# Patient Record
Sex: MALE | Race: WHITE | NOT HISPANIC OR LATINO | Employment: OTHER | URBAN - METROPOLITAN AREA
[De-identification: names, ages, dates, MRNs, and addresses within clinical notes are randomized per-mention and may not be internally consistent; named-entity substitution may affect disease eponyms.]

---

## 2018-11-26 LAB
CEA: NORMAL NG/ML
COMPLEXED PSA SERPL-MCNC: 5.2 NG/ML (ref 0–3)
FERRITIN SERPL-MCNC: 1158 NG/ML (ref 37–201)
HIV 1/2 EIA AB SCREEN: NEGATIVE

## 2018-11-27 LAB — HAPTOGLOBIN: 469 MG/DL (ref 34–200)

## 2018-11-28 LAB
ANA SER-ACNC: NEGATIVE
FLOW CYTOMETRY ANTIBODIES ANALYZED: NORMAL
MISCELLANEOUS LAB TEST ORDER: NORMAL

## 2018-12-11 ENCOUNTER — TELEPHONE (OUTPATIENT)
Dept: HEMATOLOGY/ONCOLOGY | Facility: CLINIC | Age: 58
End: 2018-12-11

## 2018-12-11 NOTE — TELEPHONE ENCOUNTER
----- Message from Dalila Hernandez sent at 12/11/2018  3:52 PM CST -----  Type: Needs Medical Advice    Who Called: Friend-Fallon Lima    Best Call Back Number: [phone]  Additional Information: Stating Dr. Moran office faxed over referral information to have the patient scheduled with Dr. Rich. Wanted to verify if received and to schedule if so

## 2018-12-11 NOTE — TELEPHONE ENCOUNTER
Spoke to Fallon Lima and informed her that we have not received the faxed information for a referral she's inquiring about. Mrs Lima stated staff at Dr Moran's office and was told that it had been send. Mrs Lima instructed to call the office again and have the information resend to .

## 2018-12-12 ENCOUNTER — TELEPHONE (OUTPATIENT)
Dept: HEMATOLOGY/ONCOLOGY | Facility: CLINIC | Age: 58
End: 2018-12-12

## 2018-12-22 ENCOUNTER — OUTSIDE PLACE OF SERVICE (OUTPATIENT)
Dept: HEMATOLOGY/ONCOLOGY | Facility: CLINIC | Age: 58
End: 2018-12-22
Payer: MEDICAID

## 2018-12-22 PROCEDURE — 99233 SBSQ HOSP IP/OBS HIGH 50: CPT | Mod: ,,, | Performed by: INTERNAL MEDICINE

## 2018-12-23 ENCOUNTER — OUTSIDE PLACE OF SERVICE (OUTPATIENT)
Dept: HEMATOLOGY/ONCOLOGY | Facility: CLINIC | Age: 58
End: 2018-12-23
Payer: MEDICAID

## 2018-12-23 PROCEDURE — 99233 SBSQ HOSP IP/OBS HIGH 50: CPT | Mod: ,,, | Performed by: INTERNAL MEDICINE

## 2018-12-23 PROCEDURE — 99233 PR SUBSEQUENT HOSPITAL CARE,LEVL III: ICD-10-PCS | Mod: ,,, | Performed by: INTERNAL MEDICINE

## 2018-12-24 ENCOUNTER — OUTSIDE PLACE OF SERVICE (OUTPATIENT)
Dept: HEMATOLOGY/ONCOLOGY | Facility: CLINIC | Age: 58
End: 2018-12-24
Payer: MEDICAID

## 2018-12-24 LAB
APTT PPP: 40.7 SEC (ref 26.2–34.7)
INR PPP: 1.6
LDH SERPL L TO P-CCNC: 278 IU/L (ref 100–194)
PROTHROMBIN TIME: 18.8 SEC (ref 11.7–14)
URATE SERPL-MCNC: 1.9 MG/DL (ref 2.6–7.8)

## 2018-12-24 PROCEDURE — 99233 PR SUBSEQUENT HOSPITAL CARE,LEVL III: ICD-10-PCS | Mod: ,,, | Performed by: INTERNAL MEDICINE

## 2018-12-24 PROCEDURE — 99233 SBSQ HOSP IP/OBS HIGH 50: CPT | Mod: ,,, | Performed by: INTERNAL MEDICINE

## 2018-12-26 ENCOUNTER — TELEPHONE (OUTPATIENT)
Dept: HEMATOLOGY/ONCOLOGY | Facility: CLINIC | Age: 58
End: 2018-12-26

## 2018-12-26 LAB
ALBUMIN SERPL-MCNC: 1.9 G/DL (ref 3.1–4.7)
ALP SERPL-CCNC: 212 IU/L (ref 40–104)
ALT (SGPT): 18 IU/L (ref 3–33)
APTT PPP: 41.6 SEC (ref 26.2–34.7)
AST SERPL-CCNC: 21 IU/L (ref 10–40)
BASOPHILS NFR BLD: 0 K/UL (ref 0–0.2)
BASOPHILS NFR BLD: 0.1 %
BILIRUB SERPL-MCNC: 1.5 MG/DL (ref 0.3–1)
BUN SERPL-MCNC: 18 MG/DL (ref 8–20)
CALCIUM SERPL-MCNC: 8.3 MG/DL (ref 7.7–10.4)
CHLORIDE: 102 MMOL/L (ref 98–110)
CO2 SERPL-SCNC: 25.3 MMOL/L (ref 22.8–31.6)
CREATININE: 0.7 MG/DL (ref 0.6–1.4)
EOSINOPHIL NFR BLD: 0.2 K/UL (ref 0–0.7)
EOSINOPHIL NFR BLD: 1.3 %
ERYTHROCYTE [DISTWIDTH] IN BLOOD BY AUTOMATED COUNT: 21.6 % (ref 11.7–14.9)
FIBRINOGEN ANTIGEN: 596 MG/DL (ref 162–449)
GLUCOSE: 114 MG/DL (ref 70–99)
GRAN #: 10.4 K/UL (ref 1.4–6.5)
GRAN%: 88.4 %
HCT VFR BLD AUTO: 28.3 % (ref 39–55)
HGB BLD-MCNC: 9 G/DL (ref 14–16)
IMMATURE GRANS (ABS): 0.3 K/UL (ref 0–1)
IMMATURE GRANULOCYTES: 2.7 %
INR PPP: 1.7
LDH SERPL L TO P-CCNC: 249 IU/L (ref 100–194)
LYMPH #: 0.7 K/UL (ref 1.2–3.4)
LYMPH%: 6.1 %
MCH RBC QN AUTO: 25.4 PG (ref 25–35)
MCHC RBC AUTO-ENTMCNC: 31.8 G/DL (ref 31–36)
MCV RBC AUTO: 79.9 FL (ref 80–100)
MONO #: 0.2 K/UL (ref 0.1–0.6)
MONO%: 1.4 %
NUCLEATED RBCS: 2 %
PLATELET # BLD AUTO: 107 K/UL (ref 140–440)
PMV BLD AUTO: 9.7 FL (ref 8.8–12.7)
POTASSIUM SERPL-SCNC: 3.1 MMOL/L (ref 3.5–5)
PROT SERPL-MCNC: 5.5 G/DL (ref 6–8.2)
PROTHROMBIN TIME: 19.7 SEC (ref 11.7–14)
RBC # BLD AUTO: 3.54 M/UL (ref 4.3–5.9)
SODIUM: 138 MMOL/L (ref 134–144)
URATE SERPL-MCNC: 2.1 MG/DL (ref 2.6–7.8)
WBC # BLD AUTO: 11.8 K/UL (ref 5–10)

## 2018-12-27 ENCOUNTER — OUTSIDE PLACE OF SERVICE (OUTPATIENT)
Dept: HEMATOLOGY/ONCOLOGY | Facility: CLINIC | Age: 58
End: 2018-12-27
Payer: MEDICAID

## 2018-12-27 LAB
ALBUMIN SERPL-MCNC: 2 G/DL (ref 3.1–4.7)
ALP SERPL-CCNC: 252 IU/L (ref 40–104)
ALT (SGPT): 18 IU/L (ref 3–33)
AST SERPL-CCNC: 19 IU/L (ref 10–40)
BASOPHILS NFR BLD: 0 K/UL (ref 0–0.2)
BASOPHILS NFR BLD: 0.2 %
BILIRUB SERPL-MCNC: 1.1 MG/DL (ref 0.3–1)
BUN SERPL-MCNC: 17 MG/DL (ref 8–20)
CALCIUM SERPL-MCNC: 8.7 MG/DL (ref 7.7–10.4)
CHLORIDE: 100 MMOL/L (ref 98–110)
CO2 SERPL-SCNC: 26.2 MMOL/L (ref 22.8–31.6)
CREATININE: 0.76 MG/DL (ref 0.6–1.4)
EOSINOPHIL NFR BLD: 0.1 K/UL (ref 0–0.7)
EOSINOPHIL NFR BLD: 1.1 %
ERYTHROCYTE [DISTWIDTH] IN BLOOD BY AUTOMATED COUNT: 22.3 % (ref 11.7–14.9)
GLUCOSE: 140 MG/DL (ref 70–99)
GRAN #: 10.7 K/UL (ref 1.4–6.5)
GRAN%: 89 %
HCT VFR BLD AUTO: 30.8 % (ref 39–55)
HGB BLD-MCNC: 9.6 G/DL (ref 14–16)
IMMATURE GRANS (ABS): 0.3 K/UL (ref 0–1)
IMMATURE GRANULOCYTES: 2.7 %
LYMPH #: 0.6 K/UL (ref 1.2–3.4)
LYMPH%: 5.2 %
MCH RBC QN AUTO: 24.9 PG (ref 25–35)
MCHC RBC AUTO-ENTMCNC: 31.2 G/DL (ref 31–36)
MCV RBC AUTO: 79.8 FL (ref 80–100)
MONO #: 0.2 K/UL (ref 0.1–0.6)
MONO%: 1.8 %
NUCLEATED RBCS: 3 %
PLATELET # BLD AUTO: 122 K/UL (ref 140–440)
PMV BLD AUTO: 9.4 FL (ref 8.8–12.7)
POTASSIUM SERPL-SCNC: 3.7 MMOL/L (ref 3.5–5)
PROT SERPL-MCNC: 5.9 G/DL (ref 6–8.2)
RBC # BLD AUTO: 3.86 M/UL (ref 4.3–5.9)
SODIUM: 136 MMOL/L (ref 134–144)
WBC # BLD AUTO: 12 K/UL (ref 5–10)

## 2018-12-27 PROCEDURE — 99233 PR SUBSEQUENT HOSPITAL CARE,LEVL III: ICD-10-PCS | Mod: ,,, | Performed by: INTERNAL MEDICINE

## 2018-12-27 PROCEDURE — 99233 SBSQ HOSP IP/OBS HIGH 50: CPT | Mod: ,,, | Performed by: INTERNAL MEDICINE

## 2018-12-28 ENCOUNTER — OUTSIDE PLACE OF SERVICE (OUTPATIENT)
Dept: HEMATOLOGY/ONCOLOGY | Facility: CLINIC | Age: 58
End: 2018-12-28
Payer: MEDICAID

## 2018-12-28 ENCOUNTER — TELEPHONE (OUTPATIENT)
Dept: HEMATOLOGY/ONCOLOGY | Facility: CLINIC | Age: 58
End: 2018-12-28

## 2018-12-28 PROCEDURE — 99232 SBSQ HOSP IP/OBS MODERATE 35: CPT | Mod: ,,, | Performed by: INTERNAL MEDICINE

## 2018-12-28 PROCEDURE — 99232 PR SUBSEQUENT HOSPITAL CARE,LEVL II: ICD-10-PCS | Mod: ,,, | Performed by: INTERNAL MEDICINE

## 2018-12-28 NOTE — TELEPHONE ENCOUNTER
----- Message from Patty Edmond sent at 12/28/2018 12:49 PM CST -----  Contact: [facility]  Dr. Patel is trying to get in touch with Dr. Rich regarding this patient.  Please call [phone].  He states this is urgent.

## 2018-12-31 ENCOUNTER — TELEPHONE (OUTPATIENT)
Dept: HEMATOLOGY/ONCOLOGY | Facility: CLINIC | Age: 58
End: 2018-12-31

## 2018-12-31 NOTE — TELEPHONE ENCOUNTER
Called and spoke to pt daughter eliseo. She stated that she lives out of town and is the only relative available to help out with her father.Eliseo stated that pt is currently admitted in [facility]. I informed eliseo that she is not on patients plan of care so I cannot speak to her about pt health or concerns. I informed pt that I will contact her Wednesday when the office opens to discuss what she needs to do to be put on pt plan of care. Eliseo stated that pt is confused so he cant verbally say its ok for her to speak about his care. Eliseo requested to speak with the Dr. I informed eliseo that I will speak with Dr. Rich Wednesday and give her a call back. Eliseo verbalized understanding.

## 2018-12-31 NOTE — TELEPHONE ENCOUNTER
----- Message from Quynh Hoyt sent at 12/31/2018 12:45 PM CST -----  Contact: daughter  Daughter - Dottie Coker - [phone] is calling/patient is in [facility] at this time under the care of Dr Rich/daughter lives about 10 hours away and is the only living relative for patient/daughter has been calling for at least four days trying to talk to Dr Rich but she has not received any return call/she is asking for Dr Rich to please call her concerning her father/also daughter is asking for a recommendation for assisted living/please call daughter as soon as possible

## 2019-01-04 ENCOUNTER — TELEPHONE (OUTPATIENT)
Dept: HEMATOLOGY/ONCOLOGY | Facility: CLINIC | Age: 59
End: 2019-01-04

## 2019-01-04 NOTE — TELEPHONE ENCOUNTER
Nicole from [employer] called to give a corrected Aptt result. Initially it was 31.2 but the correct result is a clotted sample.

## 2019-01-08 ENCOUNTER — OUTSIDE PLACE OF SERVICE (OUTPATIENT)
Dept: HEMATOLOGY/ONCOLOGY | Facility: CLINIC | Age: 59
End: 2019-01-08
Payer: MEDICAID

## 2019-01-08 ENCOUNTER — TELEPHONE (OUTPATIENT)
Dept: HEMATOLOGY/ONCOLOGY | Facility: CLINIC | Age: 59
End: 2019-01-08

## 2019-01-08 DIAGNOSIS — C81.18 NODULAR SCLEROSIS HODGKIN LYMPHOMA OF LYMPH NODES OF MULTIPLE REGIONS: ICD-10-CM

## 2019-01-08 PROCEDURE — 99233 SBSQ HOSP IP/OBS HIGH 50: CPT | Mod: ,,, | Performed by: INTERNAL MEDICINE

## 2019-01-08 PROCEDURE — 99233 PR SUBSEQUENT HOSPITAL CARE,LEVL III: ICD-10-PCS | Mod: ,,, | Performed by: INTERNAL MEDICINE

## 2019-01-08 RX ORDER — SODIUM CHLORIDE 0.9 % (FLUSH) 0.9 %
10 SYRINGE (ML) INJECTION
Status: CANCELLED | OUTPATIENT
Start: 2019-01-22

## 2019-01-08 RX ORDER — HEPARIN 100 UNIT/ML
500 SYRINGE INTRAVENOUS
Status: CANCELLED | OUTPATIENT
Start: 2019-01-22

## 2019-01-08 RX ORDER — SODIUM CHLORIDE 0.9 % (FLUSH) 0.9 %
10 SYRINGE (ML) INJECTION
Status: CANCELLED | OUTPATIENT
Start: 2019-01-08

## 2019-01-08 RX ORDER — HEPARIN 100 UNIT/ML
500 SYRINGE INTRAVENOUS
Status: CANCELLED | OUTPATIENT
Start: 2019-01-08

## 2019-01-08 RX ORDER — DOXORUBICIN HYDROCHLORIDE 2 MG/ML
25 INJECTION, SOLUTION INTRAVENOUS
Status: CANCELLED | OUTPATIENT
Start: 2019-01-08

## 2019-01-08 RX ORDER — DOXORUBICIN HYDROCHLORIDE 2 MG/ML
25 INJECTION, SOLUTION INTRAVENOUS
Status: CANCELLED | OUTPATIENT
Start: 2019-01-22

## 2019-01-08 NOTE — TELEPHONE ENCOUNTER
----- Message from Asiya Gibson sent at 1/8/2019  1:02 PM CST -----  Contact: olivia brown/ [facility] ph#[phone]  olivia brown/ [facility] ph#[phone]  Requesting clarification of chemo orders

## 2019-01-09 ENCOUNTER — TELEPHONE (OUTPATIENT)
Dept: HEMATOLOGY/ONCOLOGY | Facility: CLINIC | Age: 59
End: 2019-01-09

## 2019-01-09 NOTE — TELEPHONE ENCOUNTER
----- Message from Kalyn Matias LPN sent at 1/8/2019  4:22 PM CST -----  Contact: olivia brown/ [facility] ph#[phone]       ----- Message -----  From: Asiya Gibson  Sent: 1/8/2019   3:13 PM  To: Hilario REEVES Staff    [phone]   Requesting clarification of chemo orders, treatment plan   Fax#[phone]

## 2019-01-09 NOTE — TELEPHONE ENCOUNTER
----- Message from Abdias Romeo LPN sent at 1/8/2019  4:12 PM CST -----  Contact: olivia brown/ [facility] ph#[phone]       ----- Message -----  From: Asiya Gibson  Sent: 1/8/2019   3:13 PM  To: Hilario REEVES Staff    olivia brown/ [facility] ph#[phone]   Requesting clarification of chemo orders, treatment plan   Fax#[phone]

## 2019-01-10 ENCOUNTER — OUTSIDE PLACE OF SERVICE (OUTPATIENT)
Dept: HEMATOLOGY/ONCOLOGY | Facility: CLINIC | Age: 59
End: 2019-01-10
Payer: MEDICAID

## 2019-01-10 PROCEDURE — 99232 PR SUBSEQUENT HOSPITAL CARE,LEVL II: ICD-10-PCS | Mod: ,,, | Performed by: INTERNAL MEDICINE

## 2019-01-10 PROCEDURE — 99232 SBSQ HOSP IP/OBS MODERATE 35: CPT | Mod: ,,, | Performed by: INTERNAL MEDICINE

## 2019-01-14 DIAGNOSIS — E61.1 IRON DEFICIENCY: Primary | ICD-10-CM

## 2019-01-15 ENCOUNTER — OUTSIDE PLACE OF SERVICE (OUTPATIENT)
Dept: HEMATOLOGY/ONCOLOGY | Facility: CLINIC | Age: 59
End: 2019-01-15
Payer: MEDICAID

## 2019-01-15 PROCEDURE — 99231 PR SUBSEQUENT HOSPITAL CARE,LEVL I: ICD-10-PCS | Mod: ,,, | Performed by: INTERNAL MEDICINE

## 2019-01-15 PROCEDURE — 99231 SBSQ HOSP IP/OBS SF/LOW 25: CPT | Mod: ,,, | Performed by: INTERNAL MEDICINE

## 2019-01-16 ENCOUNTER — OUTSIDE PLACE OF SERVICE (OUTPATIENT)
Dept: HEMATOLOGY/ONCOLOGY | Facility: CLINIC | Age: 59
End: 2019-01-16
Payer: MEDICAID

## 2019-01-16 PROCEDURE — 99233 PR SUBSEQUENT HOSPITAL CARE,LEVL III: ICD-10-PCS | Mod: ,,, | Performed by: INTERNAL MEDICINE

## 2019-01-16 PROCEDURE — 99233 SBSQ HOSP IP/OBS HIGH 50: CPT | Mod: ,,, | Performed by: INTERNAL MEDICINE

## 2019-01-18 ENCOUNTER — OUTSIDE PLACE OF SERVICE (OUTPATIENT)
Dept: HEMATOLOGY/ONCOLOGY | Facility: CLINIC | Age: 59
End: 2019-01-18
Payer: MEDICAID

## 2019-01-18 PROCEDURE — 99233 SBSQ HOSP IP/OBS HIGH 50: CPT | Mod: ,,, | Performed by: INTERNAL MEDICINE

## 2019-01-18 PROCEDURE — 99233 PR SUBSEQUENT HOSPITAL CARE,LEVL III: ICD-10-PCS | Mod: ,,, | Performed by: INTERNAL MEDICINE

## 2019-01-20 LAB
ALBUMIN SERPL-MCNC: 1.7 G/DL (ref 3.1–4.7)
ALP SERPL-CCNC: 175 IU/L (ref 40–104)
ALT (SGPT): 10 IU/L (ref 3–33)
AST SERPL-CCNC: 32 IU/L (ref 10–40)
BILIRUB SERPL-MCNC: 1.3 MG/DL (ref 0.3–1)
BUN SERPL-MCNC: 15 MG/DL (ref 8–20)
CALCIUM SERPL-MCNC: 7.2 MG/DL (ref 7.7–10.4)
CHLORIDE: 103 MMOL/L (ref 98–110)
CO2 SERPL-SCNC: 27.9 MMOL/L (ref 22.8–31.6)
CREATININE: 0.49 MG/DL (ref 0.6–1.4)
GLUCOSE: 106 MG/DL (ref 70–99)
MAGNESIUM SERPL-MCNC: 1.6 MG/DL (ref 1.5–2.6)
PHOSPHATE FLD-MCNC: 3.3 MG/DL (ref 2.5–4.9)
POTASSIUM SERPL-SCNC: 3.4 MMOL/L (ref 3.5–5)
PROT SERPL-MCNC: 4.5 G/DL (ref 6–8.2)
SODIUM: 138 MMOL/L (ref 134–144)

## 2019-01-21 ENCOUNTER — OUTSIDE PLACE OF SERVICE (OUTPATIENT)
Dept: HEMATOLOGY/ONCOLOGY | Facility: CLINIC | Age: 59
End: 2019-01-21
Payer: MEDICAID

## 2019-01-21 LAB
ALBUMIN SERPL-MCNC: 1.7 G/DL (ref 3.1–4.7)
ALP SERPL-CCNC: 187 IU/L (ref 40–104)
ALT (SGPT): 10 IU/L (ref 3–33)
AST SERPL-CCNC: 15 IU/L (ref 10–40)
BILIRUB SERPL-MCNC: 1.1 MG/DL (ref 0.3–1)
BUN SERPL-MCNC: 14 MG/DL (ref 8–20)
CALCIUM SERPL-MCNC: 7.8 MG/DL (ref 7.7–10.4)
CHLORIDE: 105 MMOL/L (ref 98–110)
CO2 SERPL-SCNC: 28.9 MMOL/L (ref 22.8–31.6)
CREATININE: 0.51 MG/DL (ref 0.6–1.4)
GLUCOSE: 99 MG/DL (ref 70–99)
MAGNESIUM SERPL-MCNC: 1.9 MG/DL (ref 1.5–2.6)
PHOSPHATE FLD-MCNC: 3.1 MG/DL (ref 2.5–4.9)
POTASSIUM SERPL-SCNC: 3.4 MMOL/L (ref 3.5–5)
PROT SERPL-MCNC: 4.3 G/DL (ref 6–8.2)
SODIUM: 141 MMOL/L (ref 134–144)

## 2019-01-21 PROCEDURE — 99233 SBSQ HOSP IP/OBS HIGH 50: CPT | Mod: ,,, | Performed by: INTERNAL MEDICINE

## 2019-01-21 PROCEDURE — 99233 PR SUBSEQUENT HOSPITAL CARE,LEVL III: ICD-10-PCS | Mod: ,,, | Performed by: INTERNAL MEDICINE

## 2019-01-22 ENCOUNTER — OUTSIDE PLACE OF SERVICE (OUTPATIENT)
Dept: HEMATOLOGY/ONCOLOGY | Facility: CLINIC | Age: 59
End: 2019-01-22
Payer: MEDICAID

## 2019-01-22 LAB
ALBUMIN SERPL-MCNC: 1.9 G/DL (ref 3.1–4.7)
ALP SERPL-CCNC: 209 IU/L (ref 40–104)
ALT (SGPT): 13 IU/L (ref 3–33)
AST SERPL-CCNC: 21 IU/L (ref 10–40)
BASOPHILS NFR BLD: 0 K/UL (ref 0–0.2)
BASOPHILS NFR BLD: 0.3 %
BILIRUB SERPL-MCNC: 1.4 MG/DL (ref 0.3–1)
BUN SERPL-MCNC: 14 MG/DL (ref 8–20)
CALCIUM SERPL-MCNC: 8.1 MG/DL (ref 7.7–10.4)
CHLORIDE: 103 MMOL/L (ref 98–110)
CO2 SERPL-SCNC: 28.7 MMOL/L (ref 22.8–31.6)
CREATININE: 0.64 MG/DL (ref 0.6–1.4)
EOSINOPHIL NFR BLD: 0 K/UL (ref 0–0.7)
EOSINOPHIL NFR BLD: 0.1 %
ERYTHROCYTE [DISTWIDTH] IN BLOOD BY AUTOMATED COUNT: 20.6 % (ref 11.7–14.9)
GLUCOSE: 97 MG/DL (ref 70–99)
GRAN #: 7 K/UL (ref 1.4–6.5)
GRAN%: 57.9 %
HCT VFR BLD AUTO: 27.8 % (ref 39–55)
HGB BLD-MCNC: 9 G/DL (ref 14–16)
IMMATURE GRANS (ABS): 2.3 K/UL (ref 0–1)
IMMATURE GRANULOCYTES: 19.2 %
IMMATURE PLATELET FRACTION: 4 % (ref 0.5–7.5)
LYMPH #: 1.8 K/UL (ref 1.2–3.4)
LYMPH%: 15 %
MAGNESIUM SERPL-MCNC: 1.9 MG/DL (ref 1.5–2.6)
MCH RBC QN AUTO: 26.9 PG (ref 25–35)
MCHC RBC AUTO-ENTMCNC: 32.4 G/DL (ref 31–36)
MCV RBC AUTO: 83.2 FL (ref 80–100)
MONO #: 0.9 K/UL (ref 0.1–0.6)
MONO%: 7.5 %
NUCLEATED RBCS: 4 %
PHOSPHATE FLD-MCNC: 3.4 MG/DL (ref 2.5–4.9)
PLATELET # BLD AUTO: 88 K/UL (ref 140–440)
PMV BLD AUTO: 10.5 FL (ref 8.8–12.7)
POTASSIUM SERPL-SCNC: 3.8 MMOL/L (ref 3.5–5)
PROT SERPL-MCNC: 4.7 G/DL (ref 6–8.2)
RBC # BLD AUTO: 3.34 M/UL (ref 4.3–5.9)
SODIUM: 139 MMOL/L (ref 134–144)
WBC # BLD AUTO: 12 K/UL (ref 5–10)

## 2019-01-22 PROCEDURE — 99233 PR SUBSEQUENT HOSPITAL CARE,LEVL III: ICD-10-PCS | Mod: ,,, | Performed by: INTERNAL MEDICINE

## 2019-01-22 PROCEDURE — 99233 SBSQ HOSP IP/OBS HIGH 50: CPT | Mod: ,,, | Performed by: INTERNAL MEDICINE

## 2019-01-23 ENCOUNTER — OUTSIDE PLACE OF SERVICE (OUTPATIENT)
Dept: HEMATOLOGY/ONCOLOGY | Facility: CLINIC | Age: 59
End: 2019-01-23
Payer: MEDICAID

## 2019-01-23 PROCEDURE — 99233 PR SUBSEQUENT HOSPITAL CARE,LEVL III: ICD-10-PCS | Mod: ,,, | Performed by: INTERNAL MEDICINE

## 2019-01-23 PROCEDURE — 99233 SBSQ HOSP IP/OBS HIGH 50: CPT | Mod: ,,, | Performed by: INTERNAL MEDICINE

## 2019-01-24 ENCOUNTER — OUTSIDE PLACE OF SERVICE (OUTPATIENT)
Dept: HEMATOLOGY/ONCOLOGY | Facility: CLINIC | Age: 59
End: 2019-01-24

## 2019-01-24 LAB
BASOPHILS NFR BLD: 0.1 K/UL (ref 0–0.2)
BASOPHILS NFR BLD: 0.7 %
BUN SERPL-MCNC: 14 MG/DL (ref 8–20)
CALCIUM SERPL-MCNC: 7.8 MG/DL (ref 7.7–10.4)
CHLORIDE: 106 MMOL/L (ref 98–110)
CO2 SERPL-SCNC: 28.2 MMOL/L (ref 22.8–31.6)
CREATININE: 0.49 MG/DL (ref 0.6–1.4)
EOSINOPHIL NFR BLD: 0 %
EOSINOPHIL NFR BLD: 0 K/UL (ref 0–0.7)
ERYTHROCYTE [DISTWIDTH] IN BLOOD BY AUTOMATED COUNT: 20.4 % (ref 11.7–14.9)
GLUCOSE: 90 MG/DL (ref 70–99)
GRAN #: 9.5 K/UL (ref 1.4–6.5)
GRAN%: 56.9 %
HCT VFR BLD AUTO: 25.3 % (ref 39–55)
HGB BLD-MCNC: 8 G/DL (ref 14–16)
IMMATURE GRANS (ABS): 2.7 K/UL (ref 0–1)
IMMATURE GRANULOCYTES: 16.1 %
LYMPH #: 3.3 K/UL (ref 1.2–3.4)
LYMPH%: 20 %
MAGNESIUM SERPL-MCNC: 1.9 MG/DL (ref 1.5–2.6)
MCH RBC QN AUTO: 26.1 PG (ref 25–35)
MCHC RBC AUTO-ENTMCNC: 31.6 G/DL (ref 31–36)
MCV RBC AUTO: 82.7 FL (ref 80–100)
MONO #: 1 K/UL (ref 0.1–0.6)
MONO%: 6.3 %
NUCLEATED RBCS: 5 %
PHOSPHATE FLD-MCNC: 3.4 MG/DL (ref 2.5–4.9)
PLATELET # BLD AUTO: 70 K/UL (ref 140–440)
PMV BLD AUTO: 9.6 FL (ref 8.8–12.7)
POTASSIUM SERPL-SCNC: 3.3 MMOL/L (ref 3.5–5)
RBC # BLD AUTO: 3.06 M/UL (ref 4.3–5.9)
SODIUM: 141 MMOL/L (ref 134–144)
WBC # BLD AUTO: 16.6 K/UL (ref 5–10)

## 2019-01-25 ENCOUNTER — OUTSIDE PLACE OF SERVICE (OUTPATIENT)
Dept: HEMATOLOGY/ONCOLOGY | Facility: CLINIC | Age: 59
End: 2019-01-25
Payer: MEDICAID

## 2019-01-25 LAB
BASOPHILS NFR BLD: 0.1 K/UL (ref 0–0.2)
BASOPHILS NFR BLD: 0.5 %
BUN SERPL-MCNC: 12 MG/DL (ref 8–20)
CALCIUM SERPL-MCNC: 7.9 MG/DL (ref 7.7–10.4)
CHLORIDE: 107 MMOL/L (ref 98–110)
CO2 SERPL-SCNC: 27.5 MMOL/L (ref 22.8–31.6)
CREATININE: 0.48 MG/DL (ref 0.6–1.4)
EOSINOPHIL NFR BLD: 0 %
EOSINOPHIL NFR BLD: 0 K/UL (ref 0–0.7)
ERYTHROCYTE [DISTWIDTH] IN BLOOD BY AUTOMATED COUNT: 20.8 % (ref 11.7–14.9)
GLUCOSE: 91 MG/DL (ref 70–99)
GRAN #: 10.5 K/UL (ref 1.4–6.5)
GRAN%: 53.6 %
HCT VFR BLD AUTO: 26 % (ref 39–55)
HGB BLD-MCNC: 8.3 G/DL (ref 14–16)
IMMATURE GRANS (ABS): 2.6 K/UL (ref 0–1)
IMMATURE GRANULOCYTES: 13.5 %
LYMPH #: 5.1 K/UL (ref 1.2–3.4)
LYMPH%: 26 %
MAGNESIUM SERPL-MCNC: 2 MG/DL (ref 1.5–2.6)
MCH RBC QN AUTO: 26.8 PG (ref 25–35)
MCHC RBC AUTO-ENTMCNC: 31.9 G/DL (ref 31–36)
MCV RBC AUTO: 83.9 FL (ref 80–100)
MONO #: 1.3 K/UL (ref 0.1–0.6)
MONO%: 6.4 %
NUCLEATED RBCS: 8 %
PHOSPHATE FLD-MCNC: 3.6 MG/DL (ref 2.5–4.9)
PLATELET # BLD AUTO: 71 K/UL (ref 140–440)
PMV BLD AUTO: 9.7 FL (ref 8.8–12.7)
POTASSIUM SERPL-SCNC: 3.7 MMOL/L (ref 3.5–5)
RBC # BLD AUTO: 3.1 M/UL (ref 4.3–5.9)
SODIUM: 141 MMOL/L (ref 134–144)
WBC # BLD AUTO: 19.5 K/UL (ref 5–10)

## 2019-01-25 PROCEDURE — 99233 PR SUBSEQUENT HOSPITAL CARE,LEVL III: ICD-10-PCS | Mod: ,,, | Performed by: INTERNAL MEDICINE

## 2019-01-25 PROCEDURE — 99233 SBSQ HOSP IP/OBS HIGH 50: CPT | Mod: ,,, | Performed by: INTERNAL MEDICINE

## 2019-01-26 LAB
BASOPHILS NFR BLD: 0.2 K/UL (ref 0–0.2)
BASOPHILS NFR BLD: 0.6 %
BUN SERPL-MCNC: 18 MG/DL (ref 8–20)
CALCIUM SERPL-MCNC: 7.7 MG/DL (ref 7.7–10.4)
CHLORIDE: 106 MMOL/L (ref 98–110)
CO2 SERPL-SCNC: 27.5 MMOL/L (ref 22.8–31.6)
CREATININE: 0.62 MG/DL (ref 0.6–1.4)
EOSINOPHIL NFR BLD: 0 %
EOSINOPHIL NFR BLD: 0 K/UL (ref 0–0.7)
ERYTHROCYTE [DISTWIDTH] IN BLOOD BY AUTOMATED COUNT: 21.1 % (ref 11.7–14.9)
GLUCOSE: 170 MG/DL (ref 70–99)
GRAN #: 16.4 K/UL (ref 1.4–6.5)
GRAN%: 65.9 %
HCT VFR BLD AUTO: 23 % (ref 39–55)
HGB BLD-MCNC: 7.4 G/DL (ref 14–16)
IMMATURE GRANS (ABS): 2 K/UL (ref 0–1)
IMMATURE GRANULOCYTES: 7.9 %
LYMPH #: 4.9 K/UL (ref 1.2–3.4)
LYMPH%: 19.7 %
MAGNESIUM SERPL-MCNC: 2 MG/DL (ref 1.5–2.6)
MCH RBC QN AUTO: 27.2 PG (ref 25–35)
MCHC RBC AUTO-ENTMCNC: 32.2 G/DL (ref 31–36)
MCV RBC AUTO: 84.6 FL (ref 80–100)
MONO #: 1.5 K/UL (ref 0.1–0.6)
MONO%: 5.9 %
NUCLEATED RBCS: 3 %
PHOSPHATE FLD-MCNC: 4.4 MG/DL (ref 2.5–4.9)
PLATELET # BLD AUTO: 68 K/UL (ref 140–440)
PMV BLD AUTO: 11.2 FL (ref 8.8–12.7)
POTASSIUM SERPL-SCNC: 4 MMOL/L (ref 3.5–5)
RBC # BLD AUTO: 2.72 M/UL (ref 4.3–5.9)
SODIUM: 139 MMOL/L (ref 134–144)
WBC # BLD AUTO: 24.9 K/UL (ref 5–10)

## 2019-01-27 LAB
BASOPHILS NFR BLD: 0 K/UL (ref 0–0.2)
BASOPHILS NFR BLD: 0.2 %
BUN SERPL-MCNC: 17 MG/DL (ref 8–20)
CALCIUM SERPL-MCNC: 7.9 MG/DL (ref 7.7–10.4)
CHLORIDE: 108 MMOL/L (ref 98–110)
CO2 SERPL-SCNC: 28.2 MMOL/L (ref 22.8–31.6)
CREATININE: 0.63 MG/DL (ref 0.6–1.4)
EOSINOPHIL NFR BLD: 0 K/UL (ref 0–0.7)
EOSINOPHIL NFR BLD: 0.1 %
ERYTHROCYTE [DISTWIDTH] IN BLOOD BY AUTOMATED COUNT: 21 % (ref 11.7–14.9)
GLUCOSE: 87 MG/DL (ref 70–99)
GRAN #: 6.8 K/UL (ref 1.4–6.5)
GRAN%: 78.1 %
HCT VFR BLD AUTO: 21.4 % (ref 39–55)
HGB BLD-MCNC: 6.9 G/DL (ref 14–16)
IMMATURE GRANS (ABS): 0.3 K/UL (ref 0–1)
IMMATURE GRANULOCYTES: 3.2 %
LYMPH #: 1.2 K/UL (ref 1.2–3.4)
LYMPH%: 13.7 %
MAGNESIUM SERPL-MCNC: 2 MG/DL (ref 1.5–2.6)
MCH RBC QN AUTO: 26.8 PG (ref 25–35)
MCHC RBC AUTO-ENTMCNC: 32.2 G/DL (ref 31–36)
MCV RBC AUTO: 83.3 FL (ref 80–100)
MONO #: 0.4 K/UL (ref 0.1–0.6)
MONO%: 4.7 %
NUCLEATED RBCS: 1 %
PHOSPHATE FLD-MCNC: 3.7 MG/DL (ref 2.5–4.9)
PLATELET # BLD AUTO: 58 K/UL (ref 140–440)
PMV BLD AUTO: 10.2 FL (ref 8.8–12.7)
POTASSIUM SERPL-SCNC: 3.7 MMOL/L (ref 3.5–5)
RBC # BLD AUTO: 2.57 M/UL (ref 4.3–5.9)
SODIUM: 143 MMOL/L (ref 134–144)
WBC # BLD AUTO: 8.7 K/UL (ref 5–10)

## 2019-01-28 ENCOUNTER — OUTSIDE PLACE OF SERVICE (OUTPATIENT)
Dept: HEMATOLOGY/ONCOLOGY | Facility: CLINIC | Age: 59
End: 2019-01-28
Payer: MEDICAID

## 2019-01-28 PROCEDURE — 99233 PR SUBSEQUENT HOSPITAL CARE,LEVL III: ICD-10-PCS | Mod: ,,, | Performed by: INTERNAL MEDICINE

## 2019-01-28 PROCEDURE — 99233 SBSQ HOSP IP/OBS HIGH 50: CPT | Mod: ,,, | Performed by: INTERNAL MEDICINE

## 2019-01-29 LAB
BASOPHILS NFR BLD: 0 K/UL (ref 0–0.2)
BASOPHILS NFR BLD: 0.5 %
EOSINOPHIL NFR BLD: 0 K/UL (ref 0–0.7)
EOSINOPHIL NFR BLD: 0.3 %
ERYTHROCYTE [DISTWIDTH] IN BLOOD BY AUTOMATED COUNT: 19.1 % (ref 11.7–14.9)
GRAN #: 2.5 K/UL (ref 1.4–6.5)
GRAN%: 65.5 %
HCT VFR BLD AUTO: 25.9 % (ref 39–55)
HGB BLD-MCNC: 8.6 G/DL (ref 14–16)
IMMATURE GRANS (ABS): 0 K/UL (ref 0–1)
IMMATURE GRANULOCYTES: 0.5 %
IMMATURE PLATELET FRACTION: 1.7 % (ref 0.5–7.5)
LYMPH #: 1.1 K/UL (ref 1.2–3.4)
LYMPH%: 28.9 %
MCH RBC QN AUTO: 27.4 PG (ref 25–35)
MCHC RBC AUTO-ENTMCNC: 33.2 G/DL (ref 31–36)
MCV RBC AUTO: 82.5 FL (ref 80–100)
MONO #: 0.2 K/UL (ref 0.1–0.6)
MONO%: 4.3 %
NUCLEATED RBCS: 0 %
PLATELET # BLD AUTO: 45 K/UL (ref 140–440)
PMV BLD AUTO: 9.4 FL (ref 8.8–12.7)
RBC # BLD AUTO: 3.14 M/UL (ref 4.3–5.9)
WBC # BLD AUTO: 3.7 K/UL (ref 5–10)

## 2019-01-30 ENCOUNTER — OUTSIDE PLACE OF SERVICE (OUTPATIENT)
Dept: HEMATOLOGY/ONCOLOGY | Facility: CLINIC | Age: 59
End: 2019-01-30
Payer: MEDICAID

## 2019-01-30 LAB — FLOW CYTOMETRY ANTIBODIES ANALYZED: NORMAL

## 2019-01-30 PROCEDURE — 99232 PR SUBSEQUENT HOSPITAL CARE,LEVL II: ICD-10-PCS | Mod: ,,, | Performed by: INTERNAL MEDICINE

## 2019-01-30 PROCEDURE — 99232 SBSQ HOSP IP/OBS MODERATE 35: CPT | Mod: ,,, | Performed by: INTERNAL MEDICINE

## 2019-01-31 ENCOUNTER — OUTSIDE PLACE OF SERVICE (OUTPATIENT)
Dept: HEMATOLOGY/ONCOLOGY | Facility: CLINIC | Age: 59
End: 2019-01-31
Payer: MEDICAID

## 2019-01-31 LAB
APTT PPP: 41.1 SEC (ref 26.2–34.7)
BASOPHILS NFR BLD: 0.1 K/UL (ref 0–0.2)
BASOPHILS NFR BLD: 0.6 %
BUN SERPL-MCNC: 15 MG/DL (ref 8–20)
CALCIUM SERPL-MCNC: 8 MG/DL (ref 7.7–10.4)
CHLORIDE: 107 MMOL/L (ref 98–110)
CO2 SERPL-SCNC: 25.7 MMOL/L (ref 22.8–31.6)
CREATININE: 0.59 MG/DL (ref 0.6–1.4)
EOSINOPHIL NFR BLD: 0 K/UL (ref 0–0.7)
EOSINOPHIL NFR BLD: 0.1 %
ERYTHROCYTE [DISTWIDTH] IN BLOOD BY AUTOMATED COUNT: 18.8 % (ref 11.7–14.9)
GLUCOSE: 89 MG/DL (ref 70–99)
GRAN #: 9.4 K/UL (ref 1.4–6.5)
GRAN%: 70.6 %
HCT VFR BLD AUTO: 23.3 % (ref 39–55)
HGB BLD-MCNC: 7.8 G/DL (ref 14–16)
IMMATURE GRANS (ABS): 1.7 K/UL (ref 0–1)
IMMATURE GRANULOCYTES: 12.9 %
INR PPP: 1.2
LYMPH #: 2 K/UL (ref 1.2–3.4)
LYMPH%: 15.2 %
MCH RBC QN AUTO: 27.3 PG (ref 25–35)
MCHC RBC AUTO-ENTMCNC: 33.5 G/DL (ref 31–36)
MCV RBC AUTO: 81.5 FL (ref 80–100)
MONO #: 0.1 K/UL (ref 0.1–0.6)
MONO%: 0.6 %
NUCLEATED RBCS: 0 %
PLATELET # BLD AUTO: 52 K/UL (ref 140–440)
PMV BLD AUTO: 8.9 FL (ref 8.8–12.7)
POTASSIUM SERPL-SCNC: 3.6 MMOL/L (ref 3.5–5)
PROTHROMBIN TIME: 14.4 SEC (ref 11.7–14)
RBC # BLD AUTO: 2.86 M/UL (ref 4.3–5.9)
SODIUM: 141 MMOL/L (ref 134–144)
URATE SERPL-MCNC: 2.2 MG/DL (ref 2.6–7.8)
WBC # BLD AUTO: 13.3 K/UL (ref 5–10)

## 2019-01-31 PROCEDURE — 99232 PR SUBSEQUENT HOSPITAL CARE,LEVL II: ICD-10-PCS | Mod: ,,, | Performed by: INTERNAL MEDICINE

## 2019-01-31 PROCEDURE — 99232 SBSQ HOSP IP/OBS MODERATE 35: CPT | Mod: ,,, | Performed by: INTERNAL MEDICINE

## 2019-02-02 ENCOUNTER — OUTSIDE PLACE OF SERVICE (OUTPATIENT)
Dept: HEMATOLOGY/ONCOLOGY | Facility: CLINIC | Age: 59
End: 2019-02-02
Payer: MEDICAID

## 2019-02-02 LAB
BASOPHILS NFR BLD: 0 K/UL (ref 0–0.2)
BASOPHILS NFR BLD: 0.6 %
BUN SERPL-MCNC: 17 MG/DL (ref 8–20)
CALCIUM SERPL-MCNC: 8 MG/DL (ref 7.7–10.4)
CHLORIDE: 106 MMOL/L (ref 98–110)
CO2 SERPL-SCNC: 26 MMOL/L (ref 22.8–31.6)
CREATININE: 0.44 MG/DL (ref 0.6–1.4)
EOSINOPHIL NFR BLD: 0 K/UL (ref 0–0.7)
EOSINOPHIL NFR BLD: 0.3 %
ERYTHROCYTE [DISTWIDTH] IN BLOOD BY AUTOMATED COUNT: 17.9 % (ref 11.7–14.9)
GLUCOSE: 84 MG/DL (ref 70–99)
GRAN #: 1.3 K/UL (ref 1.4–6.5)
GRAN%: 41.5 %
HCT VFR BLD AUTO: 28.4 % (ref 39–55)
HGB BLD-MCNC: 9.9 G/DL (ref 14–16)
IMMATURE GRANS (ABS): 0 K/UL (ref 0–1)
IMMATURE GRANULOCYTES: 0.6 %
LYMPH #: 1.7 K/UL (ref 1.2–3.4)
LYMPH%: 53.9 %
MCH RBC QN AUTO: 28.9 PG (ref 25–35)
MCHC RBC AUTO-ENTMCNC: 34.9 G/DL (ref 31–36)
MCV RBC AUTO: 83 FL (ref 80–100)
MONO #: 0.1 K/UL (ref 0.1–0.6)
MONO%: 3.1 %
NUCLEATED RBCS: 0 %
PLATELET # BLD AUTO: 57 K/UL (ref 140–440)
PMV BLD AUTO: 8.9 FL (ref 8.8–12.7)
POTASSIUM SERPL-SCNC: 3.3 MMOL/L (ref 3.5–5)
RBC # BLD AUTO: 3.42 M/UL (ref 4.3–5.9)
SODIUM: 138 MMOL/L (ref 134–144)
WBC # BLD AUTO: 3.2 K/UL (ref 5–10)

## 2019-02-02 PROCEDURE — 99233 PR SUBSEQUENT HOSPITAL CARE,LEVL III: ICD-10-PCS | Mod: ,,, | Performed by: INTERNAL MEDICINE

## 2019-02-02 PROCEDURE — 99233 SBSQ HOSP IP/OBS HIGH 50: CPT | Mod: ,,, | Performed by: INTERNAL MEDICINE

## 2019-02-03 LAB
ALBUMIN SERPL-MCNC: 2.3 G/DL (ref 3.1–4.7)
ALP SERPL-CCNC: 143 IU/L (ref 40–104)
ALT (SGPT): 17 IU/L (ref 3–33)
AST SERPL-CCNC: 18 IU/L (ref 10–40)
BASOPHILS NFR BLD: 0 K/UL (ref 0–0.2)
BASOPHILS NFR BLD: 1.7 %
BILIRUB SERPL-MCNC: 0.8 MG/DL (ref 0.3–1)
BUN SERPL-MCNC: 17 MG/DL (ref 8–20)
CALCIUM SERPL-MCNC: 8 MG/DL (ref 7.7–10.4)
CHLORIDE: 107 MMOL/L (ref 98–110)
CO2 SERPL-SCNC: 25.4 MMOL/L (ref 22.8–31.6)
CREATININE: 0.48 MG/DL (ref 0.6–1.4)
EOSINOPHIL NFR BLD: 0 K/UL (ref 0–0.7)
EOSINOPHIL NFR BLD: 0.4 %
ERYTHROCYTE [DISTWIDTH] IN BLOOD BY AUTOMATED COUNT: 18 % (ref 11.7–14.9)
GLUCOSE: 86 MG/DL (ref 70–99)
GRAN #: 0.6 K/UL (ref 1.4–6.5)
GRAN%: 24.1 %
HCT VFR BLD AUTO: 27.4 % (ref 39–55)
HGB BLD-MCNC: 9.2 G/DL (ref 14–16)
IMMATURE GRANS (ABS): 0 K/UL (ref 0–1)
IMMATURE GRANULOCYTES: 0.8 %
INR PPP: 1.1
LYMPH #: 1.5 K/UL (ref 1.2–3.4)
LYMPH%: 63.3 %
MAGNESIUM SERPL-MCNC: 1.8 MG/DL (ref 1.5–2.6)
MCH RBC QN AUTO: 28 PG (ref 25–35)
MCHC RBC AUTO-ENTMCNC: 33.6 G/DL (ref 31–36)
MCV RBC AUTO: 83.5 FL (ref 80–100)
MONO #: 0.2 K/UL (ref 0.1–0.6)
MONO%: 9.7 %
NUCLEATED RBCS: 0 %
PHOSPHATE FLD-MCNC: 3.2 MG/DL (ref 2.5–4.9)
PLATELET # BLD AUTO: 50 K/UL (ref 140–440)
PMV BLD AUTO: 9.1 FL (ref 8.8–12.7)
POTASSIUM SERPL-SCNC: 3.3 MMOL/L (ref 3.5–5)
PROT SERPL-MCNC: 4.9 G/DL (ref 6–8.2)
PROTHROMBIN TIME: 13.7 SEC (ref 11.7–14)
RBC # BLD AUTO: 3.28 M/UL (ref 4.3–5.9)
SODIUM: 139 MMOL/L (ref 134–144)
WBC # BLD AUTO: 2.4 K/UL (ref 5–10)

## 2019-02-05 ENCOUNTER — OUTSIDE PLACE OF SERVICE (OUTPATIENT)
Dept: HEMATOLOGY/ONCOLOGY | Facility: CLINIC | Age: 59
End: 2019-02-05
Payer: MEDICAID

## 2019-02-05 ENCOUNTER — TELEPHONE (OUTPATIENT)
Dept: HEMATOLOGY/ONCOLOGY | Facility: CLINIC | Age: 59
End: 2019-02-05

## 2019-02-05 PROCEDURE — 99231 SBSQ HOSP IP/OBS SF/LOW 25: CPT | Mod: ,,, | Performed by: INTERNAL MEDICINE

## 2019-02-05 PROCEDURE — 99231 PR SUBSEQUENT HOSPITAL CARE,LEVL I: ICD-10-PCS | Mod: ,,, | Performed by: INTERNAL MEDICINE

## 2019-02-05 NOTE — TELEPHONE ENCOUNTER
----- Message from Quynh Patino sent at 2/5/2019 12:22 PM CST -----  Contact: Teagan from [facility]   Type: Needs Medical Advice    Who Called:  Teagan Garsia Call Back Number: [phone] and ask for Teagan   Additional Information:  Calling regarding Dr Rich signing consent form. Thanks!

## 2019-02-06 ENCOUNTER — OUTSIDE PLACE OF SERVICE (OUTPATIENT)
Dept: HEMATOLOGY/ONCOLOGY | Facility: CLINIC | Age: 59
End: 2019-02-06
Payer: MEDICAID

## 2019-02-06 PROCEDURE — 99233 PR SUBSEQUENT HOSPITAL CARE,LEVL III: ICD-10-PCS | Mod: ,,, | Performed by: INTERNAL MEDICINE

## 2019-02-06 PROCEDURE — 99233 SBSQ HOSP IP/OBS HIGH 50: CPT | Mod: ,,, | Performed by: INTERNAL MEDICINE

## 2019-02-07 ENCOUNTER — OUTSIDE PLACE OF SERVICE (OUTPATIENT)
Dept: HEMATOLOGY/ONCOLOGY | Facility: CLINIC | Age: 59
End: 2019-02-07
Payer: MEDICAID

## 2019-02-07 PROCEDURE — 99233 PR SUBSEQUENT HOSPITAL CARE,LEVL III: ICD-10-PCS | Mod: ,,, | Performed by: INTERNAL MEDICINE

## 2019-02-07 PROCEDURE — 99233 SBSQ HOSP IP/OBS HIGH 50: CPT | Mod: ,,, | Performed by: INTERNAL MEDICINE

## 2019-02-12 ENCOUNTER — TELEPHONE (OUTPATIENT)
Dept: HEMATOLOGY/ONCOLOGY | Facility: CLINIC | Age: 59
End: 2019-02-12

## 2019-02-12 ENCOUNTER — OFFICE VISIT (OUTPATIENT)
Dept: HEMATOLOGY/ONCOLOGY | Facility: CLINIC | Age: 59
End: 2019-02-12
Payer: MEDICAID

## 2019-02-12 VITALS
TEMPERATURE: 98 F | WEIGHT: 135.13 LBS | HEART RATE: 84 BPM | BODY MASS INDEX: 20.01 KG/M2 | DIASTOLIC BLOOD PRESSURE: 58 MMHG | HEIGHT: 69 IN | SYSTOLIC BLOOD PRESSURE: 101 MMHG | RESPIRATION RATE: 20 BRPM

## 2019-02-12 DIAGNOSIS — Z92.21 STATUS POST CHEMOTHERAPY: ICD-10-CM

## 2019-02-12 DIAGNOSIS — E61.1 IRON DEFICIENCY: ICD-10-CM

## 2019-02-12 DIAGNOSIS — D61.818 PANCYTOPENIA: ICD-10-CM

## 2019-02-12 DIAGNOSIS — Z09 CHEMOTHERAPY FOLLOW-UP EXAMINATION: ICD-10-CM

## 2019-02-12 DIAGNOSIS — L01.00 IMPETIGO: Primary | ICD-10-CM

## 2019-02-12 DIAGNOSIS — D64.81 ANTINEOPLASTIC CHEMOTHERAPY INDUCED ANEMIA: ICD-10-CM

## 2019-02-12 DIAGNOSIS — T45.1X5A ANTINEOPLASTIC CHEMOTHERAPY INDUCED ANEMIA: ICD-10-CM

## 2019-02-12 DIAGNOSIS — C81.02 NODULAR LYMPHOCYTE PREDOMINANT HODGKIN LYMPHOMA OF INTRATHORACIC LYMPH NODES: ICD-10-CM

## 2019-02-12 PROCEDURE — 99213 OFFICE O/P EST LOW 20 MIN: CPT | Mod: PBBFAC,PO | Performed by: INTERNAL MEDICINE

## 2019-02-12 PROCEDURE — 99999 PR PBB SHADOW E&M-EST. PATIENT-LVL III: CPT | Mod: PBBFAC,,, | Performed by: INTERNAL MEDICINE

## 2019-02-12 PROCEDURE — 99214 PR OFFICE/OUTPT VISIT, EST, LEVL IV, 30-39 MIN: ICD-10-PCS | Mod: S$PBB,,, | Performed by: INTERNAL MEDICINE

## 2019-02-12 PROCEDURE — 99214 OFFICE O/P EST MOD 30 MIN: CPT | Mod: S$PBB,,, | Performed by: INTERNAL MEDICINE

## 2019-02-12 PROCEDURE — 99999 PR PBB SHADOW E&M-EST. PATIENT-LVL III: ICD-10-PCS | Mod: PBBFAC,,, | Performed by: INTERNAL MEDICINE

## 2019-02-12 RX ORDER — MIRTAZAPINE 15 MG/1
1 TABLET, FILM COATED ORAL DAILY
Refills: 0 | COMMUNITY
Start: 2019-02-08 | End: 2019-06-03

## 2019-02-12 RX ORDER — SERTRALINE HYDROCHLORIDE 50 MG/1
1 TABLET, FILM COATED ORAL DAILY
Refills: 0 | COMMUNITY
Start: 2019-02-08 | End: 2019-06-17

## 2019-02-12 RX ORDER — CARVEDILOL 3.12 MG/1
2 TABLET ORAL 2 TIMES DAILY
Refills: 0 | COMMUNITY
Start: 2019-02-08 | End: 2019-06-17

## 2019-02-12 RX ORDER — LEVOFLOXACIN 500 MG/1
1 TABLET, FILM COATED ORAL DAILY
Refills: 0 | COMMUNITY
Start: 2018-12-30 | End: 2019-02-12 | Stop reason: SDUPTHER

## 2019-02-12 RX ORDER — HEPARIN 100 UNIT/ML
5 SYRINGE INTRAVENOUS
Status: CANCELLED | OUTPATIENT
Start: 2019-02-12

## 2019-02-12 RX ORDER — LEVOFLOXACIN 500 MG/1
500 TABLET, FILM COATED ORAL DAILY
Qty: 20 TABLET | Refills: 0 | Status: SHIPPED | OUTPATIENT
Start: 2019-02-12 | End: 2019-03-11

## 2019-02-12 RX ORDER — SODIUM CHLORIDE 0.9 % (FLUSH) 0.9 %
10 SYRINGE (ML) INJECTION
Status: CANCELLED | OUTPATIENT
Start: 2019-02-12

## 2019-02-12 RX ORDER — ACYCLOVIR 200 MG/1
1 CAPSULE ORAL DAILY
Refills: 0 | COMMUNITY
Start: 2019-02-08 | End: 2019-02-12

## 2019-02-12 RX ORDER — FUROSEMIDE 20 MG/1
1 TABLET ORAL DAILY
Refills: 0 | COMMUNITY
Start: 2018-12-30 | End: 2019-05-07

## 2019-02-12 RX ORDER — SODIUM CHLORIDE 9 MG/ML
INJECTION, SOLUTION INTRAVENOUS CONTINUOUS
Status: CANCELLED | OUTPATIENT
Start: 2019-02-12

## 2019-02-12 RX ORDER — ALLOPURINOL 100 MG/1
1 TABLET ORAL DAILY
Refills: 0 | COMMUNITY
Start: 2019-02-08 | End: 2019-04-09

## 2019-02-12 NOTE — TELEPHONE ENCOUNTER
"----- Message from Tena Dinero sent at 2/12/2019  3:41 PM CST -----  Contact: Mary Hein, [facility]cial [facility]  Mary is calling to see if they can order a rolling walker for a height of 5'11".  Call Back#[phone]  Fax#[phone]  Thanks     "

## 2019-02-12 NOTE — PROGRESS NOTES
CC: I feel better    Heriberto Keys is a 59 y.o.    This is a 59 yr old gentleman here for F/U of Hodgkins disease He received ABVD cycle 1 while hospitalized at [facility]. He had neurological presentation with dizziness and confusion yet LP negative for CSF involvemeent. He received levaquin for sinusitis and his mentation improved. CHemo did cause severe marrow suppression requiring GF support today he is accompanied bu his friend who is caring for him.     He has had an echo and PFTs at [facility]  Past Medical History:   Diagnosis Date    Anemia     Depression     Encounter for blood transfusion     Lymphoma     Lymphoma     Lymphoma 2019     Past Surgical History:   Procedure Laterality Date    LYMPHADENECTOMY Bilateral        Current Outpatient Medications:     acyclovir (ZOVIRAX) 200 MG capsule, Take 1 mg by mouth once daily., Disp: , Rfl: 0    allopurinol (ZYLOPRIM) 100 MG tablet, Take 1 tablet by mouth once daily., Disp: , Rfl: 0    carvedilol (COREG) 3.125 MG tablet, Take 2 tablets by mouth 2 (two) times daily., Disp: , Rfl: 0    furosemide (LASIX) 20 MG tablet, Take 1 tablet by mouth once daily., Disp: , Rfl: 0    levoFLOXacin (LEVAQUIN) 500 MG tablet, Take 1 tablet by mouth once daily. At 6am, Disp: , Rfl: 0    mirtazapine (REMERON) 15 MG tablet, Take 1 tablet by mouth once daily. Every night at bedtime, Disp: , Rfl: 0    sertraline (ZOLOFT) 50 MG tablet, Take 1 tablet by mouth once daily., Disp: , Rfl: 0  Review of patient's allergies indicates:  No Known Allergies  Social History     Tobacco Use    Smoking status: Former Smoker     Packs/day: 1.00     Types: Cigarettes   Substance Use Topics    Alcohol use: No     Frequency: Never    Drug use: No     History reviewed. No pertinent family history.    CONSTITUTIONAL: No fevers, chills, night sweats,  ++ wt. loss,  ++appetite changes  SKIN: no rashes or itching  ENT: No headaches, head trauma, vision changes, or eye pain  LYMPH NODES: None noticed    CV: No chest pain, palpitations.   RESP: No dyspnea on exertion, cough, wheezing, or hemoptysis  GI: No nausea, emesis, diarrhea, constipation, melena, hematochezia, pain.   : No dysuria, hematuria, urgency, or frequency   HEME: No easy bruising, bleeding problems  PSYCHIATRIC: No depression, anxiety, psychosis, hallucinations.  NEURO: No seizures, memory loss, dizziness or difficulty sleeping  MSK: No arthralgias or joint swelling         BP (!) 101/58   Pulse 84   Temp 97.5 °F (36.4 °C)   Resp 20   Wt 61.3 kg (135 lb 2.3 oz)   Gen: NAD, A and O x3, remains frail   Psych: pleasant affect, normal thought process  Eyes: Pupils round and non dilated, EOM intact  Nose: Nares patent  OP clear, mucosa patent  Neck: suppple, no JVD, trachea midline, no palpable mass, no adenopathy  Lungs: CTAB, no wheezes, no use of accessory muscles  CV: S1S2 with RRR, No mrg  Abd: soft, NTND, + BS, No HSM, no ascites  Extr: No CCE, MALCOLM, strength normal, good capillary refill  Neuro: steady gait, CNs grossly intact  Skin: intact, no lesions noted  Rheum: No joint swelling    Lab Results   Component Value Date    WBC 2.4 (L) 02/03/2019    HGB 9.2 (L) 02/03/2019    HCT 27.4 (L) 02/03/2019    MCV 83.5 02/03/2019    PLT 50 (L) 02/03/2019     CMP  Sodium   Date Value Ref Range Status   02/03/2019 139 134 - 144 mmol/L      Potassium   Date Value Ref Range Status   02/03/2019 3.3 (L) 3.5 - 5.0 mmol/L      Chloride   Date Value Ref Range Status   02/03/2019 107 98 - 110 mmol/L      CO2   Date Value Ref Range Status   02/03/2019 25.4 22.8 - 31.6 mmol/L      Glucose   Date Value Ref Range Status   02/03/2019 86 70 - 99 mg/dL      BUN, Bld   Date Value Ref Range Status   02/03/2019 17 8 - 20 mg/dL      Creatinine   Date Value Ref Range Status   02/03/2019 0.48 (L) 0.60 - 1.40 mg/dL      Calcium   Date Value Ref Range Status   02/03/2019 8.0 7.7 - 10.4 mg/dL      Total Protein   Date Value Ref Range Status   02/03/2019 4.9 (L) 6.0 - 8.2  g/dL      Albumin   Date Value Ref Range Status   02/03/2019 2.3 (L) 3.1 - 4.7 g/dL      Total Bilirubin   Date Value Ref Range Status   02/03/2019 0.8 0.3 - 1.0 mg/dL      Alkaline Phosphatase   Date Value Ref Range Status   02/03/2019 143 (H) 40 - 104 IU/L      AST   Date Value Ref Range Status   02/03/2019 18 10 - 40 IU/L        Impetigo    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 At 6am  Dispense: 20 tablet; Refill: 0    Pancytopenia  -     levoFLOXacin (LEVAQUIN) 500 MG tablet; Take 1 tablet (500 mg total) by mouth once daily. At 6am  Dispense: 20 tablet; Refill: 0    Status post chemotherapy    Chemotherapy follow-up examination  -     levoFLOXacin (LEVAQUIN) 500 MG tablet; Take 1 tablet (500 mg total) by mouth once daily. At 6am  Dispense: 20 tablet; Refill: 0    Antineoplastic chemotherapy induced anemia    Iron deficiency    Other orders  -     ferric carboxymaltose (INJECTAFER) 750 mg in sodium chloride 0.9% 250 mL infusion  -     0.9%  NaCl infusion  -     sodium chloride 0.9% flush 10 mL  -     heparin, porcine (PF) 100 unit/mL injection flush 500 Units  -     sodium chloride 0.9% 100 mL flush bag        Cont levaqquin prophylactically, hold bactrim and acyclovir for now  Due for cycle  abvd for hodgkins   Needs IV iorn and recent labs before starting   May need neupogen   Cycle 2 day 1 due soon ABVD       Thank you for allowing me to evaluate and participate in the care of this pleasant patient. Please fell free to call me with any questions or concerns.    WarmlyAmanda MD    This note was dictated with Dragon and briefly proofread. Please excuse any sentences that may be unclear or words misspelled

## 2019-02-19 ENCOUNTER — LAB VISIT (OUTPATIENT)
Dept: LAB | Facility: HOSPITAL | Age: 59
End: 2019-02-19
Attending: INTERNAL MEDICINE
Payer: MEDICAID

## 2019-02-19 DIAGNOSIS — E61.1 IRON DEFICIENCY: ICD-10-CM

## 2019-02-19 LAB
ALBUMIN SERPL BCP-MCNC: 2.7 G/DL
ALP SERPL-CCNC: 113 U/L
ALT SERPL W/O P-5'-P-CCNC: 13 U/L
ANION GAP SERPL CALC-SCNC: 9 MMOL/L
ANISOCYTOSIS BLD QL SMEAR: SLIGHT
AST SERPL-CCNC: 19 U/L
BASOPHILS # BLD AUTO: 0 K/UL
BASOPHILS NFR BLD: 0.4 %
BILIRUB SERPL-MCNC: 0.6 MG/DL
BUN SERPL-MCNC: 18 MG/DL
CALCIUM SERPL-MCNC: 8.7 MG/DL
CHLORIDE SERPL-SCNC: 105 MMOL/L
CO2 SERPL-SCNC: 27 MMOL/L
CREAT SERPL-MCNC: 0.7 MG/DL
DACRYOCYTES BLD QL SMEAR: ABNORMAL
DIFFERENTIAL METHOD: ABNORMAL
EOSINOPHIL # BLD AUTO: 0.1 K/UL
EOSINOPHIL NFR BLD: 1.9 %
ERYTHROCYTE [DISTWIDTH] IN BLOOD BY AUTOMATED COUNT: 26.1 %
EST. GFR  (AFRICAN AMERICAN): >60 ML/MIN/1.73 M^2
EST. GFR  (NON AFRICAN AMERICAN): >60 ML/MIN/1.73 M^2
GLUCOSE SERPL-MCNC: 74 MG/DL
HCT VFR BLD AUTO: 26.4 %
HGB BLD-MCNC: 8.9 G/DL
HYPOCHROMIA BLD QL SMEAR: ABNORMAL
LYMPHOCYTES # BLD AUTO: 2.9 K/UL
LYMPHOCYTES NFR BLD: 45.2 %
MCH RBC QN AUTO: 29.4 PG
MCHC RBC AUTO-ENTMCNC: 33.5 G/DL
MCV RBC AUTO: 88 FL
MONOCYTES # BLD AUTO: 0.6 K/UL
MONOCYTES NFR BLD: 9.5 %
NEUTROPHILS # BLD AUTO: 2.7 K/UL
NEUTROPHILS NFR BLD: 43 %
PLATELET # BLD AUTO: 153 K/UL
PMV BLD AUTO: 6.4 FL
POTASSIUM SERPL-SCNC: 3.7 MMOL/L
PROT SERPL-MCNC: 6 G/DL
RBC # BLD AUTO: 3.01 M/UL
SODIUM SERPL-SCNC: 141 MMOL/L
WBC # BLD AUTO: 6.4 K/UL

## 2019-02-19 PROCEDURE — 85025 COMPLETE CBC W/AUTO DIFF WBC: CPT

## 2019-02-19 PROCEDURE — 80053 COMPREHEN METABOLIC PANEL: CPT

## 2019-02-19 PROCEDURE — 36415 COLL VENOUS BLD VENIPUNCTURE: CPT

## 2019-02-20 ENCOUNTER — OFFICE VISIT (OUTPATIENT)
Dept: HEMATOLOGY/ONCOLOGY | Facility: CLINIC | Age: 59
End: 2019-02-20
Payer: MEDICAID

## 2019-02-20 ENCOUNTER — TELEPHONE (OUTPATIENT)
Dept: HEMATOLOGY/ONCOLOGY | Facility: CLINIC | Age: 59
End: 2019-02-20

## 2019-02-20 VITALS
HEIGHT: 69 IN | HEART RATE: 81 BPM | SYSTOLIC BLOOD PRESSURE: 108 MMHG | DIASTOLIC BLOOD PRESSURE: 59 MMHG | BODY MASS INDEX: 20.67 KG/M2 | TEMPERATURE: 98 F | OXYGEN SATURATION: 97 % | WEIGHT: 139.56 LBS | RESPIRATION RATE: 20 BRPM

## 2019-02-20 DIAGNOSIS — C81.18 NODULAR SCLEROSIS HODGKIN LYMPHOMA OF LYMPH NODES OF MULTIPLE REGIONS: ICD-10-CM

## 2019-02-20 DIAGNOSIS — T45.1X5A CHEMOTHERAPY INDUCED NEUTROPENIA: Primary | ICD-10-CM

## 2019-02-20 DIAGNOSIS — D70.1 CHEMOTHERAPY INDUCED NEUTROPENIA: Primary | ICD-10-CM

## 2019-02-20 DIAGNOSIS — Z09 CHEMOTHERAPY FOLLOW-UP EXAMINATION: ICD-10-CM

## 2019-02-20 DIAGNOSIS — T45.1X5A ANEMIA ASSOCIATED WITH CHEMOTHERAPY: ICD-10-CM

## 2019-02-20 DIAGNOSIS — D64.81 ANEMIA ASSOCIATED WITH CHEMOTHERAPY: ICD-10-CM

## 2019-02-20 DIAGNOSIS — Z09 ONCOLOGY FOLLOW-UP ENCOUNTER: ICD-10-CM

## 2019-02-20 PROCEDURE — 99213 OFFICE O/P EST LOW 20 MIN: CPT | Mod: PBBFAC,PO | Performed by: INTERNAL MEDICINE

## 2019-02-20 PROCEDURE — 99215 PR OFFICE/OUTPT VISIT, EST, LEVL V, 40-54 MIN: ICD-10-PCS | Mod: S$PBB,,, | Performed by: INTERNAL MEDICINE

## 2019-02-20 PROCEDURE — 99215 OFFICE O/P EST HI 40 MIN: CPT | Mod: S$PBB,,, | Performed by: INTERNAL MEDICINE

## 2019-02-20 PROCEDURE — 99999 PR PBB SHADOW E&M-EST. PATIENT-LVL III: CPT | Mod: PBBFAC,,, | Performed by: INTERNAL MEDICINE

## 2019-02-20 PROCEDURE — G0444 DEPRESSION SCREEN ANNUAL: HCPCS | Mod: PBBFAC,PO,59 | Performed by: INTERNAL MEDICINE

## 2019-02-20 PROCEDURE — 97802 MEDICAL NUTRITION INDIV IN: CPT | Mod: PBBFAC,PO | Performed by: INTERNAL MEDICINE

## 2019-02-20 PROCEDURE — 99999 PR PBB SHADOW E&M-EST. PATIENT-LVL III: ICD-10-PCS | Mod: PBBFAC,,, | Performed by: INTERNAL MEDICINE

## 2019-02-20 RX ORDER — HEPARIN 100 UNIT/ML
500 SYRINGE INTRAVENOUS
Status: CANCELLED | OUTPATIENT
Start: 2019-03-12

## 2019-02-20 RX ORDER — SODIUM CHLORIDE 0.9 % (FLUSH) 0.9 %
10 SYRINGE (ML) INJECTION
Status: CANCELLED | OUTPATIENT
Start: 2019-03-12

## 2019-02-20 RX ORDER — HEPARIN 100 UNIT/ML
500 SYRINGE INTRAVENOUS
Status: CANCELLED | OUTPATIENT
Start: 2019-02-25

## 2019-02-20 RX ORDER — SODIUM CHLORIDE 0.9 % (FLUSH) 0.9 %
10 SYRINGE (ML) INJECTION
Status: CANCELLED | OUTPATIENT
Start: 2019-02-25

## 2019-02-20 RX ORDER — DOXORUBICIN HYDROCHLORIDE 2 MG/ML
40 INJECTION, SOLUTION INTRAVENOUS
Status: CANCELLED | OUTPATIENT
Start: 2019-02-25

## 2019-02-20 RX ORDER — DOXORUBICIN HYDROCHLORIDE 2 MG/ML
40 INJECTION, SOLUTION INTRAVENOUS
Status: CANCELLED | OUTPATIENT
Start: 2019-03-12

## 2019-02-20 NOTE — PROGRESS NOTES
CC: I feel ok    Heriberto Keys is a 59 y.o.    This is a 59 yr old gentleman here for F/U of Hodgkins disease He received ABVD cycle 1 while hospitalized at [facility]. He had neurological presentation with dizziness and confusion yet LP negative for CSF involvemeent. He received levaquin for sinusitis and his mentation improved. CHemo did cause severe marrow suppression requiring GF support today he is accompanied bu his friend who is caring for him.     He has had an echo and PFTs at [facility]  Seen last visit and IV iron was ordered Never scheduled ?  Pt is tored , no fever   Remains on levaquin for pansinusitis  fawn PT   Needs taller walker   Past Medical History:   Diagnosis Date    Anemia     Depression     Encounter for blood transfusion     Lymphoma     Lymphoma     Lymphoma 2019     Past Surgical History:   Procedure Laterality Date    LYMPHADENECTOMY Bilateral    fawn allopurinol for TLS to prevent such     Current Outpatient Medications:     allopurinol (ZYLOPRIM) 100 MG tablet, Take 1 tablet by mouth once daily., Disp: , Rfl: 0    carvedilol (COREG) 3.125 MG tablet, Take 2 tablets by mouth 2 (two) times daily., Disp: , Rfl: 0    furosemide (LASIX) 20 MG tablet, Take 1 tablet by mouth once daily., Disp: , Rfl: 0    levoFLOXacin (LEVAQUIN) 500 MG tablet, Take 1 tablet (500 mg total) by mouth once daily. At 6am, Disp: 20 tablet, Rfl: 0    mirtazapine (REMERON) 15 MG tablet, Take 1 tablet by mouth once daily. Every night at bedtime, Disp: , Rfl: 0    sertraline (ZOLOFT) 50 MG tablet, Take 1 tablet by mouth once daily., Disp: , Rfl: 0  Review of patient's allergies indicates:  No Known Allergies  Social History     Tobacco Use    Smoking status: Former Smoker     Packs/day: 1.00     Types: Cigarettes   Substance Use Topics    Alcohol use: No     Frequency: Never    Drug use: No     No family history on file.    CONSTITUTIONAL: No fevers, chills, night sweats,  No further  wt. loss,  ++appetite  "changes  SKIN: no rashes or itching  ENT: No headaches, head trauma, vision changes, + change in taste   LYMPH NODES: None noticed   CV: No chest pain, palpitations.   RESP: No dyspnea on exertion, cough, wheezing, or hemoptysis  GI: No nausea, emesis, diarrhea, constipation, melena, hematochezia, pain.   : No dysuria, hematuria, urgency, or frequency   HEME: No easy bruising, bleeding problems  PSYCHIATRIC: No depression, anxiety, psychosis, hallucinations.  NEURO: No seizures, memory loss,  difficulty sleeping  MSK: No arthralgias or joint swelling         BP (!) 108/59   Pulse 81   Temp 97.5 °F (36.4 °C)   Resp 20   Ht 5' 9" (1.753 m)   Wt 63.3 kg (139 lb 8.8 oz)   SpO2 97%   BMI 20.61 kg/m²   Constitutional: oriented to person, place, and time.  well-developed and well-nourished.   HENT:   Head: Normocephalic and atraumatic.   Right Ear: External ear normal.   Left Ear: External ear normal.   Nose: Nose normal.   Mouth/Throat: Oropharynx is clear and moist.   Eyes: Conjunctivae and EOM are normal. Pupils are equal, round  Neck: Normal range of motion. Neck supple. No thyromegaly present.   Cardiovascular: Normal rate, regular rhythm, normal heart sounds and intact distal pulses.    No murmur heard.  Pulmonary/Chest: Effort normal and breath sounds normal.  no wheezes.  no rales.   Abdominal: Soft. Bowel sounds are normal.  no mass. There is no tenderness. There is no rebound.   Musculoskeletal: Normal range of motion.  no edema or tenderness.   Lymphadenopathy:    no cervical adenopathy.   Neurological: alert and oriented to person, place, and time.   Skin: Skin is warm and dry. No rash noted.   Psychiatric: normal mood and affect.   behavior is normal.   Vitals reviewed.    Lab Results   Component Value Date    WBC 6.40 02/19/2019    HGB 8.9 (L) 02/19/2019    HCT 26.4 (L) 02/19/2019    MCV 88 02/19/2019     02/19/2019     CMP  Sodium   Date Value Ref Range Status   02/19/2019 141 136 - 145 " mmol/L Final   02/03/2019 139 134 - 144 mmol/L      Potassium   Date Value Ref Range Status   02/19/2019 3.7 3.5 - 5.1 mmol/L Final     Chloride   Date Value Ref Range Status   02/19/2019 105 95 - 110 mmol/L Final   02/03/2019 107 98 - 110 mmol/L      CO2   Date Value Ref Range Status   02/19/2019 27 23 - 29 mmol/L Final     Glucose   Date Value Ref Range Status   02/19/2019 74 70 - 110 mg/dL Final   02/03/2019 86 70 - 99 mg/dL      BUN, Bld   Date Value Ref Range Status   02/19/2019 18 6 - 20 mg/dL Final     Creatinine   Date Value Ref Range Status   02/19/2019 0.7 0.5 - 1.4 mg/dL Final   02/03/2019 0.48 (L) 0.60 - 1.40 mg/dL      Calcium   Date Value Ref Range Status   02/19/2019 8.7 8.7 - 10.5 mg/dL Final     Total Protein   Date Value Ref Range Status   02/19/2019 6.0 6.0 - 8.4 g/dL Final     Albumin   Date Value Ref Range Status   02/19/2019 2.7 (L) 3.5 - 5.2 g/dL Final   02/03/2019 2.3 (L) 3.1 - 4.7 g/dL      Total Bilirubin   Date Value Ref Range Status   02/19/2019 0.6 0.1 - 1.0 mg/dL Final     Comment:     For infants and newborns, interpretation of results should be based  on gestational age, weight and in agreement with clinical  observations.  Premature Infant recommended reference ranges:  Up to 24 hours.............<8.0 mg/dL  Up to 48 hours............<12.0 mg/dL  3-5 days..................<15.0 mg/dL  6-29 days.................<15.0 mg/dL       Alkaline Phosphatase   Date Value Ref Range Status   02/19/2019 113 55 - 135 U/L Final     AST   Date Value Ref Range Status   02/19/2019 19 10 - 40 U/L Final     ALT   Date Value Ref Range Status   02/19/2019 13 10 - 44 U/L Final     Anion Gap   Date Value Ref Range Status   02/19/2019 9 8 - 16 mmol/L Final     eGFR if    Date Value Ref Range Status   02/19/2019 >60 >60 mL/min/1.73 m^2 Final     eGFR if non    Date Value Ref Range Status   02/19/2019 >60 >60 mL/min/1.73 m^2 Final     Comment:     Calculation used to obtain the  estimated glomerular filtration  rate (eGFR) is the CKD-EPI equation.          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  -     CBC auto differential; Future; Expected date: 02/20/2019  -     CMP; Future; Expected date: 02/20/2019    Other orders  -     palonosetron (ALOXI) 0.25 mg, dexamethasone (DECADRON) 20 mg in sodium chloride 0.9 % 50 mL IVPB  -     DOXOrubicin chemo injection 40 mg  -     vinBLAStine (VELBAN) 10 mg in sodium chloride 0.9% 50 mL chemo infusion  -     bleomycin (BLEOCIN) 16 Units in sodium chloride 0.9% 100 mL chemo infusion  -     dacarbazine (DTIC) 600 mg in dextrose 5 % 250 mL chemo infusion  -     sodium chloride 0.9% 100 mL flush bag  -     sodium chloride 0.9% flush 10 mL  -     heparin, porcine (PF) 100 unit/mL injection flush 500 Units  -     alteplase injection 2 mg  -     palonosetron (ALOXI) 0.25 mg, dexamethasone (DECADRON) 20 mg in sodium chloride 0.9 % 50 mL IVPB  -     DOXOrubicin chemo injection 40 mg  -     vinBLAStine (VELBAN) 10 mg in sodium chloride 0.9% 50 mL chemo infusion  -     bleomycin (BLEOCIN) 16 Units in sodium chloride 0.9% 100 mL chemo infusion  -     dacarbazine (DTIC) 600 mg in dextrose 5 % 250 mL chemo infusion  -     sodium chloride 0.9% 100 mL flush bag  -     sodium chloride 0.9% flush 10 mL  -     heparin, porcine (PF) 100 unit/mL injection flush 500 Units  -     alteplase injection 2 mg  -     pegfilgrastim injection 6 mg    hypoalbuminemia : increase protein  intake   Dose reduced by 10 %   Marrow recovered   Dose reduced due to severe pancytopenia   Cont levaqquin prophylactically, He had severe pansinusitis causing change in mentation   hold bactrim and acyclovir for now  Due for cycle 2 abvd for hodgkins NOW   Needs IV iron : Never received it yet    Neulasta is indicated   Allopurinol cont to prevenet TLS  He absolutely needs neulasta to prevent further hospitalizations  Curable cancer   Proceed with cycle 2 asap   Focused on nutrition for 15 minutes  Handouts given   No evidence of depression today   Low risk thrombosis    Cont to increase activity     rtc 2 weeks with labs  Thank you for allowing me to evaluate and participate in the care of this pleasant patient. Please fell free to call me with any questions or concerns.    Warmly,  Amanda Rich MD    This note was dictated with Dragon and briefly proofread. Please excuse any sentences that may be unclear or words misspelled

## 2019-02-20 NOTE — TELEPHONE ENCOUNTER
----- Message from Arsh Wagner sent at 2/20/2019  3:37 PM CST -----  Type: Needs Medical Advice    Who Called:  Kaylyn/Home Health  Best Call Back Number:[phone]  Additional Information: Home health is going to have to do a missed visit today - have another scheduled on Friday - call is informational

## 2019-02-26 ENCOUNTER — CLINICAL SUPPORT (OUTPATIENT)
Dept: HEMATOLOGY/ONCOLOGY | Facility: CLINIC | Age: 59
End: 2019-02-26
Payer: MEDICAID

## 2019-02-26 NOTE — PROGRESS NOTES
Heriberto Keys  [id number]    [facility]    TITLE: PLAN OF CARE FOR THE CHEMOTHERAPY PATIENT / TEACHING PROTOCOL    PURPOSE: To involve the patient / significant other in the plan of care and to provide teaching to the significant other & patient receiving chemotherapy.    LEVEL: Independent.    CONTENT: The Plan of Care for the chemotherapy patient is individualized and appropriate to the patients needs, strengths, limitations, & goals.  Education includes information regarding chemotherapy side effects, the treatment itself, and self-care  Activities.    GOAL / OUTCOME STANDARDS    PHYSIOLOGIC: The client will remain free or experience minimal side effects or toxicities throughout the chemotherapy treatment period.     PSYCHOLOGIC: The client/significant others will demonstrate positive coping mechanisms in relation to chemotherapy and its side effects.      COGINITIVE: The client/significant others will verbalize understanding of self-care measure to avoid/minimize side effects of the chemotherapy regime.    EVALUATION / COMMENT KEY:    V = Audiovisual/Video  S = Successfully meets outcome  N = Needs further instruction  NA = Not applicable to the patient  P = Previous knowledge  U = Unable to comprehend  * = See progress notes          PLAN OF CARE  INFORMATION TO BE DELIVERED / NURSING INTERVENTIONS DATE EVALUATION   1. Assessment of client/caregiver,         knowledge of cancer diagnosis,         and chemotherapy as a treatment. 1a. Evaluate patient/caregiver learning ability    b. Plan teaching sessions with patient/caregiver according to needs and present anxiety level/ability to learn.    c. Provide Chemotherapy Education Packet,        Mouth Care Protocol,         Specific Patient Education Sheets. 02/26/2019 S   2. Individual chemotherapy treatment         plan. 2a. Review of Chemotherapy Education handout from [employer]            02/26/2019   S   3. Knowledge Deficit &  Self-Management of general side effects common to all chemotherapy:  a. Nausea/Vomiting  b.   Diarrhea  c. Mouth Care  d. Dental care  e. Constipation  f. Hair Loss  g. Potential for infection  h. Fatigue   3a. Reinforce that the majority of side effects from chemotherapy are reversible and are  controlled both in the hospital and at home        (blood counts recover, hair grows back).   b.  Refer to the following for reinforcement of         information post-treatment:  1. Mouth Care Protocol.  2. Bowel Protocol for constipation or diarrhea.  3.  Drug Specific Chemotherapy Information Sheets for each medication patient receiving.    02/26/2019     S     PLAN OF CARE  INFORMATION TO BE DELIVERED / NURSING INTERVENTIONS DATE EVALUATION   h. Potential for bleeding         i. Potential anemia/fatigue         j. Potential sunburn         k. Birth control measures  l. Safety measures post treatment 4.  Chemotherapy Home Care Instruction  and Safety Information Sheet.  A. patient/caregivers to thoroughly cook shellfish (shrimp, crab, etc) to decrease the chance of infection.    B.  Use sunscreen and protective clothing while in the sun.   02/26/2019      4. Knowledge deficit & Self Management of Drug Specific  Side Effects.    a. BLADDER EFFECTS        (Hemorrhagic Cystitis)                  Preventable with adequate hydration; occurs 2-3 days or more post treatment.   1.  Instruct patient to:  a.   Void at least every 2 hours; increase intake.  b.   DO NOT hold urine; go when urge is felt.  c.    Empty bladder at bedtime and on         awakening.  d.   Observe for color changes (red to tea           colored), amount and frequency changes.  e.   Notify oncologist of any abnormalities           in urine or voiding or if you cannot               drink adequate fluids.   02/26/2019   S   b.   CHANGES IN URINE        COLOR:      1.   Instruct patient:  a.   Most evident in first 2-3 voidings after            administration.  b. Lasts less than 24 hours.  c. If urine is discolored 2 or more days post- treatment, notify oncologist.      02/26/2019 S   c.    KIDNEY EFFECTS           (Nephrotoxicity)   1.  Instruct patient to:  a.   Drink 8-16 glasses of fluid/day the day   pre-treatment and 3-4 days post-treatment to maintain hydration; the best way to minimize kidney problems.  b.   Notify oncologist immediately if unable to drink fluids or if changes are noted in urinary elimination.     02/26/2019   S   a. PULMONARY TOXICITY    1. Instruct patient to report symptoms such as shortness of breath, chest pain, shallow breathing, or chest wall discomfort to physician.  2. Reinforce preventative measures used by the health care team.  a. Baseline and periodic PFT and chest x-ray.   02/26/2019   S     PLAN OF CARE INFORMATION TO BE DELIVERED / NURSING INTERVENTIONS DATE EVALUATION   b. NERVE & MUSCLE EFFECTS (neurotoxocity; neuropathy, possible visual/hearing changes)        3. Instruct patient to:    a. Report numbness or tingling of the hands/feet, loss of fine motor movement (buttoning shirt, tying shoelaces), or gait changes to your oncologist.  b. If numbness/tingling are present:  1. protect feet with shoes at all times.  2. Use gloves for washing dishes/gardening & potholders in kitchen.       02/26/2019   S   c. CARDIOTOXICITY  Decreased effectiveness of             cardiac function. Effective are                  cumulative and irreversible.                                    CARDIAC ARRYTHMIAS              4   Instruct:  a. Heart function may be tested before treatment and perdiocally during treatment.  b. Notify oncologist of irregular pulse, palpitations, shortness of breath, or swelling in lower extremities/feet.          Taxol and Taxotere can cause arrhythmias on infusion that resolve once infusion discontinued. Instruct nurse if any irregularity felt.    02/26/2019   S   d. EXTRAVASATION  Occurs when vesicants  leak outside of vein and cause damage to the skin and underlying tissues.   1. Reinforce preventive measures used to avoid complications.  a. Fresh IV site or central line monitored continuously with vesicant IVP.  b. Continuous infusion via central line site and blood return monitored periodically around the clock.  2. Instruct to:  a. Notify nurse of any discomfort, burning, stinging, etc. at IV site during chemotherapy administration.  b. Notify oncologist of any redness, pain, or swelling at IV site after discharge from hospital.   02/26/2019   S   e. HYPERSENSITIVITY can happen with any medication.   1. Instruct patient:  a. Nurse is with them during the initial part of treatment and will be close by to monitor.  b. Pre-medication ordered by the oncologist must be taken on time. If doses are missed, treatment will need to be re-scheduled.  c. Skin redness, itching, or hives appearing after discharge should be reported to oncologist. 02/26/2019   S       PLAN OF CARE INFORMATION TO BE DELIVERED / NURSING INTERVENTIONS DATE EVALUATION   f. FLU-LIKE SYNDROME      1. Instruct patient symptoms are hard to prevent and may include fever, shaking chills, muscle and body aches.  a. Taking prescribed medications from physician if needed.  b. Adequate fluids are important.    2. Reinforce the need to call if temperature is         elevated to 100.4 or more  02/26/2019   S   g. HAND-FOOT SYNDROME  causes painful, symmetric swelling and redness of palms and soles                  5. Instruct patient to report any numbness or tingling in the hands or feet.  6. Explain prevention techniques, such as     a. Use heavy moisturizers to lessen skin dryness and itching, but to avoid if skin is cracked or broken  b. Bathe in tepid water, use non-perfumed soap, and wash gently. Baths with oatmeal or diluted baking soda may be soothing.  c. Avoid tight fitting shoes and repetitive actions, such as rubbing hands or applying pressure to  hands/feet.  7. Review measures to take should syndrome occur:  a. Cold compresses and elevation for          edema  b. Pain medications and other measures as ordered by oncologist.   4.   Syndrome resolves few weeks after therapy. 02/26/2019   S   5. DISCHARGE PLANNING /        EDUCATION 1.    Explain importance of compliance with follow- up  tests (CBC, CMP).  2.    Verify patient/caregiver know:  a.    Oncologists office phone number.  b.    Dates of follow-up appointments.  c.    Prescriptions given for nausea  3.   Review side effects to monitor and notify          oncologist about.  4.   Reinforce the need for patient and caregivers to:  a.    Review information given.  b.    Call oncologists office with questions          or symptoms  5.   Provide [facility] [employer] make referrals if needed for financial or .   02/26/2019   S     PROGRESS NOTES: I met with the patient in infusion today for chemotherapy education. he will be starting treatment with Doxorubicin, Vinblastine, Bleomycin, and Dacarbazine. He received the first cycle while inpatient at the hospital and tolerated it well with some neutropenia and anemia. He didn't remember receiving chemo school from anyone while in the hospital so I educated him today again. We discussed the mechanism of action, potential side effects of this treatment as well as ways he can manage them at home. Some of these side effects include but or not limited to fever, nausea, vomiting, decreased appetite, fatigue, weakness, cytopenias, myalgia/arthralgia, constipation, diarrhea, bleeding, shortness of breath, nail changes, or hair thinning/loss. We also discussed dietary modifications he should make although this can be discussed in more detail with the dietician. he was provided with a copy of all of the information we discussed today as well as our contact information. he will be provided a schedule on his first day of treatment. The patient  will follow-up with the physician for toxicity monitoring throughout treatment. All questions were answered and an informed consent was obtained. he was reminded to certainly contact us sooner if needed.

## 2019-02-27 DIAGNOSIS — C81.18 NODULAR SCLEROSIS HODGKIN LYMPHOMA OF LYMPH NODES OF MULTIPLE REGIONS: Primary | ICD-10-CM

## 2019-02-27 DIAGNOSIS — E61.1 IRON DEFICIENCY: Primary | ICD-10-CM

## 2019-02-27 RX ORDER — HEPARIN 100 UNIT/ML
500 SYRINGE INTRAVENOUS
Status: CANCELLED | OUTPATIENT
Start: 2019-02-27

## 2019-02-27 RX ORDER — SODIUM CHLORIDE 0.9 % (FLUSH) 0.9 %
10 SYRINGE (ML) INJECTION
Status: CANCELLED | OUTPATIENT
Start: 2019-02-27

## 2019-02-28 NOTE — TELEPHONE ENCOUNTER
----- Message from Sobeida Sal sent at 2/27/2019  4:51 PM CST -----  Contact: Kaylyn with [facility]  Type: Needs Medical Advice    Who Called:  Kaylyn  Ady Call Back Number:  fax number:[phone]  Additional Information:Kaylyn is calling to see if they can get the orders for the patient's labs faxed to them if the doctor approves for them to draw the labs at the patient's house.Please call back and advise.

## 2019-03-01 RX ORDER — HEPARIN 100 UNIT/ML
500 SYRINGE INTRAVENOUS
Status: CANCELLED | OUTPATIENT
Start: 2019-03-01

## 2019-03-01 RX ORDER — ONDANSETRON 8 MG/1
8 TABLET, ORALLY DISINTEGRATING ORAL EVERY 12 HOURS PRN
Qty: 30 TABLET | Refills: 1 | Status: SHIPPED | OUTPATIENT
Start: 2019-03-01 | End: 2019-06-17

## 2019-03-01 RX ORDER — PROCHLORPERAZINE MALEATE 10 MG
10 TABLET ORAL EVERY 6 HOURS PRN
Qty: 30 TABLET | Refills: 1 | Status: SHIPPED | OUTPATIENT
Start: 2019-03-01 | End: 2019-06-17

## 2019-03-01 RX ORDER — SODIUM CHLORIDE 0.9 % (FLUSH) 0.9 %
10 SYRINGE (ML) INJECTION
Status: CANCELLED | OUTPATIENT
Start: 2019-03-01

## 2019-03-06 ENCOUNTER — TELEPHONE (OUTPATIENT)
Dept: HEMATOLOGY/ONCOLOGY | Facility: CLINIC | Age: 59
End: 2019-03-06

## 2019-03-06 NOTE — TELEPHONE ENCOUNTER
----- Message from Veronica Lo sent at 3/6/2019 12:35 PM CST -----  Please call Kaylyn [phone] ...[facility] / requesting lab orders ? Patient is requesting to labs drawn at home

## 2019-03-06 NOTE — TELEPHONE ENCOUNTER
Spoke to Kaylyn of [facility], she is requesting order for patient labs. Labs faxed to  as requested

## 2019-03-07 LAB
ALBUMIN SERPL-MCNC: 3 G/DL (ref 3.1–4.7)
ALP SERPL-CCNC: 92 IU/L (ref 40–104)
ALT (SGPT): 13 IU/L (ref 3–33)
AST SERPL-CCNC: 18 IU/L (ref 10–40)
BASOPHILS NFR BLD: 0.1 K/UL (ref 0–0.2)
BASOPHILS NFR BLD: 0.6 %
BILIRUB SERPL-MCNC: 0.7 MG/DL (ref 0.3–1)
BUN SERPL-MCNC: 18 MG/DL (ref 8–20)
CALCIUM SERPL-MCNC: 8.6 MG/DL (ref 7.7–10.4)
CHLORIDE: 105 MMOL/L (ref 98–110)
CO2 SERPL-SCNC: 27.4 MMOL/L (ref 22.8–31.6)
CREATININE: 0.54 MG/DL (ref 0.6–1.4)
EOSINOPHIL NFR BLD: 1.1 K/UL (ref 0–0.7)
EOSINOPHIL NFR BLD: 11.2 %
ERYTHROCYTE [DISTWIDTH] IN BLOOD BY AUTOMATED COUNT: 23.2 % (ref 11.7–14.9)
GLUCOSE: 106 MG/DL (ref 70–99)
GRAN #: 3.4 K/UL (ref 1.4–6.5)
GRAN%: 34.3 %
HCT VFR BLD AUTO: 23.2 % (ref 39–55)
HGB BLD-MCNC: 7.3 G/DL (ref 14–16)
IMMATURE GRANS (ABS): 0.6 K/UL (ref 0–1)
IMMATURE GRANULOCYTES: 6.2 %
IMMATURE PLATELET FRACTION: 3.6 % (ref 0.5–7.5)
LYMPH #: 3.4 K/UL (ref 1.2–3.4)
LYMPH%: 34.4 %
MCH RBC QN AUTO: 29.9 PG (ref 25–35)
MCHC RBC AUTO-ENTMCNC: 31.5 G/DL (ref 31–36)
MCV RBC AUTO: 95.1 FL (ref 80–100)
MONO #: 1.3 K/UL (ref 0.1–0.6)
MONO%: 13.3 %
NUCLEATED RBCS: 2 %
PLATELET # BLD AUTO: 94 K/UL (ref 140–440)
PMV BLD AUTO: 10.1 FL (ref 8.8–12.7)
POTASSIUM SERPL-SCNC: 4.2 MMOL/L (ref 3.5–5)
PROT SERPL-MCNC: 5.4 G/DL (ref 6–8.2)
RBC # BLD AUTO: 2.44 M/UL (ref 4.3–5.9)
SODIUM: 138 MMOL/L (ref 134–144)
WBC # BLD AUTO: 9.9 K/UL (ref 5–10)

## 2019-03-11 ENCOUNTER — OFFICE VISIT (OUTPATIENT)
Dept: HEMATOLOGY/ONCOLOGY | Facility: CLINIC | Age: 59
End: 2019-03-11
Payer: MEDICAID

## 2019-03-11 VITALS
HEART RATE: 78 BPM | RESPIRATION RATE: 12 BRPM | BODY MASS INDEX: 21.45 KG/M2 | DIASTOLIC BLOOD PRESSURE: 56 MMHG | TEMPERATURE: 98 F | WEIGHT: 144.81 LBS | SYSTOLIC BLOOD PRESSURE: 98 MMHG | HEIGHT: 69 IN

## 2019-03-11 DIAGNOSIS — D64.81 ANEMIA ASSOCIATED WITH CHEMOTHERAPY: ICD-10-CM

## 2019-03-11 DIAGNOSIS — J32.4 CHRONIC PANSINUSITIS: ICD-10-CM

## 2019-03-11 DIAGNOSIS — D64.9 SYMPTOMATIC ANEMIA: ICD-10-CM

## 2019-03-11 DIAGNOSIS — T45.1X5A ANEMIA ASSOCIATED WITH CHEMOTHERAPY: ICD-10-CM

## 2019-03-11 DIAGNOSIS — C81.18 NODULAR SCLEROSIS HODGKIN LYMPHOMA OF LYMPH NODES OF MULTIPLE REGIONS: Primary | ICD-10-CM

## 2019-03-11 PROCEDURE — 99999 PR PBB SHADOW E&M-EST. PATIENT-LVL IV: CPT | Mod: PBBFAC,,, | Performed by: INTERNAL MEDICINE

## 2019-03-11 PROCEDURE — 99999 PR PBB SHADOW E&M-EST. PATIENT-LVL IV: ICD-10-PCS | Mod: PBBFAC,,, | Performed by: INTERNAL MEDICINE

## 2019-03-11 PROCEDURE — 99214 OFFICE O/P EST MOD 30 MIN: CPT | Mod: PBBFAC,PO | Performed by: INTERNAL MEDICINE

## 2019-03-11 PROCEDURE — 99215 OFFICE O/P EST HI 40 MIN: CPT | Mod: S$PBB,,, | Performed by: INTERNAL MEDICINE

## 2019-03-11 PROCEDURE — 99215 PR OFFICE/OUTPT VISIT, EST, LEVL V, 40-54 MIN: ICD-10-PCS | Mod: S$PBB,,, | Performed by: INTERNAL MEDICINE

## 2019-03-11 RX ORDER — ACETAMINOPHEN 325 MG/1
325 TABLET ORAL ONCE
Status: CANCELLED | OUTPATIENT
Start: 2019-03-11

## 2019-03-11 RX ORDER — DIPHENHYDRAMINE HCL 25 MG
25 CAPSULE ORAL ONCE
Status: CANCELLED | OUTPATIENT
Start: 2019-03-11

## 2019-03-11 RX ORDER — DOXORUBICIN HYDROCHLORIDE 2 MG/ML
38 INJECTION, SOLUTION INTRAVENOUS
Status: CANCELLED | OUTPATIENT
Start: 2019-03-12

## 2019-03-11 RX ORDER — HYDROCODONE BITARTRATE AND ACETAMINOPHEN 500; 5 MG/1; MG/1
TABLET ORAL ONCE
Status: CANCELLED | OUTPATIENT
Start: 2019-03-11 | End: 2019-03-11

## 2019-03-11 RX ORDER — FUROSEMIDE 10 MG/ML
20 INJECTION INTRAMUSCULAR; INTRAVENOUS ONCE
Status: CANCELLED | OUTPATIENT
Start: 2019-03-11

## 2019-03-11 NOTE — PROGRESS NOTES
CC: I feel ok    Heriberto Keys is a 59 y.o.    This is a 59 yr old gentleman here for F/U of Hodgkins disease He received ABVD cycle 1 while hospitalized at [facility]. He had neurological presentation with dizziness and confusion yet LP negative for CSF involvement. He received levaquin for sinusitis and his mentation improved. CHemo did cause severe marrow suppression requiring GF support today he is accompanied bu his friend who is caring for him.     He has had an echo and PFTs at [facility]  Pt had chemo   Remains on levaquin for pansinusitis  fawn PT   Pt ran out of mirtazapine and zoloft  He has been on carvidolol with lasix   Left foot with pins and needles in his toes     Past Medical History:   Diagnosis Date    Anemia     Depression     Encounter for blood transfusion     Lymphoma     Lymphoma     Lymphoma 2019     Past Surgical History:   Procedure Laterality Date    LYMPHADENECTOMY Bilateral    fawn allopurinol for TLS to prevent such     Current Outpatient Medications:     allopurinol (ZYLOPRIM) 100 MG tablet, Take 1 tablet by mouth once daily., Disp: , Rfl: 0    carvedilol (COREG) 3.125 MG tablet, Take 2 tablets by mouth 2 (two) times daily., Disp: , Rfl: 0    furosemide (LASIX) 20 MG tablet, Take 1 tablet by mouth once daily., Disp: , Rfl: 0    mirtazapine (REMERON) 15 MG tablet, Take 1 tablet by mouth once daily. Every night at bedtime, Disp: , Rfl: 0    ondansetron (ZOFRAN-ODT) 8 MG TbDL, Take 1 tablet (8 mg total) by mouth every 12 (twelve) hours as needed., Disp: 30 tablet, Rfl: 1    prochlorperazine (COMPAZINE) 10 MG tablet, Take 1 tablet (10 mg total) by mouth every 6 (six) hours as needed., Disp: 30 tablet, Rfl: 1    sertraline (ZOLOFT) 50 MG tablet, Take 1 tablet by mouth once daily., Disp: , Rfl: 0  Review of patient's allergies indicates:  No Known Allergies  Social History     Tobacco Use    Smoking status: Former Smoker     Packs/day: 1.00     Types: Cigarettes   Substance Use Topics     "Alcohol use: No     Frequency: Never    Drug use: No     No family history on file.    CONSTITUTIONAL: No fevers, chills, night sweats,  No further  wt. loss, appetite is good   SKIN: no rashes or itching  ENT: No headaches, head trauma, vision changes, + change in taste   LYMPH NODES: None noticed   CV: No chest pain, palpitations.   RESP: No dyspnea on exertion, cough, wheezing, or hemoptysis  GI: No nausea, emesis, diarrhea, constipation, melena, hematochezia, pain.   : No dysuria, hematuria, urgency, or frequency   HEME: No easy bruising, bleeding problems  PSYCHIATRIC: No depression, anxiety, psychosis  NEURO: No seizures, memory loss,  difficulty sleeping  MSK: No arthralgias or joint swelling       BP (!) 98/56   Pulse 78   Temp 97.5 °F (36.4 °C)   Resp 12   Ht 5' 9" (1.753 m)   Wt 65.7 kg (144 lb 13.5 oz)   BMI 21.39 kg/m²   Constitutional: oriented to person, place, and time.  well-developed and well-nourished.   HENT:   Head: Normocephalic and atraumatic.   Right Ear: External ear normal.   Left Ear: External ear normal.   Nose: Nose normal.   Mouth/Throat: Oropharynx is clear and moist.   Eyes: Conjunctivae and EOM are normal. Pupils are equal, round  Neck: Normal range of motion. Neck supple. No thyromegaly present.   Cardiovascular: Normal rate, regular rhythm, normal heart sounds   No murmur heard.  Pulmonary/Chest: Effort normal and breath sounds normal. no rales. No fremitus   Abdominal: Soft. Bowel sounds are normal.  no mass. There is no tenderness.  Musculoskeletal: Normal range of motion.  no edema or tenderness.   Lymphadenopathy:  no cervical adenopathy.   Neurological: alert and oriented to person, place, and time.   Skin: Skin is warm and dry. No rash noted.   Psychiatric: normal mood and affect.   behavior is normal.   Vitals reviewed.    Lab Results   Component Value Date    WBC 9.9 03/07/2019    HGB 7.3 (LL) 03/07/2019    HCT 23.2 (LL) 03/07/2019    MCV 95.1 03/07/2019    PLT 94 " (L) 03/07/2019     CMP  Sodium   Date Value Ref Range Status   03/07/2019 138 134 - 144 mmol/L      Potassium   Date Value Ref Range Status   03/07/2019 4.2 3.5 - 5.0 mmol/L      Chloride   Date Value Ref Range Status   03/07/2019 105 98 - 110 mmol/L      CO2   Date Value Ref Range Status   03/07/2019 27.4 22.8 - 31.6 mmol/L      Glucose   Date Value Ref Range Status   03/07/2019 106 (H) 70 - 99 mg/dL      BUN, Bld   Date Value Ref Range Status   03/07/2019 18 8 - 20 mg/dL      Creatinine   Date Value Ref Range Status   03/07/2019 0.54 (L) 0.60 - 1.40 mg/dL      Calcium   Date Value Ref Range Status   03/07/2019 8.6 7.7 - 10.4 mg/dL      Total Protein   Date Value Ref Range Status   03/07/2019 5.4 (L) 6.0 - 8.2 g/dL      Albumin   Date Value Ref Range Status   03/07/2019 3.0 (L) 3.1 - 4.7 g/dL      Total Bilirubin   Date Value Ref Range Status   03/07/2019 0.7 0.3 - 1.0 mg/dL      Alkaline Phosphatase   Date Value Ref Range Status   03/07/2019 92 40 - 104 IU/L      AST   Date Value Ref Range Status   03/07/2019 18 10 - 40 IU/L      ALT   Date Value Ref Range Status   02/19/2019 13 10 - 44 U/L Final     Anion Gap   Date Value Ref Range Status   02/19/2019 9 8 - 16 mmol/L Final     eGFR if    Date Value Ref Range Status   02/19/2019 >60 >60 mL/min/1.73 m^2 Final     eGFR if non    Date Value Ref Range Status   02/19/2019 >60 >60 mL/min/1.73 m^2 Final     Comment:     Calculation used to obtain the estimated glomerular filtration  rate (eGFR) is the CKD-EPI equation.          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    Symptomatic anemia  -     Verify informed consent for blood administration; Standing  -     Vital signs Document Pre-transfusion, 15 minutes after transfusion begins and immediately Post-transfusion for each unit transfused; Standing  -     Discharge instructions; Standing  -     Hold Transfusion and Notify Physician if:; Standing  -     If  transfusion reaction confirmed by physician/mid-level:; Standing  -     0.9%  NaCl infusion (for blood administration)  -     Ambulatory Referral to Chemotherapy Infusion  -     Type & Screen - Lab Collect; Future; Expected date: 03/11/2019  -     Prepare RBC 2 Units; sx; Future  -     Transfuse RBC 2 Units; Standing  -     furosemide injection 20 mg  -     diphenhydrAMINE capsule 25 mg  -     acetaminophen tablet 325 mg    Anemia associated with chemotherapy  -     Verify informed consent for blood administration; Standing  -     Vital signs Document Pre-transfusion, 15 minutes after transfusion begins and immediately Post-transfusion for each unit transfused; Standing  -     Discharge instructions; Standing  -     Hold Transfusion and Notify Physician if:; Standing  -     If transfusion reaction confirmed by physician/mid-level:; Standing  -     0.9%  NaCl infusion (for blood administration)  -     Ambulatory Referral to Chemotherapy Infusion  -     Type & Screen - Lab Collect; Future; Expected date: 03/11/2019  -     Prepare RBC 2 Units; sx; Future  -     Transfuse RBC 2 Units; Standing  -     furosemide injection 20 mg  -     diphenhydrAMINE capsule 25 mg  -     acetaminophen tablet 325 mg    Chronic pansinusitis  -     CT [employer] Sinuses without; Future; Expected date: 03/11/2019    Other orders  -     pegfilgrastim injection 6 mg  -     DOXOrubicin chemo injection 38 mg  -     vinBLAStine (VELBAN) 8 mg in sodium chloride 0.9% 50 mL chemo infusion  -     bleomycin (BLEOCIN) 14 Units in sodium chloride 0.9% 100 mL chemo infusion  -     dacarbazine (DTIC) 560 mg in dextrose 5 % 250 mL chemo infusion         hypoalbuminemia : increase protein intake   Dose reduced by 10 %   Marrow recovered   Dose reduced due to severe pancytopenia   Check CT sinuses since he had severe pansinusitis in past   To DR Jagruti Davis : his new pcp  Hold carvidolol and lasix  Hypotenive episode  Due for cycle 2 abvd for hodgkins  And  will receive blood transfusions   Now needs blood after being delayed when IV iron was ordered  Allopurinol  Hold he has no risk of TLS at the moment   Proceed with chemo   No evidence of depression today : no need for depression meds  Dosages have been adjusted for toxicity      No [ain   Low fall risk   Low thrombotic risk   rtc 2 weeks with labs    Advance Care Planning     Living Will  During this visit, I engaged the patient in the advance care planning process.  The patient and I reviewed the role for advance directives and their purpose in directing future healthcare if the patient's unable to speak for him/herself.  At this point in time, the patient does have full decision-making capacity.  We discussed different extreme health states that he could experience, and reviewed what kind of medical care he would want in those situations.  The patient communicated that if he were comatose and had little chance of a meaningful recovery, he would not want machines/life-sustaining treatments used.   The patient  Has not completed a living will to reflect these preferences.  The patient  Has not  already designated a healthcare power of  to make decisions on his behalf.   I spent a total of 5 minutes engaging the patient in this advance care planning discussion.                 Thank you for allowing me to evaluate and participate in the care of this pleasant patient. Please fell free to call me with any questions or concerns.    [place],  Amanda Rich MD    This note was dictated with Dragon and briefly proofread. Please excuse any sentences that may be unclear or words misspelled

## 2019-03-13 ENCOUNTER — INFUSION (OUTPATIENT)
Dept: INFUSION THERAPY | Facility: HOSPITAL | Age: 59
End: 2019-03-13
Attending: INTERNAL MEDICINE
Payer: MEDICAID

## 2019-03-13 VITALS
HEART RATE: 72 BPM | SYSTOLIC BLOOD PRESSURE: 117 MMHG | DIASTOLIC BLOOD PRESSURE: 64 MMHG | OXYGEN SATURATION: 100 % | TEMPERATURE: 98 F | RESPIRATION RATE: 16 BRPM

## 2019-03-13 DIAGNOSIS — T45.1X5A ANEMIA ASSOCIATED WITH CHEMOTHERAPY: ICD-10-CM

## 2019-03-13 DIAGNOSIS — D64.81 ANEMIA ASSOCIATED WITH CHEMOTHERAPY: ICD-10-CM

## 2019-03-13 DIAGNOSIS — D64.9 SYMPTOMATIC ANEMIA: Primary | ICD-10-CM

## 2019-03-13 LAB
ABO + RH BLD: NORMAL
BLD GP AB SCN CELLS X3 SERPL QL: NORMAL

## 2019-03-13 PROCEDURE — 36415 COLL VENOUS BLD VENIPUNCTURE: CPT

## 2019-03-13 PROCEDURE — 36430 TRANSFUSION BLD/BLD COMPNT: CPT

## 2019-03-13 PROCEDURE — P9016 RBC LEUKOCYTES REDUCED: HCPCS

## 2019-03-13 PROCEDURE — 86920 COMPATIBILITY TEST SPIN: CPT

## 2019-03-13 PROCEDURE — 63600175 PHARM REV CODE 636 W HCPCS: Performed by: INTERNAL MEDICINE

## 2019-03-13 PROCEDURE — 86850 RBC ANTIBODY SCREEN: CPT

## 2019-03-13 RX ORDER — FUROSEMIDE 10 MG/ML
20 INJECTION INTRAMUSCULAR; INTRAVENOUS ONCE
Status: DISCONTINUED | OUTPATIENT
Start: 2019-03-13 | End: 2019-03-13 | Stop reason: HOSPADM

## 2019-03-13 RX ORDER — HYDROCODONE BITARTRATE AND ACETAMINOPHEN 500; 5 MG/1; MG/1
TABLET ORAL ONCE
Status: DISCONTINUED | OUTPATIENT
Start: 2019-03-13 | End: 2019-03-13 | Stop reason: HOSPADM

## 2019-03-13 RX ORDER — ACETAMINOPHEN 325 MG/1
325 TABLET ORAL ONCE
Status: DISCONTINUED | OUTPATIENT
Start: 2019-03-13 | End: 2019-03-13 | Stop reason: HOSPADM

## 2019-03-13 RX ORDER — HEPARIN 100 UNIT/ML
500 SYRINGE INTRAVENOUS
Status: COMPLETED | OUTPATIENT
Start: 2019-03-13 | End: 2019-03-13

## 2019-03-13 RX ORDER — DIPHENHYDRAMINE HCL 25 MG
25 CAPSULE ORAL ONCE
Status: DISCONTINUED | OUTPATIENT
Start: 2019-03-13 | End: 2019-03-13 | Stop reason: HOSPADM

## 2019-03-13 RX ADMIN — HEPARIN 500 UNITS: 100 SYRINGE at 12:03

## 2019-03-13 NOTE — NURSING
Pt arrived on unit via wheelchair transport.  Allergies verified, orders verified and released. Consent reviewed.  Patient verbalized understanding. Port accessed per policy using aseptic technique.  Good blood return noted.  Primary nurse to transfuse 2 units PRBCs per order.

## 2019-03-13 NOTE — NURSING
Per primary nurse- No type and screen completed.  CC spoke with the patient concerning the above and the process to complete.  Patient verbalized understanding.  Lab will draw T&S today.  Pt agreed to reschedule the appt for PRBCs tomorrow stating he has another appt this afternoon.  Port flushed per protocol and de-accessed.  Pt will return tomorrow.

## 2019-03-14 ENCOUNTER — INFUSION (OUTPATIENT)
Dept: INFUSION THERAPY | Facility: HOSPITAL | Age: 59
End: 2019-03-14
Attending: INTERNAL MEDICINE
Payer: MEDICAID

## 2019-03-14 VITALS
DIASTOLIC BLOOD PRESSURE: 62 MMHG | OXYGEN SATURATION: 99 % | HEIGHT: 69 IN | TEMPERATURE: 98 F | WEIGHT: 144.81 LBS | RESPIRATION RATE: 18 BRPM | HEART RATE: 72 BPM | SYSTOLIC BLOOD PRESSURE: 107 MMHG | BODY MASS INDEX: 21.45 KG/M2

## 2019-03-14 DIAGNOSIS — D64.9 SYMPTOMATIC ANEMIA: ICD-10-CM

## 2019-03-14 DIAGNOSIS — D64.81 ANEMIA ASSOCIATED WITH CHEMOTHERAPY: ICD-10-CM

## 2019-03-14 DIAGNOSIS — D64.9 ANEMIA: Primary | ICD-10-CM

## 2019-03-14 DIAGNOSIS — T45.1X5A ANEMIA ASSOCIATED WITH CHEMOTHERAPY: ICD-10-CM

## 2019-03-14 LAB
BLD PROD TYP BPU: NORMAL
BLD PROD TYP BPU: NORMAL
BLOOD UNIT EXPIRATION DATE: NORMAL
BLOOD UNIT EXPIRATION DATE: NORMAL
BLOOD UNIT TYPE CODE: 600
BLOOD UNIT TYPE CODE: 6200
BLOOD UNIT TYPE: NORMAL
BLOOD UNIT TYPE: NORMAL
CODING SYSTEM: NORMAL
CODING SYSTEM: NORMAL
DISPENSE STATUS: NORMAL
DISPENSE STATUS: NORMAL
NUM UNITS TRANS PACKED RBC: NORMAL
NUM UNITS TRANS PACKED RBC: NORMAL

## 2019-03-14 PROCEDURE — 63600175 PHARM REV CODE 636 W HCPCS: Performed by: INTERNAL MEDICINE

## 2019-03-14 PROCEDURE — 25000003 PHARM REV CODE 250: Performed by: INTERNAL MEDICINE

## 2019-03-14 RX ORDER — HEPARIN 100 UNIT/ML
5 SYRINGE INTRAVENOUS ONCE
Status: COMPLETED | OUTPATIENT
Start: 2019-03-14 | End: 2019-03-14

## 2019-03-14 RX ORDER — ACETAMINOPHEN 325 MG/1
650 TABLET ORAL ONCE
Status: DISCONTINUED | OUTPATIENT
Start: 2019-03-14 | End: 2019-03-14

## 2019-03-14 RX ORDER — DIPHENHYDRAMINE HCL 25 MG
25 CAPSULE ORAL
Status: DISCONTINUED | OUTPATIENT
Start: 2019-03-14 | End: 2019-03-14

## 2019-03-14 RX ORDER — FUROSEMIDE 10 MG/ML
20 INJECTION INTRAMUSCULAR; INTRAVENOUS ONCE
Status: COMPLETED | OUTPATIENT
Start: 2019-03-14 | End: 2019-03-14

## 2019-03-14 RX ORDER — ACETAMINOPHEN 325 MG/1
650 TABLET ORAL ONCE
Status: COMPLETED | OUTPATIENT
Start: 2019-03-14 | End: 2019-03-14

## 2019-03-14 RX ORDER — FUROSEMIDE 10 MG/ML
20 INJECTION INTRAMUSCULAR; INTRAVENOUS
Status: DISCONTINUED | OUTPATIENT
Start: 2019-03-14 | End: 2019-03-14

## 2019-03-14 RX ORDER — SODIUM CHLORIDE 9 MG/ML
INJECTION, SOLUTION INTRAVENOUS CONTINUOUS
Status: DISCONTINUED | OUTPATIENT
Start: 2019-03-14 | End: 2019-03-14

## 2019-03-14 RX ORDER — DIPHENHYDRAMINE HCL 25 MG
25 CAPSULE ORAL ONCE
Status: COMPLETED | OUTPATIENT
Start: 2019-03-14 | End: 2019-03-14

## 2019-03-14 RX ORDER — HEPARIN 100 UNIT/ML
500 SYRINGE INTRAVENOUS
Status: DISCONTINUED | OUTPATIENT
Start: 2019-03-14 | End: 2019-03-14

## 2019-03-14 RX ADMIN — FUROSEMIDE 20 MG: 10 INJECTION, SOLUTION INTRAMUSCULAR; INTRAVENOUS at 01:03

## 2019-03-14 RX ADMIN — DIPHENHYDRAMINE HYDROCHLORIDE 25 MG: 25 CAPSULE ORAL at 09:03

## 2019-03-14 RX ADMIN — ACETAMINOPHEN 650 MG: 325 TABLET ORAL at 09:03

## 2019-03-14 RX ADMIN — HEPARIN SODIUM (PORCINE) LOCK FLUSH IV SOLN 100 UNIT/ML 500 UNITS: 100 SOLUTION at 04:03

## 2019-03-25 ENCOUNTER — HOSPITAL ENCOUNTER (OUTPATIENT)
Dept: RADIOLOGY | Facility: HOSPITAL | Age: 59
Discharge: HOME | End: 2019-03-25
Attending: INTERNAL MEDICINE
Payer: MEDICAID

## 2019-03-25 DIAGNOSIS — J32.4 CHRONIC PANSINUSITIS: ICD-10-CM

## 2019-03-25 PROCEDURE — 70486 CT MEDTRONIC SINUSES WITHOUT: ICD-10-PCS | Mod: 26,,, | Performed by: RADIOLOGY

## 2019-03-25 PROCEDURE — 70486 CT MAXILLOFACIAL W/O DYE: CPT | Mod: 26,,, | Performed by: RADIOLOGY

## 2019-03-25 PROCEDURE — 70486 CT MAXILLOFACIAL W/O DYE: CPT | Mod: TC

## 2019-03-26 ENCOUNTER — TELEPHONE (OUTPATIENT)
Dept: HEMATOLOGY/ONCOLOGY | Facility: CLINIC | Age: 59
End: 2019-03-26

## 2019-03-26 ENCOUNTER — OFFICE VISIT (OUTPATIENT)
Dept: HEMATOLOGY/ONCOLOGY | Facility: CLINIC | Age: 59
End: 2019-03-26
Payer: MEDICAID

## 2019-03-26 VITALS
TEMPERATURE: 98 F | HEIGHT: 69 IN | HEART RATE: 76 BPM | DIASTOLIC BLOOD PRESSURE: 66 MMHG | WEIGHT: 153.25 LBS | OXYGEN SATURATION: 100 % | SYSTOLIC BLOOD PRESSURE: 129 MMHG | BODY MASS INDEX: 22.7 KG/M2 | RESPIRATION RATE: 20 BRPM

## 2019-03-26 DIAGNOSIS — H70.90 MASTOIDITIS, UNSPECIFIED LATERALITY: ICD-10-CM

## 2019-03-26 DIAGNOSIS — M54.2 NECK PAIN ON LEFT SIDE: ICD-10-CM

## 2019-03-26 DIAGNOSIS — I10 HYPERTENSION, UNSPECIFIED TYPE: ICD-10-CM

## 2019-03-26 DIAGNOSIS — T45.1X5A CHEMOTHERAPY-INDUCED NAUSEA: ICD-10-CM

## 2019-03-26 DIAGNOSIS — Z09 CHEMOTHERAPY FOLLOW-UP EXAMINATION: ICD-10-CM

## 2019-03-26 DIAGNOSIS — T45.1X5A ANEMIA ASSOCIATED WITH CHEMOTHERAPY: ICD-10-CM

## 2019-03-26 DIAGNOSIS — C81.18 NODULAR SCLEROSIS HODGKIN LYMPHOMA OF LYMPH NODES OF MULTIPLE REGIONS: Primary | ICD-10-CM

## 2019-03-26 DIAGNOSIS — Z09 ONCOLOGY FOLLOW-UP ENCOUNTER: ICD-10-CM

## 2019-03-26 DIAGNOSIS — R11.0 CHEMOTHERAPY-INDUCED NAUSEA: ICD-10-CM

## 2019-03-26 DIAGNOSIS — D64.81 ANEMIA ASSOCIATED WITH CHEMOTHERAPY: ICD-10-CM

## 2019-03-26 PROCEDURE — 99215 OFFICE O/P EST HI 40 MIN: CPT | Mod: S$PBB,,, | Performed by: INTERNAL MEDICINE

## 2019-03-26 PROCEDURE — 1125F AMNT PAIN NOTED PAIN PRSNT: CPT | Mod: ,,, | Performed by: INTERNAL MEDICINE

## 2019-03-26 PROCEDURE — 99999 PR PBB SHADOW E&M-EST. PATIENT-LVL III: ICD-10-PCS | Mod: PBBFAC,,, | Performed by: INTERNAL MEDICINE

## 2019-03-26 PROCEDURE — 99999 PR PBB SHADOW E&M-EST. PATIENT-LVL III: CPT | Mod: PBBFAC,,, | Performed by: INTERNAL MEDICINE

## 2019-03-26 PROCEDURE — 99213 OFFICE O/P EST LOW 20 MIN: CPT | Mod: PBBFAC,PO | Performed by: INTERNAL MEDICINE

## 2019-03-26 PROCEDURE — 1125F PR PAIN SEVERITY QUANTIFIED, PAIN PRESENT: ICD-10-PCS | Mod: ,,, | Performed by: INTERNAL MEDICINE

## 2019-03-26 PROCEDURE — 99215 PR OFFICE/OUTPT VISIT, EST, LEVL V, 40-54 MIN: ICD-10-PCS | Mod: S$PBB,,, | Performed by: INTERNAL MEDICINE

## 2019-03-26 RX ORDER — SODIUM CHLORIDE 0.9 % (FLUSH) 0.9 %
10 SYRINGE (ML) INJECTION
Status: CANCELLED | OUTPATIENT
Start: 2019-04-09

## 2019-03-26 RX ORDER — SODIUM CHLORIDE 0.9 % (FLUSH) 0.9 %
10 SYRINGE (ML) INJECTION
Status: CANCELLED | OUTPATIENT
Start: 2019-03-26

## 2019-03-26 RX ORDER — DOXORUBICIN HYDROCHLORIDE 2 MG/ML
38 INJECTION, SOLUTION INTRAVENOUS
Status: CANCELLED | OUTPATIENT
Start: 2019-03-26

## 2019-03-26 RX ORDER — HEPARIN 100 UNIT/ML
500 SYRINGE INTRAVENOUS
Status: CANCELLED | OUTPATIENT
Start: 2019-03-26

## 2019-03-26 RX ORDER — DOXORUBICIN HYDROCHLORIDE 2 MG/ML
38 INJECTION, SOLUTION INTRAVENOUS
Status: CANCELLED | OUTPATIENT
Start: 2019-04-09

## 2019-03-26 RX ORDER — HEPARIN 100 UNIT/ML
500 SYRINGE INTRAVENOUS
Status: CANCELLED | OUTPATIENT
Start: 2019-04-09

## 2019-03-26 NOTE — TELEPHONE ENCOUNTER
Called and left message for mrs. Lopez who handles apts for pt. Informed her that Dr. Nas chris does not take pt insurance. I informed makayla that I scheduled pt for a ent doctor in our clinic. I informed pt of date scheduled and if date and time does not work to call .

## 2019-03-26 NOTE — PROGRESS NOTES
CC: I am swelling     Heriberto Keys is a 59 y.o.    This is a 59 yr old gentleman here for F/U of Hodgkins disease He received ABVD cycle 1 while hospitalized at [facility]. He had neurological presentation with dizziness and confusion yet LP negative for CSF involvement. He received levaquin for sinusitis and his mentation improved. CHemo did cause severe marrow suppression requiring GF support today he is accompanied bu his friend who is caring for him.     He has had an echo and PFTs at [facility]  Pt had chemo in the hopsital   No longer on levaquin for pansinusitis    He has been on carvidolol with lasix   Left foot with pins and needles in his toes     He eats a lot of lunch meat     Past Medical History:   Diagnosis Date    Anemia     Depression     Encounter for blood transfusion     Lymphoma     Lymphoma     Lymphoma 2019     Past Surgical History:   Procedure Laterality Date    LYMPHADENECTOMY Bilateral    fawn allopurinol for TLS to prevent such     Current Outpatient Medications:     allopurinol (ZYLOPRIM) 100 MG tablet, Take 1 tablet by mouth once daily., Disp: , Rfl: 0    carvedilol (COREG) 3.125 MG tablet, Take 2 tablets by mouth 2 (two) times daily., Disp: , Rfl: 0    furosemide (LASIX) 20 MG tablet, Take 1 tablet by mouth once daily., Disp: , Rfl: 0    mirtazapine (REMERON) 15 MG tablet, Take 1 tablet by mouth once daily. Every night at bedtime, Disp: , Rfl: 0    ondansetron (ZOFRAN-ODT) 8 MG TbDL, Take 1 tablet (8 mg total) by mouth every 12 (twelve) hours as needed., Disp: 30 tablet, Rfl: 1    prochlorperazine (COMPAZINE) 10 MG tablet, Take 1 tablet (10 mg total) by mouth every 6 (six) hours as needed., Disp: 30 tablet, Rfl: 1    sertraline (ZOLOFT) 50 MG tablet, Take 1 tablet by mouth once daily., Disp: , Rfl: 0  Review of patient's allergies indicates:  No Known Allergies  Social History     Tobacco Use    Smoking status: Former Smoker     Packs/day: 1.00     Types: Cigarettes   Substance Use  "Topics    Alcohol use: No     Frequency: Never    Drug use: No     History reviewed. No pertinent family history.    CONSTITUTIONAL: No fevers, chills, night sweats,  No further  wt. loss, appetite is good   SKIN: no rashes or itching  ENT: No headaches, head trauma, vision changes, + change in taste   LYMPH NODES: None noticed   CV: No chest pain, palpitations.   RESP: No dyspnea on exertion, cough, wheezing, or hemoptysis  GI: No nausea, emesis, diarrhea, constipation, melena, hematochezia, pain.   : No dysuria, hematuria, urgency, or frequency   HEME: No easy bruising, bleeding problems  PSYCHIATRIC: No depression, anxiety, psychosis  NEURO: No seizures, memory loss,  difficulty sleeping  MSK: No arthralgias or joint swelling       /66   Pulse 76   Temp 97.7 °F (36.5 °C)   Resp 20   Ht 5' 9" (1.753 m)   Wt 69.5 kg (153 lb 3.5 oz)   SpO2 100%   BMI 22.63 kg/m²   Constitutional: oriented to person, place, and time.  well-developed and well-nourished.   HENT:   Head: Normocephalic and atraumatic.   Right Ear: External ear normal.   Left Ear: External ear normal.   Nose: Nose normal.   Mouth/Throat: Oropharynx is clear and moist.   Eyes: Conjunctivae and EOM are normal. Pupils are equal, round  Neck: Normal range of motion. Neck supple. No thyromegaly present.   Cardiovascular: Normal rate, regular rhythm, normal heart sounds   No murmur heard.  Pulmonary/Chest: Effort normal and breath sounds normal. no rales. No fremitus   Abdominal: Soft. Bowel sounds are normal.  no mass.   Musculoskeletal: Normal range of motion.   1+  edema   Lymphadenopathy:  no cervical adenopathy.   Neurological: alert and oriented to person, place  Skin: Skin is warm and dry. No rash noted.   Psychiatric: normal mood and affect.   behavior is normal.   Vitals reviewed.    Lab Results   Component Value Date    WBC 11.00 03/25/2019    HGB 10.0 (L) 03/25/2019    HCT 30.8 (L) 03/25/2019    MCV 90 03/25/2019     " 03/25/2019     CMP  Sodium   Date Value Ref Range Status   03/25/2019 141 136 - 145 mmol/L Final   03/07/2019 138 134 - 144 mmol/L      Potassium   Date Value Ref Range Status   03/25/2019 3.9 3.5 - 5.1 mmol/L Final     Chloride   Date Value Ref Range Status   03/25/2019 107 95 - 110 mmol/L Final   03/07/2019 105 98 - 110 mmol/L      CO2   Date Value Ref Range Status   03/25/2019 26 23 - 29 mmol/L Final     Glucose   Date Value Ref Range Status   03/25/2019 102 70 - 110 mg/dL Final   03/07/2019 106 (H) 70 - 99 mg/dL      BUN, Bld   Date Value Ref Range Status   03/25/2019 16 6 - 20 mg/dL Final     Creatinine   Date Value Ref Range Status   03/25/2019 0.7 0.5 - 1.4 mg/dL Final   03/07/2019 0.54 (L) 0.60 - 1.40 mg/dL      Calcium   Date Value Ref Range Status   03/25/2019 9.1 8.7 - 10.5 mg/dL Final     Total Protein   Date Value Ref Range Status   03/25/2019 6.3 6.0 - 8.4 g/dL Final     Albumin   Date Value Ref Range Status   03/25/2019 3.3 (L) 3.5 - 5.2 g/dL Final   03/07/2019 3.0 (L) 3.1 - 4.7 g/dL      Total Bilirubin   Date Value Ref Range Status   03/25/2019 0.4 0.1 - 1.0 mg/dL Final     Comment:     For infants and newborns, interpretation of results should be based  on gestational age, weight and in agreement with clinical  observations.  Premature Infant recommended reference ranges:  Up to 24 hours.............<8.0 mg/dL  Up to 48 hours............<12.0 mg/dL  3-5 days..................<15.0 mg/dL  6-29 days.................<15.0 mg/dL       Alkaline Phosphatase   Date Value Ref Range Status   03/25/2019 97 55 - 135 U/L Final     AST   Date Value Ref Range Status   03/25/2019 16 10 - 40 U/L Final     ALT   Date Value Ref Range Status   03/25/2019 12 10 - 44 U/L Final     Anion Gap   Date Value Ref Range Status   03/25/2019 8 8 - 16 mmol/L Final     eGFR if    Date Value Ref Range Status   03/25/2019 >60 >60 mL/min/1.73 m^2 Final     eGFR if non    Date Value Ref Range Status    03/25/2019 >60 >60 mL/min/1.73 m^2 Final     Comment:     Calculation used to obtain the estimated glomerular filtration  rate (eGFR) is the CKD-EPI equation.          Narrative     EXAMINATION:  CT MEDTRONIC SINUSES WITHOUT    CLINICAL HISTORY:  pansinusitis on chemo;  Chronic pansinusitis    TECHNIQUE:  Unenhanced axial images were obtained through the paranasal sinuses.  Sagittal and coronal reformatted images were created.  The study is reviewed in bone and soft tissue windows.    COMPARISON:  None    FINDINGS:  There is opacification of the most anterior inferior left ethmoid air cell.  This is not resulting in obstruction of the maxillary ostium and infundibulum.  The remainder of the paranasal sinuses are clear.  The nasal septum is deviated to the right.  There is no bony erosion or destruction.    There is near complete opacification of bilateral mastoid air cells and middle ear cavities, right greater than left.  These may represent mastoid and middle ear effusions but the findings are nonspecific.  There is mild prominence of the posterior nasopharyngeal soft tissues likely representing reactive lymphoid tissue although nonspecific.    Scattered subcentimeter lymph nodes are present in the included upper neck soft tissues.    Otherwise, the facial soft tissues are normal.  The parotid glands appear normal.  On unenhanced imaging, there is no obvious soft tissue abscess.    Intracranially, there is mild volume loss but there is no hydrocephalus or midline shift.      Impression       1. There is no fluid in the paranasal sinuses.  The nasal septum is deviated to the right.  There is opacification of the most anterior inferior left ethmoid air cell.  The maxillary ostia and infundibula are patent bilaterally.  2. There is near complete opacification of the middle ear cavities and mastoid air cells bilaterally, likely representing mastoid and middle ear effusions.  There is mild prominence of the posterior  nasopharyngeal soft tissues without obvious mass.  This is nonspecific and may represent reactive lymphoid tissue.  Subcentimeter lymph nodes are also present in the included upper neck soft tissues.      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  -     CBC auto differential; Future; Expected date: 03/26/2019  -     CMP; Future; Expected date: 03/26/2019  -     Lactate dehydrogenase; Future; Expected date: 03/26/2019  -     Beta 2 Microglobulin, Serum; Future; Expected date: 03/26/2019    Mastoiditis, unspecified laterality    Neck pain on left side    Hypertension, unspecified type    Chemotherapy-induced nausea  -     CBC auto differential; Future; Expected date: 03/26/2019  -     CMP; Future; Expected date: 03/26/2019  -     Lactate dehydrogenase; Future; Expected date: 03/26/2019  -     Beta 2 Microglobulin, Serum; Future; Expected date: 03/26/2019    Oncology follow-up encounter    Chemotherapy follow-up examination  -     CBC auto differential; Future; Expected date: 03/26/2019  -     CMP; Future; Expected date: 03/26/2019  -     Lactate dehydrogenase; Future; Expected date: 03/26/2019  -     Beta 2 Microglobulin, Serum; Future; Expected date: 03/26/2019    Anemia associated with chemotherapy  -     CBC auto differential; Future; Expected date: 03/26/2019  -     CMP; Future; Expected date: 03/26/2019  -     Lactate dehydrogenase; Future; Expected date: 03/26/2019  -     Beta 2 Microglobulin, Serum; Future; Expected date: 03/26/2019         Send to DR Lovell to help with mastoiditis   Dose reduced by 10 %  Due to severe marrow suppression after chemo  Left neck stiffness and pain ? Related to mastoid problem   Dose reduced due to severe pancytopenia in past   Check CT sinuses since he had severe pansinusitis in past   To DR Jagruti Davis : his new  pcp  Hold carvidolol and lasix    Due for cycle 3  abvd for hodgkins       Allopurinol  Hold he has no risk of TLS at the moment   Proceed with chemo   Pain in neck 10/10   No fall risk   No evidence of depression today : no need for depression meds   he may continue muscle relaxer and ibuprofen for now  rtc 2 weeks with labs  Edema decrease salt  Advance Care Planning     Living Will  During this visit, I engaged the patient in the advance care planning process.  The patient and I reviewed the role for advance directives and their purpose in directing future healthcare if the patient's unable to speak for him/herself.  At this point in time, the patient does have full decision-making capacity.  We discussed different extreme health states that he could experience, and reviewed what kind of medical care he would want in those situations.  The patient communicated that if he were comatose and had little chance of a meaningful recovery, he would not want machines/life-sustaining treatments used.   The patient  Has not completed a living will to reflect these preferences.  The patient  Has not  already designated a healthcare power of  to make decisions on his behalf.   I spent a total of 5 minutes engaging the patient in this advance care planning discussion.                 Thank you for allowing me to evaluate and participate in the care of this pleasant patient. Please fell free to call me with any questions or concerns.    Warmly,  Amanda Rich MD    This note was dictated with Dragon and briefly proofread. Please excuse any sentences that may be unclear or words misspelled

## 2019-03-28 ENCOUNTER — DOCUMENTATION ONLY (OUTPATIENT)
Dept: HEMATOLOGY/ONCOLOGY | Facility: CLINIC | Age: 59
End: 2019-03-28

## 2019-03-28 NOTE — PROGRESS NOTES
At Last office visit Dr. Velez ordered pt to get iv iron. Presented to dr velez that don in scheduling at [facility] stated that pt is already on schedule to receive ferrlecit since 3/1 scheduled by barbi weekly x8 doses. Dr. Velez informed me to notify don in scheduling to cancel all iron infusions scheduled and to not order anymore at this time.

## 2019-04-03 ENCOUNTER — TELEPHONE (OUTPATIENT)
Dept: ADMINISTRATIVE | Facility: CLINIC | Age: 59
End: 2019-04-03

## 2019-04-03 NOTE — TELEPHONE ENCOUNTER
Home Health SOC 02/09/2019 - 04/09/2019 with [facility] - Dr. Amanda Rich. Patient received SN, PT and OT services.

## 2019-04-08 ENCOUNTER — LAB VISIT (OUTPATIENT)
Dept: LAB | Facility: HOSPITAL | Age: 59
End: 2019-04-08
Attending: INTERNAL MEDICINE
Payer: MEDICAID

## 2019-04-08 DIAGNOSIS — T45.1X5A CHEMOTHERAPY-INDUCED NAUSEA: ICD-10-CM

## 2019-04-08 DIAGNOSIS — D64.81 ANEMIA ASSOCIATED WITH CHEMOTHERAPY: ICD-10-CM

## 2019-04-08 DIAGNOSIS — T45.1X5A ANEMIA ASSOCIATED WITH CHEMOTHERAPY: ICD-10-CM

## 2019-04-08 DIAGNOSIS — C81.18 NODULAR SCLEROSIS HODGKIN LYMPHOMA OF LYMPH NODES OF MULTIPLE REGIONS: ICD-10-CM

## 2019-04-08 DIAGNOSIS — R11.0 CHEMOTHERAPY-INDUCED NAUSEA: ICD-10-CM

## 2019-04-08 DIAGNOSIS — Z09 CHEMOTHERAPY FOLLOW-UP EXAMINATION: ICD-10-CM

## 2019-04-08 LAB
ALBUMIN SERPL BCP-MCNC: 3.5 G/DL (ref 3.5–5.2)
ALP SERPL-CCNC: 98 U/L (ref 55–135)
ALT SERPL W/O P-5'-P-CCNC: 9 U/L (ref 10–44)
ANION GAP SERPL CALC-SCNC: 5 MMOL/L (ref 8–16)
AST SERPL-CCNC: 14 U/L (ref 10–40)
B2 MICROGLOB SERPL-MCNC: 2.7 UG/ML (ref 0–2.5)
BASOPHILS # BLD AUTO: 0 K/UL (ref 0–0.2)
BASOPHILS NFR BLD: 0.5 % (ref 0–1.9)
BILIRUB SERPL-MCNC: 0.4 MG/DL (ref 0.1–1)
BUN SERPL-MCNC: 18 MG/DL (ref 6–20)
CALCIUM SERPL-MCNC: 9.2 MG/DL (ref 8.7–10.5)
CHLORIDE SERPL-SCNC: 108 MMOL/L (ref 95–110)
CO2 SERPL-SCNC: 28 MMOL/L (ref 23–29)
CREAT SERPL-MCNC: 0.7 MG/DL (ref 0.5–1.4)
DIFFERENTIAL METHOD: ABNORMAL
EOSINOPHIL # BLD AUTO: 0.5 K/UL (ref 0–0.5)
EOSINOPHIL NFR BLD: 5.4 % (ref 0–8)
ERYTHROCYTE [DISTWIDTH] IN BLOOD BY AUTOMATED COUNT: 24.2 % (ref 11.5–14.5)
EST. GFR  (AFRICAN AMERICAN): >60 ML/MIN/1.73 M^2
EST. GFR  (NON AFRICAN AMERICAN): >60 ML/MIN/1.73 M^2
GLUCOSE SERPL-MCNC: 88 MG/DL (ref 70–110)
HCT VFR BLD AUTO: 28.7 % (ref 40–54)
HGB BLD-MCNC: 9.4 G/DL (ref 14–18)
LDH SERPL L TO P-CCNC: 194 U/L (ref 110–260)
LYMPHOCYTES # BLD AUTO: 2.3 K/UL (ref 1–4.8)
LYMPHOCYTES NFR BLD: 25 % (ref 18–48)
MCH RBC QN AUTO: 30.2 PG (ref 27–31)
MCHC RBC AUTO-ENTMCNC: 32.8 G/DL (ref 32–36)
MCV RBC AUTO: 92 FL (ref 82–98)
MONOCYTES # BLD AUTO: 0.9 K/UL (ref 0.3–1)
MONOCYTES NFR BLD: 10.1 % (ref 4–15)
NEUTROPHILS # BLD AUTO: 5.5 K/UL (ref 1.8–7.7)
NEUTROPHILS NFR BLD: 59 % (ref 38–73)
PLATELET # BLD AUTO: 244 K/UL (ref 150–350)
PMV BLD AUTO: 6.5 FL (ref 9.2–12.9)
POTASSIUM SERPL-SCNC: 3.9 MMOL/L (ref 3.5–5.1)
PROT SERPL-MCNC: 6.1 G/DL (ref 6–8.4)
RBC # BLD AUTO: 3.12 M/UL (ref 4.6–6.2)
SODIUM SERPL-SCNC: 141 MMOL/L (ref 136–145)
WBC # BLD AUTO: 9.3 K/UL (ref 3.9–12.7)

## 2019-04-08 PROCEDURE — 36415 COLL VENOUS BLD VENIPUNCTURE: CPT

## 2019-04-08 PROCEDURE — 82232 ASSAY OF BETA-2 PROTEIN: CPT

## 2019-04-08 PROCEDURE — 80053 COMPREHEN METABOLIC PANEL: CPT

## 2019-04-08 PROCEDURE — 83615 LACTATE (LD) (LDH) ENZYME: CPT

## 2019-04-08 PROCEDURE — 85025 COMPLETE CBC W/AUTO DIFF WBC: CPT

## 2019-04-09 ENCOUNTER — OFFICE VISIT (OUTPATIENT)
Dept: HEMATOLOGY/ONCOLOGY | Facility: CLINIC | Age: 59
End: 2019-04-09
Payer: MEDICAID

## 2019-04-09 VITALS
OXYGEN SATURATION: 96 % | DIASTOLIC BLOOD PRESSURE: 73 MMHG | BODY MASS INDEX: 22.76 KG/M2 | RESPIRATION RATE: 20 BRPM | HEART RATE: 77 BPM | SYSTOLIC BLOOD PRESSURE: 158 MMHG | WEIGHT: 153.69 LBS | TEMPERATURE: 99 F | HEIGHT: 69 IN

## 2019-04-09 DIAGNOSIS — M25.511 ACUTE PAIN OF RIGHT SHOULDER: ICD-10-CM

## 2019-04-09 DIAGNOSIS — D64.81 ANEMIA ASSOCIATED WITH CHEMOTHERAPY: ICD-10-CM

## 2019-04-09 DIAGNOSIS — Z09 CHEMOTHERAPY FOLLOW-UP EXAMINATION: ICD-10-CM

## 2019-04-09 DIAGNOSIS — T45.1X5A ANEMIA ASSOCIATED WITH CHEMOTHERAPY: ICD-10-CM

## 2019-04-09 DIAGNOSIS — M25.512 CHRONIC LEFT SHOULDER PAIN: ICD-10-CM

## 2019-04-09 DIAGNOSIS — E61.1 IRON DEFICIENCY: ICD-10-CM

## 2019-04-09 DIAGNOSIS — G89.29 CHRONIC LEFT SHOULDER PAIN: ICD-10-CM

## 2019-04-09 DIAGNOSIS — Z09 ONCOLOGY FOLLOW-UP ENCOUNTER: ICD-10-CM

## 2019-04-09 DIAGNOSIS — M54.2 NECK PAIN: ICD-10-CM

## 2019-04-09 DIAGNOSIS — C81.18 NODULAR SCLEROSIS HODGKIN LYMPHOMA OF LYMPH NODES OF MULTIPLE REGIONS: Primary | ICD-10-CM

## 2019-04-09 PROCEDURE — 99215 OFFICE O/P EST HI 40 MIN: CPT | Mod: S$PBB,,, | Performed by: INTERNAL MEDICINE

## 2019-04-09 PROCEDURE — 99214 OFFICE O/P EST MOD 30 MIN: CPT | Mod: PBBFAC,PO | Performed by: INTERNAL MEDICINE

## 2019-04-09 PROCEDURE — 1125F PR PAIN SEVERITY QUANTIFIED, PAIN PRESENT: ICD-10-PCS | Mod: ,,, | Performed by: INTERNAL MEDICINE

## 2019-04-09 PROCEDURE — 99999 PR PBB SHADOW E&M-EST. PATIENT-LVL IV: ICD-10-PCS | Mod: PBBFAC,,, | Performed by: INTERNAL MEDICINE

## 2019-04-09 PROCEDURE — 1125F AMNT PAIN NOTED PAIN PRSNT: CPT | Mod: ,,, | Performed by: INTERNAL MEDICINE

## 2019-04-09 PROCEDURE — 99999 PR PBB SHADOW E&M-EST. PATIENT-LVL IV: CPT | Mod: PBBFAC,,, | Performed by: INTERNAL MEDICINE

## 2019-04-09 PROCEDURE — 99215 PR OFFICE/OUTPT VISIT, EST, LEVL V, 40-54 MIN: ICD-10-PCS | Mod: S$PBB,,, | Performed by: INTERNAL MEDICINE

## 2019-04-09 RX ORDER — OXYCODONE AND ACETAMINOPHEN 5; 325 MG/1; MG/1
1 TABLET ORAL EVERY 12 HOURS PRN
Qty: 30 TABLET | Refills: 0 | Status: SHIPPED | OUTPATIENT
Start: 2019-04-09 | End: 2019-04-16 | Stop reason: SDUPTHER

## 2019-04-09 NOTE — PROGRESS NOTES
CC:  My neck is killing me and my left shoulder     Heriberto Keys is a 59 y.o.    This is a 59 yr old gentleman here for F/U of Hodgkins disease He received ABVD cycle 1 while hospitalized at [facility]. He had neurological presentation with dizziness and confusion yet LP negative for CSF involvement. He received levaquin for sinusitis and his mentation improved. CHemo did cause severe marrow suppression requiring GF support today he is accompanied bu his friend who is caring for him.     He has had an echo and PFTs at [facility]  Pt had chemo in the hopsital cycle 1   Neck pain increasing despite muscle relaxers   No longer on levaquin for pansinusitis no recurrence of such   He has been on carvidolol with lasix   Left foot with pins and needles in his toes and new cramping at night       Past Medical History:   Diagnosis Date    Anemia     Depression     Encounter for blood transfusion     Lymphoma     Lymphoma     Lymphoma 2019     Past Surgical History:   Procedure Laterality Date    LYMPHADENECTOMY Bilateral    fawn allopurinol for TLS to prevent such     Current Outpatient Medications:     allopurinol (ZYLOPRIM) 100 MG tablet, Take 1 tablet by mouth once daily., Disp: , Rfl: 0    carvedilol (COREG) 3.125 MG tablet, Take 2 tablets by mouth 2 (two) times daily., Disp: , Rfl: 0    furosemide (LASIX) 20 MG tablet, Take 1 tablet by mouth once daily., Disp: , Rfl: 0    mirtazapine (REMERON) 15 MG tablet, Take 1 tablet by mouth once daily. Every night at bedtime, Disp: , Rfl: 0    ondansetron (ZOFRAN-ODT) 8 MG TbDL, Take 1 tablet (8 mg total) by mouth every 12 (twelve) hours as needed., Disp: 30 tablet, Rfl: 1    prochlorperazine (COMPAZINE) 10 MG tablet, Take 1 tablet (10 mg total) by mouth every 6 (six) hours as needed., Disp: 30 tablet, Rfl: 1    sertraline (ZOLOFT) 50 MG tablet, Take 1 tablet by mouth once daily., Disp: , Rfl: 0  Review of patient's allergies indicates:  No Known Allergies  Social History  "    Tobacco Use    Smoking status: Former Smoker     Packs/day: 1.00     Types: Cigarettes   Substance Use Topics    Alcohol use: No     Frequency: Never    Drug use: No     No family history on file.    CONSTITUTIONAL: No fevers, chills, night sweats,  No further  wt. loss, appetite is good   SKIN: no rashes or itching  ENT: No headaches, head trauma, vision changes, + change in taste   LYMPH NODES: None noticed   CV: No chest pain, palpitations.   RESP: No dyspnea on exertion, cough, wheezing, or hemoptysis  GI: No nausea, emesis, diarrhea, constipation, melena, hematochezia, pain.   : No dysuria, hematuria, urgency, or frequency   HEME: No easy bruising, bleeding problems  PSYCHIATRIC: No depression, anxiety, psychosis  NEURO: No seizures, memory loss,  difficulty sleeping  MSK: No arthralgias or joint swelling       BP (!) 158/73   Pulse 77   Temp 98.6 °F (37 °C)   Resp 20   Ht 5' 9" (1.753 m)   Wt 69.7 kg (153 lb 10.6 oz)   SpO2 96%   BMI 22.69 kg/m²   Constitutional: oriented to person, place, and time.  well-developed and well-nourished.   HENT:   Head: Normocephalic and atraumatic.   Right Ear: External ear normal.   Left Ear: External ear normal.   Nose: Nose normal.   Mouth/Throat: Oropharynx is clear and moist.   Eyes: Conjunctivae and EOM are normal. Pupils are equal, round  Neck: Normal range of motion. Neck supple. No thyromegaly present.   Cardiovascular: Normal rate, regular rhythm, normal heart sounds   No murmur heard.  Pulmonary/Chest: Effort normal and breath sounds normal. no rales. No fremitus   Abdominal: Soft. Bowel sounds are normal.  no mass.   Musculoskeletal: Normal range of motion.   1+  edema   Lymphadenopathy:  no cervical adenopathy.   Neurological: alert and oriented to person, place  Skin: Skin is warm and dry. No rash noted.   Psychiatric: normal mood and affect.   behavior is normal.   Vitals reviewed.    Lab Results   Component Value Date    WBC 9.30 04/08/2019    HGB " 9.4 (L) 04/08/2019    HCT 28.7 (L) 04/08/2019    MCV 92 04/08/2019     04/08/2019     CMP  Sodium   Date Value Ref Range Status   04/08/2019 141 136 - 145 mmol/L Final   03/07/2019 138 134 - 144 mmol/L      Potassium   Date Value Ref Range Status   04/08/2019 3.9 3.5 - 5.1 mmol/L Final     Chloride   Date Value Ref Range Status   04/08/2019 108 95 - 110 mmol/L Final   03/07/2019 105 98 - 110 mmol/L      CO2   Date Value Ref Range Status   04/08/2019 28 23 - 29 mmol/L Final     Glucose   Date Value Ref Range Status   04/08/2019 88 70 - 110 mg/dL Final   03/07/2019 106 (H) 70 - 99 mg/dL      BUN, Bld   Date Value Ref Range Status   04/08/2019 18 6 - 20 mg/dL Final     Creatinine   Date Value Ref Range Status   04/08/2019 0.7 0.5 - 1.4 mg/dL Final   03/07/2019 0.54 (L) 0.60 - 1.40 mg/dL      Calcium   Date Value Ref Range Status   04/08/2019 9.2 8.7 - 10.5 mg/dL Final     Total Protein   Date Value Ref Range Status   04/08/2019 6.1 6.0 - 8.4 g/dL Final     Albumin   Date Value Ref Range Status   04/08/2019 3.5 3.5 - 5.2 g/dL Final   03/07/2019 3.0 (L) 3.1 - 4.7 g/dL      Total Bilirubin   Date Value Ref Range Status   04/08/2019 0.4 0.1 - 1.0 mg/dL Final     Comment:     For infants and newborns, interpretation of results should be based  on gestational age, weight and in agreement with clinical  observations.  Premature Infant recommended reference ranges:  Up to 24 hours.............<8.0 mg/dL  Up to 48 hours............<12.0 mg/dL  3-5 days..................<15.0 mg/dL  6-29 days.................<15.0 mg/dL       Alkaline Phosphatase   Date Value Ref Range Status   04/08/2019 98 55 - 135 U/L Final     AST   Date Value Ref Range Status   04/08/2019 14 10 - 40 U/L Final     ALT   Date Value Ref Range Status   04/08/2019 9 (L) 10 - 44 U/L Final     Anion Gap   Date Value Ref Range Status   04/08/2019 5 (L) 8 - 16 mmol/L Final     eGFR if    Date Value Ref Range Status   04/08/2019 >60 >60  mL/min/1.73 m^2 Final     eGFR if non    Date Value Ref Range Status   04/08/2019 >60 >60 mL/min/1.73 m^2 Final     Comment:     Calculation used to obtain the estimated glomerular filtration  rate (eGFR) is the CKD-EPI equation.          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  -     CT Neck Chest With Contrast (XPD); Future; Expected date: 04/09/2019  -     Ambulatory Referral to Physical Medicine Rehab  -     NM PET CT Routine Skull to Mid Thigh    Iron deficiency  -     CT Neck Chest With Contrast (XPD); Future; Expected date: 04/09/2019  -     Ambulatory Referral to Physical Medicine Rehab    Chemotherapy follow-up examination  -     NM PET CT Routine Skull to Mid Thigh    Oncology follow-up encounter    Anemia associated with chemotherapy      Add pain meds to at least help him sleep at night until he sees PMR   Pain 10/10   Dose reduced by 10 %  Due to severe marrow suppression after chemo  Left neck stiffness and pain  sending to PMR and ordering ct neck : he may need MRI   Dose reduced due to severe pancytopenia in past   Stop allopurinol  Add magensium for cramping   To DR Jagruti Davis : his new pcp  Hold carvidolol and lasix  Needs procrit times two   Due for cycle 3  Day 15 abvd for hodgkins   CLEARED   Cont BP meds   Allopurinol  Hold he has no risk of TLS at the moment   Proceed with chemo   Pain in neck 10/10  And left shoulder  No fall risk   No evidence of depression today : no need for depression meds    rtc 2 weeks with labs    Advance Care Planning     Living Will  During this visit, I engaged the patient in the advance care planning process.  The patient and I reviewed the role for advance directives and their purpose in directing future healthcare if the patient's unable to speak for him/herself.  At this point in time, the patient does have full decision-making capacity.  We discussed different extreme health states that he could experience, and  reviewed what kind of medical care he would want in those situations.  The patient communicated that if he were comatose and had little chance of a meaningful recovery, he would not want machines/life-sustaining treatments used.   The patient  Has not completed a living will to reflect these preferences.  The patient  Has not  already designated a healthcare power of  to make decisions on his behalf.   I spent a total of 5 minutes engaging the patient in this advance care planning discussion.                 Thank you for allowing me to evaluate and participate in the care of this pleasant patient. Please fell free to call me with any questions or concerns.    Warmly,  Amanda Rich MD    This note was dictated with Dragon and briefly proofread. Please excuse any sentences that may be unclear or words misspelled

## 2019-04-10 ENCOUNTER — HOSPITAL ENCOUNTER (OUTPATIENT)
Dept: RADIOLOGY | Facility: HOSPITAL | Age: 59
Discharge: HOME | End: 2019-04-10
Attending: INTERNAL MEDICINE
Payer: MEDICAID

## 2019-04-10 ENCOUNTER — TELEPHONE (OUTPATIENT)
Dept: ORTHOPEDICS | Facility: CLINIC | Age: 59
End: 2019-04-10

## 2019-04-10 DIAGNOSIS — E61.1 IRON DEFICIENCY: ICD-10-CM

## 2019-04-10 DIAGNOSIS — C81.18 NODULAR SCLEROSIS HODGKIN LYMPHOMA OF LYMPH NODES OF MULTIPLE REGIONS: ICD-10-CM

## 2019-04-10 PROCEDURE — 71260 CT NECK CHEST WITH CONTRAST (XPD): ICD-10-PCS | Mod: 26,,, | Performed by: RADIOLOGY

## 2019-04-10 PROCEDURE — 71260 CT THORAX DX C+: CPT | Mod: 26,,, | Performed by: RADIOLOGY

## 2019-04-10 PROCEDURE — 25500020 PHARM REV CODE 255: Performed by: INTERNAL MEDICINE

## 2019-04-10 PROCEDURE — 70491 CT NECK CHEST WITH CONTRAST (XPD): ICD-10-PCS | Mod: 26,,, | Performed by: RADIOLOGY

## 2019-04-10 PROCEDURE — 71260 CT THORAX DX C+: CPT | Mod: TC

## 2019-04-10 PROCEDURE — 70491 CT SOFT TISSUE NECK W/DYE: CPT | Mod: 26,,, | Performed by: RADIOLOGY

## 2019-04-10 RX ADMIN — IOHEXOL 75 ML: 350 INJECTION, SOLUTION INTRAVENOUS at 09:04

## 2019-04-16 DIAGNOSIS — M25.511 ACUTE PAIN OF RIGHT SHOULDER: ICD-10-CM

## 2019-04-16 DIAGNOSIS — I10 ESSENTIAL HYPERTENSION: Primary | ICD-10-CM

## 2019-04-16 RX ORDER — OXYCODONE AND ACETAMINOPHEN 5; 325 MG/1; MG/1
1 TABLET ORAL EVERY 12 HOURS PRN
Qty: 30 TABLET | Refills: 0 | Status: SHIPPED | OUTPATIENT
Start: 2019-04-16 | End: 2019-04-23 | Stop reason: SDUPTHER

## 2019-04-22 ENCOUNTER — LAB VISIT (OUTPATIENT)
Dept: LAB | Facility: HOSPITAL | Age: 59
End: 2019-04-22
Attending: INTERNAL MEDICINE
Payer: MEDICAID

## 2019-04-22 DIAGNOSIS — Z09 ONCOLOGY FOLLOW-UP ENCOUNTER: ICD-10-CM

## 2019-04-22 DIAGNOSIS — C81.18 NODULAR SCLEROSIS HODGKIN LYMPHOMA OF LYMPH NODES OF MULTIPLE REGIONS: ICD-10-CM

## 2019-04-22 DIAGNOSIS — D70.1 CHEMOTHERAPY INDUCED NEUTROPENIA: ICD-10-CM

## 2019-04-22 DIAGNOSIS — Z09 CHEMOTHERAPY FOLLOW-UP EXAMINATION: ICD-10-CM

## 2019-04-22 DIAGNOSIS — T45.1X5A CHEMOTHERAPY INDUCED NEUTROPENIA: ICD-10-CM

## 2019-04-22 LAB
ALBUMIN SERPL BCP-MCNC: 3.4 G/DL (ref 3.5–5.2)
ALP SERPL-CCNC: 104 U/L (ref 55–135)
ALT SERPL W/O P-5'-P-CCNC: 13 U/L (ref 10–44)
ANION GAP SERPL CALC-SCNC: 7 MMOL/L (ref 8–16)
AST SERPL-CCNC: 16 U/L (ref 10–40)
BASOPHILS # BLD AUTO: 0.1 K/UL (ref 0–0.2)
BASOPHILS NFR BLD: 0.7 % (ref 0–1.9)
BILIRUB SERPL-MCNC: 0.4 MG/DL (ref 0.1–1)
BUN SERPL-MCNC: 16 MG/DL (ref 6–20)
CALCIUM SERPL-MCNC: 9.5 MG/DL (ref 8.7–10.5)
CHLORIDE SERPL-SCNC: 106 MMOL/L (ref 95–110)
CO2 SERPL-SCNC: 26 MMOL/L (ref 23–29)
CREAT SERPL-MCNC: 0.8 MG/DL (ref 0.5–1.4)
DIFFERENTIAL METHOD: ABNORMAL
EOSINOPHIL # BLD AUTO: 0.4 K/UL (ref 0–0.5)
EOSINOPHIL NFR BLD: 5.7 % (ref 0–8)
ERYTHROCYTE [DISTWIDTH] IN BLOOD BY AUTOMATED COUNT: 22.6 % (ref 11.5–14.5)
EST. GFR  (AFRICAN AMERICAN): >60 ML/MIN/1.73 M^2
EST. GFR  (NON AFRICAN AMERICAN): >60 ML/MIN/1.73 M^2
GLUCOSE SERPL-MCNC: 129 MG/DL (ref 70–110)
HCT VFR BLD AUTO: 30 % (ref 40–54)
HGB BLD-MCNC: 9.9 G/DL (ref 14–18)
LYMPHOCYTES # BLD AUTO: 1.6 K/UL (ref 1–4.8)
LYMPHOCYTES NFR BLD: 23 % (ref 18–48)
MCH RBC QN AUTO: 29.8 PG (ref 27–31)
MCHC RBC AUTO-ENTMCNC: 32.8 G/DL (ref 32–36)
MCV RBC AUTO: 91 FL (ref 82–98)
MONOCYTES # BLD AUTO: 0.6 K/UL (ref 0.3–1)
MONOCYTES NFR BLD: 9 % (ref 4–15)
NEUTROPHILS # BLD AUTO: 4.4 K/UL (ref 1.8–7.7)
NEUTROPHILS NFR BLD: 61.6 % (ref 38–73)
PLATELET # BLD AUTO: 262 K/UL (ref 150–350)
PMV BLD AUTO: 6 FL (ref 9.2–12.9)
POTASSIUM SERPL-SCNC: 4.4 MMOL/L (ref 3.5–5.1)
PROT SERPL-MCNC: 6.2 G/DL (ref 6–8.4)
RBC # BLD AUTO: 3.31 M/UL (ref 4.6–6.2)
SODIUM SERPL-SCNC: 139 MMOL/L (ref 136–145)
WBC # BLD AUTO: 7.1 K/UL (ref 3.9–12.7)

## 2019-04-22 PROCEDURE — 80053 COMPREHEN METABOLIC PANEL: CPT

## 2019-04-22 PROCEDURE — 36415 COLL VENOUS BLD VENIPUNCTURE: CPT

## 2019-04-22 PROCEDURE — 85025 COMPLETE CBC W/AUTO DIFF WBC: CPT

## 2019-04-22 NOTE — PROGRESS NOTES
CC:  My neck is getting worse     Heriberto Keys is a 59 y.o.    This is a 59 yr old gentleman here for F/U of Hodgkins disease He received ABVD cycle 1 while hospitalized at [facility]. He had neurological presentation with dizziness and confusion yet LP negative for CSF involvement. He received levaquin for sinusitis and his mentation improved. CHemo did cause severe marrow suppression requiring GF support today he is accompanied bu his friend who is caring for him.     Pt cannot see PMR due to his insurance  Pain is worsening in neck and shoulder   No weight loss    He has had an echo and PFTs at [facility]  Pt had chemo in the hopsital cycle 1   No longer on levaquin for pansinusitis no recurrence of such   He has been on carvidolol with lasix for HTn and edema   Left foot with pins and needles in his toes , myalgias continue       Past Medical History:   Diagnosis Date    Anemia     Depression     Encounter for blood transfusion     Lymphoma     Lymphoma     Lymphoma 2019     Past Surgical History:   Procedure Laterality Date    LYMPHADENECTOMY Bilateral    fawn allopurinol for TLS to prevent such     Current Outpatient Medications:     carvedilol (COREG) 3.125 MG tablet, Take 2 tablets by mouth 2 (two) times daily., Disp: , Rfl: 0    furosemide (LASIX) 20 MG tablet, Take 1 tablet by mouth once daily., Disp: , Rfl: 0    mirtazapine (REMERON) 15 MG tablet, Take 1 tablet by mouth once daily. Every night at bedtime, Disp: , Rfl: 0    ondansetron (ZOFRAN-ODT) 8 MG TbDL, Take 1 tablet (8 mg total) by mouth every 12 (twelve) hours as needed., Disp: 30 tablet, Rfl: 1    oxyCODONE-acetaminophen (PERCOCET) 5-325 mg per tablet, Take 1 tablet by mouth every 12 (twelve) hours as needed for Pain (take 1-2 tablets every 4 hours as needed for break through pain)., Disp: 30 tablet, Rfl: 0    prochlorperazine (COMPAZINE) 10 MG tablet, Take 1 tablet (10 mg total) by mouth every 6 (six) hours as needed., Disp: 30 tablet, Rfl: 1    " sertraline (ZOLOFT) 50 MG tablet, Take 1 tablet by mouth once daily., Disp: , Rfl: 0  Review of patient's allergies indicates:  No Known Allergies  Social History     Tobacco Use    Smoking status: Former Smoker     Packs/day: 1.00     Types: Cigarettes   Substance Use Topics    Alcohol use: No     Frequency: Never    Drug use: No     No family history on file.    CONSTITUTIONAL: No fevers, chills, night sweats,  No wt. loss, appetite is good   SKIN: no rashes or itching  ENT: No headaches, head trauma, vision changes, stable  change in taste   LYMPH NODES: None noticed   CV: No chest pain, palpitations.   RESP: No dyspnea on exertion, cough,  or hemoptysis  GI: No nausea, emesis, diarrhea, constipation, melena, hematochezia, pain.   : No dysuria, hematuria, urgency, or frequency   HEME: No easy bruising, bleeding problems  PSYCHIATRIC: No depression, anxiety, psychosis  NEURO: No seizures, memory loss,  difficulty sleeping  MSK:  ++ pain in neck and shoulder        /62   Pulse 74   Temp 97.8 °F (36.6 °C)   Resp 20   Ht 5' 9" (1.753 m)   Wt 69.4 kg (153 lb)   SpO2 97%   BMI 22.59 kg/m²   Constitutional: oriented to person, place, and time.  well-developed and well-nourished.   HENT:   Head: Normocephalic and atraumatic.   Nose: Nose normal.   Mouth/Throat: Oropharynx is clear and moist.   Eyes: Conjunctivae and EOM are normal. Pupils are equal, round anicteric sclera   Neck:He cannot move his neck to look left due to pain , some pain radiating down to his left shoulder from his neck   Cardiovascular: Normal rate, regular rhythm, normal heart sounds   Pulmonary/Chest: Effort normal and breath sounds normal. No fremitus   Abdominal: Soft. Bowel sounds are normal.  no mass.   Musculoskeletal: Normal range of motion.   No edema today   Lymphadenopathy:  no cervical adenopathy.   Neurological: alert and oriented to person, place  Skin: Skin is warm and dry. No rash noted.   Psychiatric: normal mood and " affect.   behavior is normal.   Vitals reviewed.    Lab Results   Component Value Date    WBC 7.10 04/22/2019    HGB 9.9 (L) 04/22/2019    HCT 30.0 (L) 04/22/2019    MCV 91 04/22/2019     04/22/2019     CMP  Sodium   Date Value Ref Range Status   04/22/2019 139 136 - 145 mmol/L Final   03/07/2019 138 134 - 144 mmol/L      Potassium   Date Value Ref Range Status   04/22/2019 4.4 3.5 - 5.1 mmol/L Final     Chloride   Date Value Ref Range Status   04/22/2019 106 95 - 110 mmol/L Final   03/07/2019 105 98 - 110 mmol/L      CO2   Date Value Ref Range Status   04/22/2019 26 23 - 29 mmol/L Final     Glucose   Date Value Ref Range Status   04/22/2019 129 (H) 70 - 110 mg/dL Final   03/07/2019 106 (H) 70 - 99 mg/dL      BUN, Bld   Date Value Ref Range Status   04/22/2019 16 6 - 20 mg/dL Final     Creatinine   Date Value Ref Range Status   04/22/2019 0.8 0.5 - 1.4 mg/dL Final   03/07/2019 0.54 (L) 0.60 - 1.40 mg/dL      Calcium   Date Value Ref Range Status   04/22/2019 9.5 8.7 - 10.5 mg/dL Final     Total Protein   Date Value Ref Range Status   04/22/2019 6.2 6.0 - 8.4 g/dL Final     Albumin   Date Value Ref Range Status   04/22/2019 3.4 (L) 3.5 - 5.2 g/dL Final   03/07/2019 3.0 (L) 3.1 - 4.7 g/dL      Total Bilirubin   Date Value Ref Range Status   04/22/2019 0.4 0.1 - 1.0 mg/dL Final     Comment:     For infants and newborns, interpretation of results should be based  on gestational age, weight and in agreement with clinical  observations.  Premature Infant recommended reference ranges:  Up to 24 hours.............<8.0 mg/dL  Up to 48 hours............<12.0 mg/dL  3-5 days..................<15.0 mg/dL  6-29 days.................<15.0 mg/dL       Alkaline Phosphatase   Date Value Ref Range Status   04/22/2019 104 55 - 135 U/L Final     AST   Date Value Ref Range Status   04/22/2019 16 10 - 40 U/L Final     ALT   Date Value Ref Range Status   04/22/2019 13 10 - 44 U/L Final     Anion Gap   Date Value Ref Range Status    04/22/2019 7 (L) 8 - 16 mmol/L Final     eGFR if    Date Value Ref Range Status   04/22/2019 >60 >60 mL/min/1.73 m^2 Final     eGFR if non    Date Value Ref Range Status   04/22/2019 >60 >60 mL/min/1.73 m^2 Final     Comment:     Calculation used to obtain the estimated glomerular filtration  rate (eGFR) is the CKD-EPI equation.          Osteoarthritis, unspecified osteoarthritis type, unspecified site    Thyroid nodule    Neck pain on left side    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    Antineoplastic chemotherapy induced anemia    Acute pain of right shoulder  -     oxyCODONE-acetaminophen (PERCOCET) 5-325 mg per tablet; Take 1 tablet by mouth every 12 (twelve) hours as needed for Pain (take 1-2 tablets every 4 hours as needed for break through pain).  Dispense: 30 tablet; Refill: 0      Continue pain meds to at least help him sleep at night until he sees PMR   Pain 10/10 cont opioids until is seen by pain management   Dose reduced by 10 %  Due to severe marrow suppression after chemo  Left neck stiffness and pain  sending to pain management   Dose reduced due to severe pancytopenia in past   To Dr Jagruti Davis : his new pcp  Hold carvidolol and lasix  Chemo CLEARED   Cont BP meds   Proceed with chemo   No fall risk   No evidence of depression today : no need for depression meds  rtc 2 weeks with labs after pet ct     Advance Care Planning     Living Will  During this visit, I engaged the patient in the advance care planning process.  The patient and I reviewed the role for advance directives and their purpose in directing future healthcare if the patient's unable to speak for him/herself.  At this point in time, the patient does have full decision-making capacity.  We discussed different extreme health states that he could experience, and reviewed what kind of medical care he would want in those situations.  The patient communicated that if he  were comatose and had little chance of a meaningful recovery, he would not want machines/life-sustaining treatments used.   The patient  Has not completed a living will to reflect these preferences.  The patient  Has not  already designated a healthcare power of  to make decisions on his behalf.   I spent a total of 5 minutes engaging the patient in this advance care planning discussion.           Thank you for allowing me to evaluate and participate in the care of this pleasant patient. Please fell free to call me with any questions or concerns.    [place]arm[place],  Amanda Rich MD    This note was dictated with Nicholason and briefly proofread. Please excuse any sentences that may be unclear or words misspelled

## 2019-04-23 ENCOUNTER — OFFICE VISIT (OUTPATIENT)
Dept: HEMATOLOGY/ONCOLOGY | Facility: CLINIC | Age: 59
End: 2019-04-23
Payer: MEDICAID

## 2019-04-23 ENCOUNTER — TELEPHONE (OUTPATIENT)
Dept: HEMATOLOGY/ONCOLOGY | Facility: CLINIC | Age: 59
End: 2019-04-23

## 2019-04-23 VITALS
BODY MASS INDEX: 22.66 KG/M2 | HEART RATE: 74 BPM | SYSTOLIC BLOOD PRESSURE: 109 MMHG | WEIGHT: 153 LBS | RESPIRATION RATE: 20 BRPM | DIASTOLIC BLOOD PRESSURE: 62 MMHG | HEIGHT: 69 IN | OXYGEN SATURATION: 97 % | TEMPERATURE: 98 F

## 2019-04-23 DIAGNOSIS — M19.90 OSTEOARTHRITIS, UNSPECIFIED OSTEOARTHRITIS TYPE, UNSPECIFIED SITE: Primary | ICD-10-CM

## 2019-04-23 DIAGNOSIS — D64.81 ANTINEOPLASTIC CHEMOTHERAPY INDUCED ANEMIA: ICD-10-CM

## 2019-04-23 DIAGNOSIS — C81.18 NODULAR SCLEROSIS HODGKIN LYMPHOMA OF LYMPH NODES OF MULTIPLE REGIONS: ICD-10-CM

## 2019-04-23 DIAGNOSIS — M54.2 NECK PAIN ON LEFT SIDE: ICD-10-CM

## 2019-04-23 DIAGNOSIS — M25.511 ACUTE PAIN OF RIGHT SHOULDER: ICD-10-CM

## 2019-04-23 DIAGNOSIS — C81.18 HODGKIN'S DISEASE, NODULAR SCLEROSIS, OF LYMPH NODES OF MULTIPLE SITES: Primary | ICD-10-CM

## 2019-04-23 DIAGNOSIS — T45.1X5A ANTINEOPLASTIC CHEMOTHERAPY INDUCED ANEMIA: ICD-10-CM

## 2019-04-23 DIAGNOSIS — E04.1 THYROID NODULE: ICD-10-CM

## 2019-04-23 PROCEDURE — 99215 PR OFFICE/OUTPT VISIT, EST, LEVL V, 40-54 MIN: ICD-10-PCS | Mod: S$PBB,,, | Performed by: INTERNAL MEDICINE

## 2019-04-23 PROCEDURE — 99999 PR PBB SHADOW E&M-EST. PATIENT-LVL III: CPT | Mod: PBBFAC,,, | Performed by: INTERNAL MEDICINE

## 2019-04-23 PROCEDURE — 1125F PR PAIN SEVERITY QUANTIFIED, PAIN PRESENT: ICD-10-PCS | Mod: ,,, | Performed by: INTERNAL MEDICINE

## 2019-04-23 PROCEDURE — 99215 OFFICE O/P EST HI 40 MIN: CPT | Mod: S$PBB,,, | Performed by: INTERNAL MEDICINE

## 2019-04-23 PROCEDURE — 99999 PR PBB SHADOW E&M-EST. PATIENT-LVL III: ICD-10-PCS | Mod: PBBFAC,,, | Performed by: INTERNAL MEDICINE

## 2019-04-23 PROCEDURE — 99213 OFFICE O/P EST LOW 20 MIN: CPT | Mod: PBBFAC,PO | Performed by: INTERNAL MEDICINE

## 2019-04-23 PROCEDURE — 1125F AMNT PAIN NOTED PAIN PRSNT: CPT | Mod: ,,, | Performed by: INTERNAL MEDICINE

## 2019-04-23 RX ORDER — DOXORUBICIN HYDROCHLORIDE 2 MG/ML
38 INJECTION, SOLUTION INTRAVENOUS
Status: CANCELLED | OUTPATIENT
Start: 2019-04-23

## 2019-04-23 RX ORDER — DOXORUBICIN HYDROCHLORIDE 2 MG/ML
38 INJECTION, SOLUTION INTRAVENOUS
Status: CANCELLED | OUTPATIENT
Start: 2019-05-07

## 2019-04-23 RX ORDER — DOXORUBICIN HYDROCHLORIDE 2 MG/ML
38 INJECTION, SOLUTION INTRAVENOUS
Status: CANCELLED | OUTPATIENT
Start: 2019-05-21

## 2019-04-23 RX ORDER — HEPARIN 100 UNIT/ML
500 SYRINGE INTRAVENOUS
Status: CANCELLED | OUTPATIENT
Start: 2019-05-07

## 2019-04-23 RX ORDER — DOXORUBICIN HYDROCHLORIDE 2 MG/ML
38 INJECTION, SOLUTION INTRAVENOUS
Status: CANCELLED | OUTPATIENT
Start: 2019-06-04

## 2019-04-23 RX ORDER — HEPARIN 100 UNIT/ML
500 SYRINGE INTRAVENOUS
Status: CANCELLED | OUTPATIENT
Start: 2019-04-23

## 2019-04-23 RX ORDER — HEPARIN 100 UNIT/ML
500 SYRINGE INTRAVENOUS
Status: CANCELLED | OUTPATIENT
Start: 2019-06-04

## 2019-04-23 RX ORDER — OXYCODONE AND ACETAMINOPHEN 5; 325 MG/1; MG/1
1 TABLET ORAL EVERY 12 HOURS PRN
Qty: 30 TABLET | Refills: 0 | Status: SHIPPED | OUTPATIENT
Start: 2019-04-23 | End: 2019-06-17

## 2019-04-23 RX ORDER — SODIUM CHLORIDE 0.9 % (FLUSH) 0.9 %
10 SYRINGE (ML) INJECTION
Status: CANCELLED | OUTPATIENT
Start: 2019-05-21

## 2019-04-23 RX ORDER — SODIUM CHLORIDE 0.9 % (FLUSH) 0.9 %
10 SYRINGE (ML) INJECTION
Status: CANCELLED | OUTPATIENT
Start: 2019-04-23

## 2019-04-23 RX ORDER — HEPARIN 100 UNIT/ML
500 SYRINGE INTRAVENOUS
Status: CANCELLED | OUTPATIENT
Start: 2019-05-21

## 2019-04-23 RX ORDER — SODIUM CHLORIDE 0.9 % (FLUSH) 0.9 %
10 SYRINGE (ML) INJECTION
Status: CANCELLED | OUTPATIENT
Start: 2019-05-07

## 2019-04-23 RX ORDER — SODIUM CHLORIDE 0.9 % (FLUSH) 0.9 %
10 SYRINGE (ML) INJECTION
Status: CANCELLED | OUTPATIENT
Start: 2019-06-04

## 2019-05-06 ENCOUNTER — LAB VISIT (OUTPATIENT)
Dept: LAB | Facility: HOSPITAL | Age: 59
End: 2019-05-06
Attending: INTERNAL MEDICINE
Payer: MEDICAID

## 2019-05-06 DIAGNOSIS — C81.18 HODGKIN'S DISEASE, NODULAR SCLEROSIS, OF LYMPH NODES OF MULTIPLE SITES: ICD-10-CM

## 2019-05-06 LAB
ALBUMIN SERPL BCP-MCNC: 3.6 G/DL (ref 3.5–5.2)
ALP SERPL-CCNC: 110 U/L (ref 55–135)
ALT SERPL W/O P-5'-P-CCNC: 10 U/L (ref 10–44)
ANION GAP SERPL CALC-SCNC: 7 MMOL/L (ref 8–16)
AST SERPL-CCNC: 14 U/L (ref 10–40)
BASOPHILS # BLD AUTO: 0.1 K/UL (ref 0–0.2)
BASOPHILS NFR BLD: 0.7 % (ref 0–1.9)
BILIRUB SERPL-MCNC: 0.3 MG/DL (ref 0.1–1)
BUN SERPL-MCNC: 21 MG/DL (ref 6–20)
CALCIUM SERPL-MCNC: 9.3 MG/DL (ref 8.7–10.5)
CHLORIDE SERPL-SCNC: 107 MMOL/L (ref 95–110)
CO2 SERPL-SCNC: 26 MMOL/L (ref 23–29)
CREAT SERPL-MCNC: 0.7 MG/DL (ref 0.5–1.4)
DIFFERENTIAL METHOD: ABNORMAL
EOSINOPHIL # BLD AUTO: 0.5 K/UL (ref 0–0.5)
EOSINOPHIL NFR BLD: 5.9 % (ref 0–8)
ERYTHROCYTE [DISTWIDTH] IN BLOOD BY AUTOMATED COUNT: 20.4 % (ref 11.5–14.5)
EST. GFR  (AFRICAN AMERICAN): >60 ML/MIN/1.73 M^2
EST. GFR  (NON AFRICAN AMERICAN): >60 ML/MIN/1.73 M^2
GLUCOSE SERPL-MCNC: 88 MG/DL (ref 70–110)
HCT VFR BLD AUTO: 32.2 % (ref 40–54)
HGB BLD-MCNC: 10.5 G/DL (ref 14–18)
LYMPHOCYTES # BLD AUTO: 1.9 K/UL (ref 1–4.8)
LYMPHOCYTES NFR BLD: 25.5 % (ref 18–48)
MCH RBC QN AUTO: 29.9 PG (ref 27–31)
MCHC RBC AUTO-ENTMCNC: 32.8 G/DL (ref 32–36)
MCV RBC AUTO: 91 FL (ref 82–98)
MONOCYTES # BLD AUTO: 1 K/UL (ref 0.3–1)
MONOCYTES NFR BLD: 13.6 % (ref 4–15)
NEUTROPHILS # BLD AUTO: 4.1 K/UL (ref 1.8–7.7)
NEUTROPHILS NFR BLD: 54.3 % (ref 38–73)
PLATELET # BLD AUTO: 265 K/UL (ref 150–350)
PMV BLD AUTO: 6.9 FL (ref 9.2–12.9)
POTASSIUM SERPL-SCNC: 3.9 MMOL/L (ref 3.5–5.1)
PROT SERPL-MCNC: 6.3 G/DL (ref 6–8.4)
RBC # BLD AUTO: 3.53 M/UL (ref 4.6–6.2)
SODIUM SERPL-SCNC: 140 MMOL/L (ref 136–145)
WBC # BLD AUTO: 7.6 K/UL (ref 3.9–12.7)

## 2019-05-06 PROCEDURE — 80053 COMPREHEN METABOLIC PANEL: CPT

## 2019-05-06 PROCEDURE — 36415 COLL VENOUS BLD VENIPUNCTURE: CPT

## 2019-05-06 PROCEDURE — 85025 COMPLETE CBC W/AUTO DIFF WBC: CPT

## 2019-05-07 ENCOUNTER — OFFICE VISIT (OUTPATIENT)
Dept: HEMATOLOGY/ONCOLOGY | Facility: CLINIC | Age: 59
End: 2019-05-07
Payer: MEDICAID

## 2019-05-07 VITALS
TEMPERATURE: 98 F | OXYGEN SATURATION: 100 % | DIASTOLIC BLOOD PRESSURE: 67 MMHG | WEIGHT: 151.88 LBS | BODY MASS INDEX: 22.49 KG/M2 | SYSTOLIC BLOOD PRESSURE: 141 MMHG | HEIGHT: 69 IN | HEART RATE: 69 BPM | RESPIRATION RATE: 20 BRPM

## 2019-05-07 DIAGNOSIS — R91.8 LUNG NODULES: ICD-10-CM

## 2019-05-07 DIAGNOSIS — J69.0 ASPIRATION PNEUMONIA OF RIGHT UPPER LOBE, UNSPECIFIED ASPIRATION PNEUMONIA TYPE: Primary | ICD-10-CM

## 2019-05-07 DIAGNOSIS — C81.18 NODULAR SCLEROSIS HODGKIN LYMPHOMA OF LYMPH NODES OF MULTIPLE REGIONS: ICD-10-CM

## 2019-05-07 DIAGNOSIS — M50.30 DEGENERATIVE DISC DISEASE, CERVICAL: ICD-10-CM

## 2019-05-07 DIAGNOSIS — Z09 CHEMOTHERAPY FOLLOW-UP EXAMINATION: ICD-10-CM

## 2019-05-07 DIAGNOSIS — J90 BILATERAL PLEURAL EFFUSION: ICD-10-CM

## 2019-05-07 DIAGNOSIS — R20.2 PARESTHESIAS: ICD-10-CM

## 2019-05-07 PROCEDURE — G0444 DEPRESSION SCREEN ANNUAL: HCPCS | Mod: PBBFAC,PO | Performed by: INTERNAL MEDICINE

## 2019-05-07 PROCEDURE — 99999 PR PBB SHADOW E&M-EST. PATIENT-LVL III: ICD-10-PCS | Mod: PBBFAC,,, | Performed by: INTERNAL MEDICINE

## 2019-05-07 PROCEDURE — 99215 PR OFFICE/OUTPT VISIT, EST, LEVL V, 40-54 MIN: ICD-10-PCS | Mod: S$PBB,,, | Performed by: INTERNAL MEDICINE

## 2019-05-07 PROCEDURE — 1125F PR PAIN SEVERITY QUANTIFIED, PAIN PRESENT: ICD-10-PCS | Mod: ,,, | Performed by: INTERNAL MEDICINE

## 2019-05-07 PROCEDURE — 1125F AMNT PAIN NOTED PAIN PRSNT: CPT | Mod: ,,, | Performed by: INTERNAL MEDICINE

## 2019-05-07 PROCEDURE — 99215 OFFICE O/P EST HI 40 MIN: CPT | Mod: S$PBB,,, | Performed by: INTERNAL MEDICINE

## 2019-05-07 PROCEDURE — 99999 PR PBB SHADOW E&M-EST. PATIENT-LVL III: CPT | Mod: PBBFAC,,, | Performed by: INTERNAL MEDICINE

## 2019-05-07 PROCEDURE — 99213 OFFICE O/P EST LOW 20 MIN: CPT | Mod: PBBFAC,PO | Performed by: INTERNAL MEDICINE

## 2019-05-07 RX ORDER — BUMETANIDE 0.5 MG/1
2 TABLET ORAL 2 TIMES DAILY
Qty: 20 TABLET | Refills: 0 | Status: SHIPPED | OUTPATIENT
Start: 2019-05-07 | End: 2019-05-17 | Stop reason: SDUPTHER

## 2019-05-07 RX ORDER — METRONIDAZOLE 250 MG/1
500 TABLET ORAL EVERY 6 HOURS
Qty: 30 TABLET | Refills: 0 | Status: SHIPPED | OUTPATIENT
Start: 2019-05-07 | End: 2019-06-17

## 2019-05-07 RX ORDER — IBUPROFEN 600 MG/1
TABLET ORAL
Refills: 0 | Status: ON HOLD | COMMUNITY
Start: 2019-03-25 | End: 2019-08-16 | Stop reason: HOSPADM

## 2019-05-07 RX ORDER — NAPROXEN 500 MG/1
TABLET ORAL
Refills: 0 | COMMUNITY
Start: 2019-05-03 | End: 2019-06-17

## 2019-05-07 RX ORDER — POTASSIUM CHLORIDE 20 MEQ/1
20 TABLET, EXTENDED RELEASE ORAL DAILY
Qty: 20 TABLET | Refills: 0 | Status: SHIPPED | OUTPATIENT
Start: 2019-05-07 | End: 2019-05-17 | Stop reason: SDUPTHER

## 2019-05-07 RX ORDER — BACLOFEN 10 MG/1
TABLET ORAL
Refills: 2 | COMMUNITY
Start: 2019-04-28 | End: 2019-06-17

## 2019-05-07 RX ORDER — AMOXICILLIN 500 MG/1
500 CAPSULE ORAL EVERY 8 HOURS
Qty: 30 CAPSULE | Refills: 0 | Status: SHIPPED | OUTPATIENT
Start: 2019-05-07 | End: 2019-05-17
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
## 2019-05-15 ENCOUNTER — HOSPITAL ENCOUNTER (OUTPATIENT)
Dept: RADIOLOGY | Facility: HOSPITAL | Age: 59
Discharge: HOME | End: 2019-05-15
Attending: INTERNAL MEDICINE
Payer: MEDICAID

## 2019-05-15 DIAGNOSIS — J90 BILATERAL PLEURAL EFFUSION: ICD-10-CM

## 2019-05-15 DIAGNOSIS — J69.0 ASPIRATION PNEUMONIA OF RIGHT UPPER LOBE, UNSPECIFIED ASPIRATION PNEUMONIA TYPE: ICD-10-CM

## 2019-05-15 PROCEDURE — 71046 X-RAY EXAM CHEST 2 VIEWS: CPT | Mod: TC,FY

## 2019-05-15 PROCEDURE — 71046 X-RAY EXAM CHEST 2 VIEWS: CPT | Mod: 26,,, | Performed by: RADIOLOGY

## 2019-05-15 PROCEDURE — 71046 XR CHEST PA AND LATERAL: ICD-10-PCS | Mod: 26,,, | Performed by: RADIOLOGY

## 2019-05-17 ENCOUNTER — OFFICE VISIT (OUTPATIENT)
Dept: HEMATOLOGY/ONCOLOGY | Facility: CLINIC | Age: 59
End: 2019-05-17
Payer: MEDICAID

## 2019-05-17 VITALS
OXYGEN SATURATION: 100 % | TEMPERATURE: 98 F | HEIGHT: 69 IN | HEART RATE: 72 BPM | SYSTOLIC BLOOD PRESSURE: 105 MMHG | WEIGHT: 152.13 LBS | BODY MASS INDEX: 22.53 KG/M2 | DIASTOLIC BLOOD PRESSURE: 62 MMHG | RESPIRATION RATE: 20 BRPM

## 2019-05-17 DIAGNOSIS — C81.18 NODULAR SCLEROSIS HODGKIN LYMPHOMA OF LYMPH NODES OF MULTIPLE REGIONS: ICD-10-CM

## 2019-05-17 DIAGNOSIS — Z87.09 HISTORY OF PLEURAL EFFUSION: ICD-10-CM

## 2019-05-17 DIAGNOSIS — I10 ESSENTIAL HYPERTENSION: ICD-10-CM

## 2019-05-17 DIAGNOSIS — J69.0 ASPIRATION PNEUMONIA OF RIGHT UPPER LOBE, UNSPECIFIED ASPIRATION PNEUMONIA TYPE: ICD-10-CM

## 2019-05-17 DIAGNOSIS — M54.2 NECK PAIN: ICD-10-CM

## 2019-05-17 DIAGNOSIS — J90 BILATERAL PLEURAL EFFUSION: ICD-10-CM

## 2019-05-17 DIAGNOSIS — Z79.899 CHEMOPROPHYLAXIS: Primary | ICD-10-CM

## 2019-05-17 DIAGNOSIS — H70.90 MASTOIDITIS, UNSPECIFIED LATERALITY: ICD-10-CM

## 2019-05-17 PROCEDURE — 99215 PR OFFICE/OUTPT VISIT, EST, LEVL V, 40-54 MIN: ICD-10-PCS | Mod: S$PBB,,, | Performed by: INTERNAL MEDICINE

## 2019-05-17 PROCEDURE — 99999 PR PBB SHADOW E&M-EST. PATIENT-LVL III: ICD-10-PCS | Mod: PBBFAC,,, | Performed by: INTERNAL MEDICINE

## 2019-05-17 PROCEDURE — 99215 OFFICE O/P EST HI 40 MIN: CPT | Mod: S$PBB,,, | Performed by: INTERNAL MEDICINE

## 2019-05-17 PROCEDURE — 99999 PR PBB SHADOW E&M-EST. PATIENT-LVL III: CPT | Mod: PBBFAC,,, | Performed by: INTERNAL MEDICINE

## 2019-05-17 PROCEDURE — 99213 OFFICE O/P EST LOW 20 MIN: CPT | Mod: PBBFAC,PO | Performed by: INTERNAL MEDICINE

## 2019-05-17 RX ORDER — DOXORUBICIN HYDROCHLORIDE 2 MG/ML
38 INJECTION, SOLUTION INTRAVENOUS
Status: CANCELLED | OUTPATIENT
Start: 2019-06-04

## 2019-05-17 RX ORDER — HEPARIN 100 UNIT/ML
500 SYRINGE INTRAVENOUS
Status: CANCELLED | OUTPATIENT
Start: 2019-06-18

## 2019-05-17 RX ORDER — SODIUM CHLORIDE 0.9 % (FLUSH) 0.9 %
10 SYRINGE (ML) INJECTION
Status: CANCELLED | OUTPATIENT
Start: 2019-06-04

## 2019-05-17 RX ORDER — IBUPROFEN 800 MG/1
TABLET ORAL
Refills: 2 | COMMUNITY
Start: 2019-05-08 | End: 2019-06-17

## 2019-05-17 RX ORDER — DOXORUBICIN HYDROCHLORIDE 2 MG/ML
38 INJECTION, SOLUTION INTRAVENOUS
Status: CANCELLED | OUTPATIENT
Start: 2019-06-18

## 2019-05-17 RX ORDER — POTASSIUM CHLORIDE 20 MEQ/1
20 TABLET, EXTENDED RELEASE ORAL DAILY
Qty: 20 TABLET | Refills: 0 | Status: SHIPPED | OUTPATIENT
Start: 2019-05-17 | End: 2019-06-17

## 2019-05-17 RX ORDER — SODIUM CHLORIDE 0.9 % (FLUSH) 0.9 %
10 SYRINGE (ML) INJECTION
Status: CANCELLED | OUTPATIENT
Start: 2019-06-18

## 2019-05-17 RX ORDER — HEPARIN 100 UNIT/ML
500 SYRINGE INTRAVENOUS
Status: CANCELLED | OUTPATIENT
Start: 2019-05-21

## 2019-05-17 RX ORDER — DOXORUBICIN HYDROCHLORIDE 2 MG/ML
38 INJECTION, SOLUTION INTRAVENOUS
Status: CANCELLED | OUTPATIENT
Start: 2019-07-02

## 2019-05-17 RX ORDER — HEPARIN 100 UNIT/ML
500 SYRINGE INTRAVENOUS
Status: CANCELLED | OUTPATIENT
Start: 2019-06-04

## 2019-05-17 RX ORDER — SODIUM CHLORIDE 0.9 % (FLUSH) 0.9 %
10 SYRINGE (ML) INJECTION
Status: CANCELLED | OUTPATIENT
Start: 2019-05-21

## 2019-05-17 RX ORDER — HEPARIN 100 UNIT/ML
500 SYRINGE INTRAVENOUS
Status: CANCELLED | OUTPATIENT
Start: 2019-07-02

## 2019-05-17 RX ORDER — BUMETANIDE 0.5 MG/1
2 TABLET ORAL 2 TIMES DAILY
Qty: 20 TABLET | Refills: 0 | Status: SHIPPED | OUTPATIENT
Start: 2019-05-17 | End: 2019-06-17

## 2019-05-17 RX ORDER — DEXTROMETHORPHAN HYDROBROMIDE, GUAIFENESIN 5; 100 MG/5ML; MG/5ML
LIQUID ORAL
COMMUNITY
End: 2019-06-17

## 2019-05-17 RX ORDER — SODIUM CHLORIDE 0.9 % (FLUSH) 0.9 %
10 SYRINGE (ML) INJECTION
Status: CANCELLED | OUTPATIENT
Start: 2019-07-02

## 2019-05-17 RX ORDER — DOXORUBICIN HYDROCHLORIDE 2 MG/ML
38 INJECTION, SOLUTION INTRAVENOUS
Status: CANCELLED | OUTPATIENT
Start: 2019-05-21
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
## 2019-05-31 ENCOUNTER — LAB VISIT (OUTPATIENT)
Dept: LAB | Facility: HOSPITAL | Age: 59
End: 2019-05-31
Attending: INTERNAL MEDICINE
Payer: MEDICAID

## 2019-05-31 DIAGNOSIS — Z79.899 CHEMOPROPHYLAXIS: ICD-10-CM

## 2019-05-31 LAB
ALBUMIN SERPL BCP-MCNC: 3.1 G/DL (ref 3.5–5.2)
ALP SERPL-CCNC: 140 U/L (ref 55–135)
ALT SERPL W/O P-5'-P-CCNC: 12 U/L (ref 10–44)
ANION GAP SERPL CALC-SCNC: 7 MMOL/L (ref 8–16)
AST SERPL-CCNC: 10 U/L (ref 10–40)
BASOPHILS # BLD AUTO: 0 K/UL (ref 0–0.2)
BASOPHILS NFR BLD: 0.6 % (ref 0–1.9)
BILIRUB SERPL-MCNC: 0.2 MG/DL (ref 0.1–1)
BUN SERPL-MCNC: 18 MG/DL (ref 6–20)
CALCIUM SERPL-MCNC: 8.9 MG/DL (ref 8.7–10.5)
CHLORIDE SERPL-SCNC: 106 MMOL/L (ref 95–110)
CO2 SERPL-SCNC: 28 MMOL/L (ref 23–29)
CREAT SERPL-MCNC: 0.7 MG/DL (ref 0.5–1.4)
DIFFERENTIAL METHOD: ABNORMAL
EOSINOPHIL # BLD AUTO: 0.5 K/UL (ref 0–0.5)
EOSINOPHIL NFR BLD: 6 % (ref 0–8)
ERYTHROCYTE [DISTWIDTH] IN BLOOD BY AUTOMATED COUNT: 19.1 % (ref 11.5–14.5)
EST. GFR  (AFRICAN AMERICAN): >60 ML/MIN/1.73 M^2
EST. GFR  (NON AFRICAN AMERICAN): >60 ML/MIN/1.73 M^2
GLUCOSE SERPL-MCNC: 102 MG/DL (ref 70–110)
HCT VFR BLD AUTO: 29.3 % (ref 40–54)
HGB BLD-MCNC: 9.7 G/DL (ref 14–18)
LYMPHOCYTES # BLD AUTO: 1.3 K/UL (ref 1–4.8)
LYMPHOCYTES NFR BLD: 17.5 % (ref 18–48)
MCH RBC QN AUTO: 29.6 PG (ref 27–31)
MCHC RBC AUTO-ENTMCNC: 33 G/DL (ref 32–36)
MCV RBC AUTO: 90 FL (ref 82–98)
MONOCYTES # BLD AUTO: 0.7 K/UL (ref 0.3–1)
MONOCYTES NFR BLD: 9.3 % (ref 4–15)
NEUTROPHILS # BLD AUTO: 5.1 K/UL (ref 1.8–7.7)
NEUTROPHILS NFR BLD: 66.6 % (ref 38–73)
PLATELET # BLD AUTO: 204 K/UL (ref 150–350)
PMV BLD AUTO: 6.7 FL (ref 9.2–12.9)
POTASSIUM SERPL-SCNC: 3.5 MMOL/L (ref 3.5–5.1)
PROT SERPL-MCNC: 5.6 G/DL (ref 6–8.4)
RBC # BLD AUTO: 3.28 M/UL (ref 4.6–6.2)
SODIUM SERPL-SCNC: 141 MMOL/L (ref 136–145)
WBC # BLD AUTO: 7.7 K/UL (ref 3.9–12.7)

## 2019-05-31 PROCEDURE — 36415 COLL VENOUS BLD VENIPUNCTURE: CPT

## 2019-05-31 PROCEDURE — 80053 COMPREHEN METABOLIC PANEL: CPT

## 2019-05-31 PROCEDURE — 85025 COMPLETE CBC W/AUTO DIFF WBC: CPT

## 2019-06-03 ENCOUNTER — OFFICE VISIT (OUTPATIENT)
Dept: HEMATOLOGY/ONCOLOGY | Facility: CLINIC | Age: 59
End: 2019-06-03
Payer: MEDICAID

## 2019-06-03 VITALS
DIASTOLIC BLOOD PRESSURE: 59 MMHG | SYSTOLIC BLOOD PRESSURE: 114 MMHG | HEIGHT: 69 IN | TEMPERATURE: 98 F | OXYGEN SATURATION: 98 % | BODY MASS INDEX: 23.47 KG/M2 | RESPIRATION RATE: 20 BRPM | WEIGHT: 158.5 LBS | HEART RATE: 68 BPM

## 2019-06-03 DIAGNOSIS — Z09 ONCOLOGY FOLLOW-UP ENCOUNTER: ICD-10-CM

## 2019-06-03 DIAGNOSIS — R91.8 LUNG NODULES: ICD-10-CM

## 2019-06-03 DIAGNOSIS — E61.1 IRON DEFICIENCY: ICD-10-CM

## 2019-06-03 DIAGNOSIS — C81.18 NODULAR SCLEROSIS HODGKIN LYMPHOMA OF LYMPH NODES OF MULTIPLE REGIONS: Primary | ICD-10-CM

## 2019-06-03 PROCEDURE — 99999 PR PBB SHADOW E&M-EST. PATIENT-LVL IV: CPT | Mod: PBBFAC,,, | Performed by: INTERNAL MEDICINE

## 2019-06-03 PROCEDURE — 99214 OFFICE O/P EST MOD 30 MIN: CPT | Mod: PBBFAC,PO | Performed by: INTERNAL MEDICINE

## 2019-06-03 PROCEDURE — 99215 PR OFFICE/OUTPT VISIT, EST, LEVL V, 40-54 MIN: ICD-10-PCS | Mod: S$PBB,,, | Performed by: INTERNAL MEDICINE

## 2019-06-03 PROCEDURE — 99999 PR PBB SHADOW E&M-EST. PATIENT-LVL IV: ICD-10-PCS | Mod: PBBFAC,,, | Performed by: INTERNAL MEDICINE

## 2019-06-03 PROCEDURE — 99215 OFFICE O/P EST HI 40 MIN: CPT | Mod: S$PBB,,, | Performed by: INTERNAL MEDICINE

## 2019-06-03 RX ORDER — MINERAL OIL
ENEMA (ML) RECTAL
Refills: 0 | COMMUNITY
Start: 2019-05-17 | End: 2019-06-17
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
## 2019-06-06 DIAGNOSIS — M75.42 IMPINGEMENT SYNDROME OF LEFT SHOULDER: ICD-10-CM

## 2019-06-06 DIAGNOSIS — M75.52 BURSITIS OF LEFT SHOULDER: ICD-10-CM

## 2019-06-06 DIAGNOSIS — M54.2 CERVICALGIA: ICD-10-CM

## 2019-06-06 DIAGNOSIS — M54.12 RADICULOPATHY, CERVICAL REGION: Primary | ICD-10-CM

## 2019-06-07 DIAGNOSIS — M75.52 SUBACROMIAL BURSITIS OF LEFT SHOULDER JOINT: ICD-10-CM

## 2019-06-07 DIAGNOSIS — M75.22 BICIPITAL TENDINITIS OF LEFT SHOULDER: Primary | ICD-10-CM

## 2019-06-07 DIAGNOSIS — M75.42 IMPINGEMENT SYNDROME OF LEFT SHOULDER: ICD-10-CM

## 2019-06-14 ENCOUNTER — LAB VISIT (OUTPATIENT)
Dept: LAB | Facility: HOSPITAL | Age: 59
End: 2019-06-14
Attending: INTERNAL MEDICINE
Payer: MEDICAID

## 2019-06-14 DIAGNOSIS — C81.18 NODULAR SCLEROSIS HODGKIN LYMPHOMA OF LYMPH NODES OF MULTIPLE REGIONS: ICD-10-CM

## 2019-06-14 LAB
ALBUMIN SERPL BCP-MCNC: 3.2 G/DL (ref 3.5–5.2)
ALP SERPL-CCNC: 186 U/L (ref 55–135)
ALT SERPL W/O P-5'-P-CCNC: 9 U/L (ref 10–44)
ANION GAP SERPL CALC-SCNC: 7 MMOL/L (ref 8–16)
AST SERPL-CCNC: 12 U/L (ref 10–40)
BASOPHILS # BLD AUTO: 0.1 K/UL (ref 0–0.2)
BASOPHILS NFR BLD: 0.9 % (ref 0–1.9)
BILIRUB SERPL-MCNC: 0.3 MG/DL (ref 0.1–1)
BUN SERPL-MCNC: 19 MG/DL (ref 6–20)
CALCIUM SERPL-MCNC: 9 MG/DL (ref 8.7–10.5)
CHLORIDE SERPL-SCNC: 109 MMOL/L (ref 95–110)
CO2 SERPL-SCNC: 27 MMOL/L (ref 23–29)
CREAT SERPL-MCNC: 0.8 MG/DL (ref 0.5–1.4)
DIFFERENTIAL METHOD: ABNORMAL
EOSINOPHIL # BLD AUTO: 0.4 K/UL (ref 0–0.5)
EOSINOPHIL NFR BLD: 5.9 % (ref 0–8)
ERYTHROCYTE [DISTWIDTH] IN BLOOD BY AUTOMATED COUNT: 18.9 % (ref 11.5–14.5)
EST. GFR  (AFRICAN AMERICAN): >60 ML/MIN/1.73 M^2
EST. GFR  (NON AFRICAN AMERICAN): >60 ML/MIN/1.73 M^2
GLUCOSE SERPL-MCNC: 100 MG/DL (ref 70–110)
HCT VFR BLD AUTO: 31 % (ref 40–54)
HGB BLD-MCNC: 10.2 G/DL (ref 14–18)
IRON SERPL-MCNC: 48 UG/DL (ref 45–160)
LDH SERPL L TO P-CCNC: 164 U/L (ref 110–260)
LYMPHOCYTES # BLD AUTO: 1.3 K/UL (ref 1–4.8)
LYMPHOCYTES NFR BLD: 17.8 % (ref 18–48)
MCH RBC QN AUTO: 29.1 PG (ref 27–31)
MCHC RBC AUTO-ENTMCNC: 32.8 G/DL (ref 32–36)
MCV RBC AUTO: 89 FL (ref 82–98)
MONOCYTES # BLD AUTO: 0.7 K/UL (ref 0.3–1)
MONOCYTES NFR BLD: 9 % (ref 4–15)
NEUTROPHILS # BLD AUTO: 4.9 K/UL (ref 1.8–7.7)
NEUTROPHILS NFR BLD: 66.4 % (ref 38–73)
PLATELET # BLD AUTO: 241 K/UL (ref 150–350)
PMV BLD AUTO: 6.9 FL (ref 9.2–12.9)
POTASSIUM SERPL-SCNC: 3.7 MMOL/L (ref 3.5–5.1)
PROT SERPL-MCNC: 5.9 G/DL (ref 6–8.4)
RBC # BLD AUTO: 3.49 M/UL (ref 4.6–6.2)
SATURATED IRON: 22 % (ref 20–50)
SODIUM SERPL-SCNC: 143 MMOL/L (ref 136–145)
TOTAL IRON BINDING CAPACITY: 219 UG/DL (ref 250–450)
TRANSFERRIN SERPL-MCNC: 148 MG/DL (ref 200–375)
WBC # BLD AUTO: 7.4 K/UL (ref 3.9–12.7)

## 2019-06-14 PROCEDURE — 83540 ASSAY OF IRON: CPT

## 2019-06-14 PROCEDURE — 85025 COMPLETE CBC W/AUTO DIFF WBC: CPT

## 2019-06-14 PROCEDURE — 83615 LACTATE (LD) (LDH) ENZYME: CPT

## 2019-06-14 PROCEDURE — 80053 COMPREHEN METABOLIC PANEL: CPT

## 2019-06-14 PROCEDURE — 36415 COLL VENOUS BLD VENIPUNCTURE: CPT

## 2019-06-17 ENCOUNTER — OFFICE VISIT (OUTPATIENT)
Dept: HEMATOLOGY/ONCOLOGY | Facility: CLINIC | Age: 59
End: 2019-06-17
Payer: MEDICAID

## 2019-06-17 VITALS
BODY MASS INDEX: 23.9 KG/M2 | WEIGHT: 161.38 LBS | SYSTOLIC BLOOD PRESSURE: 150 MMHG | DIASTOLIC BLOOD PRESSURE: 65 MMHG | HEART RATE: 71 BPM | OXYGEN SATURATION: 100 % | TEMPERATURE: 98 F | RESPIRATION RATE: 20 BRPM | HEIGHT: 69 IN

## 2019-06-17 DIAGNOSIS — C81.18 NODULAR SCLEROSIS HODGKIN LYMPHOMA OF LYMPH NODES OF MULTIPLE REGIONS: Primary | ICD-10-CM

## 2019-06-17 DIAGNOSIS — G89.4 CHRONIC PAIN SYNDROME: ICD-10-CM

## 2019-06-17 DIAGNOSIS — Z09 CHEMOTHERAPY FOLLOW-UP EXAMINATION: ICD-10-CM

## 2019-06-17 DIAGNOSIS — R59.1 LYMPHADENOPATHY: ICD-10-CM

## 2019-06-17 DIAGNOSIS — M54.2 NECK PAIN ON LEFT SIDE: ICD-10-CM

## 2019-06-17 DIAGNOSIS — R20.2 PARESTHESIAS: ICD-10-CM

## 2019-06-17 DIAGNOSIS — Z09 ONCOLOGY FOLLOW-UP ENCOUNTER: ICD-10-CM

## 2019-06-17 DIAGNOSIS — E04.2 MULTIPLE THYROID NODULES: ICD-10-CM

## 2019-06-17 DIAGNOSIS — Z79.899 LONG TERM CURRENT USE OF DIURETIC: ICD-10-CM

## 2019-06-17 PROCEDURE — 1125F AMNT PAIN NOTED PAIN PRSNT: CPT | Mod: ,,, | Performed by: INTERNAL MEDICINE

## 2019-06-17 PROCEDURE — 99999 PR PBB SHADOW E&M-EST. PATIENT-LVL III: CPT | Mod: PBBFAC,,, | Performed by: INTERNAL MEDICINE

## 2019-06-17 PROCEDURE — 99213 OFFICE O/P EST LOW 20 MIN: CPT | Mod: PBBFAC,PO | Performed by: INTERNAL MEDICINE

## 2019-06-17 PROCEDURE — 1125F PR PAIN SEVERITY QUANTIFIED, PAIN PRESENT: ICD-10-PCS | Mod: ,,, | Performed by: INTERNAL MEDICINE

## 2019-06-17 PROCEDURE — 99999 PR PBB SHADOW E&M-EST. PATIENT-LVL III: ICD-10-PCS | Mod: PBBFAC,,, | Performed by: INTERNAL MEDICINE

## 2019-06-17 PROCEDURE — 99215 PR OFFICE/OUTPT VISIT, EST, LEVL V, 40-54 MIN: ICD-10-PCS | Mod: S$PBB,,, | Performed by: INTERNAL MEDICINE

## 2019-06-17 PROCEDURE — 99215 OFFICE O/P EST HI 40 MIN: CPT | Mod: S$PBB,,, | Performed by: INTERNAL MEDICINE

## 2019-06-17 NOTE — PROGRESS NOTES
CC: I am doing physical therapy     Heriberto Keys is a 59 y.o.    This is a 59 yr old gentleman here for F/U of Hodgkins disease He received ABVD cycle 1 while hospitalized at [facility]. He had neurological presentation with dizziness and confusion yet LP negative for CSF involvement. He received levaquin for sinusitis and his mentation improved. CHemo did cause severe marrow suppression requiring GF support today he is accompanied bu his friend who is caring for him.   Pain is same in neck  Pt had chemo in the hospital cycle 1   Due for ABVD   Post IV iron and procrit     He had been on carvidolol with lasix for HTn and edema stable     Past Medical History:   Diagnosis Date    Anemia     Depression     Encounter for blood transfusion     Lymphoma     Lymphoma     Lymphoma 2019     Past Surgical History:   Procedure Laterality Date    LYMPHADENECTOMY Bilateral      He is not on any meds right now   No further need for allopurinol    Current Outpatient Medications:     acetaminophen (TYLENOL) 650 MG TbSR, Take by mouth., Disp: , Rfl:     baclofen (LIORESAL) 10 MG tablet, TK 1 T PO BID PRN, Disp: , Rfl: 2    bumetanide (BUMEX) 0.5 MG Tab, Take 4 tablets (2 mg total) by mouth 2 (two) times daily., Disp: 20 tablet, Rfl: 0    carvedilol (COREG) 3.125 MG tablet, Take 2 tablets by mouth 2 (two) times daily., Disp: , Rfl: 0    fexofenadine (ALLEGRA) 180 MG tablet, TK 1 T PO QD, Disp: , Rfl: 0    ibuprofen (ADVIL,MOTRIN) 600 MG tablet, TK 1 T PO Q 6 H WF PRF PAIN, Disp: , Rfl: 0    ibuprofen (ADVIL,MOTRIN) 800 MG tablet, TK 1 T PO TID PRF PAIN. TAKE WITH FOOD AND PEPCID OVER THE COUNTER, Disp: , Rfl: 2    metroNIDAZOLE (FLAGYL) 250 MG tablet, Take 2 tablets (500 mg total) by mouth every 6 (six) hours., Disp: 30 tablet, Rfl: 0    naproxen (NAPROSYN) 500 MG tablet, TK 1 T PO  BID WITH FOOD, Disp: , Rfl: 0    ondansetron (ZOFRAN-ODT) 8 MG TbDL, Take 1 tablet (8 mg total) by mouth every 12 (twelve) hours as  "needed., Disp: 30 tablet, Rfl: 1    oxyCODONE-acetaminophen (PERCOCET) 5-325 mg per tablet, Take 1 tablet by mouth every 12 (twelve) hours as needed for Pain (take 1-2 tablets every 4 hours as needed for break through pain)., Disp: 30 tablet, Rfl: 0    potassium chloride SA (K-DUR,KLOR-CON) 20 MEQ tablet, Take 1 tablet (20 mEq total) by mouth once daily., Disp: 20 tablet, Rfl: 0    prochlorperazine (COMPAZINE) 10 MG tablet, Take 1 tablet (10 mg total) by mouth every 6 (six) hours as needed., Disp: 30 tablet, Rfl: 1    sertraline (ZOLOFT) 50 MG tablet, Take 1 tablet by mouth once daily., Disp: , Rfl: 0  Review of patient's allergies indicates:  No Known Allergies  Social History     Tobacco Use    Smoking status: Former Smoker     Packs/day: 1.00     Types: Cigarettes   Substance Use Topics    Alcohol use: No     Frequency: Never    Drug use: No     No family history on file.    CONSTITUTIONAL: No fevers, chills, night sweats,  No wt. loss  SKIN: no rashes or itching  ENT: No headaches, head trauma, vision changes, stable  change in taste   LYMPH NODES: None noticed  Neck pain stable : doing physical therapy   CV: No chest pain, palpitations.  No orthopnea or PND  RESP: No dyspnea on exertion, cough,  or hemoptysis  GI: No nausea, emesis, diarrhea, constipation, melena, hematochezia  : No dysuria, hematuria, urgency, or frequency   HEME: No easy bruising, bleeding problems  PSYCHIATRIC: No depression, anxiety, no psychosis   NEURO: No seizures, memory loss,  difficulty sleeping  MSK:  No pain today        BP (!) 150/65   Pulse 71   Temp 98.3 °F (36.8 °C)   Resp 20   Ht 5' 9" (1.753 m)   Wt 73.2 kg (161 lb 6 oz)   SpO2 100%   BMI 23.83 kg/m²   Constitutional: oriented to person, place, and time.  well-developed and well-nourished.   HENT:   Head: Normocephalic and atraumatic. Nose:non boggy turbinates    No sinus tenderness  Mouth/Throat: Oropharynx is clear and moist.   Eyes: Conjunctivae and EOM are " normal. Pupils are equal, round anicteric sclera   Neck: supple no thyromegaly   Cardiovascular: Normal rate, regular rhythm, normal heart sounds No pmi   Pulmonary/Chest: Effort normal and breath sounds normal.No fremitus   Abdominal: Soft. Bowel sounds are normal.  no mass.   Musculoskeletal: Normal range of motion.   No edema today   Lymphadenopathy:  no cervical adenopathy.   Neurological: alert and oriented to person, place  Skin: Skin is warm and dry. No rash noted.   Psychiatric: normal mood and affect.   behavior is normal.   Vitals reviewed.    Lab Results   Component Value Date    WBC 7.40 06/14/2019    HGB 10.2 (L) 06/14/2019    HCT 31.0 (L) 06/14/2019    MCV 89 06/14/2019     06/14/2019     CMP  Sodium   Date Value Ref Range Status   06/14/2019 143 136 - 145 mmol/L Final   03/07/2019 138 134 - 144 mmol/L      Potassium   Date Value Ref Range Status   06/14/2019 3.7 3.5 - 5.1 mmol/L Final     Chloride   Date Value Ref Range Status   06/14/2019 109 95 - 110 mmol/L Final   03/07/2019 105 98 - 110 mmol/L      CO2   Date Value Ref Range Status   06/14/2019 27 23 - 29 mmol/L Final     Glucose   Date Value Ref Range Status   06/14/2019 100 70 - 110 mg/dL Final   03/07/2019 106 (H) 70 - 99 mg/dL      BUN, Bld   Date Value Ref Range Status   06/14/2019 19 6 - 20 mg/dL Final     Creatinine   Date Value Ref Range Status   06/14/2019 0.8 0.5 - 1.4 mg/dL Final   03/07/2019 0.54 (L) 0.60 - 1.40 mg/dL      Calcium   Date Value Ref Range Status   06/14/2019 9.0 8.7 - 10.5 mg/dL Final     Total Protein   Date Value Ref Range Status   06/14/2019 5.9 (L) 6.0 - 8.4 g/dL Final     Albumin   Date Value Ref Range Status   06/14/2019 3.2 (L) 3.5 - 5.2 g/dL Final   03/07/2019 3.0 (L) 3.1 - 4.7 g/dL      Total Bilirubin   Date Value Ref Range Status   06/14/2019 0.3 0.1 - 1.0 mg/dL Final     Comment:     For infants and newborns, interpretation of results should be based  on gestational age, weight and in agreement with  clinical  observations.  Premature Infant recommended reference ranges:  Up to 24 hours.............<8.0 mg/dL  Up to 48 hours............<12.0 mg/dL  3-5 days..................<15.0 mg/dL  6-29 days.................<15.0 mg/dL       Alkaline Phosphatase   Date Value Ref Range Status   06/14/2019 186 (H) 55 - 135 U/L Final     AST   Date Value Ref Range Status   06/14/2019 12 10 - 40 U/L Final     ALT   Date Value Ref Range Status   06/14/2019 9 (L) 10 - 44 U/L Final     Anion Gap   Date Value Ref Range Status   06/14/2019 7 (L) 8 - 16 mmol/L Final     eGFR if    Date Value Ref Range Status   06/14/2019 >60 >60 mL/min/1.73 m^2 Final     eGFR if non    Date Value Ref Range Status   06/14/2019 >60 >60 mL/min/1.73 m^2 Final     Comment:     Calculation used to obtain the estimated glomerular filtration  rate (eGFR) is the CKD-EPI equation.          X-Ray Chest PA And Lateral   Order: [id number]   Status:  Final result   Visible to patient:  No (Not Released) Next appt:  None Dx:  Bilateral pleural effusion; Aspiratio...     CT Neck Chest With Contrast (XPD)   Order: [id number]   Status:  Final result   Visible to patient:  No (Not Released) Next appt:  None Dx:  Iron deficiency; Nodular sclerosis Ho...   Details     Reading Physician Reading Date Result Priority   Jhon Mendosa MD 4/10/2019 Routine      Narrative     EXAMINATION:  CT NECK CHEST WITH CONTRAST (XPD)    CLINICAL HISTORY:  neck pain and left shoulder pain; Nodular sclerosis Hodgkin lymphoma, lymph nodes of multiple sites    TECHNIQUE:  Low dose axial images, coronal and sagittal reformations were obtained from the skull base to the lung bases following the intravenous administration of 75 mL of Omnipaque 350.    COMPARISON:  None.    FINDINGS:  Included intracranial structures and orbital contents are unremarkable.  Paranasal sinuses are clear.  There is a 1.5 cm hypodense lesion an adjacent 9 mm hypodense lesion with  macrocalcification in the right thyroid lobe.  Remaining glandular tissue unremarkable.  Aero digestive tract is unremarkable with no nodular or masslike enhancement.  No cervical adenopathy.  Atherosclerosis.  Straightening of normal cervical lordosis.  2 mm anterolisthesis C3 on C4.  Moderate degenerative change at the anterior C1-2 articulation.  Moderate degenerative disc disease at C4-5, C5-6, C6-7.  Uncovertebral spurs contribute to bony neural foraminal narrowing on the right at C4-5 through C6-7 and left at C6-7 as well as C3-4.    Patchy ground-glass opacity in the lungs, peribronchovascular distribution.  Dependent atelectasis in both lower lobes.  8 mm ground-glass nodule in the right upper lobe series 3, image 77.  Bilateral pleural fluid.  Normal sized heart.  Port-A-Cath right chest wall tip in the SVC.  Enlarged prevascular lymph nodes which measure 1.7 and 1.2 cm respectively series 3, image 87.  Partially imaged cystic lesion along the left renal midpole.  Remote trauma along the posterior right upper thoracic cage ribs 3 through 5 with retained metallic fragments.      Impression       1. No cervical adenopathy. Enlarged mediastinal lymph nodes are presumably in keeping with provided history of lymphoma. Any comparison exams would be helpful.  2. Cervical degenerative change.  3. Right thyroid nodules which could be further characterized with ultrasound as clinically warranted.  4. Moderate-sized bilateral pleural effusions.  5. Patchy pulmonary interstitial disease with differential favoring edema.      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  -     CBC auto differential; Future; Expected date: 06/17/2019  -     Iron and TIBC; Future; Expected date: 06/17/2019  -     CMP; Future; Expected date: 06/17/2019    Neck pain on left  side    Paresthesias    Chemotherapy follow-up examination    Oncology follow-up encounter  -     CBC auto differential; Future; Expected date: 06/17/2019  -     Iron and TIBC; Future; Expected date: 06/17/2019  -     CMP; Future; Expected date: 06/17/2019    Long term current use of diuretic    Chronic pain syndrome    Multiple thyroid nodules    Lymphadenopathy       CLEARED for chemo   He is leaving to go out of town soon   Schedule PET CT when he gets back to town   He states he may ot be able to take his last week   He is cleared   I discussed prognosis   Pt feels drugged after taking the opioid for neck pain   He wants to see his children and grandchildren  He is also planning to go to [place]   He understands risk of recurrence   Anemia stable : increase oral iron intake   Continued increase protein   No need for medicine  RCT two weeks for day 15 ABVD cycle 6 LAST cycle     PETCT 2 months     Advance Care Planning     Living Will  During this visit, I engaged the patient in the advance care planning process.  The patient and I reviewed the role for advance directives and their purpose in directing future healthcare if the patient's unable to speak for him/herself.  At this point in time, the patient does have full decision-making capacity.  We discussed different extreme health states that he could experience, and reviewed what kind of medical care he would want in those situations.  The patient communicated that if he were comatose and had little chance of a meaningful recovery, he would not want machines/life-sustaining treatments used.   The patient  Has not completed a living will to reflect these preferences.  The patient  Has not  already designated a healthcare power of  to make decisions on his behalf.   I spent a total of 5 minutes engaging the patient in this advance care planning discussion.           Thank you for allowing me to evaluate and participate in the care of this pleasant patient.  Please fell free to call me with any questions or concerns.    Warmly,  Amanda Rich MD    This note was dictated with Dragon and briefly proofread. Please excuse any sentences that may be unclear or words misspelled

## 2019-07-30 DIAGNOSIS — M54.12 CERVICAL RADICULOPATHY: Primary | ICD-10-CM

## 2019-07-31 ENCOUNTER — CLINICAL SUPPORT (OUTPATIENT)
Dept: REHABILITATION | Facility: HOSPITAL | Age: 59
End: 2019-07-31
Payer: MEDICAID

## 2019-07-31 DIAGNOSIS — M54.12 CERVICAL RADICULOPATHY: ICD-10-CM

## 2019-07-31 PROCEDURE — 97012 MECHANICAL TRACTION THERAPY: CPT

## 2019-07-31 PROCEDURE — 97110 THERAPEUTIC EXERCISES: CPT

## 2019-07-31 NOTE — PROGRESS NOTES
Physical Therapy Daily Treatment Note     Name: Heriberto GUERRERO [facility]  Clinic Number: [id number]    Therapy Diagnosis: No diagnosis found.  Physician: Bartolo Lopez MD    Visit Date: 7/31/2019    Physician Orders: Eval and Treat  Medical Diagnosis: Cervical Radiculopathy   Evaluation Date: 6/18/19  Authorization Period Expiration: 8/20/09  Plan of Care Certification Period: 8/16/19  Visit #/Visits authorized: 7/ 8     Time In: 11:00  Time Out: 12:10  Total Billable Time: 68 minutes    Precautions: Standard    Subjective     Pt reports:   He was compliant with home exercise program.  Response to previous treatment: patient with no c/o    Pain: 3/10  Location: Left neck      Objective     Heriberto received therapeutic exercises to develop strength for 55 minutes including:      ·  Cardio - UBE 8 min  ·   C-Spine - Upper Trap  Stretch 30 seconds x 3 Left side only  ·   C-Spine - Levator Scap Stretch 30 seconds x 3 Left side only  ·   C-Spine - Pectoralis Corner Stretch two positions 3 x 30 seconds both positions  ·   C-Spine - Chin Tucks 3 x 10 reps  ·   C-Spine - Retraction  3 sets of 10 with 5 second holds  ·   Shoulder - Scapular Retraction 2 x 10 with Pink TB 5 second holds  ·   C-Spine  - SNAG 10 sec holds x 10 reps       IE: CA Dx of lymphoma and finished chemo Rx on same day as P.T.eval 6/18/19 - No heat/US or e-stim.    Fall hx and possible memory deficits    Heriberto received the following supervised modalities after being cleared for contradictions: Mechanical Traction:  Heriberto received intermittent mechanical traction to the cervical spine at a force of 18lb for 30 sec hold and at a force of 9lb for 15 sec. for a total of 13  minutes.  Patient tolerated treatment well without any adverse effects noted nor verbalized.    ·  Modality - Mechanical Traction 13 Minutes - Cervical 18#s for 30 sec and 9#s for 15 sec          Assessment     Patient participated with Rx to address his radiating cervical pain and was able to  tolerate Rx with no c/o and required VC/TC for improved exs quality and performance.  He responds to mechanical traction with a reported decrease in pain to 0-1/10 pain.  He is expected to cont to improve to address his postural dysfunction in order to mitigate his pain and optimize his functional mob as he progresses to his goals with cont PT rx.        Heriberto is progressing well towards his goals.   Pt prognosis is Good.     Pt will continue to benefit from skilled outpatient physical therapy to address the deficits listed in the problem list box on initial evaluation, provide pt/family education and to maximize pt's level of independence in the home and community environment.     Pt's spiritual, cultural and educational needs considered and pt agreeable to plan of care and goals.     Anticipated barriers to physical therapy: suspected memory deficits.    Goals:       Goals  Length Status Goal   Short Term In Progress 7/31/2019   1a. Patient will demonstrate initial HEP with use of handouts prn in order to optimize Rx outcomes and maximize functional mob. in 5 visits.   Short Term In Progress 7/31/2019   2a. Patient will relate cervical pain </= 8/10 at worst over the previous 5 days in order to improve QoL, ROM, and in order to improve his ability to drive and perform his day to day functional mob in 8 visits.     Long Term In Progress 7/31/2019     3. Patient will improve MMT grades for L Lower trapezius to 3/5 (initial 2+/5) in order to improve scapular stability and improve posture, ability with reaching, ADL, day to day tasks and house hold function by discharge.     Short Term In Progress 7/31/2019   4a. Patient will decrease disability as evidenced by the NDI to 25% (initial 34%) in order to improve postural dysfunction, QoL, ROM, and to promote ability to drive and perform his day to day functional mob in 8 visits.   Short Term In Progress 7/31/2019   5a. Patient will decrease disability as evidenced by the  Quick DASH to 15% (initial 20.45%) in improve postural dysfunction, QoL, ROM, and to promote ability to drive and perform his day to day functional mob in 8 visits.   Long Term  1b. Patient will demonstrate final HEP with use of handouts prn in order to optimize Rx outcomes and maximize functional mob.by discharge.   Long Term  2b. Patient will relate cervical pain </= 6/10 at worst over the previous 5 days in order to improve QoL, ROM, and in order to improve his ability to drive and perform his day to day functional mob by discharge.     Long Term  4b. Patient will decrease disability as evidenced by the NDI to 20% (initial 34%) in order to improve postural dysfunction, QoL, ROM, and to promote ability to drive and perform his day to day functional mob by discharge.   Long Term  5b. Patient will decrease disability as evidenced by the Quick DASH to 10% (initial 20.45%) in improve postural dysfunction, QoL, ROM, and to promote ability to drive and perform his day to day functional mob by discharge.   Long Term  6. Patient will improve ROM with cervical SB B'ly to 10 deg (initial 5 deg) in order to improve QoL, ROM, and in order to improve his ability to drive and perform his day to day functional mob by discharge.               Plan     Cont with POC to address his deficits and to mitigate his symptoms.  Requires further Authorization to complete POC following next visit.      Roberto Whiting, PT

## 2019-08-02 ENCOUNTER — CLINICAL SUPPORT (OUTPATIENT)
Dept: REHABILITATION | Facility: HOSPITAL | Age: 59
End: 2019-08-02
Payer: MEDICAID

## 2019-08-02 DIAGNOSIS — M54.2 NECK PAIN ON LEFT SIDE: ICD-10-CM

## 2019-08-02 PROCEDURE — 97110 THERAPEUTIC EXERCISES: CPT

## 2019-08-02 PROCEDURE — 97012 MECHANICAL TRACTION THERAPY: CPT

## 2019-08-02 NOTE — PROGRESS NOTES
Physical Therapy Daily Treatment Note     Name: Heriberto GUERRERO [facility]  Clinic Number: [id number]    Therapy Diagnosis:   Encounter Diagnosis   Name Primary?    Neck pain on left side      Physician: Bartolo Lopez MD    Visit Date: 8/2/2019    Physician Orders: Eval and Treat  Medical Diagnosis: Cervical Radiculopathy   Evaluation Date: 6/18/19  Authorization Period Expiration: 8/20/09  Plan of Care Certification Period: 8/16/19  Visit #/Visits authorized: 7/ 8     Time In: 09:00  Time Out: 1010  Total Billable Time: 70 minutes    Precautions: Standard    Subjective     Pt reports:   He was compliant with home exercise program.  Response to previous treatment: patient with no c/o    Pain: 3/10  Location: Left neck      Objective     Heriberto received therapeutic exercises to develop strength for 55 minutes including:      ·  Cardio - UBE 8 min  ·   C-Spine - Upper Trap  Stretch 30 seconds x 3 Left side only  ·   C-Spine - Levator Scap Stretch 30 seconds x 3 Left side only  ·   C-Spine - Pectoralis Corner Stretch two positions 3 x 30 seconds both positions  ·   C-Spine - Chin Tucks 3 x 10 reps  ·   C-Spine - Retraction and B side bend /c Pink TB  3 sets of 10 (with 5 second holds retractions only)  ·   Shoulder - Scapular Retraction 2 x 10 with Pink TB 5 second holds  ·   C-Spine  - SNAG 10 sec holds x 10 reps     Cybex- Lat pull downs 2 x 10 reps /c 3.5 plates wide , reverse/narrow     Cybex- Rows 2 x 10 reps /c 3 plates      IE: CA Dx of lymphoma and finished chemo Rx on same day as P.T.eval 6/18/19 - No heat/US or e-stim.    Fall hx and possible memory deficits    Heriberto received the following supervised modalities after being cleared for contradictions: Mechanical Traction:  Heriberto received intermittent mechanical traction to the cervical spine at a force of 22lb for 30 sec hold and at a force of 11lb for 15 sec. for a total of 13  minutes.  Patient tolerated treatment well without any adverse effects noted nor  verbalized.    ·           Assessment   Pt req's max tc's for proper form and execution for UT and levator scap stretches. Pt /c poor response to cueing as each rep he req'd cueing for correction. He tolerated additional there-ex well to improve c-spine strength. He will continue to benefit from skilled PT to improve ROM and decrease painful symptoms to allow pt to return to PLOF.       Heriberto is progressing well towards his goals.   Pt prognosis is Good.     Pt will continue to benefit from skilled outpatient physical therapy to address the deficits listed in the problem list box on initial evaluation, provide pt/family education and to maximize pt's level of independence in the home and community environment.     Pt's spiritual, cultural and educational needs considered and pt agreeable to plan of care and goals.     Anticipated barriers to physical therapy: suspected memory deficits.    Goals:       Goals  Length Status Goal   Short Term In Progress 8/2/2019   1a. Patient will demonstrate initial HEP with use of handouts prn in order to optimize Rx outcomes and maximize functional mob. in 5 visits.   Short Term In Progress 8/2/2019   2a. Patient will relate cervical pain </= 8/10 at worst over the previous 5 days in order to improve QoL, ROM, and in order to improve his ability to drive and perform his day to day functional mob in 8 visits.     Long Term In Progress 8/2/2019     3. Patient will improve MMT grades for L Lower trapezius to 3/5 (initial 2+/5) in order to improve scapular stability and improve posture, ability with reaching, ADL, day to day tasks and house hold function by discharge.     Short Term In Progress 8/2/2019   4a. Patient will decrease disability as evidenced by the NDI to 25% (initial 34%) in order to improve postural dysfunction, QoL, ROM, and to promote ability to drive and perform his day to day functional mob in 8 visits.   Short Term In Progress 8/2/2019   5a. Patient will decrease  disability as evidenced by the Quick DASH to 15% (initial 20.45%) in improve postural dysfunction, QoL, ROM, and to promote ability to drive and perform his day to day functional mob in 8 visits.   Long Term  1b. Patient will demonstrate final HEP with use of handouts prn in order to optimize Rx outcomes and maximize functional mob.by discharge.   Long Term  2b. Patient will relate cervical pain </= 6/10 at worst over the previous 5 days in order to improve QoL, ROM, and in order to improve his ability to drive and perform his day to day functional mob by discharge.     Long Term  4b. Patient will decrease disability as evidenced by the NDI to 20% (initial 34%) in order to improve postural dysfunction, QoL, ROM, and to promote ability to drive and perform his day to day functional mob by discharge.   Long Term  5b. Patient will decrease disability as evidenced by the Quick DASH to 10% (initial 20.45%) in improve postural dysfunction, QoL, ROM, and to promote ability to drive and perform his day to day functional mob by discharge.   Long Term  6. Patient will improve ROM with cervical SB B'ly to 10 deg (initial 5 deg) in order to improve QoL, ROM, and in order to improve his ability to drive and perform his day to day functional mob by discharge.               Plan     Cont with POC to address his deficits and to mitigate his symptoms.  Requires further Authorization to complete POC following next visit.      Zenaida Chowdary, PTA

## 2019-08-07 ENCOUNTER — CLINICAL SUPPORT (OUTPATIENT)
Dept: REHABILITATION | Facility: HOSPITAL | Age: 59
End: 2019-08-07
Payer: MEDICAID

## 2019-08-07 DIAGNOSIS — M54.2 NECK PAIN ON LEFT SIDE: ICD-10-CM

## 2019-08-07 PROCEDURE — 97110 THERAPEUTIC EXERCISES: CPT

## 2019-08-07 PROCEDURE — 97012 MECHANICAL TRACTION THERAPY: CPT

## 2019-08-07 PROCEDURE — 97140 MANUAL THERAPY 1/> REGIONS: CPT

## 2019-08-07 NOTE — PROGRESS NOTES
Physical Therapy Daily Treatment Note     Name: Heriberto GUERRERO [facility]  Clinic Number: [id number]    Therapy Diagnosis:   Encounter Diagnosis   Name Primary?    Neck pain on left side      Physician: Bartolo Lopez MD    Visit Date: 8/7/2019    Physician Orders: Eval and Treat  Medical Diagnosis: Cervical Radiculopathy   Evaluation Date: 6/18/19  Authorization Period Expiration: 8/20/09  Plan of Care Certification Period: 8/16/19  Visit #/Visits authorized: 7/ 8     Time In: 09:00  Time Out: 1010  Total Billable Time: 70 minutes       PT treated patient x 10 minutes for Manual Therapy of the noted times.      Precautions: Standard    Subjective     Pt reports: Having to take medication due to pain this am. Post tx pt verbalized significant improvement in symptoms.   He was compliant with home exercise program. (as per PTA)  Response to previous treatment: patient with no c/o    Pain: 6/10  Location: Left neck      Objective     Heriberto received therapeutic exercises to develop strength for 55 minutes including: (as per PTA)      ·  Cardio - UBE 8 min  ·   C-Spine - Upper Trap  Stretch 30 seconds x 3 Left side only  ·   C-Spine - Levator Scap Stretch 30 seconds x 3 Left side only  ·   C-Spine - Pectoralis Corner Stretch two positions 3 x 30 seconds both positions  ·   C-Spine - Chin Tucks 3 x 10 reps  ·   · DNP 8/7/2019    C-Spine - Retraction and B side bend /c Pink TB  3 sets of 10 (with 5 second holds retractions only)  ·   Shoulder - Scapular Retraction 2 x 10 with Pink TB 5 second holds  ·   C-Spine  - SNAG 10 sec holds x 10 reps     Cybex- Lat pull downs 2 x 10 reps /c 3.5 plates wide , reverse/narrow     Cybex- Rows 2 x 10 reps /c 3 plates      IE: [place] Dx of lymphoma and finished chemo Rx on same day as P.T.eval 6/18/19 - No heat/US or e-stim.    Fall hx and possible memory deficits    Adil received the following supervised modalities after being cleared for contradictions: Mechanical Traction:  Heriberto  received intermittent mechanical traction to the cervical spine at a force of 23lb for 30 sec hold and at a force of 11lb for 15 sec. for a total of 13  minutes.  Patient tolerated treatment well without any adverse effects noted nor verbalized.     PTA performed STM to B UTs, Levator scap, and sub occipitals x 15 minutes. Followed by manual therapy performed by Supervising PT Roberto Whiting PT.        Patient cervical spine assessed for joint mobility with rotational restrictions to upper cervical spine.  PT performed manual distraction with rotation B'ly and with grades III and IV joint mobes to address rotational restrictions and with noted improved ROM.  However patient continued with a resting head posture of side bent and rotation.   ·           Assessment     Patient was able to tolerate manual therapy with distraction and mobes and with improved head posture and AROM, however he continued with noted limitations.  He is expected to improve with cont Rx. In order to improve ROM and mitigate pain with cont PT Rx.         Pt prognosis is Good.     Pt will continue to benefit from skilled outpatient physical therapy to address the deficits listed in the problem list box on initial evaluation, provide pt/family education and to maximize pt's level of independence in the home and community environment.     Pt's spiritual, cultural and educational needs considered and pt agreeable to plan of care and goals.     Anticipated barriers to physical therapy: suspected memory deficits.    Goals:       Goals  Length Status Goal   Short Term In Progress 8/7/2019   1a. Patient will demonstrate initial HEP with use of handouts prn in order to optimize Rx outcomes and maximize functional mob. in 5 visits.   Short Term In Progress 8/7/2019   2a. Patient will relate cervical pain </= 8/10 at worst over the previous 5 days in order to improve QoL, ROM, and in order to improve his ability to drive and perform his day to day functional  mob in 8 visits.     Long Term In Progress 8/7/2019     3. Patient will improve MMT grades for L Lower trapezius to 3/5 (initial 2+/5) in order to improve scapular stability and improve posture, ability with reaching, ADL, day to day tasks and house hold function by discharge.     Short Term In Progress 8/7/2019   4a. Patient will decrease disability as evidenced by the NDI to 25% (initial 34%) in order to improve postural dysfunction, QoL, ROM, and to promote ability to drive and perform his day to day functional mob in 8 visits.   Short Term In Progress 8/7/2019   5a. Patient will decrease disability as evidenced by the Quick DASH to 15% (initial 20.45%) in improve postural dysfunction, QoL, ROM, and to promote ability to drive and perform his day to day functional mob in 8 visits.   Long Term  1b. Patient will demonstrate final HEP with use of handouts prn in order to optimize Rx outcomes and maximize functional mob.by discharge.   Long Term  2b. Patient will relate cervical pain </= 6/10 at worst over the previous 5 days in order to improve QoL, ROM, and in order to improve his ability to drive and perform his day to day functional mob by discharge.     Long Term  4b. Patient will decrease disability as evidenced by the NDI to 20% (initial 34%) in order to improve postural dysfunction, QoL, ROM, and to promote ability to drive and perform his day to day functional mob by discharge.   Long Term  5b. Patient will decrease disability as evidenced by the Quick DASH to 10% (initial 20.45%) in improve postural dysfunction, QoL, ROM, and to promote ability to drive and perform his day to day functional mob by discharge.   Long Term  6. Patient will improve ROM with cervical SB B'ly to 10 deg (initial 5 deg) in order to improve QoL, ROM, and in order to improve his ability to drive and perform his day to day functional mob by discharge.               Plan     Cont with POC to address his deficits and to mitigate his  symptoms. PT will reassess on follow up visit.        Roberto Whiting PT

## 2019-08-09 ENCOUNTER — CLINICAL SUPPORT (OUTPATIENT)
Dept: REHABILITATION | Facility: HOSPITAL | Age: 59
End: 2019-08-09
Payer: MEDICAID

## 2019-08-09 DIAGNOSIS — M54.2 NECK PAIN ON LEFT SIDE: Primary | ICD-10-CM

## 2019-08-09 PROCEDURE — 97140 MANUAL THERAPY 1/> REGIONS: CPT

## 2019-08-09 PROCEDURE — 97110 THERAPEUTIC EXERCISES: CPT

## 2019-08-09 NOTE — PROGRESS NOTES
Physical Therapy Daily Treatment Note     Name: Heriberto GUERRERO [facility]  Clinic Number: [id number]    Therapy Diagnosis:   Encounter Diagnosis   Name Primary?    Neck pain on left side Yes     Physician: Bartolo Lopez MD    Visit Date: 8/9/2019    Physician Orders: Eval and Treat  Medical Diagnosis: Cervical Radiculopathy   Evaluation Date: 6/18/19  Authorization Period Expiration: 8/20/09  Plan of Care Certification Period: 8/16/19  Visit #/Visits authorized:/ 8     Time In: 09:00  Time Out: 10:10  Total Billable Time: 57 minutes          Precautions: Standard    Subjective     Pt reports: Patient relates having radiating cervical pain of 2/10 to L neck and shoulder this day, and has been intermittent at best with his HEP.  Response to previous treatment: patient with no c/o    Pain: 2/10  Location: Left neck      Objective     Heriberto received therapeutic exercises to develop strength for 45 minutes including:       ·  Cardio - UBE 8 min (DNP)    Pulleys - x 8 min  ·   C-Spine - Upper Trap  Stretch 30 seconds x 3   ·   C-Spine - Levator Scap Stretch 30 seconds x 3  ·   C-Spine - Pectoralis Corner Stretch two positions 3 x 30 seconds both positions  ·   C-Spine - Chin Tucks 2 x 15 reps in supine    C-spine Retractions 2 x 15 in supine with pillow and towel roll    C-Spine -  B side bend /c Pink TB  2 sets of 10 (with 5 second holds retractions only DNP holds)  ·   Shoulder - Scapular Retraction 2 x 10 with Pink TB 5 second holds  ·   C-Spine  - SNAG 10 sec holds x 10 reps (DNP)    Cybex- Lat pull downs 2 x 10 reps /c 3.5 plates wide , reverse/narrow  (DNP)    Cybex- Rows 2 x 10 reps /c 3 plates (DNP)      IE: [place] Dx of lymphoma and finished chemo Rx on same day as P.T.eval 6/18/19 - No heat/US or e-stim.    Fall hx and possible memory deficits    Adil received the following supervised modalities after being cleared for contradictions: Mechanical Traction:  Adil received intermittent mechanical traction to the  cervical spine at a force of 23lb for 30 sec hold and at a force of 11lb for 15 sec. for a total of 0  minutes.  Patient tolerated treatment well without any adverse effects noted nor verbalized.(DNP)      Manual Therapy x 12 Minutes  Patient cervical spine assessed for joint mobility with rotational restrictions to upper cervical spine.  PT performed manual distraction with rotation B'ly and with grades III and IV joint mobes to address rotational restrictions.  Following mobes patient related feeling a little better and looser.  Patient continues with a resting head posture of side bent and rotation.       Assessment     Patient continues to participate with Rx to address his radiating pain as well as his head and neck posture.  He requires VC/TC for improved exs quality and performance with targeted cervical musculature in order to activate sub occipitals, and upper trapezius.  Patient with a noted upper cervical restriction with rotation and flexion which is contributing to his limited ROM and pain.  He responds to Rx and mobes and related that his pain decreases to a 0 to 1/10.  Patient is expected to cont to progress to goals with cont Rx in order to optimize his pain free mobility.      Pt prognosis is Fair.     Pt will continue to benefit from skilled outpatient physical therapy to address the deficits listed in the problem list box on initial evaluation, provide pt/family education and to maximize pt's level of independence in the home and community environment.     Pt's spiritual, cultural and educational needs considered and pt agreeable to plan of care and goals.     Anticipated barriers to physical therapy: suspected memory deficits, intermittent compliance with HEP    Goals:       Goals  Length Status Goal   Short Term In Progress 8/9/2019   1a. Patient will demonstrate initial HEP with use of handouts prn in order to optimize Rx outcomes and maximize functional mob. in 5 visits.   Short Term In  Progress 8/9/2019   2a. Patient will relate cervical pain </= 8/10 at worst over the previous 5 days in order to improve QoL, ROM, and in order to improve his ability to drive and perform his day to day functional mob in 8 visits.     Long Term In Progress 8/9/2019     3. Patient will improve MMT grades for L Lower trapezius to 3/5 (initial 2+/5) in order to improve scapular stability and improve posture, ability with reaching, ADL, day to day tasks and house hold function by discharge.     Short Term In Progress 8/9/2019   4a. Patient will decrease disability as evidenced by the NDI to 25% (initial 34%) in order to improve postural dysfunction, QoL, ROM, and to promote ability to drive and perform his day to day functional mob in 8 visits.   Short Term In Progress 8/9/2019   5a. Patient will decrease disability as evidenced by the Quick DASH to 15% (initial 20.45%) in improve postural dysfunction, QoL, ROM, and to promote ability to drive and perform his day to day functional mob in 8 visits.   Long Term  1b. Patient will demonstrate final HEP with use of handouts prn in order to optimize Rx outcomes and maximize functional mob.by discharge.   Long Term  2b. Patient will relate cervical pain </= 6/10 at worst over the previous 5 days in order to improve QoL, ROM, and in order to improve his ability to drive and perform his day to day functional mob by discharge.     Long Term  4b. Patient will decrease disability as evidenced by the NDI to 20% (initial 34%) in order to improve postural dysfunction, QoL, ROM, and to promote ability to drive and perform his day to day functional mob by discharge.   Long Term  5b. Patient will decrease disability as evidenced by the Quick DASH to 10% (initial 20.45%) in improve postural dysfunction, QoL, ROM, and to promote ability to drive and perform his day to day functional mob by discharge.   Long Term  6. Patient will improve ROM with cervical SB B'ly to 10 deg (initial 5 deg)  in order to improve QoL, ROM, and in order to improve his ability to drive and perform his day to day functional mob by discharge.               Plan     Cont with POC to address ROM, strength act. fawn. and to mitigate his symptoms as well as cont with cervical joint mobes.        Roberto Whiting PT

## 2019-08-11 ENCOUNTER — HOSPITAL ENCOUNTER (INPATIENT)
Facility: HOSPITAL | Age: 59
LOS: 5 days | Discharge: HOME | DRG: 195 | End: 2019-08-16
Attending: EMERGENCY MEDICINE | Admitting: HOSPITALIST
Payer: MEDICAID

## 2019-08-11 DIAGNOSIS — J18.9 PNEUMONIA OF RIGHT LOWER LOBE DUE TO INFECTIOUS ORGANISM: Primary | ICD-10-CM

## 2019-08-11 DIAGNOSIS — R50.9 FEVER: ICD-10-CM

## 2019-08-11 DIAGNOSIS — Z87.09 HISTORY OF BACTERIAL SINUSITIS: ICD-10-CM

## 2019-08-11 DIAGNOSIS — D80.1 HYPOGAMMAGLOBULINEMIA: ICD-10-CM

## 2019-08-11 LAB
ALBUMIN SERPL BCP-MCNC: 2.7 G/DL (ref 3.5–5.2)
ALP SERPL-CCNC: 127 U/L (ref 55–135)
ALT SERPL W/O P-5'-P-CCNC: 13 U/L (ref 10–44)
ANION GAP SERPL CALC-SCNC: 7 MMOL/L (ref 8–16)
AST SERPL-CCNC: 20 U/L (ref 10–40)
BASOPHILS # BLD AUTO: 0.03 K/UL (ref 0–0.2)
BASOPHILS NFR BLD: 0.4 % (ref 0–1.9)
BILIRUB SERPL-MCNC: 0.5 MG/DL (ref 0.1–1)
BUN SERPL-MCNC: 16 MG/DL (ref 6–20)
CALCIUM SERPL-MCNC: 8.6 MG/DL (ref 8.7–10.5)
CHLORIDE SERPL-SCNC: 107 MMOL/L (ref 95–110)
CO2 SERPL-SCNC: 26 MMOL/L (ref 23–29)
CREAT SERPL-MCNC: 0.8 MG/DL (ref 0.5–1.4)
DIFFERENTIAL METHOD: ABNORMAL
EOSINOPHIL # BLD AUTO: 0.3 K/UL (ref 0–0.5)
EOSINOPHIL NFR BLD: 3.9 % (ref 0–8)
ERYTHROCYTE [DISTWIDTH] IN BLOOD BY AUTOMATED COUNT: 15.9 % (ref 11.5–14.5)
EST. GFR  (AFRICAN AMERICAN): >60 ML/MIN/1.73 M^2
EST. GFR  (NON AFRICAN AMERICAN): >60 ML/MIN/1.73 M^2
GLUCOSE SERPL-MCNC: 155 MG/DL (ref 70–110)
HCT VFR BLD AUTO: 28.7 % (ref 40–54)
HGB BLD-MCNC: 9.2 G/DL (ref 14–18)
IMM GRANULOCYTES # BLD AUTO: 0.2 K/UL (ref 0–0.04)
IMM GRANULOCYTES NFR BLD AUTO: 2.5 % (ref 0–0.5)
LACTATE SERPL-SCNC: 1.3 MMOL/L (ref 0.5–1.9)
LYMPHOCYTES # BLD AUTO: 1.8 K/UL (ref 1–4.8)
LYMPHOCYTES NFR BLD: 22.4 % (ref 18–48)
MCH RBC QN AUTO: 26.5 PG (ref 27–31)
MCHC RBC AUTO-ENTMCNC: 32.1 G/DL (ref 32–36)
MCV RBC AUTO: 83 FL (ref 82–98)
MONOCYTES # BLD AUTO: 0.5 K/UL (ref 0.3–1)
MONOCYTES NFR BLD: 6 % (ref 4–15)
NEUTROPHILS # BLD AUTO: 5.2 K/UL (ref 1.8–7.7)
NEUTROPHILS NFR BLD: 64.8 % (ref 38–73)
NRBC BLD-RTO: 0 /100 WBC
PLATELET # BLD AUTO: 200 K/UL (ref 150–350)
PMV BLD AUTO: 9 FL (ref 9.2–12.9)
POTASSIUM SERPL-SCNC: 3.8 MMOL/L (ref 3.5–5.1)
PROT SERPL-MCNC: 6.5 G/DL (ref 6–8.4)
RBC # BLD AUTO: 3.47 M/UL (ref 4.6–6.2)
SODIUM SERPL-SCNC: 140 MMOL/L (ref 136–145)
WBC # BLD AUTO: 8.02 K/UL (ref 3.9–12.7)

## 2019-08-11 PROCEDURE — 96361 HYDRATE IV INFUSION ADD-ON: CPT

## 2019-08-11 PROCEDURE — 12000002 HC ACUTE/MED SURGE SEMI-PRIVATE ROOM

## 2019-08-11 PROCEDURE — 99285 EMERGENCY DEPT VISIT HI MDM: CPT | Mod: 25

## 2019-08-11 PROCEDURE — 80053 COMPREHEN METABOLIC PANEL: CPT

## 2019-08-11 PROCEDURE — 83605 ASSAY OF LACTIC ACID: CPT

## 2019-08-11 PROCEDURE — 85025 COMPLETE CBC W/AUTO DIFF WBC: CPT

## 2019-08-11 PROCEDURE — 63600175 PHARM REV CODE 636 W HCPCS: Performed by: EMERGENCY MEDICINE

## 2019-08-11 PROCEDURE — 83036 HEMOGLOBIN GLYCOSYLATED A1C: CPT

## 2019-08-11 RX ADMIN — SODIUM CHLORIDE 1000 ML: 9 INJECTION, SOLUTION INTRAVENOUS at 10:08

## 2019-08-12 PROBLEM — D64.9 ANEMIA: Status: ACTIVE | Noted: 2019-08-12

## 2019-08-12 PROBLEM — J18.9 PNEUMONIA OF RIGHT LOWER LOBE DUE TO INFECTIOUS ORGANISM: Status: ACTIVE | Noted: 2019-08-12

## 2019-08-12 PROBLEM — R73.9 HYPERGLYCEMIA: Status: ACTIVE | Noted: 2019-08-12

## 2019-08-12 PROBLEM — Z85.72 HISTORY OF LYMPHOMA: Status: ACTIVE | Noted: 2019-08-12

## 2019-08-12 PROBLEM — R50.9 FEVER AND CHILLS: Status: ACTIVE | Noted: 2019-08-12

## 2019-08-12 LAB
BILIRUB UR QL STRIP: NEGATIVE
CLARITY UR: CLEAR
COLOR UR: YELLOW
ESTIMATED AVG GLUCOSE: 117 MG/DL (ref 68–131)
GLUCOSE UR QL STRIP: NEGATIVE
HBA1C MFR BLD HPLC: 5.7 % (ref 4.5–6.2)
HGB UR QL STRIP: NEGATIVE
INFLUENZA A, MOLECULAR: NEGATIVE
INFLUENZA B, MOLECULAR: NEGATIVE
KETONES UR QL STRIP: NEGATIVE
LEUKOCYTE ESTERASE UR QL STRIP: NEGATIVE
NITRITE UR QL STRIP: NEGATIVE
PH UR STRIP: 6 [PH] (ref 5–8)
PROCALCITONIN SERPL IA-MCNC: 0.13 NG/ML (ref 0–0.5)
PROT UR QL STRIP: NEGATIVE
SP GR UR STRIP: 1.01 (ref 1–1.03)
SPECIMEN SOURCE: NORMAL
URN SPEC COLLECT METH UR: NORMAL
UROBILINOGEN UR STRIP-ACNC: NEGATIVE EU/DL

## 2019-08-12 PROCEDURE — 87502 INFLUENZA DNA AMP PROBE: CPT

## 2019-08-12 PROCEDURE — 84145 PROCALCITONIN (PCT): CPT

## 2019-08-12 PROCEDURE — 96375 TX/PRO/DX INJ NEW DRUG ADDON: CPT

## 2019-08-12 PROCEDURE — 36415 COLL VENOUS BLD VENIPUNCTURE: CPT

## 2019-08-12 PROCEDURE — 81003 URINALYSIS AUTO W/O SCOPE: CPT

## 2019-08-12 PROCEDURE — 87205 SMEAR GRAM STAIN: CPT

## 2019-08-12 PROCEDURE — 87070 CULTURE OTHR SPECIMN AEROBIC: CPT

## 2019-08-12 PROCEDURE — 25000003 PHARM REV CODE 250: Performed by: HOSPITALIST

## 2019-08-12 PROCEDURE — G0378 HOSPITAL OBSERVATION PER HR: HCPCS

## 2019-08-12 PROCEDURE — 63600175 PHARM REV CODE 636 W HCPCS: Performed by: HOSPITALIST

## 2019-08-12 PROCEDURE — 12000002 HC ACUTE/MED SURGE SEMI-PRIVATE ROOM

## 2019-08-12 PROCEDURE — 87040 BLOOD CULTURE FOR BACTERIA: CPT

## 2019-08-12 RX ORDER — ACETAMINOPHEN 325 MG/1
650 TABLET ORAL EVERY 8 HOURS PRN
Status: DISCONTINUED | OUTPATIENT
Start: 2019-08-12 | End: 2019-08-13

## 2019-08-12 RX ORDER — SODIUM CHLORIDE 0.9 % (FLUSH) 0.9 %
10 SYRINGE (ML) INJECTION
Status: DISCONTINUED | OUTPATIENT
Start: 2019-08-12 | End: 2019-08-16 | Stop reason: HOSPADM

## 2019-08-12 RX ORDER — LEVOFLOXACIN 5 MG/ML
750 INJECTION, SOLUTION INTRAVENOUS
Status: DISCONTINUED | OUTPATIENT
Start: 2019-08-12 | End: 2019-08-14

## 2019-08-12 RX ORDER — ACETAMINOPHEN 325 MG/1
650 TABLET ORAL EVERY 4 HOURS PRN
Status: DISCONTINUED | OUTPATIENT
Start: 2019-08-12 | End: 2019-08-16 | Stop reason: HOSPADM

## 2019-08-12 RX ADMIN — ACETAMINOPHEN 650 MG: 325 TABLET ORAL at 04:08

## 2019-08-12 RX ADMIN — LEVOFLOXACIN 750 MG: 750 INJECTION, SOLUTION INTRAVENOUS at 04:08

## 2019-08-12 NOTE — H&P
[facility] Medicine  History & Physical    Patient Name: Heriberto Keys  MRN: [id number]  Admission Date: 8/11/2019  Attending Physician: Lazaro Clifford  Primary Care Provider: Jagruti Davis NP         Patient information was obtained from patient and ER records.     Subjective:     Principal Problem:Fever and chills    Chief Complaint:   Chief Complaint   Patient presents with    Fever     Early this a.m.        HPI:  patient is a pleasant 59-year-old male who states he has had chills off and on for several hours in the afternoon for the last 4 days.  Today he checked his temperature and it was approximately 104 at home.   He does have a history of lymphoma in remission for the last 4-5 months.  Patient is followed by Dr. De Anda  He is on no medicines whatsoever except ibuprofen for his fever.  He does have a history of anemia.  The patient denies any sore throat or runny nose chest pain and productive cough vomiting diarrhea.or  abdominal pain  He  also denies urinary problems. He  does complain of left earache.    Past Medical History:   Diagnosis Date    Anemia     Depression     Encounter for blood transfusion     Lymphoma     Lymphoma     Lymphoma 2019       Past Surgical History:   Procedure Laterality Date    LYMPHADENECTOMY Bilateral        Review of patient's allergies indicates:  No Known Allergies    No current facility-administered medications on file prior to encounter.      Current Outpatient Medications on File Prior to Encounter   Medication Sig    ibuprofen (ADVIL,MOTRIN) 600 MG tablet TK 1 T PO Q 6 H WF PRF PAIN     Family History     None        Tobacco Use    Smoking status: Former Smoker     Packs/day: 1.00     Types: Cigarettes   Substance and Sexual Activity    Alcohol use: No     Frequency: Never    Drug use: No    Sexual activity: Not on file     Review of Systems   Constitutional: Positive for chills and fever. Negative for activity change and  appetite change.   HENT: Negative.  Negative for facial swelling (slight left ear discharge), sinus pressure, sinus pain and sore throat.    Respiratory: Negative for cough, chest tightness, shortness of breath and wheezing.    Cardiovascular: Negative for chest pain and leg swelling.   Gastrointestinal: Negative for abdominal pain, diarrhea and vomiting.   Genitourinary: Negative for dysuria and hematuria.   Musculoskeletal: Negative for back pain and joint swelling.   Neurological: Negative for dizziness, weakness and light-headedness.   Psychiatric/Behavioral: Negative for confusion. The patient is not nervous/anxious.      Objective:     Vital Signs (Most Recent):  Temp: 97.9 °F (36.6 °C) (08/12/19 0225)  Pulse: 70 (08/12/19 0225)  Resp: 16 (08/12/19 0225)  BP: 119/71 (08/12/19 0225)  SpO2: 99 % (08/12/19 0225) Vital Signs (24h Range):  Temp:  [97.9 °F (36.6 °C)-103.2 °F (39.6 °C)] 97.9 °F (36.6 °C)  Pulse:  [70-98] 70  Resp:  [16-18] 16  SpO2:  [98 %-100 %] 99 %  BP: (119-157)/(70-72) 119/71     Weight: 68.9 kg (152 lb)  Body mass index is 22.45 kg/m².    Physical Exam   Constitutional: He is oriented to person, place, and time. He appears well-developed and well-nourished.   HENT:   Slight erythema left tympanic membrane no discharge   Eyes: Conjunctivae are normal. No scleral icterus.   Sclera non icteric   Neck: Normal range of motion. Neck supple.   Cardiovascular: Normal rate, regular rhythm and normal heart sounds.   Pulmonary/Chest: Effort normal and breath sounds normal.   Abdominal: Soft. Bowel sounds are normal. There is no tenderness.   Musculoskeletal: Normal range of motion.   Neurological: He is alert and oriented to person, place, and time.   Skin: Skin is warm. No rash noted.   Psychiatric: He has a normal mood and affect. His behavior is normal.   Nursing note and vitals reviewed.          Significant Labs:   CBC:   Recent Labs   Lab 08/11/19  2200   WBC 8.02   HGB 9.2*   HCT 28.7*         CMP:   Recent Labs   Lab 08/11/19  2200      K 3.8      CO2 26   *   BUN 16   CREATININE 0.8   CALCIUM 8.6*   PROT 6.5   ALBUMIN 2.7*   BILITOT 0.5   ALKPHOS 127   AST 20   ALT 13   ANIONGAP 7*   EGFRNONAA >60.0     Cardiac Markers: No results for input(s): CKMB, MYOGLOBIN, BNP, TROPISTAT in the last 48 hours.  Lipid Panel: No results for input(s): CHOL, HDL, LDLCALC, TRIG, CHOLHDL in the last 48 hours.  Magnesium: No results for input(s): MG in the last 48 hours.    Significant Imaging:   Chest x-ray was reviewed by me    Assessment/Plan:     * Fever and chills  -Fever and chills of  unclear etiology.  He may have underlying early right lower lobe pneumonia.  Repeat chest x-ray PA and lateral are pending start empiric antibiotics  -Possible left otitis media.  -Rule out other etiologies      History of lymphoma  Follow routinely but  .  Patient states he has been in remission for 4-5 months      Hyperglycemia  Will check hemoglobin A1c      Anemia  Most likely chronic disease. Will follow as an outpatient      VTE Risk Mitigation (From admission, onward)    None             Lazaro Clifford DO  Department of Hospital Medicine   [facility]

## 2019-08-12 NOTE — SUBJECTIVE & OBJECTIVE
Past Medical History:   Diagnosis Date    Anemia     Depression     Encounter for blood transfusion     Lymphoma     Lymphoma     Lymphoma 2019       Past Surgical History:   Procedure Laterality Date    LYMPHADENECTOMY Bilateral        Review of patient's allergies indicates:  No Known Allergies    No current facility-administered medications on file prior to encounter.      Current Outpatient Medications on File Prior to Encounter   Medication Sig    ibuprofen (ADVIL,MOTRIN) 600 MG tablet TK 1 T PO Q 6 H WF PRF PAIN     Family History     None        Tobacco Use    Smoking status: Former Smoker     Packs/day: 1.00     Types: Cigarettes   Substance and Sexual Activity    Alcohol use: No     Frequency: Never    Drug use: No    Sexual activity: Not on file     Review of Systems   Constitutional: Positive for chills and fever. Negative for activity change and appetite change.   HENT: Negative.  Negative for facial swelling (slight left ear discharge), sinus pressure, sinus pain and sore throat.    Respiratory: Negative for cough, chest tightness, shortness of breath and wheezing.    Cardiovascular: Negative for chest pain and leg swelling.   Gastrointestinal: Negative for abdominal pain, diarrhea and vomiting.   Genitourinary: Negative for dysuria and hematuria.   Musculoskeletal: Negative for back pain and joint swelling.   Neurological: Negative for dizziness, weakness and light-headedness.   Psychiatric/Behavioral: Negative for confusion. The patient is not nervous/anxious.      Objective:     Vital Signs (Most Recent):  Temp: 97.9 °F (36.6 °C) (08/12/19 0225)  Pulse: 70 (08/12/19 0225)  Resp: 16 (08/12/19 0225)  BP: 119/71 (08/12/19 0225)  SpO2: 99 % (08/12/19 0225) Vital Signs (24h Range):  Temp:  [97.9 °F (36.6 °C)-103.2 °F (39.6 °C)] 97.9 °F (36.6 °C)  Pulse:  [70-98] 70  Resp:  [16-18] 16  SpO2:  [98 %-100 %] 99 %  BP: (119-157)/(70-72) 119/71     Weight: 68.9 kg (152 lb)  Body mass index is 22.45  kg/m².    Physical Exam   Constitutional: He is oriented to person, place, and time. He appears well-developed and well-nourished.   HENT:   Slight erythema left tympanic membrane no discharge   Eyes: Conjunctivae are normal. No scleral icterus.   Sclera non icteric   Neck: Normal range of motion. Neck supple.   Cardiovascular: Normal rate, regular rhythm and normal heart sounds.   Pulmonary/Chest: Effort normal and breath sounds normal.   Abdominal: Soft. Bowel sounds are normal. There is no tenderness.   Musculoskeletal: Normal range of motion.   Neurological: He is alert and oriented to person, place, and time.   Skin: Skin is warm. No rash noted.   Psychiatric: He has a normal mood and affect. His behavior is normal.   Nursing note and vitals reviewed.          Significant Labs:   CBC:   Recent Labs   Lab 08/11/19  2200   WBC 8.02   HGB 9.2*   HCT 28.7*        CMP:   Recent Labs   Lab 08/11/19  2200      K 3.8      CO2 26   *   BUN 16   CREATININE 0.8   CALCIUM 8.6*   PROT 6.5   ALBUMIN 2.7*   BILITOT 0.5   ALKPHOS 127   AST 20   ALT 13   ANIONGAP 7*   EGFRNONAA >60.0     Cardiac Markers: No results for input(s): CKMB, MYOGLOBIN, BNP, TROPISTAT in the last 48 hours.  Lipid Panel: No results for input(s): CHOL, HDL, LDLCALC, TRIG, CHOLHDL in the last 48 hours.  Magnesium: No results for input(s): MG in the last 48 hours.    Significant Imaging:   Chest x-ray was reviewed by me

## 2019-08-12 NOTE — ED NOTES
Notified hospitalist of pt's statement that he was told he was going home and on antibiotics for his ears. Hospitalist states that pt is being admitted for pneumonia and will talk to pt at a later time about this; can't talk to pt at this time. Pt informed and asked if he's ok with going to his room on the floor. Pt states yes.

## 2019-08-12 NOTE — ASSESSMENT & PLAN NOTE
-Fever and chills of  unclear etiology.  He may have underlying early right lower lobe pneumonia.  Repeat chest x-ray PA and lateral are pending start empiric antibiotics  -Possible left otitis media.  -Rule out other etiologies

## 2019-08-12 NOTE — PROGRESS NOTES
Patient admitted with fevers and chills with productive cough of whitish sputum, denies any shortness of breath, chest pain. Patient has crackles in both lungs on exam.  Otherwise patient is clinically stable.  On ear exam patient has right ear otitis media  Plan is to continue IV Levaquin covering for pneumonia and otitis media.  Follow-up blood culture results  Check procalcitonin

## 2019-08-12 NOTE — ED NOTES
Pt states that he's ready to go home and the doctor with the white hair told him he was sending him home on an antibiotic.

## 2019-08-12 NOTE — HPI
patient is a pleasant 59-year-old male who states he has had chills off and on for several hours in the afternoon for the last 4 days.  Today he checked his temperature and it was approximately 104 at home.   He does have a history of lymphoma in remission for the last 4-5 months.  Patient is followed by Dr. De Anda  He is on no medicines whatsoever except ibuprofen for his fever.  He does have a history of anemia.  The patient denies any sore throat or runny nose chest pain and productive cough vomiting diarrhea.or  abdominal pain  He also denies urinary problems. He does complain of left earache.

## 2019-08-12 NOTE — ED PROVIDER NOTES
Encounter Date: 8/11/2019       History     Chief Complaint   Patient presents with    Fever     Chief complaint is fever HPI the patient states he has had chills off and on for several hours in the afternoon for the last 4 days.  He does have a history of lymphoma in remission for the last 4-5 months.  He is on no medicines whatsoever except ibuprofen for his fever.  He does have a history of anemia.  The patient denies any sore throat or runny nose chest pain and productive cough vomiting diarrhea.  He denies troubles urinating.  He does state that he has frequency.  He did complain of earache.        Review of patient's allergies indicates:  No Known Allergies  Past Medical History:   Diagnosis Date    Anemia     Depression     Encounter for blood transfusion     Lymphoma     Lymphoma     Lymphoma 2019     Past Surgical History:   Procedure Laterality Date    LYMPHADENECTOMY Bilateral      No family history on file.  Social History     Tobacco Use    Smoking status: Former Smoker     Packs/day: 1.00     Types: Cigarettes   Substance Use Topics    Alcohol use: No     Frequency: Never    Drug use: No     Review of Systems   Constitutional: Negative for chills and fever.   HENT: Negative for ear pain, rhinorrhea and sore throat.    Eyes: Negative for pain and visual disturbance.   Respiratory: Negative for cough and shortness of breath.    Cardiovascular: Negative for chest pain and palpitations.   Gastrointestinal: Negative for abdominal pain, constipation, diarrhea, nausea and vomiting.   Genitourinary: Negative for dysuria, frequency, hematuria and urgency.   Musculoskeletal: Negative for back pain, joint swelling and myalgias.   Skin: Negative for rash.   Neurological: Negative for dizziness, seizures, weakness and headaches.   Hematological: Negative for adenopathy. Does not bruise/bleed easily.   Psychiatric/Behavioral: Negative for dysphoric mood. The patient is not nervous/anxious.        Physical  Exam     Initial Vitals [08/11/19 2120]   BP Pulse Resp Temp SpO2   (!) 150/72 98 16 (!) 103.2 °F (39.6 °C) 98 %      MAP       --         Physical Exam    Nursing note and vitals reviewed.  Constitutional: He appears well-developed and well-nourished.   HENT:   Head: Normocephalic and atraumatic.   Eyes: Conjunctivae, EOM and lids are normal. Pupils are equal, round, and reactive to light.   Neck: Trachea normal. Neck supple. No thyroid mass present.   Cardiovascular: Normal rate, regular rhythm and normal heart sounds.   Pulmonary/Chest: Breath sounds normal. No respiratory distress.   Abdominal: Soft. Bowel sounds are normal. There is no tenderness.   Musculoskeletal: Normal range of motion.   Neurological: He is alert and oriented to person, place, and time. He has normal strength and normal reflexes. No cranial nerve deficit or sensory deficit.   Skin: Skin is warm and dry.   Psychiatric: He has a normal mood and affect. His speech is normal and behavior is normal. Judgment and thought content normal.         ED Course   Procedures  Labs Reviewed - No data to display       Imaging Results    None               I have spoken to the hospitalist/consultant and have discussed the patient's chief complaint as well as physical exam and labs.  Pending recommendation.                    Clinical Impression:       ICD-10-CM ICD-9-CM   1. Pneumonia of right lower lobe due to infectious organism J18.1 486   2. Fever R50.9 780.60                                Tereza Merino MD  08/12/19 4597

## 2019-08-12 NOTE — ED NOTES
Informed hospitalist twice that admit orders were entered as ER orders and the floor nurse cannot see his orders. Hospitalist states that he put the orders in correctly.

## 2019-08-13 PROCEDURE — 25000003 PHARM REV CODE 250: Performed by: HOSPITALIST

## 2019-08-13 PROCEDURE — 87040 BLOOD CULTURE FOR BACTERIA: CPT | Mod: 59

## 2019-08-13 PROCEDURE — 96365 THER/PROPH/DIAG IV INF INIT: CPT

## 2019-08-13 PROCEDURE — 87086 URINE CULTURE/COLONY COUNT: CPT

## 2019-08-13 PROCEDURE — G0378 HOSPITAL OBSERVATION PER HR: HCPCS

## 2019-08-13 PROCEDURE — 12000002 HC ACUTE/MED SURGE SEMI-PRIVATE ROOM

## 2019-08-13 PROCEDURE — 96366 THER/PROPH/DIAG IV INF ADDON: CPT

## 2019-08-13 PROCEDURE — 63600175 PHARM REV CODE 636 W HCPCS: Performed by: HOSPITALIST

## 2019-08-13 PROCEDURE — 25000003 PHARM REV CODE 250: Performed by: INTERNAL MEDICINE

## 2019-08-13 RX ADMIN — ACETAMINOPHEN 650 MG: 325 TABLET ORAL at 09:08

## 2019-08-13 RX ADMIN — ACETAMINOPHEN 650 MG: 325 TABLET ORAL at 12:08

## 2019-08-13 RX ADMIN — LEVOFLOXACIN 750 MG: 750 INJECTION, SOLUTION INTRAVENOUS at 03:08

## 2019-08-13 RX ADMIN — ACETAMINOPHEN 650 MG: 325 TABLET ORAL at 11:08

## 2019-08-13 NOTE — PROGRESS NOTES
[facility] Medicine  Progress Note    Patient Name: Heriberto Keys  MRN: [id number]  Patient Class: OP- Observation   Admission Date: 8/11/2019  Length of Stay: 0 days  Attending Physician: Daryn Brewer MD  Primary Care Provider: Jagruti Davis NP        Subjective:       Interval History:    Patient continues to have high fevers mostly later part of the day.  Patient complains of chills.  Complaining of productive cough of yellow sputum.  Denies any shortness of breath, chest pain, URI symptoms.  Procalcitonin is low.  Blood cultures have been negative.    Review of Systems  Objective:     Vital Signs (Most Recent):  Temp: 99.1 °F (37.3 °C) (08/13/19 1637)  Pulse: 74 (08/13/19 1637)  Resp: 18 (08/13/19 1637)  BP: (!) 154/78 (08/13/19 1637)  SpO2: (!) 93 % (08/13/19 1637) Vital Signs (24h Range):  Temp:  [98.7 °F (37.1 °C)-103 °F (39.4 °C)] 99.1 °F (37.3 °C)  Pulse:  [70-88] 74  Resp:  [18-19] 18  SpO2:  [93 %-96 %] 93 %  BP: (134-154)/(71-78) 154/78     Weight: 75.2 kg (165 lb 12.6 oz)  Body mass index is 24.48 kg/m².    Intake/Output Summary (Last 24 hours) at 8/13/2019 1753  Last data filed at 8/13/2019 0530  Gross per 24 hour   Intake 540 ml   Output --   Net 540 ml        Physical Exam:  General: Patient resting comfortably in no acute distress.  Lungs:  Left lung crackles noted.  No wheezing  Cor: Regular rate and rhythm. No murmurs. No pedal edema.  Abd: Soft. Nontender. Non-distended.  Neuro: A&O x3. Moving all 4 extremities equally  Ext: No clubbing. No cyanosis.     Significant Labs:   BMP:   Recent Labs   Lab 08/11/19  2200   *      K 3.8      CO2 26   BUN 16   CREATININE 0.8   CALCIUM 8.6*     CBC:   Recent Labs   Lab 08/11/19  2200   WBC 8.02   HGB 9.2*   HCT 28.7*          Assessment/Plan:      Atypical pneumonia (?  Viral) versus ILD    History of nodular sclerosis type Hodgkin's lymphoma s/p chemo, self reported h/o  remission    PLAN:  Continue IV Levaquin  As needed Tylenol and ibuprofen  Check respiratory viral panel  Consult Infectious Disease  Will contact Dr. Rich regarding the status of lymphoma        Active Diagnoses:    Diagnosis Date Noted POA    PRINCIPAL PROBLEM:  Pneumonia of right lower lobe due to infectious organism [J18.1] 08/12/2019 Yes    History of lymphoma [Z85.79] 08/12/2019 Not Applicable    Anemia [D64.9] 08/12/2019 Yes      Problems Resolved During this Admission:     VTE Risk Mitigation (From admission, onward)        Ordered     IP VTE HIGH RISK PATIENT  Once      08/12/19 3866             Daryn Brewer MD  Department of Hospital Medicine   [facility]

## 2019-08-13 NOTE — NURSING
Pt has fever 103, new order for blood cx x2 and urine cx per Dr. Clifford.  Pt given tylenol.  See MAR.  Pt instructed on urine cx collection.  Voiced understanding.

## 2019-08-13 NOTE — PLAN OF CARE
Problem: Adult Inpatient Plan of Care  Goal: Plan of Care Review  Outcome: Ongoing (interventions implemented as appropriate)     08/12/19 1930 08/13/19 0634   Plan of Care Review   Plan of Care Reviewed With patient  --    Progress  --  no change     Goal: Patient-Specific Goal (Individualization)  Outcome: Ongoing (interventions implemented as appropriate)     08/12/19 0510   OTHER   Individualized Care Needs IV antibiotics     Goal: Absence of Hospital-Acquired Illness or Injury  Outcome: Ongoing (interventions implemented as appropriate)  Intervention: Identify and Manage Fall Risk     08/13/19 0330   Optimize Balance and Safe Activity   Safety Promotion/Fall Prevention assistive device/personal item within reach;nonskid shoes/socks when out of bed;side rails raised x 2     Intervention: Prevent VTE (venous thromboembolism)     08/12/19 1930   Prevent or Manage Embolism   VTE Prevention/Management ambulation promoted       Goal: Optimal Comfort and Wellbeing  Outcome: Ongoing (interventions implemented as appropriate)  Intervention: Provide Person-Centered Care     08/12/19 1930   Support Dyspnea Relief   Trust Relationship/Rapport care explained

## 2019-08-14 LAB
ANION GAP SERPL CALC-SCNC: 9 MMOL/L (ref 8–16)
BACTERIA SPEC AEROBE CULT: NORMAL
BASOPHILS # BLD AUTO: 0.02 K/UL (ref 0–0.2)
BASOPHILS NFR BLD: 0.2 % (ref 0–1.9)
BUN SERPL-MCNC: 18 MG/DL (ref 6–20)
CALCIUM SERPL-MCNC: 8.6 MG/DL (ref 8.7–10.5)
CHLORIDE SERPL-SCNC: 106 MMOL/L (ref 95–110)
CO2 SERPL-SCNC: 26 MMOL/L (ref 23–29)
CREAT SERPL-MCNC: 0.8 MG/DL (ref 0.5–1.4)
DIFFERENTIAL METHOD: ABNORMAL
EOSINOPHIL # BLD AUTO: 0.1 K/UL (ref 0–0.5)
EOSINOPHIL NFR BLD: 1.4 % (ref 0–8)
ERYTHROCYTE [DISTWIDTH] IN BLOOD BY AUTOMATED COUNT: 16.1 % (ref 11.5–14.5)
EST. GFR  (AFRICAN AMERICAN): >60 ML/MIN/1.73 M^2
EST. GFR  (NON AFRICAN AMERICAN): >60 ML/MIN/1.73 M^2
GLUCOSE SERPL-MCNC: 110 MG/DL (ref 70–110)
GRAM STN SPEC: NORMAL
HCT VFR BLD AUTO: 29.4 % (ref 40–54)
HGB BLD-MCNC: 9.5 G/DL (ref 14–18)
IMM GRANULOCYTES # BLD AUTO: 0.14 K/UL (ref 0–0.04)
IMM GRANULOCYTES NFR BLD AUTO: 1.4 % (ref 0–0.5)
LYMPHOCYTES # BLD AUTO: 1.7 K/UL (ref 1–4.8)
LYMPHOCYTES NFR BLD: 17.5 % (ref 18–48)
MCH RBC QN AUTO: 26.2 PG (ref 27–31)
MCHC RBC AUTO-ENTMCNC: 32.3 G/DL (ref 32–36)
MCV RBC AUTO: 81 FL (ref 82–98)
MONOCYTES # BLD AUTO: 0.6 K/UL (ref 0.3–1)
MONOCYTES NFR BLD: 5.9 % (ref 4–15)
NEUTROPHILS # BLD AUTO: 7.1 K/UL (ref 1.8–7.7)
NEUTROPHILS NFR BLD: 73.6 % (ref 38–73)
NRBC BLD-RTO: 0 /100 WBC
PLATELET # BLD AUTO: 222 K/UL (ref 150–350)
PMV BLD AUTO: 9.4 FL (ref 9.2–12.9)
POTASSIUM SERPL-SCNC: 3.7 MMOL/L (ref 3.5–5.1)
RBC # BLD AUTO: 3.62 M/UL (ref 4.6–6.2)
SODIUM SERPL-SCNC: 141 MMOL/L (ref 136–145)
WBC # BLD AUTO: 9.66 K/UL (ref 3.9–12.7)

## 2019-08-14 PROCEDURE — G0378 HOSPITAL OBSERVATION PER HR: HCPCS

## 2019-08-14 PROCEDURE — 36415 COLL VENOUS BLD VENIPUNCTURE: CPT

## 2019-08-14 PROCEDURE — 87147 CULTURE TYPE IMMUNOLOGIC: CPT

## 2019-08-14 PROCEDURE — 96375 TX/PRO/DX INJ NEW DRUG ADDON: CPT

## 2019-08-14 PROCEDURE — 96367 TX/PROPH/DG ADDL SEQ IV INF: CPT

## 2019-08-14 PROCEDURE — 80048 BASIC METABOLIC PNL TOTAL CA: CPT

## 2019-08-14 PROCEDURE — 96366 THER/PROPH/DIAG IV INF ADDON: CPT

## 2019-08-14 PROCEDURE — 85025 COMPLETE CBC W/AUTO DIFF WBC: CPT

## 2019-08-14 PROCEDURE — 87186 SC STD MICRODIL/AGAR DIL: CPT

## 2019-08-14 PROCEDURE — 25000003 PHARM REV CODE 250: Performed by: HOSPITALIST

## 2019-08-14 PROCEDURE — 12000002 HC ACUTE/MED SURGE SEMI-PRIVATE ROOM

## 2019-08-14 PROCEDURE — 25500020 PHARM REV CODE 255: Performed by: INTERNAL MEDICINE

## 2019-08-14 PROCEDURE — 63600175 PHARM REV CODE 636 W HCPCS: Performed by: HOSPITALIST

## 2019-08-14 PROCEDURE — 63600175 PHARM REV CODE 636 W HCPCS: Performed by: INTERNAL MEDICINE

## 2019-08-14 PROCEDURE — 87070 CULTURE OTHR SPECIMN AEROBIC: CPT

## 2019-08-14 PROCEDURE — 87077 CULTURE AEROBIC IDENTIFY: CPT

## 2019-08-14 PROCEDURE — 25000003 PHARM REV CODE 250: Performed by: INTERNAL MEDICINE

## 2019-08-14 RX ORDER — IBUPROFEN 400 MG/1
400 TABLET ORAL 3 TIMES DAILY
Status: DISCONTINUED | OUTPATIENT
Start: 2019-08-14 | End: 2019-08-15

## 2019-08-14 RX ORDER — CEFEPIME HYDROCHLORIDE 1 G/50ML
2 INJECTION, SOLUTION INTRAVENOUS
Status: DISCONTINUED | OUTPATIENT
Start: 2019-08-14 | End: 2019-08-16

## 2019-08-14 RX ORDER — SODIUM CHLORIDE 9 MG/ML
INJECTION, SOLUTION INTRAVENOUS CONTINUOUS
Status: DISCONTINUED | OUTPATIENT
Start: 2019-08-14 | End: 2019-08-16 | Stop reason: HOSPADM

## 2019-08-14 RX ORDER — IBUPROFEN 400 MG/1
400 TABLET ORAL EVERY 6 HOURS PRN
Status: DISCONTINUED | OUTPATIENT
Start: 2019-08-14 | End: 2019-08-16 | Stop reason: HOSPADM

## 2019-08-14 RX ADMIN — CEFEPIME HYDROCHLORIDE 2 G: 2 INJECTION, SOLUTION INTRAVENOUS at 08:08

## 2019-08-14 RX ADMIN — IOHEXOL 100 ML: 350 INJECTION, SOLUTION INTRAVENOUS at 05:08

## 2019-08-14 RX ADMIN — ACETAMINOPHEN 650 MG: 325 TABLET ORAL at 04:08

## 2019-08-14 RX ADMIN — IBUPROFEN 400 MG: 400 TABLET, FILM COATED ORAL at 07:08

## 2019-08-14 RX ADMIN — SODIUM CHLORIDE: 0.9 INJECTION, SOLUTION INTRAVENOUS at 07:08

## 2019-08-14 RX ADMIN — LEVOFLOXACIN 750 MG: 750 INJECTION, SOLUTION INTRAVENOUS at 04:08

## 2019-08-14 NOTE — PROGRESS NOTES
[facility] Medicine  Progress Note    Patient Name: Heriberto Keys  MRN: [id number]  Patient Class: OP- Observation   Admission Date: 8/11/2019  Length of Stay: 0 days  Attending Physician: Daryn Brewer MD  Primary Care Provider: Jagruti Davis NP        Subjective:       Interval History:    Seen and examined today morning, patient reports feeling better than yesterday.  Still has cough.  Patient was seen sitting outside in the hallway with his friend.  Noted spiking fevers up to 102 today evening again.    Review of Systems  Objective:     Vital Signs (Most Recent):  Temp: (!) 101.1 °F (38.4 °C) (08/14/19 1721)  Pulse: 80 (08/14/19 1619)  Resp: 19 (08/14/19 1619)  BP: (!) 143/73 (08/14/19 1619)  SpO2: 97 % (08/14/19 1619) Vital Signs (24h Range):  Temp:  [99 °F (37.2 °C)-102.3 °F (39.1 °C)] 101.1 °F (38.4 °C)  Pulse:  [74-88] 80  Resp:  [16-19] 19  SpO2:  [93 %-98 %] 97 %  BP: (104-143)/(54-74) 143/73     Weight: 75.2 kg (165 lb 12.6 oz)  Body mass index is 24.48 kg/m².    Intake/Output Summary (Last 24 hours) at 8/14/2019 1753  Last data filed at 8/14/2019 0420  Gross per 24 hour   Intake 450 ml   Output --   Net 450 ml        Physical Exam:  General: Patient resting comfortably in no acute distress.  Lungs:  Left lung crackles noted.  No wheezing  Cor: Regular rate and rhythm. No murmurs. No pedal edema.  Abd: Soft. Nontender. Non-distended.  Neuro: A&O x3. Moving all 4 extremities equally  Ext: No clubbing. No cyanosis.     Significant Labs:   BMP:   Recent Labs   Lab 08/14/19 0457         K 3.7      CO2 26   BUN 18   CREATININE 0.8   CALCIUM 8.6*     CBC:   Recent Labs   Lab 08/14/19 0457   WBC 9.66   HGB 9.5*   HCT 29.4*          Assessment/Plan:      Persistent fevers from atypical pneumonia (?  Viral) versus acute right otitis media vs relapse of lymphoma     History of nodular sclerosis type Hodgkin's lymphoma s/p chemo, self reported h/o  remission    PLAN:  Noted ID recommendations, antibiotics changed to cefepime  Follow-up CT chest results  Follow-up right ear fluid cultures  Prn tylenol or ibuprofen for fevers      Active Diagnoses:    Diagnosis Date Noted POA    PRINCIPAL PROBLEM:  Pneumonia of right lower lobe due to infectious organism [J18.1] 08/12/2019 Yes    History of lymphoma [Z85.79] 08/12/2019 Not Applicable    Anemia [D64.9] 08/12/2019 Yes      Problems Resolved During this Admission:     VTE Risk Mitigation (From admission, onward)        Ordered     IP VTE HIGH RISK PATIENT  Once      08/12/19 1033             Daryn Brewer MD  Department of Hospital Medicine   [facility]
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
## 2019-08-15 LAB
ANION GAP SERPL CALC-SCNC: 8 MMOL/L (ref 8–16)
BACTERIA UR CULT: NO GROWTH
BUN SERPL-MCNC: 18 MG/DL (ref 6–20)
CALCIUM SERPL-MCNC: 8.4 MG/DL (ref 8.7–10.5)
CHLORIDE SERPL-SCNC: 106 MMOL/L (ref 95–110)
CO2 SERPL-SCNC: 25 MMOL/L (ref 23–29)
CREAT SERPL-MCNC: 0.8 MG/DL (ref 0.5–1.4)
EST. GFR  (AFRICAN AMERICAN): >60 ML/MIN/1.73 M^2
EST. GFR  (NON AFRICAN AMERICAN): >60 ML/MIN/1.73 M^2
GLUCOSE SERPL-MCNC: 120 MG/DL (ref 70–110)
POTASSIUM SERPL-SCNC: 3.4 MMOL/L (ref 3.5–5.1)
SODIUM SERPL-SCNC: 139 MMOL/L (ref 136–145)

## 2019-08-15 PROCEDURE — 82787 IGG 1 2 3 OR 4 EACH: CPT | Mod: 91

## 2019-08-15 PROCEDURE — 96375 TX/PRO/DX INJ NEW DRUG ADDON: CPT

## 2019-08-15 PROCEDURE — 96376 TX/PRO/DX INJ SAME DRUG ADON: CPT

## 2019-08-15 PROCEDURE — 63600175 PHARM REV CODE 636 W HCPCS: Performed by: INTERNAL MEDICINE

## 2019-08-15 PROCEDURE — 99233 PR SUBSEQUENT HOSPITAL CARE,LEVL III: ICD-10-PCS | Mod: ,,, | Performed by: INTERNAL MEDICINE

## 2019-08-15 PROCEDURE — 96361 HYDRATE IV INFUSION ADD-ON: CPT

## 2019-08-15 PROCEDURE — 36415 COLL VENOUS BLD VENIPUNCTURE: CPT

## 2019-08-15 PROCEDURE — 80048 BASIC METABOLIC PNL TOTAL CA: CPT

## 2019-08-15 PROCEDURE — 86603 ADENOVIRUS ANTIBODY: CPT

## 2019-08-15 PROCEDURE — 12000002 HC ACUTE/MED SURGE SEMI-PRIVATE ROOM

## 2019-08-15 PROCEDURE — 99233 SBSQ HOSP IP/OBS HIGH 50: CPT | Mod: ,,, | Performed by: INTERNAL MEDICINE

## 2019-08-15 PROCEDURE — 25000003 PHARM REV CODE 250: Performed by: HOSPITALIST

## 2019-08-15 PROCEDURE — 25000003 PHARM REV CODE 250: Performed by: INTERNAL MEDICINE

## 2019-08-15 PROCEDURE — 30000890 LABCORP MISCELLANEOUS TEST

## 2019-08-15 PROCEDURE — 25000003 PHARM REV CODE 250: Performed by: FAMILY MEDICINE

## 2019-08-15 PROCEDURE — 80299 QUANTITATIVE ASSAY DRUG: CPT | Mod: 91

## 2019-08-15 RX ORDER — POTASSIUM CHLORIDE 20 MEQ/1
40 TABLET, EXTENDED RELEASE ORAL ONCE
Status: COMPLETED | OUTPATIENT
Start: 2019-08-15 | End: 2019-08-15

## 2019-08-15 RX ORDER — TOBRAMYCIN 3 MG/ML
3 SOLUTION/ DROPS OPHTHALMIC 4 TIMES DAILY
Status: DISCONTINUED | OUTPATIENT
Start: 2019-08-15 | End: 2019-08-16 | Stop reason: HOSPADM

## 2019-08-15 RX ADMIN — CEFEPIME HYDROCHLORIDE 2 G: 2 INJECTION, SOLUTION INTRAVENOUS at 12:08

## 2019-08-15 RX ADMIN — SODIUM CHLORIDE: 0.9 INJECTION, SOLUTION INTRAVENOUS at 12:08

## 2019-08-15 RX ADMIN — TOBRAMYCIN 3 DROP: 3 SOLUTION/ DROPS OPHTHALMIC at 09:08

## 2019-08-15 RX ADMIN — ACETAMINOPHEN 650 MG: 325 TABLET ORAL at 06:08

## 2019-08-15 RX ADMIN — TOBRAMYCIN 3 DROP: 3 SOLUTION/ DROPS OPHTHALMIC at 04:08

## 2019-08-15 RX ADMIN — IBUPROFEN 400 MG: 400 TABLET, FILM COATED ORAL at 08:08

## 2019-08-15 RX ADMIN — POTASSIUM CHLORIDE 40 MEQ: 20 TABLET, EXTENDED RELEASE ORAL at 08:08

## 2019-08-15 RX ADMIN — CEFEPIME HYDROCHLORIDE 2 G: 2 INJECTION, SOLUTION INTRAVENOUS at 08:08

## 2019-08-15 RX ADMIN — CEFEPIME HYDROCHLORIDE 2 G: 2 INJECTION, SOLUTION INTRAVENOUS at 03:08

## 2019-08-15 RX ADMIN — VANCOMYCIN HYDROCHLORIDE 1500 MG: 1 INJECTION, POWDER, LYOPHILIZED, FOR SOLUTION INTRAVENOUS at 05:08

## 2019-08-15 NOTE — CONSULTS
[facility]  Hematology/Oncology  Consult Note    Patient Name: Heriberto Keys  MRN: [id number]  Admission Date: 8/11/2019  Hospital Length of Stay: 0 days  Code Status: Full Code   Attending Provider: Daryn Brewer MD  Consulting Provider: Amanda Rich MD  Primary Care Physician: Jagruti Davis NP  Principal Problem:Pneumonia of right lower lobe due to infectious organism  August 15   Consults  Subjective:     HPI:  This is a 59 year old gentleman who entered a complete remission for Hodgkins lymphoma. He has been admitted for fever and otitis with purulent drainage from his right ear. He is starting vancomycin today while on oral antimicrobials. He has ground glass opacities on CT chest and ID was consulted for pneumonia    August 14 2019  CT chest   1.  Small bilateral pleural effusions and adjacent atelectasis.    2.  Ground-glass attenuation nodules in the right upper lobe, possibly infectious/inflammatory, consider short-term follow-up in 3 months to document resolution as malignancy is not excluded (new from the previous exam).    3.  Abnormal mediastinal lymphadenopathy with enlarged prevascular lymph nodes (improved from the previous CT of the chest).    4.  Incidentally noted thyroid nodule, consider outpatient thyroid ultrasound.    April 29 2019  PET CT    IMPRESSION:    1. Patchy increased FDG activity throughout the axial and visualized  appendicular skeleton without pathologic osseous lesion on CT imaging. This  could be related to red marrow recruitment, although incompletely  treated/active lymphoproliferative disorder such as lymphoma is a consideration.    2. No significant change in size of enlarged AP window lymph node in  mediastinum, without significant increased FDG uptake and this likely reflects  sequelae of treated lymphoma.    3. No other abnormality of concern for FDG avid active lymphoproliferative  disorder.    4. 2 slightly nodular foci of opacity in  posterior right upper show very mild  associated FDG uptake and are most likely of infectious or inflammatory  etiology. Attention to these on follow-up imaging is recommended.    5. Bilateral pleural effusions.  Oncology Treatment Plan:   OP ABVD Q4W    Medications:  Continuous Infusions:   sodium chloride 0.9% 75 mL/hr at 08/15/19 0427     Scheduled Meds:   ceFEPime (MAXIPIME) IVPB  2 g Intravenous Q8H    tobramycin sulfate 0.3%  3 drop Right Eye QID     PRN Meds:acetaminophen, ibuprofen, sodium chloride 0.9%     Review of patient's allergies indicates:  No Known Allergies     Past Medical History:   Diagnosis Date    Anemia     Depression     Encounter for blood transfusion     Lymphoma     Lymphoma     Lymphoma 2019     Past Surgical History:   Procedure Laterality Date    LYMPHADENECTOMY Bilateral      Family History     None        Tobacco Use    Smoking status: Former Smoker     Packs/day: 1.00     Types: Cigarettes   Substance and Sexual Activity    Alcohol use: No     Frequency: Never    Drug use: No    Sexual activity: Not on file       Review of Systems   Constitutional: Positive for fever. Negative for fatigue and unexpected weight change.   HENT: Negative for rhinorrhea and sore throat.    Eyes: Negative for photophobia.   Respiratory: Negative for cough and shortness of breath.    Cardiovascular: Negative for chest pain and leg swelling.   Gastrointestinal: Negative for abdominal pain, constipation, diarrhea, nausea and vomiting.   Endocrine: Negative for cold intolerance and heat intolerance.   Genitourinary: Negative for dysuria, frequency, hematuria and urgency.   Musculoskeletal: Negative for arthralgias, back pain and neck pain.   Skin: Negative for pallor and rash.   Neurological: Negative for weakness, numbness and headaches.   Hematological: Negative for adenopathy. Does not bruise/bleed easily.   Psychiatric/Behavioral: Negative for agitation.   All other systems reviewed and are  negative.    Objective:     Vital Signs (Most Recent):  Temp: 98.3 °F (36.8 °C) (08/15/19 1200)  Pulse: 64 (08/15/19 1200)  Resp: 18 (08/15/19 1200)  BP: (!) 97/57 (08/15/19 1200)  SpO2: (!) 94 % (08/15/19 1200) Vital Signs (24h Range):  Temp:  [97.5 °F (36.4 °C)-102.3 °F (39.1 °C)] 98.3 °F (36.8 °C)  Pulse:  [63-82] 64  Resp:  [18-19] 18  SpO2:  [94 %-98 %] 94 %  BP: ()/(57-74) 97/57     Weight: 75.2 kg (165 lb 12.6 oz)  Body mass index is 24.48 kg/m².  Body surface area is 1.91 meters squared.      Intake/Output Summary (Last 24 hours) at 8/15/2019 1236  Last data filed at 8/15/2019 0427  Gross per 24 hour   Intake 450 ml   Output --   Net 450 ml       Physical Exam   Constitutional: He is oriented to person, place, and time. He appears well-developed and well-nourished.   HENT:   Head: Normocephalic and atraumatic.   Mouth/Throat: Oropharynx is clear and moist. No oropharyngeal exudate.   Eyes: Conjunctivae and EOM are normal. No scleral icterus.   Neck: Normal range of motion. Neck supple. No JVD present. No tracheal deviation present.   Cardiovascular: Normal rate, regular rhythm and normal heart sounds.   No murmur (2= capillary refill) heard.  Pulmonary/Chest: Effort normal and breath sounds normal. No respiratory distress. He has no wheezes.   Abdominal: Soft. Bowel sounds are normal. He exhibits no distension.   Musculoskeletal: Normal range of motion. He exhibits no edema.   Neurological: He is alert and oriented to person, place, and time. No cranial nerve deficit.   Steady gait   Skin: Skin is warm and dry. No rash noted. No erythema.   Psychiatric: He has a normal mood and affect. Thought content normal.   Nursing note and vitals reviewed.      Significant Labs:     Lab Results   Component Value Date    WBC 9.66 08/14/2019    RBC 3.62 (L) 08/14/2019    HGB 9.5 (L) 08/14/2019    HCT 29.4 (L) 08/14/2019    MCV 81 (L) 08/14/2019    MCH 26.2 (L) 08/14/2019    MCHC 32.3 08/14/2019    RDW 16.1 (H)  08/14/2019     08/14/2019    MPV 9.4 08/14/2019    GRAN 7.1 08/14/2019    GRAN 73.6 (H) 08/14/2019    LYMPH 1.7 08/14/2019    LYMPH 17.5 (L) 08/14/2019    MONO 0.6 08/14/2019    MONO 5.9 08/14/2019    EOS 0.1 08/14/2019    BASO 0.02 08/14/2019    EOSINOPHIL 1.4 08/14/2019    BASOPHIL 0.2 08/14/2019         Diagnostic Results:  noted    CT sinuses negative   Assessment/Plan:     Active Diagnoses:    Diagnosis Date Noted POA    PRINCIPAL PROBLEM:  Pneumonia of right lower lobe due to infectious organism [J18.1] 08/12/2019 Yes    History of lymphoma [Z85.79] 08/12/2019 Not Applicable    Anemia [D64.9] 08/12/2019 Yes      Problems Resolved During this Admission:     Febrile illness with otitis: ID managing antimicrobials  Adenopathy noted on CT with ground glass opacities: Pt will be due for a  Pet ct as an outpatient:   Port to be flushed in hospital  Vancomycin may be administered via port  Will follow pt once discharged   I suspect his fever is due to the severe otitis   Thank you    Amanda Rich MD  Hematology/Oncology  [facility]

## 2019-08-15 NOTE — PROGRESS NOTES
VANCOMYCIN PHARMACOKINETIC NOTE:  Vancomycin Day # 1    Objective/Assessment:    Diagnosis/Indication for Vancomycin= Pneumonia    59 y.o., male; Actual Body Weight = 75.2 kg (165 lb 12.6 oz).    The patient has the following labs:     8/15/2019 Estimated Creatinine Clearance: 98.2 mL/min (based on SCr of 0.8 mg/dL). Lab Results   Component Value Date    BUN 18 08/15/2019       Lab Results   Component Value Date    WBC 9.66 08/14/2019            Plan:  Adjust vancomycin dose and/or frequency based on the patient's actual weight and renal function:  Initiate Vancomycin 1500mg IV  once,then 1250 mg q12hr    Vancomycin trough level has been ordered prior to 4th dose 8/17 0400    Pharmacy will manage vancomycin therapy, monitor serum vancomycin levels, monitor renal function and adjust regimen as necessary    Thank you for allowing us to participate in this patient's care.     Yvette Amaya 8/15/2019 5:09 PM  Department of Pharmacy  Ext [phone]

## 2019-08-15 NOTE — PLAN OF CARE
Problem: Fall Injury Risk  Goal: Absence of Fall and Fall-Related Injury  Outcome: Ongoing (interventions implemented as appropriate)  Intervention: Identify and Manage Contributors to Fall Injury Risk     08/14/19 0730   Identify and Manage Contributors to Fall Injury Risk   Self-Care Promotion independence encouraged   Manage Acute Allergic Reaction   Medication Review/Management medications reviewed     Intervention: Promote Injury-Free Environment     08/15/19 0100 08/15/19 0409   Optimize [place] and Functional Mobility   Environmental Safety Modification  --  assistive device/personal items within reach;clutter free environment maintained;lighting adjusted;room organization consistent   Optimize Balance and Safe Activity   Safety Promotion/Fall Prevention assistive device/personal item within reach;commode/urinal/bedpan at bedside;diversional activities provided;Fall Risk reviewed with patient/family;Fall Risk signage in place;lighting adjusted;medications reviewed;nonskid shoes/socks when out of bed;side rails raised x 2  --

## 2019-08-15 NOTE — PROGRESS NOTES
Progress Note  Infectious Disease    Admit Date: 8/11/2019   LOS: 0 days     SUBJECTIVE:     Follow-up For:  Fever, lymphoma, ?viral infection, purulent otitis media/externa    INTERVAL HISTORY:  8/15:  Temps lower since ibuprofen. CT chest reviewed and there is a small focus of infiltrate. No findings to explain left pleural rub. Had 2 BMs, cleaned his breakfast plate.  Respiratory viral panel never sent or received by lab    Antibiotics (From admission, onward)    Start     Stop Route Frequency Ordered    08/14/19 1815  cefepime in dextrose 5 % IVPB 2 g      -- IV Every 8 hours (non-standard times) 08/14/19 1707          Antifungals (From admission, onward)    None           Antivirals (From admission, onward)    None          Review of Systems: temps have been lower since ibuprofen last pm.   HEENT: No change in vision, sore throat, ulcers, thrush, dysphagia  Heart: No chest pain, orthopnea or edema  Lungs: No shortness of breath, cough, sputum production or pleuritic pain  Abdomen: No nausea, vomiting, diarrhea, abdominal pain, appetite is good  And he had a BM  Extremities: No  cyanosis, edema, joint swelling or new pain  MSK: no new pain, swelling, ambulates  Neurological: No headaches, focal weakness,   Psych: calm, appropriate  Skin: No rash, itching, or erythema  VAD: No IV site issues    OBJECTIVE:     Vital Signs (Most Recent)  Temp: 98.6 °F (37 °C) (08/15/19 0700)  Pulse: 63 (08/15/19 0700)  Resp: 18 (08/15/19 0700)  BP: 109/66 (08/15/19 0700)  SpO2: 95 % (08/15/19 0700)    Temperature Range Min/Max (Last 24H):  Temp:  [97.5 °F (36.4 °C)-102.3 °F (39.1 °C)]     I & O (Last 24H):    Intake/Output Summary (Last 24 hours) at 8/15/2019 0851  Last data filed at 8/15/2019 0427  Gross per 24 hour   Intake 450 ml   Output --   Net 450 ml       Physical Exam:  General:         In NAD. Alert and attentive, cooperative, comfortable, and completely nontoxic  Eyes:               Anicteric, PERRL, EOMI  ENT:                 No ulcers, exudates, thrush, nares patent, his left tympanic membrane is perforated, the mucosa is normal, right tympanic membrane appears perforated any draining slimy yellow fluid which was cultured, (8/14)  Neck:              supple, no masses or adenopathy appreciated  Lungs:            Clear, no consolidation, rales, wheezes, but there is a less prominent pleural rub at the left posterior axillary line, near the axilla  Heart:              RRR, no gallop/murmur/rub noted  Abd:                Soft, NT, ND, normal BS, no masses or organomegaly appreciated.  :                  Voids  Musc:              Joints without effusion, swelling, erythema, synovitis, muscle wasting.   Skin:               No rashes. No palmar or plantar lesions. No subungual petechiae  Wound:             Neuro:  Alert, attentive, speech fluent, face symmetric, moves all extremities, no focal weakness. Ambulatory  Psych:             Calm, cooperative  Lymphatic:     No cervical, supraclavicular, axillary, or inguinal nodes  Extrem:           No edema, erythema, phlebitis, cellulitis, warm and well perfused  VAD:               Peripheral, right-sided Port-A-Cath is not accessed:       Laboratory:    Recent Labs   Lab 08/11/19 2200 08/14/19  0457   WBC 8.02 9.66   HGB 9.2* 9.5*   HCT 28.7* 29.4*    222       Recent Labs   Lab 08/11/19 2200 08/14/19  0457 08/15/19  0511    141 139   K 3.8 3.7 3.4*    106 106   CO2 26 26 25   BUN 16 18 18   CREATININE 0.8 0.8 0.8   CALCIUM 8.6* 8.6* 8.4*   PROT 6.5  --   --    BILITOT 0.5  --   --    ALKPHOS 127  --   --    ALT 13  --   --    AST 20  --   --      No results for input(s): CRP in the last 168 hours.    Microbiology Results (last 7 days)     Procedure Component Value Units Date/Time    Urine Culture High Risk [[id number]] Collected:  08/13/19 0152    Order Status:  Completed Specimen:  Urine, Clean Catch Updated:  08/15/19 0841     Urine Culture, Routine No growth     Narrative:       Indicated criteria for high risk culture:->Other  Other (specify):->fever 103    Blood Culture #1 **CANNOT BE ORDERED STAT** [[id number]] Collected:  08/12/19 0255    Order Status:  Completed Specimen:  Blood from Peripheral, Forearm, Left Updated:  08/15/19 0632     Blood Culture, Routine No Growth to date      No Growth to date      No Growth to date      No Growth to date    Blood Culture #2 **CANNOT BE ORDERED STAT** [[id number]] Collected:  08/12/19 0255    Order Status:  Completed Specimen:  Blood from Peripheral, Forearm, Left Updated:  08/15/19 0632     Blood Culture, Routine No Growth to date      No Growth to date      No Growth to date      No Growth to date    Blood culture [[id number]] Collected:  08/13/19 0210    Order Status:  Completed Specimen:  Blood Updated:  08/15/19 0432     Blood Culture, Routine No Growth to date      No Growth to date      No Growth to date    Blood culture [[id number]] Collected:  08/13/19 0210    Order Status:  Completed Specimen:  Blood Updated:  08/15/19 0432     Blood Culture, Routine No Growth to date      No Growth to date      No Growth to date    Respiratory Viral Panel by PCR [place]; Nasopharyngeal Swab [[id number]] Collected:  08/14/19 1615    Order Status:  Sent Specimen:  Respiratory Updated:  08/14/19 1917    Aerobic culture [[id number]] Collected:  08/14/19 1700    Order Status:  Sent Specimen:  Body Fluid from Ear, Right Updated:  08/14/19 1913    Culture, Respiratory with Gram Stain [[id number]] Collected:  08/12/19 1725    Order Status:  Completed Specimen:  Respiratory from Sputum Updated:  08/14/19 0855     Respiratory Culture Normal respiratory jose martin     Gram Stain (Respiratory) >10 epithelial cells per low power field     Gram Stain (Respiratory) Moderate WBC's     Gram Stain (Respiratory) Few Gram positive cocci     Gram Stain (Respiratory) Few Gram positive rods     Gram Stain (Respiratory) Rare Gram negative rods    Blood culture  [[id number]]     Order Status:  Canceled Specimen:  Blood           Diagnostic Results: Reviewed  CT chest  1.  Small bilateral pleural effusions and adjacent atelectasis.    2.  Ground-glass attenuation nodules in the right upper lobe, possibly infectious/inflammatory, consider short-term follow-up in 3 months to document resolution as malignancy is not excluded (new from the previous exam).    3.  Abnormal mediastinal lymphadenopathy with enlarged prevascular lymph nodes (improved from the previous CT of the chest).    4.  Incidentally noted thyroid nodule, consider outpatient thyroid ultrasound.    Cardiology:    ASSESSMENT/PLAN:   PROLONGED FEVER WITHOUT SIGNS OF SEPSIS OR TOXICITY              -right purulent otitis media/externa (chronic bilateral TM perforations)              -left post axillary line pleural rub, with improving cxr, CT chest reviewed with small focus of infiltrate on right              -normal procalcitonin              -?viral infection, ?relapse of Hodgkins, collect resp viral pcr              -constipation?, 2 episodes of vomiting could be from fever/headache, ,resolved     Rec:     awaiting Culture right ear fluid. Will adjust antibiotics based on this              Continue cefepime   Adding topical ear drops(looking for one without steroids)              Use ibuprofen to see if his temp responds more to this than tylenol    Consult his oncologist for concurrent care and to keep her abreast of this hospitalization and CT findings   Should probably see ENT as an outpatient    Dr. jones to cover 8/16-19

## 2019-08-15 NOTE — NURSING
Spoke with pharmacist about tobramycin ophthalmic which is ordered to be administered as eardrop. Pharmacy says eye drop can be administered via the ear route but eardrops cannot be administered in eye so order is appropriate.

## 2019-08-15 NOTE — PROGRESS NOTES
Progress Note  Department of Hospital Medicine    SUBJECTIVE:     Follow-up For:  Pneumonia of right lower lobe due to infectious organism    HPI/Interval history: patient admitted with fever and diagnosed with pneumonia right lower lobe of lung. Feels well. Reported significant left ear pain. Diagnosed with acute otitis media. He has chronically perforated eardrums. No purulent drainage out of the ear today. Ear feels better. Up ambulating. 8 a good breakfast. No further fever since starting ibuprofen.    PMH/FH/SH/Review of Systems: See H and P/Consult dated 8/11/2019    OBJECTIVE:     Vital Signs Range (Last 24H):  Temp:  [97.5 °F (36.4 °C)-102.3 °F (39.1 °C)]   Pulse:  [63-82]   Resp:  [18-19]   BP: ()/(57-74)   SpO2:  [94 %-98 %]     I & O (Last 24H):    Intake/Output Summary (Last 24 hours) at 8/15/2019 1325  Last data filed at 8/15/2019 0427  Gross per 24 hour   Intake 450 ml   Output --   Net 450 ml       Physical Exam:  General- Resting in bed, NAD  HEENT- PERRLA, EOMI, OP clear, MMM  Neck- No JVD, no LAD, no Thyromegaly  CV- Regular rate and rhythm, No Murmur, rubs, or gallops.  Resp- Lungs Coarse bilaterally, mild rub noted intermitently on  exam, No increased WOB  GI- Soft, Non tender/non-distended, BS normoactive x4 quads, no HSM  Extrem- No cyanosis, clubbing, edema. 2+ DPs bilaterally.  Neuro- CN II-XII grossly intact, no sensory or motor deficits noted.  PSYC: alert and oriented times four.  Euthymic mood      Lab/X-ray Results:  CBC:   Recent Labs   Lab 08/14/19  0457   WBC 9.66   RBC 3.62*   HGB 9.5*   HCT 29.4*      MCV 81*   MCH 26.2*   MCHC 32.3     BMP:   Recent Labs   Lab 08/15/19  0511   *      K 3.4*      CO2 25   BUN 18   CREATININE 0.8   CALCIUM 8.4*     CMP:   Recent Labs   Lab 08/11/19  2200  08/15/19  0511   *   < > 120*   CALCIUM 8.6*   < > 8.4*   ALBUMIN 2.7*  --   --    PROT 6.5  --   --       < > 139   K 3.8   < > 3.4*   CO2 26   < > 25       < > 106   BUN 16   < > 18   CREATININE 0.8   < > 0.8   ALKPHOS 127  --   --    ALT 13  --   --    AST 20  --   --    BILITOT 0.5  --   --     < > = values in this interval not displayed.     LFTs:   Recent Labs   Lab 08/11/19  2200   ALT 13   AST 20   ALKPHOS 127   BILITOT 0.5   PROT 6.5   ALBUMIN 2.7*     Coagulation: No results for input(s): LABPROT, INR, APTT in the last 168 hours.  Cardiac markers: No results for input(s): CKMB, CPKMB, TROPONINT, TROPONINI, MYOGLOBIN in the last 168 hours.  Recent Labs   Lab 08/12/19  0028   COLORU Yellow   SPECGRAV 1.010   PHUR 6.0   PROTEINUA Negative   NITRITE Negative   LEUKOCYTESUR Negative   UROBILINOGEN Negative           ASSESSMENT/PLAN:     Active Hospital Problems    Diagnosis  POA    *Pneumonia of right lower lobe due to infectious organism [J18.1]  Yes    History of lymphoma [Z85.79]  Not Applicable    Anemia [D64.9]  Yes      Resolved Hospital Problems   No resolved problems to display.       See orders placed today for the complete plan.    Pneumonia right lower lobe secondary to infectious organism-continue current antibiotic regimen. May be atypical versus viral pneumonia. CT shows right lower lobe infiltrate. Some lymphadenopathy appreciated. Consult Dr. De Anda the patient's Hematologist for evaluation.    History of nodular sclerosing non-Hodgkin's lymphoma- patient with lymphadenopathy noted on CT scan. Consult hematology oncology, Dr. De Anda, for evaluation.    Anemia- currently stable H&H levels.    Acute otitis media with chronic eardrum perforation- awaiting fluid culture. Remains on antibiotic drops without steroid. patient's otitis symptoms improved.

## 2019-08-16 VITALS
HEART RATE: 73 BPM | HEIGHT: 69 IN | WEIGHT: 165.81 LBS | RESPIRATION RATE: 18 BRPM | OXYGEN SATURATION: 96 % | SYSTOLIC BLOOD PRESSURE: 122 MMHG | TEMPERATURE: 99 F | DIASTOLIC BLOOD PRESSURE: 66 MMHG | BODY MASS INDEX: 24.56 KG/M2

## 2019-08-16 PROBLEM — Z85.72 HISTORY OF LYMPHOMA: Chronic | Status: ACTIVE | Noted: 2019-08-12

## 2019-08-16 PROBLEM — H66.012 ACUTE SUPPURATIVE OTITIS MEDIA OF LEFT EAR WITH SPONTANEOUS RUPTURE OF TYMPANIC MEMBRANE: Status: ACTIVE | Noted: 2019-08-16

## 2019-08-16 PROBLEM — D64.9 ANEMIA: Chronic | Status: ACTIVE | Noted: 2019-08-12

## 2019-08-16 LAB — BACTERIA SPEC AEROBE CULT: ABNORMAL

## 2019-08-16 PROCEDURE — 63600175 PHARM REV CODE 636 W HCPCS: Performed by: INTERNAL MEDICINE

## 2019-08-16 RX ORDER — TOBRAMYCIN 3 MG/ML
3 SOLUTION/ DROPS OPHTHALMIC 4 TIMES DAILY
Qty: 5 ML | Refills: 3 | Status: SHIPPED | OUTPATIENT
Start: 2019-08-16 | End: 2019-08-26

## 2019-08-16 RX ORDER — DOXYCYCLINE 100 MG/1
100 CAPSULE ORAL EVERY 12 HOURS
Qty: 20 CAPSULE | Refills: 1 | Status: SHIPPED | OUTPATIENT
Start: 2019-08-16 | End: 2019-08-26

## 2019-08-16 RX ORDER — IBUPROFEN 400 MG/1
400 TABLET ORAL EVERY 6 HOURS PRN
Qty: 90 TABLET | Refills: 3 | Status: SHIPPED | OUTPATIENT
Start: 2019-08-16 | End: 2019-10-21

## 2019-08-16 RX ORDER — DOXYCYCLINE 100 MG/1
100 CAPSULE ORAL EVERY 12 HOURS
Status: DISCONTINUED | OUTPATIENT
Start: 2019-08-16 | End: 2019-08-16 | Stop reason: HOSPADM

## 2019-08-16 RX ADMIN — TOBRAMYCIN 3 DROP: 3 SOLUTION/ DROPS OPHTHALMIC at 10:08

## 2019-08-16 RX ADMIN — CEFEPIME HYDROCHLORIDE 2 G: 2 INJECTION, SOLUTION INTRAVENOUS at 04:08

## 2019-08-16 RX ADMIN — VANCOMYCIN HYDROCHLORIDE 1250 MG: 1 INJECTION, POWDER, LYOPHILIZED, FOR SOLUTION INTRAVENOUS at 04:08

## 2019-08-16 RX ADMIN — TOBRAMYCIN 3 DROP: 3 SOLUTION/ DROPS OPHTHALMIC at 12:08

## 2019-08-16 RX ADMIN — CEFEPIME HYDROCHLORIDE 2 G: 2 INJECTION, SOLUTION INTRAVENOUS at 12:08

## 2019-08-16 NOTE — PROGRESS NOTES
"[facility]  Adult Nutrition  Progress Note    SUMMARY       Recommendations    Recommendation/Intervention: 1.) continue diet as tolerated,  to assist with prefs daily  Goals: 1.) pt to meet >75% estimated nutritional needs 2.) maintain weight   Nutrition Goal Status: new    Reason for Assessment    Reason For Assessment: length of stay  Diagnosis: pulmonary disease(pneumonia)  Relevant Medical History: hx: Hodgkins lymphoma, s/p chemo, anemia, depression    Nutrition Risk Screen    Nutrition Risk Screen: no indicators present    Nutrition/Diet History    Patient Reported Diet/Restrictions/Preferences: general  Spiritual, Cultural Beliefs, Anabaptist Practices, Values that Affect Care: no  Food Allergies: NKFA  Factors Affecting Nutritional Intake: None identified at this time    Anthropometrics    Temp: 98.6 °F (37 °C)  Height Method: Stated  Height: 5' 9" (175.3 cm)  Height (inches): 69 in  Weight Method: Bed Scale  Weight: 75.2 kg (165 lb 12.6 oz)  Weight (lb): 165.79 lb  Ideal Body Weight (IBW), Male: 160 lb  % Ideal Body Weight, Male (lb): 103.62 lb  BMI (Calculated): 24.5  BMI Grade: 18.5-24.9 - normal  Weight Loss: (unable to assess)       Lab/Procedures/Meds    Pertinent Labs Reviewed: reviewed  BMP  Lab Results   Component Value Date     08/15/2019    K 3.4 (L) 08/15/2019     08/15/2019    CO2 25 08/15/2019    BUN 18 08/15/2019    CREATININE 0.8 08/15/2019    CALCIUM 8.4 (L) 08/15/2019    ANIONGAP 8 08/15/2019    ESTGFRAFRICA >60.0 08/15/2019    EGFRNONAA >60.0 08/15/2019     Lab Results   Component Value Date    WBC 9.66 08/14/2019    HGB 9.5 (L) 08/14/2019    HCT 29.4 (L) 08/14/2019    MCV 81 (L) 08/14/2019     08/14/2019       Pertinent Medications Reviewed: reviewed  Scheduled Meds:   ceFEPime (MAXIPIME) IVPB  2 g Intravenous Q8H    tobramycin sulfate 0.3%  3 drop Right Eye QID    vancomycin (VANCOCIN) IVPB  1,250 mg Intravenous Q12H     Continuous " Infusions:   sodium chloride 0.9% 75 mL/hr at 08/15/19 1238       Estimated/Assessed Needs    Weight Used For Calorie Calculations: 75.2 kg (165 lb 12.6 oz)  Energy Calorie Requirements (kcal): 9623-6436 (25-30 kcal/kg)  Energy Need Method: Kcal/kg  Protein Requirements:  g (1.2-1.5 g/kg)  Weight Used For Protein Calculations: 75.2 kg (165 lb 12.6 oz)     Estimated Fluid Requirement Method: RDA Method  RDA Method (mL): 1880         Nutrition Prescription Ordered    Current Diet Order: regular  Nutrition Order Comments: tolerating diet per notes    Evaluation of Received Nutrient/Fluid Intake    Energy Calories Required: meeting needs  Protein Required: meeting needs  Fluid Required: meeting needs  Tolerance: tolerating  % Meal Intake: 75 - 100 %     Nutrition Risk    Level of Risk/Frequency of Follow-up: moderate       Monitor and Evaluation    Food and Nutrient Intake: energy intake, food and beverage intake  Food and Nutrient Adminstration: diet order  Anthropometric Measurements: weight change  Biochemical Data, Medical Tests and Procedures: gastrointestinal profile, glucose/endocrine profile, electrolyte and renal panel  Nutrition-Focused Physical Findings: overall appearance       Nutrition Follow-Up    RD Follow-up?: Yes       Trena Pichardo  08/16/2019  10:07 AM

## 2019-08-16 NOTE — PLAN OF CARE
08/16/19 1424   Final Note   Assessment Type Final Discharge Note   Anticipated Discharge Disposition Home   DC home with no needs
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
## 2019-08-16 NOTE — PROGRESS NOTES
Progress Note  Infectious Disease    Patient is not seen by me today.  Patient already discharged.  Discussed with Dr. Hudson related to me that patient looked and felt great and was ready to go home.  Also bilateral ear looked improved.  Oncologist is planning a pet scan as outpatient.  It is okay to use doxycycline.    I asked Dr. Arias is to please follow results of pending tests I ordered earlier today

## 2019-08-17 LAB
BACTERIA BLD CULT: NORMAL
BACTERIA BLD CULT: NORMAL

## 2019-08-18 LAB
BACTERIA BLD CULT: NORMAL
BACTERIA BLD CULT: NORMAL

## 2019-08-19 LAB
IGG SERPL-MCNC: 1083 MG/DL (ref 700–1600)
IGG1 SER-MCNC: 669 MG/DL (ref 248–810)
IGG2 SER-MCNC: 195 MG/DL (ref 130–555)
IGG3 SER-MCNC: 49 MG/DL (ref 15–102)
IGG4 SER-MCNC: 81 MG/DL (ref 2–96)
LABCORP MISC TEST CODE: NORMAL
LABCORP MISC TEST NAME: NORMAL
LABCORP MISCELLANEOUS TEST: NORMAL

## 2019-08-26 ENCOUNTER — TELEPHONE (OUTPATIENT)
Dept: HEMATOLOGY/ONCOLOGY | Facility: CLINIC | Age: 59
End: 2019-08-26

## 2019-08-26 NOTE — TELEPHONE ENCOUNTER
----- Message from Troy Dukes sent at 8/26/2019  3:31 PM CDT -----  Contact: Patient  Type: Appointment Access     Who Called:  Patient  Best Call Back Number: [phone]  Additional Information: Patient is requesting soonest appointment available with provider for hospital follow up pneumonia. Please advise for appointment

## 2019-08-27 RX ORDER — SODIUM CHLORIDE 0.9 % (FLUSH) 0.9 %
10 SYRINGE (ML) INJECTION
Status: CANCELLED | OUTPATIENT
Start: 2019-08-27

## 2019-08-27 RX ORDER — HEPARIN 100 UNIT/ML
500 SYRINGE INTRAVENOUS
Status: CANCELLED | OUTPATIENT
Start: 2019-08-27

## 2019-09-06 ENCOUNTER — INFUSION (OUTPATIENT)
Dept: INFUSION THERAPY | Facility: HOSPITAL | Age: 59
End: 2019-09-06
Attending: INTERNAL MEDICINE
Payer: MEDICAID

## 2019-09-06 ENCOUNTER — OFFICE VISIT (OUTPATIENT)
Dept: HEMATOLOGY/ONCOLOGY | Facility: CLINIC | Age: 59
End: 2019-09-06
Payer: MEDICAID

## 2019-09-06 VITALS
BODY MASS INDEX: 22.53 KG/M2 | SYSTOLIC BLOOD PRESSURE: 118 MMHG | WEIGHT: 152.13 LBS | TEMPERATURE: 98 F | RESPIRATION RATE: 19 BRPM | HEIGHT: 69 IN | DIASTOLIC BLOOD PRESSURE: 62 MMHG | HEART RATE: 70 BPM

## 2019-09-06 VITALS
DIASTOLIC BLOOD PRESSURE: 59 MMHG | RESPIRATION RATE: 20 BRPM | HEIGHT: 69 IN | HEART RATE: 66 BPM | TEMPERATURE: 98 F | BODY MASS INDEX: 22.79 KG/M2 | WEIGHT: 153.88 LBS | OXYGEN SATURATION: 98 % | SYSTOLIC BLOOD PRESSURE: 110 MMHG

## 2019-09-06 DIAGNOSIS — C81.18 NODULAR SCLEROSIS HODGKIN LYMPHOMA OF LYMPH NODES OF MULTIPLE REGIONS: Primary | ICD-10-CM

## 2019-09-06 DIAGNOSIS — Z09 HOSPITAL DISCHARGE FOLLOW-UP: ICD-10-CM

## 2019-09-06 DIAGNOSIS — T45.1X5A ANTINEOPLASTIC CHEMOTHERAPY INDUCED ANEMIA: Primary | ICD-10-CM

## 2019-09-06 DIAGNOSIS — D64.9 ANEMIA, UNSPECIFIED TYPE: Chronic | ICD-10-CM

## 2019-09-06 DIAGNOSIS — E61.1 IRON DEFICIENCY: ICD-10-CM

## 2019-09-06 DIAGNOSIS — D64.81 ANTINEOPLASTIC CHEMOTHERAPY INDUCED ANEMIA: Primary | ICD-10-CM

## 2019-09-06 DIAGNOSIS — C81.18 NODULAR SCLEROSIS HODGKIN LYMPHOMA OF LYMPH NODES OF MULTIPLE REGIONS: ICD-10-CM

## 2019-09-06 DIAGNOSIS — M54.2 NECK PAIN ON LEFT SIDE: ICD-10-CM

## 2019-09-06 LAB
ALBUMIN SERPL BCP-MCNC: 2.9 G/DL (ref 3.5–5.2)
ALP SERPL-CCNC: 122 U/L (ref 55–135)
ALT SERPL W/O P-5'-P-CCNC: 14 U/L (ref 10–44)
ANION GAP SERPL CALC-SCNC: 8 MMOL/L (ref 8–16)
AST SERPL-CCNC: 14 U/L (ref 10–40)
BASOPHILS # BLD AUTO: 0.05 K/UL (ref 0–0.2)
BASOPHILS NFR BLD: 0.8 % (ref 0–1.9)
BILIRUB SERPL-MCNC: 0.5 MG/DL (ref 0.1–1)
BUN SERPL-MCNC: 19 MG/DL (ref 6–20)
CALCIUM SERPL-MCNC: 9 MG/DL (ref 8.7–10.5)
CHLORIDE SERPL-SCNC: 99 MMOL/L (ref 95–110)
CO2 SERPL-SCNC: 27 MMOL/L (ref 23–29)
CREAT SERPL-MCNC: 0.8 MG/DL (ref 0.5–1.4)
DIFFERENTIAL METHOD: ABNORMAL
EOSINOPHIL # BLD AUTO: 0.3 K/UL (ref 0–0.5)
EOSINOPHIL NFR BLD: 4.5 % (ref 0–8)
ERYTHROCYTE [DISTWIDTH] IN BLOOD BY AUTOMATED COUNT: 18.3 % (ref 11.5–14.5)
EST. GFR  (AFRICAN AMERICAN): >60 ML/MIN/1.73 M^2
EST. GFR  (NON AFRICAN AMERICAN): >60 ML/MIN/1.73 M^2
GLUCOSE SERPL-MCNC: 129 MG/DL (ref 70–110)
HCT VFR BLD AUTO: 30.2 % (ref 40–54)
HGB BLD-MCNC: 9.4 G/DL (ref 14–18)
IMM GRANULOCYTES # BLD AUTO: 0.06 K/UL (ref 0–0.04)
IMM GRANULOCYTES NFR BLD AUTO: 0.9 % (ref 0–0.5)
IRON SERPL-MCNC: 34 UG/DL (ref 45–160)
LYMPHOCYTES # BLD AUTO: 2.4 K/UL (ref 1–4.8)
LYMPHOCYTES NFR BLD: 35.4 % (ref 18–48)
MCH RBC QN AUTO: 25.2 PG (ref 27–31)
MCHC RBC AUTO-ENTMCNC: 31.1 G/DL (ref 32–36)
MCV RBC AUTO: 81 FL (ref 82–98)
MONOCYTES # BLD AUTO: 0.6 K/UL (ref 0.3–1)
MONOCYTES NFR BLD: 8.6 % (ref 4–15)
NEUTROPHILS # BLD AUTO: 3.3 K/UL (ref 1.8–7.7)
NEUTROPHILS NFR BLD: 49.8 % (ref 38–73)
NRBC BLD-RTO: 0 /100 WBC
PLATELET # BLD AUTO: 275 K/UL (ref 150–350)
PMV BLD AUTO: 9.4 FL (ref 9.2–12.9)
POTASSIUM SERPL-SCNC: 4 MMOL/L (ref 3.5–5.1)
PROT SERPL-MCNC: 7.2 G/DL (ref 6–8.4)
RBC # BLD AUTO: 3.73 M/UL (ref 4.6–6.2)
SATURATED IRON: 20 % (ref 20–50)
SODIUM SERPL-SCNC: 134 MMOL/L (ref 136–145)
TOTAL IRON BINDING CAPACITY: 171 UG/DL (ref 250–450)
TRANSFERRIN SERPL-MCNC: 122 MG/DL (ref 200–375)
WBC # BLD AUTO: 6.66 K/UL (ref 3.9–12.7)

## 2019-09-06 PROCEDURE — 63600175 PHARM REV CODE 636 W HCPCS: Performed by: INTERNAL MEDICINE

## 2019-09-06 PROCEDURE — 36591 DRAW BLOOD OFF VENOUS DEVICE: CPT

## 2019-09-06 PROCEDURE — 99999 PR PBB SHADOW E&M-EST. PATIENT-LVL III: CPT | Mod: PBBFAC,,, | Performed by: INTERNAL MEDICINE

## 2019-09-06 PROCEDURE — 25000003 PHARM REV CODE 250: Performed by: INTERNAL MEDICINE

## 2019-09-06 PROCEDURE — 99214 OFFICE O/P EST MOD 30 MIN: CPT | Mod: S$PBB,,, | Performed by: INTERNAL MEDICINE

## 2019-09-06 PROCEDURE — 85025 COMPLETE CBC W/AUTO DIFF WBC: CPT

## 2019-09-06 PROCEDURE — 80053 COMPREHEN METABOLIC PANEL: CPT

## 2019-09-06 PROCEDURE — 99214 PR OFFICE/OUTPT VISIT, EST, LEVL IV, 30-39 MIN: ICD-10-PCS | Mod: S$PBB,,, | Performed by: INTERNAL MEDICINE

## 2019-09-06 PROCEDURE — 99999 PR PBB SHADOW E&M-EST. PATIENT-LVL III: ICD-10-PCS | Mod: PBBFAC,,, | Performed by: INTERNAL MEDICINE

## 2019-09-06 PROCEDURE — 99213 OFFICE O/P EST LOW 20 MIN: CPT | Mod: PBBFAC,PO | Performed by: INTERNAL MEDICINE

## 2019-09-06 PROCEDURE — 82232 ASSAY OF BETA-2 PROTEIN: CPT

## 2019-09-06 PROCEDURE — 83540 ASSAY OF IRON: CPT

## 2019-09-06 PROCEDURE — A4216 STERILE WATER/SALINE, 10 ML: HCPCS | Performed by: INTERNAL MEDICINE

## 2019-09-06 RX ORDER — HEPARIN 100 UNIT/ML
500 SYRINGE INTRAVENOUS
Status: CANCELLED | OUTPATIENT
Start: 2019-09-06

## 2019-09-06 RX ORDER — SODIUM CHLORIDE 0.9 % (FLUSH) 0.9 %
10 SYRINGE (ML) INJECTION
Status: CANCELLED | OUTPATIENT
Start: 2019-09-06

## 2019-09-06 RX ORDER — HEPARIN 100 UNIT/ML
500 SYRINGE INTRAVENOUS
Status: COMPLETED | OUTPATIENT
Start: 2019-09-06 | End: 2019-09-06

## 2019-09-06 RX ORDER — SODIUM CHLORIDE 0.9 % (FLUSH) 0.9 %
10 SYRINGE (ML) INJECTION
Status: COMPLETED | OUTPATIENT
Start: 2019-09-06 | End: 2019-09-06

## 2019-09-06 RX ADMIN — SODIUM CHLORIDE, PRESERVATIVE FREE 10 ML: 5 INJECTION INTRAVENOUS at 03:09

## 2019-09-06 RX ADMIN — HEPARIN 500 UNITS: 100 SYRINGE at 03:09
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
## 2019-09-10 LAB — B2 MICROGLOB SERPL-MCNC: 2.8 MG/L (ref 0.6–2.4)

## 2019-09-13 ENCOUNTER — DOCUMENTATION ONLY (OUTPATIENT)
Dept: REHABILITATION | Facility: HOSPITAL | Age: 59
End: 2019-09-13

## 2019-09-13 NOTE — PROGRESS NOTES
Patient is currently being discharged from OPPT.  He was initially evaluated on 6/18/19 and had 9 follow up visits with his most recent occurring on 8/9/19.  Since that time he had a hospital stay, but most recently had not returned to clinic.  At the time of her most recent visit, all of his STG were in progress, however he had not met any goals and is currently being discharged.

## 2019-09-20 ENCOUNTER — TELEPHONE (OUTPATIENT)
Dept: HEMATOLOGY/ONCOLOGY | Facility: CLINIC | Age: 59
End: 2019-09-20

## 2019-09-20 NOTE — TELEPHONE ENCOUNTER
Spoke to pt to schedule f/u apt following pet scan and iv iron. Pt stated he is in [place] and will return in 6 days. Pt stated [facility] his [facility]. I informed pt I will call him when he returns to schedule his f/u apt with dr velez. Pt verbalized understanding.

## 2019-09-27 ENCOUNTER — INFUSION (OUTPATIENT)
Dept: INFUSION THERAPY | Facility: HOSPITAL | Age: 59
End: 2019-09-27
Attending: INTERNAL MEDICINE
Payer: MEDICAID

## 2019-09-27 VITALS
HEIGHT: 69 IN | BODY MASS INDEX: 22.6 KG/M2 | TEMPERATURE: 98 F | RESPIRATION RATE: 18 BRPM | DIASTOLIC BLOOD PRESSURE: 56 MMHG | HEART RATE: 72 BPM | WEIGHT: 152.56 LBS | SYSTOLIC BLOOD PRESSURE: 107 MMHG

## 2019-09-27 DIAGNOSIS — D64.81 ANTINEOPLASTIC CHEMOTHERAPY INDUCED ANEMIA: Primary | ICD-10-CM

## 2019-09-27 DIAGNOSIS — C81.18 NODULAR SCLEROSIS HODGKIN LYMPHOMA OF LYMPH NODES OF MULTIPLE REGIONS: ICD-10-CM

## 2019-09-27 DIAGNOSIS — E61.1 IRON DEFICIENCY: ICD-10-CM

## 2019-09-27 DIAGNOSIS — T45.1X5A ANTINEOPLASTIC CHEMOTHERAPY INDUCED ANEMIA: Primary | ICD-10-CM

## 2019-09-27 PROCEDURE — 63600175 PHARM REV CODE 636 W HCPCS: Performed by: INTERNAL MEDICINE

## 2019-09-27 PROCEDURE — 96365 THER/PROPH/DIAG IV INF INIT: CPT

## 2019-09-27 RX ORDER — HEPARIN 100 UNIT/ML
500 SYRINGE INTRAVENOUS
Status: DISCONTINUED | OUTPATIENT
Start: 2019-09-27 | End: 2019-09-27 | Stop reason: HOSPADM

## 2019-09-27 RX ORDER — SODIUM CHLORIDE 0.9 % (FLUSH) 0.9 %
10 SYRINGE (ML) INJECTION
Status: DISCONTINUED | OUTPATIENT
Start: 2019-09-27 | End: 2019-09-27 | Stop reason: HOSPADM

## 2019-09-27 RX ORDER — HEPARIN 100 UNIT/ML
500 SYRINGE INTRAVENOUS
Status: CANCELLED | OUTPATIENT
Start: 2019-09-27

## 2019-09-27 RX ORDER — SODIUM CHLORIDE 0.9 % (FLUSH) 0.9 %
10 SYRINGE (ML) INJECTION
Status: CANCELLED | OUTPATIENT
Start: 2019-09-27

## 2019-09-27 RX ADMIN — SODIUM CHLORIDE 125 MG: 9 INJECTION, SOLUTION INTRAVENOUS at 01:09

## 2019-09-27 RX ADMIN — Medication 500 UNITS: at 02:09

## 2019-10-04 ENCOUNTER — INFUSION (OUTPATIENT)
Dept: INFUSION THERAPY | Facility: HOSPITAL | Age: 59
End: 2019-10-04
Attending: INTERNAL MEDICINE
Payer: MEDICAID

## 2019-10-04 VITALS
SYSTOLIC BLOOD PRESSURE: 108 MMHG | DIASTOLIC BLOOD PRESSURE: 63 MMHG | RESPIRATION RATE: 18 BRPM | TEMPERATURE: 98 F | HEART RATE: 60 BPM

## 2019-10-04 DIAGNOSIS — E61.1 IRON DEFICIENCY: ICD-10-CM

## 2019-10-04 DIAGNOSIS — C81.18 NODULAR SCLEROSIS HODGKIN LYMPHOMA OF LYMPH NODES OF MULTIPLE REGIONS: ICD-10-CM

## 2019-10-04 DIAGNOSIS — T45.1X5A ANTINEOPLASTIC CHEMOTHERAPY INDUCED ANEMIA: Primary | ICD-10-CM

## 2019-10-04 DIAGNOSIS — D64.81 ANTINEOPLASTIC CHEMOTHERAPY INDUCED ANEMIA: Primary | ICD-10-CM

## 2019-10-04 PROCEDURE — 63600175 PHARM REV CODE 636 W HCPCS: Performed by: INTERNAL MEDICINE

## 2019-10-04 PROCEDURE — 25000003 PHARM REV CODE 250: Performed by: INTERNAL MEDICINE

## 2019-10-04 PROCEDURE — 96365 THER/PROPH/DIAG IV INF INIT: CPT

## 2019-10-04 PROCEDURE — A4216 STERILE WATER/SALINE, 10 ML: HCPCS | Performed by: INTERNAL MEDICINE

## 2019-10-04 RX ORDER — SODIUM CHLORIDE 0.9 % (FLUSH) 0.9 %
10 SYRINGE (ML) INJECTION
Status: DISCONTINUED | OUTPATIENT
Start: 2019-10-04 | End: 2019-10-04 | Stop reason: HOSPADM

## 2019-10-04 RX ORDER — HEPARIN 100 UNIT/ML
500 SYRINGE INTRAVENOUS
Status: CANCELLED | OUTPATIENT
Start: 2019-10-04

## 2019-10-04 RX ORDER — SODIUM CHLORIDE 0.9 % (FLUSH) 0.9 %
10 SYRINGE (ML) INJECTION
Status: CANCELLED | OUTPATIENT
Start: 2019-10-04

## 2019-10-04 RX ORDER — SODIUM CHLORIDE 0.9 % (FLUSH) 0.9 %
10 SYRINGE (ML) INJECTION
Status: COMPLETED | OUTPATIENT
Start: 2019-10-04 | End: 2019-10-04

## 2019-10-04 RX ORDER — HEPARIN 100 UNIT/ML
500 SYRINGE INTRAVENOUS
Status: COMPLETED | OUTPATIENT
Start: 2019-10-04 | End: 2019-10-04

## 2019-10-04 RX ADMIN — HEPARIN 500 UNITS: 100 SYRINGE at 02:10

## 2019-10-04 RX ADMIN — SODIUM CHLORIDE 125 MG: 9 INJECTION, SOLUTION INTRAVENOUS at 12:10

## 2019-10-04 RX ADMIN — SODIUM CHLORIDE, PRESERVATIVE FREE 10 ML: 5 INJECTION INTRAVENOUS at 02:10

## 2019-10-08 ENCOUNTER — TELEPHONE (OUTPATIENT)
Dept: HEMATOLOGY/ONCOLOGY | Facility: CLINIC | Age: 59
End: 2019-10-08

## 2019-10-08 NOTE — TELEPHONE ENCOUNTER
Spoke to pt to schedule f/u apt with dr velez after completing pet scan. Pt confirmed apt date and time scheduled and verbalized understanding.

## 2019-10-10 ENCOUNTER — HOSPITAL ENCOUNTER (OUTPATIENT)
Dept: RADIOLOGY | Facility: HOSPITAL | Age: 59
Discharge: HOME | End: 2019-10-10
Attending: INTERNAL MEDICINE
Payer: MEDICAID

## 2019-10-10 VITALS — BODY MASS INDEX: 23.11 KG/M2 | HEIGHT: 69 IN | WEIGHT: 156 LBS

## 2019-10-10 LAB — GLUCOSE SERPL-MCNC: 92 MG/DL (ref 70–110)

## 2019-10-10 PROCEDURE — 78815 PET IMAGE W/CT SKULL-THIGH: CPT | Mod: TC,PO

## 2019-10-10 PROCEDURE — A9552 F18 FDG: HCPCS | Mod: PO

## 2019-10-14 ENCOUNTER — TELEPHONE (OUTPATIENT)
Dept: HEMATOLOGY/ONCOLOGY | Facility: CLINIC | Age: 59
End: 2019-10-14

## 2019-10-21 ENCOUNTER — LAB VISIT (OUTPATIENT)
Dept: LAB | Facility: HOSPITAL | Age: 59
End: 2019-10-21
Attending: INTERNAL MEDICINE
Payer: MEDICAID

## 2019-10-21 ENCOUNTER — OFFICE VISIT (OUTPATIENT)
Dept: HEMATOLOGY/ONCOLOGY | Facility: CLINIC | Age: 59
End: 2019-10-21
Payer: MEDICAID

## 2019-10-21 ENCOUNTER — TELEPHONE (OUTPATIENT)
Dept: HEMATOLOGY/ONCOLOGY | Facility: CLINIC | Age: 59
End: 2019-10-21

## 2019-10-21 VITALS
RESPIRATION RATE: 12 BRPM | TEMPERATURE: 98 F | DIASTOLIC BLOOD PRESSURE: 57 MMHG | SYSTOLIC BLOOD PRESSURE: 111 MMHG | HEIGHT: 69 IN | WEIGHT: 154.75 LBS | OXYGEN SATURATION: 100 % | HEART RATE: 78 BPM | BODY MASS INDEX: 22.92 KG/M2

## 2019-10-21 DIAGNOSIS — M79.605 PAIN IN BOTH LOWER EXTREMITIES: ICD-10-CM

## 2019-10-21 DIAGNOSIS — C81.18 NODULAR SCLEROSIS HODGKIN LYMPHOMA OF LYMPH NODES OF MULTIPLE REGIONS: Primary | ICD-10-CM

## 2019-10-21 DIAGNOSIS — R53.1 WEAKNESS: ICD-10-CM

## 2019-10-21 DIAGNOSIS — M79.604 PAIN IN BOTH LOWER EXTREMITIES: ICD-10-CM

## 2019-10-21 DIAGNOSIS — R89.8 ABNORMAL BONE MARROW EXAMINATION: ICD-10-CM

## 2019-10-21 DIAGNOSIS — R21 RASH: ICD-10-CM

## 2019-10-21 DIAGNOSIS — C81.18 NODULAR SCLEROSIS HODGKIN LYMPHOMA OF LYMPH NODES OF MULTIPLE REGIONS: ICD-10-CM

## 2019-10-21 LAB
ALBUMIN SERPL BCP-MCNC: 2.9 G/DL (ref 3.5–5.2)
ALP SERPL-CCNC: 143 U/L (ref 55–135)
ALT SERPL W/O P-5'-P-CCNC: 11 U/L (ref 10–44)
ANION GAP SERPL CALC-SCNC: 9 MMOL/L (ref 8–16)
AST SERPL-CCNC: 9 U/L (ref 10–40)
BASOPHILS # BLD AUTO: 0.03 K/UL (ref 0–0.2)
BASOPHILS NFR BLD: 0.4 % (ref 0–1.9)
BILIRUB SERPL-MCNC: 0.6 MG/DL (ref 0.1–1)
BUN SERPL-MCNC: 14 MG/DL (ref 6–20)
CALCIUM SERPL-MCNC: 9.2 MG/DL (ref 8.7–10.5)
CHLORIDE SERPL-SCNC: 102 MMOL/L (ref 95–110)
CO2 SERPL-SCNC: 25 MMOL/L (ref 23–29)
CREAT SERPL-MCNC: 0.8 MG/DL (ref 0.5–1.4)
DIFFERENTIAL METHOD: ABNORMAL
EOSINOPHIL # BLD AUTO: 0.5 K/UL (ref 0–0.5)
EOSINOPHIL NFR BLD: 6.5 % (ref 0–8)
ERYTHROCYTE [DISTWIDTH] IN BLOOD BY AUTOMATED COUNT: 18.2 % (ref 11.5–14.5)
EST. GFR  (AFRICAN AMERICAN): >60 ML/MIN/1.73 M^2
EST. GFR  (NON AFRICAN AMERICAN): >60 ML/MIN/1.73 M^2
GLUCOSE SERPL-MCNC: 113 MG/DL (ref 70–110)
HCT VFR BLD AUTO: 32.1 % (ref 40–54)
HGB BLD-MCNC: 10 G/DL (ref 14–18)
IMM GRANULOCYTES # BLD AUTO: 0.06 K/UL (ref 0–0.04)
IRON SERPL-MCNC: 23 UG/DL (ref 45–160)
LYMPHOCYTES # BLD AUTO: 1.6 K/UL (ref 1–4.8)
LYMPHOCYTES NFR BLD: 23.2 % (ref 18–48)
MCH RBC QN AUTO: 25.6 PG (ref 27–31)
MCHC RBC AUTO-ENTMCNC: 31.2 G/DL (ref 32–36)
MCV RBC AUTO: 82 FL (ref 82–98)
MONOCYTES # BLD AUTO: 0.6 K/UL (ref 0.3–1)
MONOCYTES NFR BLD: 8.6 % (ref 4–15)
NEUTROPHILS # BLD AUTO: 4.2 K/UL (ref 1.8–7.7)
NEUTROPHILS NFR BLD: 60.4 % (ref 38–73)
NRBC BLD-RTO: 0 /100 WBC
PLATELET # BLD AUTO: 269 K/UL (ref 150–350)
PMV BLD AUTO: 9.1 FL (ref 9.2–12.9)
POTASSIUM SERPL-SCNC: 3.7 MMOL/L (ref 3.5–5.1)
PROT SERPL-MCNC: 6.9 G/DL (ref 6–8.4)
RBC # BLD AUTO: 3.9 M/UL (ref 4.6–6.2)
SATURATED IRON: 14 % (ref 20–50)
SODIUM SERPL-SCNC: 136 MMOL/L (ref 136–145)
TOTAL IRON BINDING CAPACITY: 167 UG/DL (ref 250–450)
TRANSFERRIN SERPL-MCNC: 113 MG/DL (ref 200–375)
WBC # BLD AUTO: 6.94 K/UL (ref 3.9–12.7)

## 2019-10-21 PROCEDURE — 99999 PR PBB SHADOW E&M-EST. PATIENT-LVL III: ICD-10-PCS | Mod: PBBFAC,,, | Performed by: INTERNAL MEDICINE

## 2019-10-21 PROCEDURE — 99999 PR PBB SHADOW E&M-EST. PATIENT-LVL III: CPT | Mod: PBBFAC,,, | Performed by: INTERNAL MEDICINE

## 2019-10-21 PROCEDURE — 99215 PR OFFICE/OUTPT VISIT, EST, LEVL V, 40-54 MIN: ICD-10-PCS | Mod: S$PBB,,, | Performed by: INTERNAL MEDICINE

## 2019-10-21 PROCEDURE — 83540 ASSAY OF IRON: CPT

## 2019-10-21 PROCEDURE — 99213 OFFICE O/P EST LOW 20 MIN: CPT | Mod: PBBFAC,PO | Performed by: INTERNAL MEDICINE

## 2019-10-21 PROCEDURE — 85025 COMPLETE CBC W/AUTO DIFF WBC: CPT

## 2019-10-21 PROCEDURE — 80053 COMPREHEN METABOLIC PANEL: CPT

## 2019-10-21 PROCEDURE — 36415 COLL VENOUS BLD VENIPUNCTURE: CPT

## 2019-10-21 PROCEDURE — 99215 OFFICE O/P EST HI 40 MIN: CPT | Mod: S$PBB,,, | Performed by: INTERNAL MEDICINE

## 2019-10-21 PROCEDURE — G0444 DEPRESSION SCREEN ANNUAL: HCPCS | Mod: PBBFAC,PO | Performed by: INTERNAL MEDICINE

## 2019-10-21 RX ORDER — METHYLPREDNISOLONE 4 MG/1
TABLET ORAL
Qty: 1 PACKAGE | Refills: 0 | Status: SHIPPED | OUTPATIENT
Start: 2019-10-21 | End: 2019-10-27 | Stop reason: ALTCHOICE
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
## 2019-10-23 DIAGNOSIS — C81.18 NODULAR SCLEROSIS HODGKIN LYMPHOMA OF LYMPH NODES OF MULTIPLE REGIONS: Primary | ICD-10-CM

## 2019-10-27 ENCOUNTER — HOSPITAL ENCOUNTER (INPATIENT)
Facility: HOSPITAL | Age: 59
LOS: 3 days | Discharge: HOME | DRG: 312 | End: 2019-10-30
Attending: EMERGENCY MEDICINE | Admitting: FAMILY MEDICINE
Payer: MEDICAID

## 2019-10-27 DIAGNOSIS — R50.9 FEVER, UNSPECIFIED FEVER CAUSE: Primary | ICD-10-CM

## 2019-10-27 DIAGNOSIS — R07.9 CHEST PAIN: ICD-10-CM

## 2019-10-27 DIAGNOSIS — E86.0 DEHYDRATION: ICD-10-CM

## 2019-10-27 PROBLEM — I95.1 ORTHOSTATIC HYPOTENSION: Status: ACTIVE | Noted: 2019-10-27

## 2019-10-27 PROBLEM — I10 HYPERTENSION: Status: RESOLVED | Noted: 2019-03-26 | Resolved: 2019-10-27

## 2019-10-27 PROBLEM — H66.012 ACUTE SUPPURATIVE OTITIS MEDIA OF LEFT EAR WITH SPONTANEOUS RUPTURE OF TYMPANIC MEMBRANE: Status: RESOLVED | Noted: 2019-08-16 | Resolved: 2019-10-27

## 2019-10-27 PROBLEM — H70.90 MASTOIDITIS: Status: RESOLVED | Noted: 2019-03-26 | Resolved: 2019-10-27

## 2019-10-27 PROBLEM — J18.9 PNEUMONIA DUE TO INFECTIOUS ORGANISM: Status: RESOLVED | Noted: 2019-08-12 | Resolved: 2019-10-27

## 2019-10-27 LAB
ABO GROUP BLD: NORMAL
ALBUMIN SERPL BCP-MCNC: 2.9 G/DL (ref 3.5–5.2)
ALP SERPL-CCNC: 107 U/L (ref 55–135)
ALT SERPL W/O P-5'-P-CCNC: 22 U/L (ref 10–44)
ANION GAP SERPL CALC-SCNC: 11 MMOL/L (ref 8–16)
AST SERPL-CCNC: 15 U/L (ref 10–40)
BASOPHILS # BLD AUTO: 0.03 K/UL (ref 0–0.2)
BASOPHILS NFR BLD: 0.3 % (ref 0–1.9)
BILIRUB SERPL-MCNC: 0.9 MG/DL (ref 0.1–1)
BILIRUB UR QL STRIP: NEGATIVE
BLD GP AB SCN CELLS X3 SERPL QL: NORMAL
BNP SERPL-MCNC: 193 PG/ML (ref 0–99)
BUN SERPL-MCNC: 18 MG/DL (ref 6–20)
CALCIUM SERPL-MCNC: 8.7 MG/DL (ref 8.7–10.5)
CHLORIDE SERPL-SCNC: 98 MMOL/L (ref 95–110)
CLARITY UR: CLEAR
CO2 SERPL-SCNC: 25 MMOL/L (ref 23–29)
COLOR UR: YELLOW
CREAT SERPL-MCNC: 0.9 MG/DL (ref 0.5–1.4)
D DIMER PPP IA.FEU-MCNC: 1.26 UG/ML FEU
DIFFERENTIAL METHOD: ABNORMAL
EOSINOPHIL # BLD AUTO: 0.3 K/UL (ref 0–0.5)
EOSINOPHIL NFR BLD: 2.6 % (ref 0–8)
ERYTHROCYTE [DISTWIDTH] IN BLOOD BY AUTOMATED COUNT: 18.3 % (ref 11.5–14.5)
EST. GFR  (AFRICAN AMERICAN): >60 ML/MIN/1.73 M^2
EST. GFR  (NON AFRICAN AMERICAN): >60 ML/MIN/1.73 M^2
GLUCOSE SERPL-MCNC: 138 MG/DL (ref 70–110)
GLUCOSE UR QL STRIP: NEGATIVE
HCT VFR BLD AUTO: 34 % (ref 40–54)
HGB BLD-MCNC: 10.6 G/DL (ref 14–18)
HGB UR QL STRIP: NEGATIVE
IMM GRANULOCYTES # BLD AUTO: 0.15 K/UL (ref 0–0.04)
IMM GRANULOCYTES NFR BLD AUTO: 1.4 % (ref 0–0.5)
INR PPP: 1.5
KETONES UR QL STRIP: NEGATIVE
LDH SERPL L TO P-CCNC: 0.95 MMOL/L (ref 0.5–2.2)
LEUKOCYTE ESTERASE UR QL STRIP: NEGATIVE
LYMPHOCYTES # BLD AUTO: 2.3 K/UL (ref 1–4.8)
LYMPHOCYTES NFR BLD: 21.5 % (ref 18–48)
MAGNESIUM SERPL-MCNC: 1.7 MG/DL (ref 1.6–2.6)
MCH RBC QN AUTO: 25.4 PG (ref 27–31)
MCHC RBC AUTO-ENTMCNC: 31.2 G/DL (ref 32–36)
MCV RBC AUTO: 82 FL (ref 82–98)
MONOCYTES # BLD AUTO: 0.6 K/UL (ref 0.3–1)
MONOCYTES NFR BLD: 5.6 % (ref 4–15)
NEUTROPHILS # BLD AUTO: 7.4 K/UL (ref 1.8–7.7)
NEUTROPHILS NFR BLD: 68.6 % (ref 38–73)
NITRITE UR QL STRIP: NEGATIVE
NRBC BLD-RTO: 0 /100 WBC
PH UR STRIP: 7 [PH] (ref 5–8)
PLATELET # BLD AUTO: 339 K/UL (ref 150–350)
PMV BLD AUTO: 9.3 FL (ref 9.2–12.9)
POTASSIUM SERPL-SCNC: 3.8 MMOL/L (ref 3.5–5.1)
PROT SERPL-MCNC: 7 G/DL (ref 6–8.4)
PROT UR QL STRIP: ABNORMAL
PROTHROMBIN TIME: 17.3 SEC (ref 10.6–14.8)
RBC # BLD AUTO: 4.17 M/UL (ref 4.6–6.2)
RH BLD: NORMAL
SAMPLE: NORMAL
SODIUM SERPL-SCNC: 134 MMOL/L (ref 136–145)
SP GR UR STRIP: >1.03 (ref 1–1.03)
TROPONIN I SERPL DL<=0.01 NG/ML-MCNC: <0.03 NG/ML (ref 0.02–0.04)
TROPONIN I SERPL DL<=0.01 NG/ML-MCNC: <0.03 NG/ML (ref 0.02–0.04)
TSH SERPL DL<=0.005 MIU/L-ACNC: 0.81 UIU/ML (ref 0.34–5.6)
URN SPEC COLLECT METH UR: ABNORMAL
UROBILINOGEN UR STRIP-ACNC: ABNORMAL EU/DL
WBC # BLD AUTO: 10.79 K/UL (ref 3.9–12.7)

## 2019-10-27 PROCEDURE — 25500020 PHARM REV CODE 255: Performed by: EMERGENCY MEDICINE

## 2019-10-27 PROCEDURE — 36415 COLL VENOUS BLD VENIPUNCTURE: CPT

## 2019-10-27 PROCEDURE — 84484 ASSAY OF TROPONIN QUANT: CPT

## 2019-10-27 PROCEDURE — 83880 ASSAY OF NATRIURETIC PEPTIDE: CPT

## 2019-10-27 PROCEDURE — 83605 ASSAY OF LACTIC ACID: CPT

## 2019-10-27 PROCEDURE — G0378 HOSPITAL OBSERVATION PER HR: HCPCS

## 2019-10-27 PROCEDURE — 86900 BLOOD TYPING SEROLOGIC ABO: CPT

## 2019-10-27 PROCEDURE — 99285 EMERGENCY DEPT VISIT HI MDM: CPT | Mod: 25

## 2019-10-27 PROCEDURE — 84484 ASSAY OF TROPONIN QUANT: CPT | Mod: 91

## 2019-10-27 PROCEDURE — 84443 ASSAY THYROID STIM HORMONE: CPT

## 2019-10-27 PROCEDURE — 63600175 PHARM REV CODE 636 W HCPCS: Performed by: NURSE PRACTITIONER

## 2019-10-27 PROCEDURE — 87040 BLOOD CULTURE FOR BACTERIA: CPT

## 2019-10-27 PROCEDURE — 25000003 PHARM REV CODE 250

## 2019-10-27 PROCEDURE — 85025 COMPLETE CBC W/AUTO DIFF WBC: CPT

## 2019-10-27 PROCEDURE — 86901 BLOOD TYPING SEROLOGIC RH(D): CPT

## 2019-10-27 PROCEDURE — 93005 ELECTROCARDIOGRAM TRACING: CPT

## 2019-10-27 PROCEDURE — 80053 COMPREHEN METABOLIC PANEL: CPT

## 2019-10-27 PROCEDURE — 25000003 PHARM REV CODE 250: Performed by: NURSE PRACTITIONER

## 2019-10-27 PROCEDURE — 83735 ASSAY OF MAGNESIUM: CPT

## 2019-10-27 PROCEDURE — 12000002 HC ACUTE/MED SURGE SEMI-PRIVATE ROOM

## 2019-10-27 PROCEDURE — 81003 URINALYSIS AUTO W/O SCOPE: CPT

## 2019-10-27 PROCEDURE — 85610 PROTHROMBIN TIME: CPT

## 2019-10-27 PROCEDURE — 63600175 PHARM REV CODE 636 W HCPCS: Performed by: EMERGENCY MEDICINE

## 2019-10-27 PROCEDURE — 85379 FIBRIN DEGRADATION QUANT: CPT

## 2019-10-27 PROCEDURE — 86850 RBC ANTIBODY SCREEN: CPT

## 2019-10-27 RX ORDER — ACETAMINOPHEN 325 MG/1
650 TABLET ORAL EVERY 4 HOURS PRN
Status: DISCONTINUED | OUTPATIENT
Start: 2019-10-27 | End: 2019-10-30 | Stop reason: HOSPADM

## 2019-10-27 RX ORDER — ONDANSETRON 2 MG/ML
4 INJECTION INTRAMUSCULAR; INTRAVENOUS EVERY 8 HOURS PRN
Status: DISCONTINUED | OUTPATIENT
Start: 2019-10-27 | End: 2019-10-30 | Stop reason: HOSPADM

## 2019-10-27 RX ORDER — SODIUM CHLORIDE, SODIUM LACTATE, POTASSIUM CHLORIDE, CALCIUM CHLORIDE 600; 310; 30; 20 MG/100ML; MG/100ML; MG/100ML; MG/100ML
INJECTION, SOLUTION INTRAVENOUS CONTINUOUS
Status: DISCONTINUED | OUTPATIENT
Start: 2019-10-27 | End: 2019-10-30 | Stop reason: HOSPADM

## 2019-10-27 RX ORDER — ENOXAPARIN SODIUM 100 MG/ML
40 INJECTION SUBCUTANEOUS EVERY 24 HOURS
Status: DISCONTINUED | OUTPATIENT
Start: 2019-10-27 | End: 2019-10-30 | Stop reason: HOSPADM

## 2019-10-27 RX ORDER — POTASSIUM CHLORIDE 750 MG/1
10 CAPSULE, EXTENDED RELEASE ORAL ONCE
Status: COMPLETED | OUTPATIENT
Start: 2019-10-27 | End: 2019-10-27

## 2019-10-27 RX ORDER — AMOXICILLIN 250 MG
1 CAPSULE ORAL 2 TIMES DAILY
Status: DISCONTINUED | OUTPATIENT
Start: 2019-10-27 | End: 2019-10-30 | Stop reason: HOSPADM

## 2019-10-27 RX ORDER — LANOLIN ALCOHOL/MO/W.PET/CERES
800 CREAM (GRAM) TOPICAL ONCE
Status: COMPLETED | OUTPATIENT
Start: 2019-10-27 | End: 2019-10-27

## 2019-10-27 RX ORDER — HYDROCODONE BITARTRATE AND ACETAMINOPHEN 5; 325 MG/1; MG/1
1 TABLET ORAL EVERY 6 HOURS PRN
Status: DISCONTINUED | OUTPATIENT
Start: 2019-10-27 | End: 2019-10-30 | Stop reason: HOSPADM

## 2019-10-27 RX ORDER — SODIUM CHLORIDE 0.9 % (FLUSH) 0.9 %
10 SYRINGE (ML) INJECTION
Status: DISCONTINUED | OUTPATIENT
Start: 2019-10-27 | End: 2019-10-30 | Stop reason: HOSPADM

## 2019-10-27 RX ADMIN — SODIUM CHLORIDE 1000 ML: 0.9 INJECTION, SOLUTION INTRAVENOUS at 12:10

## 2019-10-27 RX ADMIN — SODIUM CHLORIDE, SODIUM LACTATE, POTASSIUM CHLORIDE, AND CALCIUM CHLORIDE: .6; .31; .03; .02 INJECTION, SOLUTION INTRAVENOUS at 03:10

## 2019-10-27 RX ADMIN — MAGNESIUM OXIDE 800 MG: 400 TABLET ORAL at 08:10

## 2019-10-27 RX ADMIN — ENOXAPARIN SODIUM 40 MG: 100 INJECTION SUBCUTANEOUS at 04:10

## 2019-10-27 RX ADMIN — SODIUM CHLORIDE 1000 ML: 0.9 INJECTION, SOLUTION INTRAVENOUS at 01:10

## 2019-10-27 RX ADMIN — IOHEXOL 100 ML: 350 INJECTION, SOLUTION INTRAVENOUS at 12:10

## 2019-10-27 RX ADMIN — SENNOSIDES AND DOCUSATE SODIUM 1 TABLET: 8.6; 5 TABLET ORAL at 08:10

## 2019-10-27 RX ADMIN — POTASSIUM CHLORIDE 10 MEQ: 750 CAPSULE, EXTENDED RELEASE ORAL at 08:10

## 2019-10-27 NOTE — ASSESSMENT & PLAN NOTE
Repeat orthostatics vital signs in AM   If continues to be orthostatic, consider midodrine   He takes no meds at home at this time

## 2019-10-27 NOTE — HPI
Mr. Keys presents today with complaints of weakness. It is severe. It is associated with LEON, dizziness, and chest tightness. He denies fever, chills, N/V/D, or LOC. He has a history of Hodgkin's Lymphoma. His last chemo was about 3 months ago. He states at home, he's been having trouble walking long distances, and the more he walks the weaker his legs get and feels as if he is going to pass out. He's been taken off of all of his home meds and was recently on a medrol dose pack for a rash, which healed. He had less symptoms while on the steroids. He lives with a friend who is elderly and she is concerned that if he feels, she will be unable to get him up.

## 2019-10-27 NOTE — ASSESSMENT & PLAN NOTE
Admit to med/surg tele  IV bolus given in ED, will continue with LR @125mL/hr for now  Monitor status   Pt complained LEON and chest tightness, but suspect this is d/t orthostasis - will trend troponins which are currently negative
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
## 2019-10-27 NOTE — ED NOTES
C/o generalized weakness with dizziness off and on for couple days, denies chest pain, received flu shot yesterday, denies fever

## 2019-10-28 PROBLEM — R50.9 FEVER: Status: ACTIVE | Noted: 2019-10-28

## 2019-10-28 LAB
ANION GAP SERPL CALC-SCNC: 10 MMOL/L (ref 8–16)
BASOPHILS # BLD AUTO: 0.03 K/UL (ref 0–0.2)
BASOPHILS NFR BLD: 0.4 % (ref 0–1.9)
BUN SERPL-MCNC: 14 MG/DL (ref 6–20)
CALCIUM SERPL-MCNC: 8.3 MG/DL (ref 8.7–10.5)
CHLORIDE SERPL-SCNC: 101 MMOL/L (ref 95–110)
CO2 SERPL-SCNC: 24 MMOL/L (ref 23–29)
CREAT SERPL-MCNC: 0.8 MG/DL (ref 0.5–1.4)
DIFFERENTIAL METHOD: ABNORMAL
EOSINOPHIL # BLD AUTO: 0.3 K/UL (ref 0–0.5)
EOSINOPHIL NFR BLD: 4.1 % (ref 0–8)
ERYTHROCYTE [DISTWIDTH] IN BLOOD BY AUTOMATED COUNT: 18 % (ref 11.5–14.5)
EST. GFR  (AFRICAN AMERICAN): >60 ML/MIN/1.73 M^2
EST. GFR  (NON AFRICAN AMERICAN): >60 ML/MIN/1.73 M^2
GLUCOSE SERPL-MCNC: 94 MG/DL (ref 70–110)
HCT VFR BLD AUTO: 27.8 % (ref 40–54)
HGB BLD-MCNC: 8.8 G/DL (ref 14–18)
IMM GRANULOCYTES # BLD AUTO: 0.1 K/UL (ref 0–0.04)
IMM GRANULOCYTES NFR BLD AUTO: 1.3 % (ref 0–0.5)
LYMPHOCYTES # BLD AUTO: 1.4 K/UL (ref 1–4.8)
LYMPHOCYTES NFR BLD: 18.4 % (ref 18–48)
MAGNESIUM SERPL-MCNC: 1.7 MG/DL (ref 1.6–2.6)
MCH RBC QN AUTO: 25.7 PG (ref 27–31)
MCHC RBC AUTO-ENTMCNC: 31.7 G/DL (ref 32–36)
MCV RBC AUTO: 81 FL (ref 82–98)
MONOCYTES # BLD AUTO: 0.6 K/UL (ref 0.3–1)
MONOCYTES NFR BLD: 8 % (ref 4–15)
NEUTROPHILS # BLD AUTO: 5.3 K/UL (ref 1.8–7.7)
NEUTROPHILS NFR BLD: 67.8 % (ref 38–73)
NRBC BLD-RTO: 0 /100 WBC
PLATELET # BLD AUTO: 273 K/UL (ref 150–350)
PMV BLD AUTO: 9.1 FL (ref 9.2–12.9)
POTASSIUM SERPL-SCNC: 4 MMOL/L (ref 3.5–5.1)
RBC # BLD AUTO: 3.43 M/UL (ref 4.6–6.2)
SODIUM SERPL-SCNC: 135 MMOL/L (ref 136–145)
TROPONIN I SERPL DL<=0.01 NG/ML-MCNC: <0.03 NG/ML (ref 0.02–0.04)
WBC # BLD AUTO: 7.78 K/UL (ref 3.9–12.7)

## 2019-10-28 PROCEDURE — 85025 COMPLETE CBC W/AUTO DIFF WBC: CPT

## 2019-10-28 PROCEDURE — 36415 COLL VENOUS BLD VENIPUNCTURE: CPT

## 2019-10-28 PROCEDURE — 25000003 PHARM REV CODE 250: Performed by: NURSE PRACTITIONER

## 2019-10-28 PROCEDURE — 63600175 PHARM REV CODE 636 W HCPCS: Performed by: INTERNAL MEDICINE

## 2019-10-28 PROCEDURE — G0378 HOSPITAL OBSERVATION PER HR: HCPCS

## 2019-10-28 PROCEDURE — 83735 ASSAY OF MAGNESIUM: CPT

## 2019-10-28 PROCEDURE — 12000002 HC ACUTE/MED SURGE SEMI-PRIVATE ROOM

## 2019-10-28 PROCEDURE — 63600175 PHARM REV CODE 636 W HCPCS: Performed by: NURSE PRACTITIONER

## 2019-10-28 PROCEDURE — 80048 BASIC METABOLIC PNL TOTAL CA: CPT

## 2019-10-28 RX ORDER — LANOLIN ALCOHOL/MO/W.PET/CERES
400 CREAM (GRAM) TOPICAL EVERY 4 HOURS
Status: DISPENSED | OUTPATIENT
Start: 2019-10-28 | End: 2019-10-28

## 2019-10-28 RX ADMIN — ACETAMINOPHEN 650 MG: 325 TABLET ORAL at 05:10

## 2019-10-28 RX ADMIN — VANCOMYCIN HYDROCHLORIDE 1500 MG: 1 INJECTION, POWDER, LYOPHILIZED, FOR SOLUTION INTRAVENOUS at 04:10

## 2019-10-28 RX ADMIN — PIPERACILLIN AND TAZOBACTAM 3.38 G: 3; .375 INJECTION, POWDER, LYOPHILIZED, FOR SOLUTION INTRAVENOUS; PARENTERAL at 11:10

## 2019-10-28 RX ADMIN — SODIUM CHLORIDE, SODIUM LACTATE, POTASSIUM CHLORIDE, AND CALCIUM CHLORIDE: .6; .31; .03; .02 INJECTION, SOLUTION INTRAVENOUS at 05:10

## 2019-10-28 RX ADMIN — PIPERACILLIN AND TAZOBACTAM 3.38 G: 3; .375 INJECTION, POWDER, LYOPHILIZED, FOR SOLUTION INTRAVENOUS; PARENTERAL at 09:10

## 2019-10-28 NOTE — NURSING
ORTHOSTATIC VITALS    95/52 BP ; PULSE 64 -LYING  97/58 BP;  PULSE 85 - SITTING  93/55 BP;  PULSE 92 - STANDING

## 2019-10-28 NOTE — PROGRESS NOTES
[facility] Medicine  Progress Note    Patient Name: Heriberto Keys  MRN: [id number]  Patient Class: OP- Observation   Admission Date: 10/27/2019  Length of Stay: 0 days  Attending Physician: Sanjeev Chairez MD  Primary Care Provider: Jagruti Davis NP        Subjective:     Principal Problem:Orthostatic hypotension        HPI:  Mr. Keys presents today with complaints of weakness. It is severe. It is associated with LEON, dizziness, and chest tightness. He denies fever, chills, N/V/D, or LOC. He has a history of Hodgkin's Lymphoma. His last chemo was about 3 months ago. He states at home, he's been having trouble walking long distances, and the more he walks the weaker his legs get and feels as if he is going to pass out. He's been taken off of all of his home meds and was recently on a medrol dose pack for a rash, which healed. He had less symptoms while on the steroids. He lives with a friend who is elderly and she is concerned that if he feels, she will be unable to get him up.     Overview/Hospital Course:  No notes on file    Interval History:  Patient seen and examined at bedside after being admitted overnight for fatigue, dizziness and was found to have orthostatic hypotension.  Patient recently finished chemotherapy for Hodgkin's lymphoma about 3 months ago.  Reports needing blood transfusion for similar symptoms in the past.    Reports improvement in symptoms.  However still weak overall and endorses polyuria.  Overnight patient had a T-max of 102.5° F. Denies chest pain, shortness of breath, cough, chills, headache, neck pain, diarrhea or skin rash.    Objective:     Vital Signs (Most Recent):  Temp: 98.4 °F (36.9 °C) (10/28/19 1207)  Pulse: 64 (10/28/19 1207)  Resp: 18 (10/28/19 1207)  BP: (!) 96/52 (10/28/19 1207)  SpO2: 97 % (10/28/19 1207) Vital Signs (24h Range):  Temp:  [98.3 °F (36.8 °C)-102.5 °F (39.2 °C)] 98.4 °F (36.9 °C)  Pulse:  [63-77] 64  Resp:  [16-18]  18  SpO2:  [96 %-97 %] 97 %  BP: ()/(52-62) 96/52     Weight: 70.8 kg (156 lb)  Body mass index is 23.03 kg/m².    Intake/Output Summary (Last 24 hours) at 10/28/2019 1713  Last data filed at 10/27/2019 2000  Gross per 24 hour   Intake 300 ml   Output --   Net 300 ml      Physical Exam   Constitutional: He is oriented to person, place, and time. He appears well-developed and well-nourished. He is cooperative.   HENT:   Mouth/Throat: Oropharynx is clear and moist.   Eyes: Conjunctivae are normal. No scleral icterus.   Neck: Neck supple. No thyromegaly present.   Cardiovascular: Normal rate, regular rhythm, S1 normal, S2 normal, normal heart sounds and intact distal pulses. Exam reveals no gallop and no friction rub.   Pulmonary/Chest: Effort normal and breath sounds normal. No accessory muscle usage. No tachypnea. No respiratory distress. He has no wheezes. He has no rales.   Abdominal: Soft. Bowel sounds are normal. He exhibits no distension and no mass. There is no tenderness. There is no guarding.   Musculoskeletal: He exhibits no edema or deformity.   Neurological: He is alert and oriented to person, place, and time. He has normal strength. No sensory deficit.   Skin: Skin is warm and dry. Capillary refill takes less than 2 seconds. No rash noted.   Psychiatric: He has a normal mood and affect. His speech is normal and behavior is normal. Judgment and thought content normal. Cognition and memory are normal.   Nursing note and vitals reviewed.      Significant Labs:   CBC:   Recent Labs   Lab 10/27/19  1056 10/28/19  0428   WBC 10.79 7.78   HGB 10.6* 8.8*   HCT 34.0* 27.8*    273     CMP:   Recent Labs   Lab 10/27/19  1056 10/28/19  0428   * 135*   K 3.8 4.0   CL 98 101   CO2 25 24   * 94   BUN 18 14   CREATININE 0.9 0.8   CALCIUM 8.7 8.3*   PROT 7.0  --    ALBUMIN 2.9*  --    BILITOT 0.9  --    ALKPHOS 107  --    AST 15  --    ALT 22  --    ANIONGAP 11 10   EGFRNONAA >60.0 >60.0      Magnesium:   Recent Labs   Lab 10/27/19  1056 10/28/19  0428   MG 1.7 1.7       Significant Imaging: I have reviewed all pertinent imaging results/findings within the past 24 hours.     CTA chest on admission:   Impression:     NO CT EVIDENCE PULMONARY EMBOLI  Small bilateral pleural effusions    Stable pre-vascular adenopathy and gynecomastia    Right upper lobe with reticulonodular opacities in the posterior aspect.             Assessment/Plan:      * Orthostatic hypotension  Blood pressure remains in high 90s over 60s  Possible concern for sepsis in the setting of fevers  Continue IV fluid hydration as well as empiric antibiotics  Obtain cortisol levels in the a.m.        Fever  Concern for sepsis in the setting of hypotension  Given history of lymphoma and recent chemotherapy patient is immunocompromised  Ordered blood and urine cultures  CTA chest with reticulonodular opacity in right upper lobe which is however stable  Empiric vancomycin and piperacillin/tazobactam        Dehydration  Plan as above for orthostatic hypotension        Anemia  Stable Hb  Known hx of lymphoma  MCV is borderline low  Consider iron panel if Hb drops         Bilateral pleural effusion  Small b/l pleural effusions  Stable  Monitor for respiratory distress in the setting of IV fluid hydration        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  Aware  Follows Dr. Rich   Has a f/u appointment on 11/04   CTA chest with stable prevascular adenopathy.  No evidence of new mediastinal or hilar adenopathy noted.      VTE Risk Mitigation (From admission, onward)         Ordered     enoxaparin injection 40 mg  Daily      10/27/19 1338     IP VTE HIGH RISK PATIENT  Once      10/27/19 1338                      Sanjeev Chairez MD  Department of Hospital Medicine   [facility]

## 2019-10-28 NOTE — SUBJECTIVE & OBJECTIVE
Interval History:  Patient seen and examined at bedside after being admitted overnight for fatigue, dizziness and was found to have orthostatic hypotension.  Patient recently finished chemotherapy for Hodgkin's lymphoma about 3 months ago.  Reports needing blood transfusion for similar symptoms in the past.    Reports improvement in symptoms.  However still weak overall and endorses polyuria.  Overnight patient had a T-max of 102.5° F. Denies chest pain, shortness of breath, cough, chills, headache, neck pain, diarrhea or skin rash.    Objective:     Vital Signs (Most Recent):  Temp: 98.4 °F (36.9 °C) (10/28/19 1207)  Pulse: 64 (10/28/19 1207)  Resp: 18 (10/28/19 1207)  BP: (!) 96/52 (10/28/19 1207)  SpO2: 97 % (10/28/19 1207) Vital Signs (24h Range):  Temp:  [98.3 °F (36.8 °C)-102.5 °F (39.2 °C)] 98.4 °F (36.9 °C)  Pulse:  [63-77] 64  Resp:  [16-18] 18  SpO2:  [96 %-97 %] 97 %  BP: ()/(52-62) 96/52     Weight: 70.8 kg (156 lb)  Body mass index is 23.03 kg/m².    Intake/Output Summary (Last 24 hours) at 10/28/2019 1713  Last data filed at 10/27/2019 2000  Gross per 24 hour   Intake 300 ml   Output --   Net 300 ml      Physical Exam   Constitutional: He is oriented to person, place, and time. He appears well-developed and well-nourished. He is cooperative.   HENT:   Mouth/Throat: Oropharynx is clear and moist.   Eyes: Conjunctivae are normal. No scleral icterus.   Neck: Neck supple. No thyromegaly present.   Cardiovascular: Normal rate, regular rhythm, S1 normal, S2 normal, normal heart sounds and intact distal pulses. Exam reveals no gallop and no friction rub.   Pulmonary/Chest: Effort normal and breath sounds normal. No accessory muscle usage. No tachypnea. No respiratory distress. He has no wheezes. He has no rales.   Abdominal: Soft. Bowel sounds are normal. He exhibits no distension and no mass. There is no tenderness. There is no guarding.   Musculoskeletal: He exhibits no edema or deformity.    Neurological: He is alert and oriented to person, place, and time. He has normal strength. No sensory deficit.   Skin: Skin is warm and dry. Capillary refill takes less than 2 seconds. No rash noted.   Psychiatric: He has a normal mood and affect. His speech is normal and behavior is normal. Judgment and thought content normal. Cognition and memory are normal.   Nursing note and vitals reviewed.      Significant Labs:   CBC:   Recent Labs   Lab 10/27/19  1056 10/28/19  0428   WBC 10.79 7.78   HGB 10.6* 8.8*   HCT 34.0* 27.8*    273     CMP:   Recent Labs   Lab 10/27/19  1056 10/28/19  0428   * 135*   K 3.8 4.0   CL 98 101   CO2 25 24   * 94   BUN 18 14   CREATININE 0.9 0.8   CALCIUM 8.7 8.3*   PROT 7.0  --    ALBUMIN 2.9*  --    BILITOT 0.9  --    ALKPHOS 107  --    AST 15  --    ALT 22  --    ANIONGAP 11 10   EGFRNONAA >60.0 >60.0     Magnesium:   Recent Labs   Lab 10/27/19  1056 10/28/19  0428   MG 1.7 1.7       Significant Imaging: I have reviewed all pertinent imaging results/findings within the past 24 hours.     CTA chest on admission:   Impression:     NO CT EVIDENCE PULMONARY EMBOLI  Small bilateral pleural effusions    Stable pre-vascular adenopathy and gynecomastia    Right upper lobe with reticulonodular opacities in the posterior aspect.

## 2019-10-28 NOTE — PROGRESS NOTES
Pharmacokinetic Initial Assessment: IV Vancomycin    Assessment/Plan:    Initiate intravenous vancomycin with loading dose of 1500 mg once followed by a maintenance dose of vancomycin 1250 mg IV every 12 hours  Desired empiric serum trough concentration is 10 to 15 mcg/mL  Draw vancomycin trough level 30 min prior to fourth dose on 10/30 at approximately 0000   Pharmacy will continue to follow and monitor vancomycin.      Please contact pharmacy at extension [phone] with any questions regarding this assessment.     Thank you for the consult,   Diego Ortiz       Patient brief summary:  Heriberto Keys is a 59 y.o. male initiated on antimicrobial therapy with IV Vancomycin for treatment of suspected sepsis    Drug Allergies:   Review of patient's allergies indicates:  No Known Allergies    Actual Body Weight:   70.8    Renal Function:   Estimated Creatinine Clearance: 98.2 mL/min (based on SCr of 0.8 mg/dL).,           Metabolic Panel (last 72 hours):  Recent Labs   Lab Result Units 10/27/19  1056 10/27/19  1330 10/28/19  0428   Sodium mmol/L 134*  --  135*   Potassium mmol/L 3.8  --  4.0   Chloride mmol/L 98  --  101   CO2 mmol/L 25  --  24   Glucose mg/dL 138*  --  94   Glucose, UA   --  Negative  --    BUN, Bld mg/dL 18  --  14   Creatinine mg/dL 0.9  --  0.8   Albumin g/dL 2.9*  --   --    Total Bilirubin mg/dL 0.9  --   --    Alkaline Phosphatase U/L 107  --   --    AST U/L 15  --   --    ALT U/L 22  --   --    Magnesium mg/dL 1.7  --  1.7       Drug levels (last 3 results):  No results for input(s): VANCOMYCINRA, VANCOMYCINPE, VANCOMYCINTR in the last 72 hours.    Microbiologic Results:  Microbiology Results (last 7 days)       Procedure Component Value Units Date/Time    Blood culture #1 **CANNOT BE ORDERED STAT** [[id number]] Collected:  10/27/19 1056    Order Status:  Completed Specimen:  Blood from Peripheral, Antecubital, Left Updated:  10/27/19 1917     Blood Culture, Routine No Growth to date    Blood  culture #2 **CANNOT BE ORDERED STAT** [[id number]] Collected:  10/27/19 1112    Order Status:  Completed Specimen:  Blood from Peripheral, Antecubital, Right Updated:  10/27/19 1917     Blood Culture, Routine No Growth to date

## 2019-10-28 NOTE — ASSESSMENT & PLAN NOTE
Small b/l pleural effusions  Stable  Monitor for respiratory distress in the setting of IV fluid hydration

## 2019-10-28 NOTE — ASSESSMENT & PLAN NOTE
Aware  Follows Dr. Rich   Has a f/u appointment on 11/04   CTA chest with stable prevascular adenopathy.  No evidence of new mediastinal or hilar adenopathy noted.

## 2019-10-28 NOTE — CONSULTS
ELECTROLYTE MANAGEMENT PROGRESS NOTE    Objective:  59 y.o., male, Actual Body Weight = 70.8 kg (156 lb)    Recent Labs     10/27/19  1056 10/28/19  0428   CREATININE 0.9 0.8   K 3.8 4.0   MG 1.7 1.7   CALCIUM 8.7 8.3*   ALBUMIN 2.9*  --      Patient received Potassium chloride CR capsule 10 mEq PO on 10/27/19 at 20:23.  Patient received Magnesium oxide tablet 800 mg PO on 10/27/19 at 20:23    Diet Adult Regular (IDDSI Level 7)    IV Access:  Peripheral    Assessment:  Electrolyte Deficiency:  Hypokalemia (K+ < 4.0), corrected; Hypomagnesemia (Mg < 1.9)  Calcium corrected for low albumin = 9.18    Patient type:  Acute Care (not in an ICU)    Plan:  Replace magnesium with Magnesium oxide 400 mg PO every 4 hours x  2 doses.    Will monitor electrolytes daily.    Thank you for allowing us to participate in this patient's care.     Juany Sellers PharmD 10/28/2019 7:46 AM  ID Clinical Pharmacist, Ext [phone] or [phone]

## 2019-10-28 NOTE — ASSESSMENT & PLAN NOTE
Blood pressure remains in high 90s over 60s  Possible concern for sepsis in the setting of fevers  Continue IV fluid hydration as well as empiric antibiotics  Obtain cortisol levels in the a.m.

## 2019-10-28 NOTE — ASSESSMENT & PLAN NOTE
Concern for sepsis in the setting of hypotension  Given history of lymphoma and recent chemotherapy patient is immunocompromised  Ordered blood and urine cultures  CTA chest with reticulonodular opacity in right upper lobe which is however stable  Empiric vancomycin and piperacillin/tazobactam
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
## 2019-10-29 DIAGNOSIS — C81.18 NODULAR SCLEROSIS HODGKIN LYMPHOMA OF LYMPH NODES OF MULTIPLE REGIONS: Primary | ICD-10-CM

## 2019-10-29 LAB
ANION GAP SERPL CALC-SCNC: 10 MMOL/L (ref 8–16)
BASOPHILS # BLD AUTO: 0.02 K/UL (ref 0–0.2)
BASOPHILS NFR BLD: 0.3 % (ref 0–1.9)
BILIRUB UR QL STRIP: NEGATIVE
BUN SERPL-MCNC: 12 MG/DL (ref 6–20)
CALCIUM SERPL-MCNC: 8.2 MG/DL (ref 8.7–10.5)
CHLORIDE SERPL-SCNC: 101 MMOL/L (ref 95–110)
CLARITY UR: CLEAR
CO2 SERPL-SCNC: 25 MMOL/L (ref 23–29)
COLOR UR: YELLOW
CREAT SERPL-MCNC: 0.9 MG/DL (ref 0.5–1.4)
DIFFERENTIAL METHOD: ABNORMAL
EOSINOPHIL # BLD AUTO: 0.4 K/UL (ref 0–0.5)
EOSINOPHIL NFR BLD: 4.5 % (ref 0–8)
ERYTHROCYTE [DISTWIDTH] IN BLOOD BY AUTOMATED COUNT: 18 % (ref 11.5–14.5)
EST. GFR  (AFRICAN AMERICAN): >60 ML/MIN/1.73 M^2
EST. GFR  (NON AFRICAN AMERICAN): >60 ML/MIN/1.73 M^2
GLUCOSE SERPL-MCNC: 114 MG/DL (ref 70–110)
GLUCOSE UR QL STRIP: NEGATIVE
HCT VFR BLD AUTO: 28.6 % (ref 40–54)
HGB BLD-MCNC: 9.1 G/DL (ref 14–18)
HGB UR QL STRIP: NEGATIVE
IMM GRANULOCYTES # BLD AUTO: 0.13 K/UL (ref 0–0.04)
IMM GRANULOCYTES NFR BLD AUTO: 1.6 % (ref 0–0.5)
KETONES UR QL STRIP: NEGATIVE
LEUKOCYTE ESTERASE UR QL STRIP: NEGATIVE
LYMPHOCYTES # BLD AUTO: 1.4 K/UL (ref 1–4.8)
LYMPHOCYTES NFR BLD: 17.6 % (ref 18–48)
MAGNESIUM SERPL-MCNC: 1.7 MG/DL (ref 1.6–2.6)
MCH RBC QN AUTO: 25.3 PG (ref 27–31)
MCHC RBC AUTO-ENTMCNC: 31.8 G/DL (ref 32–36)
MCV RBC AUTO: 79 FL (ref 82–98)
MONOCYTES # BLD AUTO: 0.5 K/UL (ref 0.3–1)
MONOCYTES NFR BLD: 6.6 % (ref 4–15)
NEUTROPHILS # BLD AUTO: 5.5 K/UL (ref 1.8–7.7)
NEUTROPHILS NFR BLD: 69.4 % (ref 38–73)
NITRITE UR QL STRIP: NEGATIVE
NRBC BLD-RTO: 0 /100 WBC
PH UR STRIP: 7 [PH] (ref 5–8)
PLATELET # BLD AUTO: 282 K/UL (ref 150–350)
PMV BLD AUTO: 9 FL (ref 9.2–12.9)
POTASSIUM SERPL-SCNC: 3.7 MMOL/L (ref 3.5–5.1)
PROT UR QL STRIP: ABNORMAL
RBC # BLD AUTO: 3.6 M/UL (ref 4.6–6.2)
SODIUM SERPL-SCNC: 136 MMOL/L (ref 136–145)
SP GR UR STRIP: 1.01 (ref 1–1.03)
URN SPEC COLLECT METH UR: ABNORMAL
UROBILINOGEN UR STRIP-ACNC: ABNORMAL EU/DL
WBC # BLD AUTO: 7.99 K/UL (ref 3.9–12.7)

## 2019-10-29 PROCEDURE — 63600175 PHARM REV CODE 636 W HCPCS: Performed by: INTERNAL MEDICINE

## 2019-10-29 PROCEDURE — G0378 HOSPITAL OBSERVATION PER HR: HCPCS

## 2019-10-29 PROCEDURE — 12000002 HC ACUTE/MED SURGE SEMI-PRIVATE ROOM

## 2019-10-29 PROCEDURE — 25000003 PHARM REV CODE 250

## 2019-10-29 PROCEDURE — 81003 URINALYSIS AUTO W/O SCOPE: CPT

## 2019-10-29 PROCEDURE — 83735 ASSAY OF MAGNESIUM: CPT

## 2019-10-29 PROCEDURE — 80048 BASIC METABOLIC PNL TOTAL CA: CPT

## 2019-10-29 PROCEDURE — 63600175 PHARM REV CODE 636 W HCPCS: Performed by: NURSE PRACTITIONER

## 2019-10-29 PROCEDURE — 36415 COLL VENOUS BLD VENIPUNCTURE: CPT

## 2019-10-29 PROCEDURE — 25000003 PHARM REV CODE 250: Performed by: NURSE PRACTITIONER

## 2019-10-29 PROCEDURE — 85025 COMPLETE CBC W/AUTO DIFF WBC: CPT

## 2019-10-29 RX ORDER — LANOLIN ALCOHOL/MO/W.PET/CERES
400 CREAM (GRAM) TOPICAL EVERY 4 HOURS
Status: COMPLETED | OUTPATIENT
Start: 2019-10-29 | End: 2019-10-29

## 2019-10-29 RX ORDER — POTASSIUM CHLORIDE 750 MG/1
10 CAPSULE, EXTENDED RELEASE ORAL ONCE
Status: COMPLETED | OUTPATIENT
Start: 2019-10-29 | End: 2019-10-29

## 2019-10-29 RX ADMIN — PIPERACILLIN AND TAZOBACTAM 3.38 G: 3; .375 INJECTION, POWDER, LYOPHILIZED, FOR SOLUTION INTRAVENOUS; PARENTERAL at 05:10

## 2019-10-29 RX ADMIN — SODIUM CHLORIDE, SODIUM LACTATE, POTASSIUM CHLORIDE, AND CALCIUM CHLORIDE: .6; .31; .03; .02 INJECTION, SOLUTION INTRAVENOUS at 10:10

## 2019-10-29 RX ADMIN — PIPERACILLIN AND TAZOBACTAM 3.38 G: 3; .375 INJECTION, POWDER, LYOPHILIZED, FOR SOLUTION INTRAVENOUS; PARENTERAL at 11:10

## 2019-10-29 RX ADMIN — POTASSIUM CHLORIDE 10 MEQ: 750 CAPSULE, EXTENDED RELEASE ORAL at 09:10

## 2019-10-29 RX ADMIN — VANCOMYCIN HYDROCHLORIDE 1250 MG: 1 INJECTION, POWDER, LYOPHILIZED, FOR SOLUTION INTRAVENOUS at 01:10

## 2019-10-29 RX ADMIN — PIPERACILLIN AND TAZOBACTAM 3.38 G: 3; .375 INJECTION, POWDER, LYOPHILIZED, FOR SOLUTION INTRAVENOUS; PARENTERAL at 08:10

## 2019-10-29 RX ADMIN — MAGNESIUM OXIDE 400 MG: 400 TABLET ORAL at 12:10

## 2019-10-29 RX ADMIN — MAGNESIUM OXIDE 400 MG: 400 TABLET ORAL at 09:10

## 2019-10-29 RX ADMIN — ACETAMINOPHEN 650 MG: 325 TABLET ORAL at 05:10

## 2019-10-29 NOTE — ASSESSMENT & PLAN NOTE
Blood pressure 114/62, improved  Possible concern for sepsis in the setting of fevers, patient has evening fevers reports daily  Continue IV fluid hydration as well as IV Zosyn and vancomycin  Obtain cortisol levels for morning of 10/29/2019, I do not see results but possibly a send out?

## 2019-10-29 NOTE — PROGRESS NOTES
[facility] Medicine  Progress Note    Patient Name: Heriberto Keys  MRN: [id number]  Patient Class: OP- Observation   Admission Date: 10/27/2019  Length of Stay: 0 days  Attending Physician: Clovis Angel MD  Primary Care Provider: Jagruti Davis NP        Subjective:     Principal Problem:Orthostatic hypotension        HPI:  Mr. Keys presents today with complaints of weakness. It is severe. It is associated with LEON, dizziness, and chest tightness. He denies fever, chills, N/V/D, or LOC. He has a history of Hodgkin's Lymphoma. His last chemo was about 3 months ago. He states at home, he's been having trouble walking long distances, and the more he walks the weaker his legs get and feels as if he is going to pass out. He's been taken off of all of his home meds and was recently on a medrol dose pack for a rash, which healed. He had less symptoms while on the steroids. He lives with a friend who is elderly and she is concerned that if he feels, she will be unable to get him up.     Overview/Hospital Course:  No notes on file    Interval History:  Patient seen and examined this afternoon.  Patient laying comfortably in bed.  Inquired if he can go home.  Discussed with patient that he has spiked fever yesterday and I need to make sure that he is afebrile for 24 hr before we can discharge him home.  Patient informed he has received treatment for his Hodgkin's lymphoma with Dr. Rich for 1 year.  Patient also reports every day evening fever reports he spikes a fever and then he sweats and it goes away  Other than that patient denies any more weakness, lightheadedness, headache, nausea, vomiting, chest pain, shortness of    Review of Systems   Constitutional: Positive for fatigue and fever (Every evening). Negative for chills and diaphoresis.   HENT: Negative for congestion, ear pain, sore throat and trouble swallowing.    Eyes: Negative for pain, discharge and visual disturbance.    Respiratory: Negative for cough, chest tightness, shortness of breath and wheezing.    Cardiovascular: Negative for chest pain, palpitations and leg swelling.   Gastrointestinal: Negative for abdominal pain, constipation, diarrhea, nausea and vomiting.   Endocrine: Negative.  Negative for polydipsia, polyphagia and polyuria.   Genitourinary: Negative.  Negative for dysuria, flank pain, frequency and urgency.   Musculoskeletal: Negative.  Negative for back pain, joint swelling, neck pain and neck stiffness.   Skin: Negative for rash and wound.   Allergic/Immunologic: Negative for immunocompromised state.   Neurological: Positive for weakness ( now resolved). Negative for dizziness, syncope, speech difficulty, light-headedness, numbness and headaches.   Hematological: Negative for adenopathy.   Psychiatric/Behavioral: Negative for confusion and suicidal ideas. The patient is not nervous/anxious.      Objective:     Vital Signs (Most Recent):  Temp: (!) 102.8 °F (39.3 °C) (10/29/19 1630)  Pulse: 73 (10/29/19 1630)  Resp: 18 (10/29/19 1630)  BP: 114/62 (10/29/19 1630)  SpO2: 97 % (10/29/19 1630) Vital Signs (24h Range):  Temp:  [98.5 °F (36.9 °C)-102.8 °F (39.3 °C)] 102.8 °F (39.3 °C)  Pulse:  [61-86] 73  Resp:  [18] 18  SpO2:  [97 %-100 %] 97 %  BP: (113-136)/(52-74) 114/62     Weight: 70.8 kg (156 lb)  Body mass index is 23.03 kg/m².    Intake/Output Summary (Last 24 hours) at 10/29/2019 1844  Last data filed at 10/29/2019 1700  Gross per 24 hour   Intake 6577.08 ml   Output --   Net 6577.08 ml      Physical Exam   Constitutional: He is oriented to person, place, and time. He appears well-developed and well-nourished. He is cooperative.   HENT:   Mouth/Throat: Oropharynx is clear and moist.   Eyes: Conjunctivae and EOM are normal. No scleral icterus.   Neck: Neck supple. No thyromegaly present.   Cardiovascular: Normal rate, regular rhythm, S1 normal, S2 normal, normal heart sounds and intact distal pulses. Exam  reveals no gallop and no friction rub.   Pulmonary/Chest: Effort normal and breath sounds normal. No accessory muscle usage. No tachypnea. No respiratory distress. He has no wheezes. He has no rales.   Abdominal: Soft. Bowel sounds are normal. He exhibits no distension and no mass. There is no tenderness. There is no guarding.   Musculoskeletal: He exhibits no edema or deformity.   Neurological: He is alert and oriented to person, place, and time. He has normal strength. No sensory deficit.   Skin: Skin is warm and dry. Capillary refill takes less than 2 seconds. No rash noted.   Psychiatric: He has a normal mood and affect. His speech is normal and behavior is normal. Judgment and thought content normal. Cognition and memory are normal.   Nursing note and vitals reviewed.      Significant Labs:   Blood Culture: No results for input(s): LABBLOO in the last 48 hours.  CBC:   Recent Labs   Lab 10/28/19  0428 10/29/19  0509   WBC 7.78 7.99   HGB 8.8* 9.1*   HCT 27.8* 28.6*    282     CMP:   Recent Labs   Lab 10/28/19  0428 10/29/19  0509   * 136   K 4.0 3.7    101   CO2 24 25   GLU 94 114*   BUN 14 12   CREATININE 0.8 0.9   CALCIUM 8.3* 8.2*   ANIONGAP 10 10   EGFRNONAA >60.0 >60.0     Cardiac Markers: No results for input(s): CKMB, MYOGLOBIN, BNP, TROPISTAT in the last 48 hours.  Magnesium:   Recent Labs   Lab 10/28/19  0428 10/29/19  0509   MG 1.7 1.7     Troponin:   Recent Labs   Lab 10/27/19  2335   TROPONINI <0.030     TSH:   Recent Labs   Lab 10/27/19  1056   TSH 0.810     Urine Studies:   Recent Labs   Lab 10/29/19  0655   COLORU Yellow   APPEARANCEUA Clear   PHUR 7.0   SPECGRAV 1.015   PROTEINUA Trace*   GLUCUA Negative   KETONESU Negative   BILIRUBINUA Negative   OCCULTUA Negative   NITRITE Negative   UROBILINOGEN 2.0-3.0*   LEUKOCYTESUR Negative       Significant Imaging: I have reviewed all pertinent imaging results/findings within the past 24 hours.     10/27/2019 CTA chest non coronary  PE study  NO CT EVIDENCE PULMONARY EMBOLI  Small bilateral pleural effusions  Stable pre-vascular adenopathy and gynecomastia  Right upper lobe with reticulonodular opacities in the    10/27/2019 X-RAY CHEST no acute cardiopulmonary abnormality            Assessment/Plan:      * Orthostatic hypotension  Blood pressure 114/62, improved  Possible concern for sepsis in the setting of fevers, patient has evening fevers reports daily  Continue IV fluid hydration as well as IV Zosyn and vancomycin  Obtain cortisol levels for morning of 10/29/2019, I do not see results but possibly a send out?      Fever  Concern for sepsis in the setting of hypotension, ?  Evening fevers related with Hodgkin's disease  Given history of lymphoma and recent chemotherapy patient is immunocompromised  Ordered blood and urine cultures- initial blood cultures negative  Ordered blood culture if patient spikes fever greater than 100.6  CTA chest with reticulonodular opacity in right upper lobe which is however stable  Empiric vancomycin and piperacillin/tazobactam        Dehydration  Plan as above for orthostatic hypotension        Anemia  Stable Hb  Known hx of lymphoma  MCV is borderline low  Consider iron panel if Hb drops         Bilateral pleural effusion  Tiny b/l pleural effusions  Stable  Monitor for respiratory distress in the setting of IV fluid hydration        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  Aware  Follows Dr. Rich   Has a f/u appointment on 11/04   CTA chest with stable prevascular adenopathy.  No evidence of new mediastinal or hilar adenopathy noted. Though there was a mention of mottled appearance of the bone marrow compatible with known nauseous metastases  Patient should not have evening fevers now that his Hodgkin disease has been treated      VTE Risk Mitigation (From admission, onward)         Ordered     enoxaparin injection 40 mg  Daily      10/27/19 1338     IP VTE HIGH RISK PATIENT   Once      10/27/19 1338              Patient Active Problem List   Diagnosis    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    Antineoplastic chemotherapy induced anemia    Iron deficiency    Chemotherapy induced neutropenia    Neck pain on left side    Anemia associated with chemotherapy    Bilateral pleural effusion    Paresthesias    Degenerative disc disease, cervical    History of lymphoma    Anemia    Dehydration    Orthostatic hypotension    Fever           Clovis Angel MD  Department of [facility]   [facility]

## 2019-10-29 NOTE — SUBJECTIVE & OBJECTIVE
Interval History:  Patient seen and examined this afternoon.  Patient laying comfortably in bed.  Inquired if he can go home.  Discussed with patient that he has spiked fever yesterday and I need to make sure that he is afebrile for 24 hr before we can discharge him home.  Patient informed he has received treatment for his Hodgkin's lymphoma with Dr. Rich for 1 year.  Patient also reports every day evening fever reports he spikes a fever and then he sweats and it goes away  Other than that patient denies any more weakness, lightheadedness, headache, nausea, vomiting, chest pain, shortness of    Review of Systems   Constitutional: Positive for fatigue and fever (Every evening). Negative for chills and diaphoresis.   HENT: Negative for congestion, ear pain, sore throat and trouble swallowing.    Eyes: Negative for pain, discharge and visual disturbance.   Respiratory: Negative for cough, chest tightness, shortness of breath and wheezing.    Cardiovascular: Negative for chest pain, palpitations and leg swelling.   Gastrointestinal: Negative for abdominal pain, constipation, diarrhea, nausea and vomiting.   Endocrine: Negative.  Negative for polydipsia, polyphagia and polyuria.   Genitourinary: Negative.  Negative for dysuria, flank pain, frequency and urgency.   Musculoskeletal: Negative.  Negative for back pain, joint swelling, neck pain and neck stiffness.   Skin: Negative for rash and wound.   Allergic/Immunologic: Negative for immunocompromised state.   Neurological: Positive for weakness ( now resolved). Negative for dizziness, syncope, speech difficulty, light-headedness, numbness and headaches.   Hematological: Negative for adenopathy.   Psychiatric/Behavioral: Negative for confusion and suicidal ideas. The patient is not nervous/anxious.      Objective:     Vital Signs (Most Recent):  Temp: (!) 102.8 °F (39.3 °C) (10/29/19 1630)  Pulse: 73 (10/29/19 1630)  Resp: 18 (10/29/19 1630)  BP: 114/62 (10/29/19  1630)  SpO2: 97 % (10/29/19 1630) Vital Signs (24h Range):  Temp:  [98.5 °F (36.9 °C)-102.8 °F (39.3 °C)] 102.8 °F (39.3 °C)  Pulse:  [61-86] 73  Resp:  [18] 18  SpO2:  [97 %-100 %] 97 %  BP: (113-136)/(52-74) 114/62     Weight: 70.8 kg (156 lb)  Body mass index is 23.03 kg/m².    Intake/Output Summary (Last 24 hours) at 10/29/2019 1844  Last data filed at 10/29/2019 1700  Gross per 24 hour   Intake 6577.08 ml   Output --   Net 6577.08 ml      Physical Exam   Constitutional: He is oriented to person, place, and time. He appears well-developed and well-nourished. He is cooperative.   HENT:   Mouth/Throat: Oropharynx is clear and moist.   Eyes: Conjunctivae and EOM are normal. No scleral icterus.   Neck: Neck supple. No thyromegaly present.   Cardiovascular: Normal rate, regular rhythm, S1 normal, S2 normal, normal heart sounds and intact distal pulses. Exam reveals no gallop and no friction rub.   Pulmonary/Chest: Effort normal and breath sounds normal. No accessory muscle usage. No tachypnea. No respiratory distress. He has no wheezes. He has no rales.   Abdominal: Soft. Bowel sounds are normal. He exhibits no distension and no mass. There is no tenderness. There is no guarding.   Musculoskeletal: He exhibits no edema or deformity.   Neurological: He is alert and oriented to person, place, and time. He has normal strength. No sensory deficit.   Skin: Skin is warm and dry. Capillary refill takes less than 2 seconds. No rash noted.   Psychiatric: He has a normal mood and affect. His speech is normal and behavior is normal. Judgment and thought content normal. Cognition and memory are normal.   Nursing note and vitals reviewed.      Significant Labs:   Blood Culture: No results for input(s): LABBLOO in the last 48 hours.  CBC:   Recent Labs   Lab 10/28/19  0428 10/29/19  0509   WBC 7.78 7.99   HGB 8.8* 9.1*   HCT 27.8* 28.6*    282     CMP:   Recent Labs   Lab 10/28/19  0428 10/29/19  0509   * 136   K 4.0  3.7    101   CO2 24 25   GLU 94 114*   BUN 14 12   CREATININE 0.8 0.9   CALCIUM 8.3* 8.2*   ANIONGAP 10 10   EGFRNONAA >60.0 >60.0     Cardiac Markers: No results for input(s): CKMB, MYOGLOBIN, BNP, TROPISTAT in the last 48 hours.  Magnesium:   Recent Labs   Lab 10/28/19  0428 10/29/19  0509   MG 1.7 1.7     Troponin:   Recent Labs   Lab 10/27/19  2335   TROPONINI <0.030     TSH:   Recent Labs   Lab 10/27/19  1056   TSH 0.810     Urine Studies:   Recent Labs   Lab 10/29/19  0655   COLORU Yellow   APPEARANCEUA Clear   PHUR 7.0   SPECGRAV 1.015   PROTEINUA Trace*   GLUCUA Negative   KETONESU Negative   BILIRUBINUA Negative   OCCULTUA Negative   NITRITE Negative   UROBILINOGEN 2.0-3.0*   LEUKOCYTESUR Negative       Significant Imaging: I have reviewed all pertinent imaging results/findings within the past 24 hours.     10/27/2019 CTA chest non coronary PE study  NO CT EVIDENCE PULMONARY EMBOLI  Small bilateral pleural effusions  Stable pre-vascular adenopathy and gynecomastia  Right upper lobe with reticulonodular opacities in the    10/27/2019 X-RAY CHEST no acute cardiopulmonary abnormality

## 2019-10-29 NOTE — ASSESSMENT & PLAN NOTE
Tiny b/l pleural effusions  Stable  Monitor for respiratory distress in the setting of IV fluid hydration

## 2019-10-29 NOTE — PROGRESS NOTES
ELECTROLYTE MANAGEMENT PROGRESS NOTE    Objective:  59 y.o., male, Actual Body Weight = 70.8 kg (156 lb)    Lab Results   Component Value Date    CREATININE 0.9 10/29/2019    K 3.7 10/29/2019    MG 1.7 10/29/2019    PHOS 3.2 02/03/2019    CALCIUM 8.2 (L) 10/29/2019    ALBUMIN 2.9 (L) 10/27/2019     Diet Adult Regular (IDDSI Level 7)    IV Access:  Peripheral    Assessment:  Electrolyte Deficiency:  Hypokalemia (K+ < 4.0) and Hypomagnesemia (Mg < 1.9)  Calcium corrected for low albumin = 9.08    Patient type:  Acute Care (not in an ICU)    Plan:  Replace magnesium with Magnesium oxide 400mg PO every 4 hours x  2 doses.  Replace potassium with Potassium chloride 10 mEq PO x 1 dose.     Will continue to monitor electrolytes daily.    Juany Sellers PharmD 10/29/2019 10:57 AM  ID Clinical Pharmacist, Ext [phone] or [phone]

## 2019-10-29 NOTE — ASSESSMENT & PLAN NOTE
Aware  Follows Dr. Rihc   Has a f/u appointment on 11/04   CTA chest with stable prevascular adenopathy.  No evidence of new mediastinal or hilar adenopathy noted. Though there was a mention of mottled appearance of the bone marrow compatible with known nauseous metastases  Patient should not have evening fevers now that his Hodgkin disease has been treated

## 2019-10-29 NOTE — ASSESSMENT & PLAN NOTE
Concern for sepsis in the setting of hypotension, ?  Evening fevers related with Hodgkin's disease  Given history of lymphoma and recent chemotherapy patient is immunocompromised  Ordered blood and urine cultures- initial blood cultures negative  Ordered blood culture if patient spikes fever greater than 100.6  CTA chest with reticulonodular opacity in right upper lobe which is however stable  Empiric vancomycin and piperacillin/tazobactam

## 2019-10-29 NOTE — PLAN OF CARE

## 2019-10-30 ENCOUNTER — TELEPHONE (OUTPATIENT)
Dept: HEMATOLOGY/ONCOLOGY | Facility: CLINIC | Age: 59
End: 2019-10-30

## 2019-10-30 VITALS
HEIGHT: 69 IN | SYSTOLIC BLOOD PRESSURE: 114 MMHG | HEART RATE: 65 BPM | TEMPERATURE: 98 F | RESPIRATION RATE: 18 BRPM | OXYGEN SATURATION: 100 % | BODY MASS INDEX: 23.11 KG/M2 | WEIGHT: 156 LBS | DIASTOLIC BLOOD PRESSURE: 64 MMHG

## 2019-10-30 PROBLEM — R07.9 CHEST PAIN: Status: ACTIVE | Noted: 2019-10-30

## 2019-10-30 PROBLEM — E86.0 DEHYDRATION: Status: RESOLVED | Noted: 2019-10-27 | Resolved: 2019-10-30

## 2019-10-30 PROBLEM — I95.1 ORTHOSTATIC HYPOTENSION: Status: RESOLVED | Noted: 2019-10-27 | Resolved: 2019-10-30

## 2019-10-30 PROBLEM — R07.9 CHEST PAIN: Status: RESOLVED | Noted: 2019-10-30 | Resolved: 2019-10-30

## 2019-10-30 LAB
ANION GAP SERPL CALC-SCNC: 7 MMOL/L (ref 8–16)
BASOPHILS # BLD AUTO: 0.02 K/UL (ref 0–0.2)
BASOPHILS NFR BLD: 0.3 % (ref 0–1.9)
BUN SERPL-MCNC: 14 MG/DL (ref 6–20)
CALCIUM SERPL-MCNC: 8.4 MG/DL (ref 8.7–10.5)
CHLORIDE SERPL-SCNC: 102 MMOL/L (ref 95–110)
CO2 SERPL-SCNC: 28 MMOL/L (ref 23–29)
CREAT SERPL-MCNC: 1 MG/DL (ref 0.5–1.4)
DIFFERENTIAL METHOD: ABNORMAL
EOSINOPHIL # BLD AUTO: 0.4 K/UL (ref 0–0.5)
EOSINOPHIL NFR BLD: 4.8 % (ref 0–8)
ERYTHROCYTE [DISTWIDTH] IN BLOOD BY AUTOMATED COUNT: 18.1 % (ref 11.5–14.5)
EST. GFR  (AFRICAN AMERICAN): >60 ML/MIN/1.73 M^2
EST. GFR  (NON AFRICAN AMERICAN): >60 ML/MIN/1.73 M^2
ESTIMATED AVG GLUCOSE: 120 MG/DL (ref 68–131)
GLUCOSE SERPL-MCNC: 125 MG/DL (ref 70–110)
HBA1C MFR BLD HPLC: 5.8 % (ref 4.5–6.2)
HCT VFR BLD AUTO: 29 % (ref 40–54)
HGB BLD-MCNC: 9.2 G/DL (ref 14–18)
IMM GRANULOCYTES # BLD AUTO: 0.1 K/UL (ref 0–0.04)
IMM GRANULOCYTES NFR BLD AUTO: 1.3 % (ref 0–0.5)
LYMPHOCYTES # BLD AUTO: 1.3 K/UL (ref 1–4.8)
LYMPHOCYTES NFR BLD: 17 % (ref 18–48)
MAGNESIUM SERPL-MCNC: 1.8 MG/DL (ref 1.6–2.6)
MCH RBC QN AUTO: 25.5 PG (ref 27–31)
MCHC RBC AUTO-ENTMCNC: 31.7 G/DL (ref 32–36)
MCV RBC AUTO: 80 FL (ref 82–98)
MONOCYTES # BLD AUTO: 0.5 K/UL (ref 0.3–1)
MONOCYTES NFR BLD: 6 % (ref 4–15)
NEUTROPHILS # BLD AUTO: 5.6 K/UL (ref 1.8–7.7)
NEUTROPHILS NFR BLD: 70.6 % (ref 38–73)
NRBC BLD-RTO: 0 /100 WBC
PLATELET # BLD AUTO: 247 K/UL (ref 150–350)
PMV BLD AUTO: 8.5 FL (ref 9.2–12.9)
POTASSIUM SERPL-SCNC: 3.5 MMOL/L (ref 3.5–5.1)
RBC # BLD AUTO: 3.61 M/UL (ref 4.6–6.2)
SODIUM SERPL-SCNC: 137 MMOL/L (ref 136–145)
VANCOMYCIN TROUGH SERPL-MCNC: 17.3 UG/ML (ref 10–22)
WBC # BLD AUTO: 7.89 K/UL (ref 3.9–12.7)

## 2019-10-30 PROCEDURE — 80048 BASIC METABOLIC PNL TOTAL CA: CPT

## 2019-10-30 PROCEDURE — 80202 ASSAY OF VANCOMYCIN: CPT

## 2019-10-30 PROCEDURE — 36415 COLL VENOUS BLD VENIPUNCTURE: CPT

## 2019-10-30 PROCEDURE — 83735 ASSAY OF MAGNESIUM: CPT

## 2019-10-30 PROCEDURE — 63600175 PHARM REV CODE 636 W HCPCS: Performed by: INTERNAL MEDICINE

## 2019-10-30 PROCEDURE — 85025 COMPLETE CBC W/AUTO DIFF WBC: CPT

## 2019-10-30 PROCEDURE — 83036 HEMOGLOBIN GLYCOSYLATED A1C: CPT

## 2019-10-30 PROCEDURE — 12000002 HC ACUTE/MED SURGE SEMI-PRIVATE ROOM

## 2019-10-30 PROCEDURE — 25000003 PHARM REV CODE 250: Performed by: NURSE PRACTITIONER

## 2019-10-30 RX ORDER — POTASSIUM CHLORIDE 20 MEQ/1
20 TABLET, EXTENDED RELEASE ORAL ONCE
Status: DISCONTINUED | OUTPATIENT
Start: 2019-10-30 | End: 2019-10-30 | Stop reason: HOSPADM

## 2019-10-30 RX ORDER — LEVOFLOXACIN 750 MG/1
750 TABLET ORAL DAILY
Qty: 7 TABLET | Refills: 0 | Status: SHIPPED | OUTPATIENT
Start: 2019-10-30 | End: 2019-11-06

## 2019-10-30 RX ORDER — LANOLIN ALCOHOL/MO/W.PET/CERES
400 CREAM (GRAM) TOPICAL EVERY 4 HOURS
Status: DISCONTINUED | OUTPATIENT
Start: 2019-10-30 | End: 2019-10-30 | Stop reason: HOSPADM

## 2019-10-30 RX ADMIN — ACETAMINOPHEN 650 MG: 325 TABLET ORAL at 04:10

## 2019-10-30 RX ADMIN — VANCOMYCIN HYDROCHLORIDE 1250 MG: 1 INJECTION, POWDER, LYOPHILIZED, FOR SOLUTION INTRAVENOUS at 02:10

## 2019-10-30 RX ADMIN — PIPERACILLIN AND TAZOBACTAM 3.38 G: 3; .375 INJECTION, POWDER, LYOPHILIZED, FOR SOLUTION INTRAVENOUS; PARENTERAL at 11:10

## 2019-10-30 RX ADMIN — PIPERACILLIN AND TAZOBACTAM 3.38 G: 3; .375 INJECTION, POWDER, LYOPHILIZED, FOR SOLUTION INTRAVENOUS; PARENTERAL at 04:10

## 2019-10-30 NOTE — PROGRESS NOTES
ELECTROLYTE MANAGEMENT PROGRESS NOTE    Objective:  59 y.o., male, Actual Body Weight = 70.8 kg (156 lb)    Lab Results   Component Value Date    CREATININE 1.0 10/30/2019    K 3.5 10/30/2019    MG 1.8 10/30/2019    PHOS 3.2 02/03/2019    CALCIUM 8.4 (L) 10/30/2019    ALBUMIN 2.9 (L) 10/27/2019     Diet Adult Regular (IDDSI Level 7)    IV Access:  Peripheral    Assessment:  Electrolyte Deficiency:  Hypokalemia (K+ < 4.0) and Hypomagnesemia (Mg < 1.9)  Calcium corrected for low albumin = 9.22    Patient type:  Acute Care (not in an ICU)    Plan:  Replace magnesium with Magnesium oxide 400 mg PO every 4 hours x  2 doses.  Replace potassium with Potassium chloride 20 mEq PO x 1  dose.     Will continue to monitor electrolytes daily.    Juany Sellers, PharmD 10/30/2019 12:24 PM  ID Clinical Pharmacist, Ext [phone] or [phone]

## 2019-10-30 NOTE — DISCHARGE SUMMARY
[facility] Medicine  Discharge Summary      Patient Name: Heriberto Keys  MRN: [id number]  Admission Date: 10/27/2019  Hospital Length of Stay: 0 days  Discharge Date and Time:  10/30/2019 4:48 PM  Attending Physician: Clovis Angel MD   Discharging Provider: Clovis Angel MD  Primary Care Provider: Jagruti Davis NP      HPI:   Mr. Keys presents today with complaints of weakness. It is severe. It is associated with LEON, dizziness, and chest tightness. He denies fever, chills, N/V/D, or LOC. He has a history of Hodgkin's Lymphoma. His last chemo was about 3 months ago. He states at home, he's been having trouble walking long distances, and the more he walks the weaker his legs get and feels as if he is going to pass out. He's been taken off of all of his home meds and was recently on a medrol dose pack for a rash, which healed. He had less symptoms while on the steroids. He lives with a friend who is elderly and she is concerned that if he feels, she will be unable to get him up.     * No surgery found *      Hospital Course:   Mr. Keys was admitted on 10/27/2019 with complaints of weakness. It is severe. It is associated with LEON, dizziness, and chest tightness. He denies fever, chills, N/V/D, or LOC. He has a history of Hodgkin's Lymphoma. His last chemo was about 3 months ago. He states at home, he's been having trouble walking long distances, and the more he walks the weaker his legs get and feels as if he is going to pass out. He's been taken off of all of his home meds and was recently on a medrol dose pack for a rash, which healed. He had less symptoms while on the steroids. He lives with a friend who is elderly and she is concerned that if he feels, she will be unable to get him up.   Patient was admitted, IV fluids started for his orthostatic hypotension and antibiotic vanc and Zosyn  While in the hospital patient spiked fever almost every evening 101-102 degrees, I  ordered blood cultures to be collected if he spikes fever but somehow it got over looked.  Patient informed that he gets evening fever every day which goes away.  Patient also mentioned that Dr. Rich his primary oncologist aware of this fever spikes.  I did discussed with patient that if he allows I would like to talk to Dr. Rich regarding this before I can discharge as patient has been insisting on going home and not willing to stay  10/30/2019:  I spoke to Dr. iRch personally discussed patient's case, his recent admission with weakness and orthostatic hypotension.  Discussed his fever spikes that we are noticing every day, also discussed his recent CTA and his report regarding the abnormal finding in his bones.  Dr. Rcih informed me that patient has been a difficult case.  Informed that patient completed his 6 of ABVB cycles on July 2, 2019 with her.  Patient recently had PET scan done which the report is not available, but she does not think that the bone marrow finding on CTA is related to his Hodgkin's lymphoma/disease.   Dr. Rich recommended that I switch his antibiotics to oral Levaquin 750 mg for 7 days and discharge him home or he will leave AMA.  Dr. Rich also recommended to order a sinus x-ray just to rule out acute sinusitis as he has a history of fever with sinus infection in the past.  Also recommended that even if his x-rays are normal, to switch him to oral levofloxacin and discharge him home today and she will see him this week in her office to follow up.  Dr. Tarango asked me to document this discussion in his note to refer this decision making to her  I discuss with the patient that I have spoke to Dr. Rich and her recommendation.  Patient did inform me that he would not stay another night and he wants to go home and he will call tomorrow morning Dr. Rich's office and make an appointment to see her  I will discharge patient on Levaquin 750 mg p.o. daily for 7 days       Consults:  Dr. Rich on phone  (see hospital course)  Consults (From admission, onward)        Status Ordering Provider     Pharmacy to dose Vancomycin consult  Once     Provider:  (Not yet assigned)    Acknowledged ABDULLAHI HEALY          No new Assessment & Plan notes have been filed under this hospital service since the last note was generated.  Service: Hospital Medicine    Final Active Diagnoses:    Diagnosis Date Noted POA    PRINCIPAL PROBLEM:  Fever [R50.9] 10/28/2019 No    Anemia [D64.9] 08/12/2019 Yes     Chronic    Bilateral pleural effusion [J90] 05/07/2019 Yes    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 [C81.18] 01/08/2019 Yes      Problems Resolved During this Admission:    Diagnosis Date Noted Date Resolved POA    Chest pain [R07.9] 10/30/2019 10/30/2019 Yes    Dehydration [E86.0] 10/27/2019 10/30/2019 Yes    Orthostatic hypotension [I95.1] 10/27/2019 10/30/2019 Yes       Discharged Condition: fair    Disposition: Home or Self Care    Follow Up:  Follow-up Information     Amanda Rich MD.    Specialty:  Hematology and Oncology  Why:  Post hospital discharge follow-up For fever spikes and review of PET scan (case discussed with Dr Rich and recommended pt sees her this week  Contact information:  [address] [id number] [phone]                 Patient Instructions:      Diet Adult Regular     Notify your health care provider if you experience any of the following:  persistent nausea and vomiting or diarrhea     Notify your health care provider if you experience any of the following:  persistent dizziness, light-headedness, or visual disturbances     Notify your health care provider if you experience any of the following:  increased confusion or weakness     Activity as tolerated       Significant Diagnostic Studies: Labs:   CMP   Recent Labs   Lab 10/29/19  0509 10/30/19  1140    137   K 3.7 3.5    102   CO2 25 28   * 125*   BUN 12 14   CREATININE  0.9 1.0   CALCIUM 8.2* 8.4*   ANIONGAP 10 7*   ESTGFRAFRICA >60.0 >60.0   EGFRNONAA >60.0 >60.0   , CBC   Recent Labs   Lab 10/29/19  0509 10/30/19  1140   WBC 7.99 7.89   HGB 9.1* 9.2*   HCT 28.6* 29.0*    247    and A1C:   Recent Labs   Lab 08/11/19  2200 10/30/19  1141   HGBA1C 5.7 5.8       Pending Diagnostic Studies:     None         Medications:  Reconciled Home Medications:      Medication List      START taking these medications    levoFLOXacin 750 MG tablet  Commonly known as:  LEVAQUIN  Take 1 tablet (750 mg total) by mouth once daily. for 7 days            Indwelling Lines/Drains at time of discharge:   Lines/Drains/Airways     Central Venous Catheter Line                 Port A Cath Single Lumen right subclavian -- days                Time spent on the discharge of patient: 36 minutes  Patient was seen and examined on the date of discharge and determined to be suitable for discharge.         Clovis Angel MD  Department of Hospital Medicine  [facility]

## 2019-10-30 NOTE — HOSPITAL COURSE
Mr. Keys was admitted on 10/27/2019 with complaints of weakness. It is severe. It is associated with LEON, dizziness, and chest tightness. He denies fever, chills, N/V/D, or LOC. He has a history of Hodgkin's Lymphoma. His last chemo was about 3 months ago. He states at home, he's been having trouble walking long distances, and the more he walks the weaker his legs get and feels as if he is going to pass out. He's been taken off of all of his home meds and was recently on a medrol dose pack for a rash, which healed. He had less symptoms while on the steroids. He lives with a friend who is elderly and she is concerned that if he feels, she will be unable to get him up.   Patient was admitted, IV fluids started for his orthostatic hypotension and antibiotic vanc and Zosyn  While in the hospital patient spiked fever almost every evening 101-102 degrees, I ordered blood cultures to be collected if he spikes fever but somehow it got over looked.  Patient informed that he gets evening fever every day which goes away.  Patient also mentioned that Dr. Rich his primary oncologist aware of this fever spikes.  I did discussed with patient that if he allows I would like to talk to Dr. Rich regarding this before I can discharge as patient has been insisting on going home and not willing to stay  10/30/2019:  I spoke to Dr. Rich personally discussed patient's case, his recent admission with weakness and orthostatic hypotension.  Discussed his fever spikes that we are noticing every day, also discussed his recent CTA and his report regarding the abnormal finding in his bones.  Dr. Rich informed me that patient has been a difficult case.  Informed that patient completed his 6 of ABVB cycles on July 2, 2019 with her.  Patient recently had PET scan done which the report is not available, but she does not think that the bone marrow finding on CTA is related to his Hodgkin's lymphoma/disease.   Dr. Rich recommended that I switch his  antibiotics to oral Levaquin 750 mg for 7 days and discharge him home or he will leave AMA.  Dr. Rich also recommended to order a sinus x-ray just to rule out acute sinusitis as he has a history of fever with sinus infection in the past.  Also recommended that even if his x-rays are normal, to switch him to oral levofloxacin and discharge him home today and she will see him this week in her office to follow up.  Dr. Tarango asked me to document this discussion in his note to refer this decision making to her  I discuss with the patient that I have spoke to Dr. Rich and her recommendation.  Patient did inform me that he would not stay another night and he wants to go home and he will call tomorrow morning Dr. Rich's office and make an appointment to see her  I will discharge patient on Levaquin 750 mg p.o. daily for 7 days

## 2019-11-01 LAB
BACTERIA BLD CULT: NORMAL
BACTERIA BLD CULT: NORMAL

## 2019-11-04 ENCOUNTER — OFFICE VISIT (OUTPATIENT)
Dept: HEMATOLOGY/ONCOLOGY | Facility: CLINIC | Age: 59
End: 2019-11-04
Payer: MEDICAID

## 2019-11-04 VITALS
HEART RATE: 70 BPM | TEMPERATURE: 99 F | DIASTOLIC BLOOD PRESSURE: 59 MMHG | BODY MASS INDEX: 22.56 KG/M2 | HEIGHT: 69 IN | OXYGEN SATURATION: 98 % | WEIGHT: 152.31 LBS | RESPIRATION RATE: 20 BRPM | SYSTOLIC BLOOD PRESSURE: 104 MMHG

## 2019-11-04 DIAGNOSIS — C81.18 NODULAR SCLEROSIS HODGKIN LYMPHOMA OF LYMPH NODES OF MULTIPLE REGIONS: ICD-10-CM

## 2019-11-04 DIAGNOSIS — R53.1 WEAKNESS: Primary | ICD-10-CM

## 2019-11-04 DIAGNOSIS — Z09 ONCOLOGY FOLLOW-UP ENCOUNTER: ICD-10-CM

## 2019-11-04 DIAGNOSIS — C81.18 NODULAR SCLEROSIS HODGKIN LYMPHOMA OF LYMPH NODES OF MULTIPLE REGIONS: Primary | ICD-10-CM

## 2019-11-04 DIAGNOSIS — R41.0 CONFUSION: ICD-10-CM

## 2019-11-04 PROCEDURE — 99215 PR OFFICE/OUTPT VISIT, EST, LEVL V, 40-54 MIN: ICD-10-PCS | Mod: S$PBB,,, | Performed by: INTERNAL MEDICINE

## 2019-11-04 PROCEDURE — 99999 PR PBB SHADOW E&M-EST. PATIENT-LVL III: ICD-10-PCS | Mod: PBBFAC,,, | Performed by: INTERNAL MEDICINE

## 2019-11-04 PROCEDURE — 99999 PR PBB SHADOW E&M-EST. PATIENT-LVL III: CPT | Mod: PBBFAC,,, | Performed by: INTERNAL MEDICINE

## 2019-11-04 PROCEDURE — 99213 OFFICE O/P EST LOW 20 MIN: CPT | Mod: PBBFAC,PO | Performed by: INTERNAL MEDICINE

## 2019-11-04 PROCEDURE — 99215 OFFICE O/P EST HI 40 MIN: CPT | Mod: S$PBB,,, | Performed by: INTERNAL MEDICINE
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
## 2019-11-05 ENCOUNTER — TELEPHONE (OUTPATIENT)
Dept: HEMATOLOGY/ONCOLOGY | Facility: CLINIC | Age: 59
End: 2019-11-05

## 2019-11-05 DIAGNOSIS — C81.18 NODULAR SCLEROSIS HODGKIN LYMPHOMA OF LYMPH NODES OF MULTIPLE REGIONS: Primary | ICD-10-CM

## 2019-11-05 NOTE — TELEPHONE ENCOUNTER
Spoke with Haylee Olivera Rn at [facility] to schedule the lumbar puncture for patient. Scheduled for 11/7/19 at 9:30 AM.  Called and spoke with Jessica and advised of time to report to [facility] for procedure. Verbalized understanding

## 2019-11-06 DIAGNOSIS — C81.18 NODULAR SCLEROSIS HODGKIN LYMPHOMA OF LYMPH NODES OF MULTIPLE REGIONS: Primary | ICD-10-CM

## 2019-11-06 NOTE — NURSING
Contacted patient to go over instructions for scheduled lumbar puncture on 11/7/2019 @ 1100. Patient instructed to arrive no later than 0930 am to outpatient registration at [facility], then upstairs for pre-op assessment, pre-op labs and IV insertion. Pt is not currently taking aspirin or any blood thinners. Patient instructed to have nothing to eat or drink after midnight the night before the procedure except, a sip of water with essential medications the morning of.  Instructed patient to bathe the night before and morning of procedure with anti bacterial soap or Hibiclens. Instructed to patient to make arrangements in advance for transportation home by a responsible adult. Patient educated on all additional pre-op instructions per policy. Pt verbalized understanding.

## 2019-11-07 ENCOUNTER — HOSPITAL ENCOUNTER (OUTPATIENT)
Facility: HOSPITAL | Age: 59
Discharge: HOME | End: 2019-11-07
Attending: INTERNAL MEDICINE | Admitting: RADIOLOGY
Payer: MEDICAID

## 2019-11-07 ENCOUNTER — HOSPITAL ENCOUNTER (OUTPATIENT)
Dept: RADIOLOGY | Facility: HOSPITAL | Age: 59
Discharge: HOME | End: 2019-11-07
Attending: INTERNAL MEDICINE
Payer: MEDICAID

## 2019-11-07 VITALS
SYSTOLIC BLOOD PRESSURE: 120 MMHG | WEIGHT: 152 LBS | BODY MASS INDEX: 22.51 KG/M2 | DIASTOLIC BLOOD PRESSURE: 62 MMHG | OXYGEN SATURATION: 98 % | HEIGHT: 69 IN | HEART RATE: 62 BPM | TEMPERATURE: 98 F | RESPIRATION RATE: 20 BRPM

## 2019-11-07 DIAGNOSIS — C81.18 NODULAR SCLEROSIS HODGKIN LYMPHOMA OF LYMPH NODES OF MULTIPLE REGIONS: ICD-10-CM

## 2019-11-07 DIAGNOSIS — R41.0 CONFUSION: ICD-10-CM

## 2019-11-07 DIAGNOSIS — R53.1 WEAKNESS: ICD-10-CM

## 2019-11-07 DIAGNOSIS — Z09 ONCOLOGY FOLLOW-UP ENCOUNTER: ICD-10-CM

## 2019-11-07 LAB
CLARITY CSF: CLEAR
COLOR CSF: COLORLESS
GLUCOSE CSF-MCNC: 50 MG/DL (ref 40–70)
PROT CSF-MCNC: 44 MG/DL (ref 15–40)
RBC # CSF: 541 /CU MM
SPECIMEN VOL CSF: 4 ML
WBC # CSF: 1 /CU MM (ref 0–5)

## 2019-11-07 PROCEDURE — 77003 FL LUMBAR PUNCTURE: ICD-10-PCS | Mod: 26,,, | Performed by: RADIOLOGY

## 2019-11-07 PROCEDURE — 71000015 HC POSTOP RECOV 1ST HR

## 2019-11-07 PROCEDURE — 87205 SMEAR GRAM STAIN: CPT

## 2019-11-07 PROCEDURE — 99900103 DSU ONLY-NO CHARGE-INITIAL HR (STAT)

## 2019-11-07 PROCEDURE — 87116 MYCOBACTERIA CULTURE: CPT

## 2019-11-07 PROCEDURE — 82945 GLUCOSE OTHER FLUID: CPT

## 2019-11-07 PROCEDURE — 99900104 DSU ONLY-NO CHARGE-EA ADD'L HR (STAT)

## 2019-11-07 PROCEDURE — 62270 DX LMBR SPI PNXR: CPT | Mod: ,,, | Performed by: RADIOLOGY

## 2019-11-07 PROCEDURE — 84157 ASSAY OF PROTEIN OTHER: CPT

## 2019-11-07 PROCEDURE — 77003 FLUOROGUIDE FOR SPINE INJECT: CPT | Mod: 26,,, | Performed by: RADIOLOGY

## 2019-11-07 PROCEDURE — 87070 CULTURE OTHR SPECIMN AEROBIC: CPT

## 2019-11-07 PROCEDURE — 89051 BODY FLUID CELL COUNT: CPT

## 2019-11-07 PROCEDURE — 87206 SMEAR FLUORESCENT/ACID STAI: CPT

## 2019-11-07 PROCEDURE — 62270 FL LUMBAR PUNCTURE: ICD-10-PCS | Mod: ,,, | Performed by: RADIOLOGY

## 2019-11-07 PROCEDURE — 25000003 PHARM REV CODE 250: Performed by: RADIOLOGY

## 2019-11-07 PROCEDURE — 87102 FUNGUS ISOLATION CULTURE: CPT

## 2019-11-07 PROCEDURE — 62270 DX LMBR SPI PNXR: CPT

## 2019-11-07 RX ORDER — LIDOCAINE HYDROCHLORIDE 10 MG/ML
1 INJECTION, SOLUTION EPIDURAL; INFILTRATION; INTRACAUDAL; PERINEURAL ONCE
Status: COMPLETED | OUTPATIENT
Start: 2019-11-07 | End: 2019-11-07

## 2019-11-07 RX ADMIN — LIDOCAINE HYDROCHLORIDE 60 MG: 10 INJECTION, SOLUTION EPIDURAL; INFILTRATION; INTRACAUDAL; PERINEURAL at 12:11

## 2019-11-07 NOTE — PROGRESS NOTES
Radiology Post-Procedure Note    Pre Op Diagnosis: fever, for lumbar puncture  Post Op Diagnosis: Same    Procedure: lumbar puncture under fluoroscopy    Procedure performed by: Dr. Ingris Kirkpatrick    Written Informed Consent Obtained: Yes  Specimen Removed: YES 4 cc CSF sent to lab, initially clear, turned minimally blood tinged at end after the patient moved, and the CSF from the tubing.. Patient monitored on floor prior to discharge home to followup with his MD, Dr. Rich.  Estimated Blood Loss: Minimal    Findings:   Successful lumbar puncture under fluoroscopy.    Patient tolerated procedure well.    @SIG@

## 2019-11-07 NOTE — DISCHARGE INSTRUCTIONS
To follow up with patient's MD, discharge instructions given.  Follow with patient's physician for results and Notify physician or come to ER for any problems / concerns including new or increasing back,leg,abdominal, shoulder, chest pain, leg weakness, paresthesia, bowel or bladder problems , fever, shortness of breath. Resume previous diet as tolerated

## 2019-11-07 NOTE — PLAN OF CARE
Discharged home with Ms. Lopez family friend in no pain or distress, all valuables returned, IV removed, handouts provided and signs and symptoms to report were reviewed

## 2019-11-07 NOTE — OR NURSING
1115- Pt taken by Merly Carr via stretcher to radiology. Visitor director to waiting room. Belongings in DSU.

## 2019-11-08 NOTE — NURSING
Contacted patient to go over instructions for scheduled bone marrow on 11/11/2019 @ 1100. Patient instructed to arrive no later than 0930 am to outpatient registration, then upstairs for pre-op assessment, pre-op labs and IV insertion. Pt is not currently taking aspirin or any blood thinners. Patient instructed to have nothing to eat or drink after midnight the night before the procedure except, a sip of water with essential medications the morning of.  Instructed patient to bathe the night before and morning of procedure with anti bacterial soap or Hibiclens. Instructed to patient to make arrangements in advance for transportation home by a responsible adult. Patient educated on all additional pre-op instructions per policy. Pt verbalized understanding.

## 2019-11-11 ENCOUNTER — HOSPITAL ENCOUNTER (OUTPATIENT)
Dept: RADIOLOGY | Facility: HOSPITAL | Age: 59
Discharge: HOME | End: 2019-11-11
Attending: INTERNAL MEDICINE
Payer: MEDICAID

## 2019-11-11 ENCOUNTER — HOSPITAL ENCOUNTER (OUTPATIENT)
Facility: HOSPITAL | Age: 59
Discharge: HOME | End: 2019-11-11
Attending: INTERNAL MEDICINE | Admitting: RADIOLOGY
Payer: MEDICAID

## 2019-11-11 VITALS
OXYGEN SATURATION: 97 % | SYSTOLIC BLOOD PRESSURE: 132 MMHG | WEIGHT: 152 LBS | HEIGHT: 69 IN | DIASTOLIC BLOOD PRESSURE: 66 MMHG | BODY MASS INDEX: 22.51 KG/M2 | RESPIRATION RATE: 16 BRPM | HEART RATE: 67 BPM | TEMPERATURE: 98 F

## 2019-11-11 VITALS
OXYGEN SATURATION: 98 % | DIASTOLIC BLOOD PRESSURE: 60 MMHG | SYSTOLIC BLOOD PRESSURE: 117 MMHG | RESPIRATION RATE: 16 BRPM | HEART RATE: 68 BPM

## 2019-11-11 DIAGNOSIS — M79.605 PAIN IN BOTH LOWER EXTREMITIES: ICD-10-CM

## 2019-11-11 DIAGNOSIS — C81.18 NODULAR SCLEROSIS HODGKIN LYMPHOMA OF LYMPH NODES OF MULTIPLE REGIONS: Primary | ICD-10-CM

## 2019-11-11 DIAGNOSIS — R53.1 WEAKNESS: ICD-10-CM

## 2019-11-11 DIAGNOSIS — C81.18 NODULAR SCLEROSIS HODGKIN LYMPHOMA OF LYMPH NODES OF MULTIPLE REGIONS: ICD-10-CM

## 2019-11-11 DIAGNOSIS — R21 RASH: ICD-10-CM

## 2019-11-11 DIAGNOSIS — R89.8 ABNORMAL BONE MARROW EXAMINATION: ICD-10-CM

## 2019-11-11 DIAGNOSIS — M79.604 PAIN IN BOTH LOWER EXTREMITIES: ICD-10-CM

## 2019-11-11 PROCEDURE — 88342 IMHCHEM/IMCYTCHM 1ST ANTB: CPT | Mod: 26,59,, | Performed by: PATHOLOGY

## 2019-11-11 PROCEDURE — 99900103 DSU ONLY-NO CHARGE-INITIAL HR (STAT)

## 2019-11-11 PROCEDURE — 88305 TISSUE EXAM BY PATHOLOGIST: CPT | Performed by: PATHOLOGY

## 2019-11-11 PROCEDURE — 85097 BONE MARROW INTERPRETATION: CPT | Mod: ,,, | Performed by: PATHOLOGY

## 2019-11-11 PROCEDURE — 77012 CT DIAGNOSTIC BONE MARROW BIOPSY(IES) WITH ASPRIATION LEFT (XPD): ICD-10-PCS | Mod: 26,,, | Performed by: RADIOLOGY

## 2019-11-11 PROCEDURE — 71000016 HC POSTOP RECOV ADDL HR

## 2019-11-11 PROCEDURE — 88341 PR IHC OR ICC EACH ADD'L SINGLE ANTIBODY  STAINPR: ICD-10-PCS | Mod: 26,59,, | Performed by: PATHOLOGY

## 2019-11-11 PROCEDURE — 88305 TISSUE EXAM BY PATHOLOGIST: CPT | Mod: 26,,, | Performed by: PATHOLOGY

## 2019-11-11 PROCEDURE — 38222 CT DIAGNOSTIC BONE MARROW BIOPSY(IES) WITH ASPRIATION LEFT (XPD): ICD-10-PCS | Mod: ,,, | Performed by: RADIOLOGY

## 2019-11-11 PROCEDURE — 77012 CT SCAN FOR NEEDLE BIOPSY: CPT | Mod: 26,,, | Performed by: RADIOLOGY

## 2019-11-11 PROCEDURE — 88311 PR  DECALCIFY TISSUE: ICD-10-PCS | Mod: 26,,, | Performed by: PATHOLOGY

## 2019-11-11 PROCEDURE — 99900104 DSU ONLY-NO CHARGE-EA ADD'L HR (STAT)

## 2019-11-11 PROCEDURE — 88341 IMHCHEM/IMCYTCHM EA ADD ANTB: CPT | Mod: 59 | Performed by: PATHOLOGY

## 2019-11-11 PROCEDURE — 38222 DX BONE MARROW BX & ASPIR: CPT | Mod: TC,LT

## 2019-11-11 PROCEDURE — 88237 TISSUE CULTURE BONE MARROW: CPT

## 2019-11-11 PROCEDURE — C1830 POWER BONE MARROW BX NEEDLE: HCPCS

## 2019-11-11 PROCEDURE — 38222 DX BONE MARROW BX & ASPIR: CPT | Mod: ,,, | Performed by: RADIOLOGY

## 2019-11-11 PROCEDURE — 88342 CHG IMMUNOCYTOCHEMISTRY: ICD-10-PCS | Mod: 26,59,, | Performed by: PATHOLOGY

## 2019-11-11 PROCEDURE — 88313 SPECIAL STAINS GROUP 2: CPT | Mod: 59 | Performed by: PATHOLOGY

## 2019-11-11 PROCEDURE — 88313 PR  SPECIAL STAINS,GROUP II: ICD-10-PCS | Mod: 26,,, | Performed by: PATHOLOGY

## 2019-11-11 PROCEDURE — 85097 PR  BONE MARROW,SMEAR INTERPRETATION: ICD-10-PCS | Mod: ,,, | Performed by: PATHOLOGY

## 2019-11-11 PROCEDURE — 88311 DECALCIFY TISSUE: CPT | Mod: 26,,, | Performed by: PATHOLOGY

## 2019-11-11 PROCEDURE — 71000015 HC POSTOP RECOV 1ST HR

## 2019-11-11 PROCEDURE — 77012 CT SCAN FOR NEEDLE BIOPSY: CPT

## 2019-11-11 PROCEDURE — 88189 FLOWCYTOMETRY/READ 16 & >: CPT | Mod: ,,, | Performed by: PATHOLOGY

## 2019-11-11 PROCEDURE — 88313 SPECIAL STAINS GROUP 2: CPT | Mod: 26,,, | Performed by: PATHOLOGY

## 2019-11-11 PROCEDURE — 63600175 PHARM REV CODE 636 W HCPCS: Performed by: RADIOLOGY

## 2019-11-11 PROCEDURE — 88185 FLOWCYTOMETRY/TC ADD-ON: CPT | Mod: 59 | Performed by: PATHOLOGY

## 2019-11-11 PROCEDURE — 88341 IMHCHEM/IMCYTCHM EA ADD ANTB: CPT | Mod: 26,59,, | Performed by: PATHOLOGY

## 2019-11-11 PROCEDURE — 88305 TISSUE EXAM BY PATHOLOGIST: ICD-10-PCS | Mod: 26,,, | Performed by: PATHOLOGY

## 2019-11-11 PROCEDURE — 88189 PR  FLOWCYTOMETRY/READ, 16 & > MARKERS: ICD-10-PCS | Mod: ,,, | Performed by: PATHOLOGY

## 2019-11-11 PROCEDURE — 88184 FLOWCYTOMETRY/ TC 1 MARKER: CPT | Performed by: PATHOLOGY

## 2019-11-11 PROCEDURE — 88342 IMHCHEM/IMCYTCHM 1ST ANTB: CPT | Performed by: PATHOLOGY

## 2019-11-11 RX ORDER — LIDOCAINE HYDROCHLORIDE 10 MG/ML
1 INJECTION, SOLUTION EPIDURAL; INFILTRATION; INTRACAUDAL; PERINEURAL ONCE
Status: DISCONTINUED | OUTPATIENT
Start: 2019-11-11 | End: 2019-11-12 | Stop reason: HOSPADM

## 2019-11-11 RX ORDER — MIDAZOLAM HYDROCHLORIDE 1 MG/ML
1 INJECTION INTRAMUSCULAR; INTRAVENOUS
Status: DISCONTINUED | OUTPATIENT
Start: 2019-11-11 | End: 2019-11-12 | Stop reason: HOSPADM

## 2019-11-11 RX ORDER — FENTANYL CITRATE 50 UG/ML
25 INJECTION, SOLUTION INTRAMUSCULAR; INTRAVENOUS
Status: DISCONTINUED | OUTPATIENT
Start: 2019-11-11 | End: 2019-11-12 | Stop reason: HOSPADM

## 2019-11-11 RX ADMIN — FENTANYL CITRATE 25 MCG: 0.05 INJECTION, SOLUTION INTRAMUSCULAR; INTRAVENOUS at 12:11

## 2019-11-11 RX ADMIN — MIDAZOLAM HYDROCHLORIDE 1 MG: 1 INJECTION, SOLUTION INTRAMUSCULAR; INTRAVENOUS at 12:11

## 2019-11-11 NOTE — NURSING
Patient transferred via stretcher from DSU to CT 2.  Consents obtained by Radiologist   and Time out complete. Bone Marrow biopsy performed by . Versed 1 mg and Fentanyl 25 mcg were administered IV- Pathology Nessa whitfield present, received specimens and verified acceptable sample  Vital signs were monitored and remained stable for duration of procedure- pt tolerated procedure well- Bandaid applied to lower back CDI- Report called to post op nurse [place]. pt transported via stretcher to post op recovery.

## 2019-11-11 NOTE — DISCHARGE INSTRUCTIONS
Recovery After Procedural Sedation (Adult)  You have been given medicine by vein to make you sleep during your surgery. This may have included both a pain medicine and sleeping medicine. Most of the effects have worn off. But you may still have some drowsiness for the next 6 to 8 hours.  Home care  Follow these guidelines when you get home:  · For the next 8 hours, you should be watched by a responsible adult. This person should make sure your condition is not getting worse.  · Don't drink any alcohol for the next 24 hours.  · Don't drive, operate dangerous machinery, or make important business or personal decisions during the next 24 hours.  Note: Your healthcare provider may tell you not to take any medicine by mouth for pain or sleep in the next 4 hours. These medicines may react with the medicines you were given in the hospital. This could cause a much stronger response than usual.  Follow-up care  Follow up with your healthcare provider if you are not alert and back to your usual level of activity within 12 hours.  When to seek medical advice  Call your healthcare provider right away if any of these occur:  · Drowsiness gets worse  · Weakness or dizziness gets worse  · Repeated vomiting  · You can't be awakened   Date Last Reviewed: 10/18/2016  © 8391-9980 [employer]. [address] [place] [address], [place] [address]. All rights reserved. This information is not intended as a substitute for professional medical care. Always follow your healthcare professional's instructions.    Post op instructions for prevention of DVT  What is deep vein thrombosis?  Deep vein thrombosis (DVT) is the medical term for blood clots in the deep veins of the leg.  These blood clots can be dangerous.  A DVT can block a blood vessel and keep blood from getting where it needs to go.  Another problem is that the clot can travel to other parts of the body such as the lungs.  A clot that travels to the lungs is called a  pulmonary embolus (PE) and can cause serious problems with breathing which can lead to death.  Am I at risk for DVT/PE?  If you are not very active, you are at risk of DVT.  Anyone confined to bed, sitting for long periods of time, recovering from surgery, etc. increases the risk of DVT.  Other risk factors are cancer diagnosis, certain medications, estrogen replacement in any form,older age, obesity, pregnancy, smoking, history of clotting disorders, and dehydration.  How will I know if I have a DVT?   Swelling in the lower leg   Pain   Warmth, redness, hardness or bulging of the vein  If you have any of these symptoms, call your doctors office right away.  Some people will not have any symptoms until the clot moves to the lungs.  What are the symptoms of a PE?   Panting, shortness of breath, or trouble breathing   Sharp, knife-like chest pain when you breathe   Coughing or coughing up blood   Rapid heartbeat  If you have any of these symptoms or get worse quickly, call 911 for emergency treatment.  How can I prevent a DVT?   Avoid long periods of inactivity and dont cross your legs--get up and walk around every hour or so.   Stay active--walking after surgery is highly encouraged.  This means you should get out of the house and walk in the neighborhood.  Going up and down stairs will not impair healing (unless advised against such activity by your doctor).     Drink plenty of noncaffeinated, nonalcoholic fluids each day to prevent dehydration.   Wear special support stockings, if they have been advised by your doctor.   If you travel, stop at least once an hour and walk around.   Avoid smoking (assistance with stopping is available through your healthcare provider)  Always notify your doctor if you are not able to follow the post operative instructions that are given to you at the time of discharge.  It may be necessary to prescribe one of the medications available to prevent DVT.    We hope your  stay was comfortable as you heal now, mend and rest.    For we have enjoyed taking care of you by giving your our best.    And as you get better, by regaining your health and strength;   We count it as a privilege to have served you and hope your time at [facility] was well spent.      Thank  You!!!

## 2019-11-11 NOTE — INTERVAL H&P NOTE
The patient has been examined and the H&P has been reviewed:    I concur with the findings and no changes have occurred since H&P was written.    No probs with sedation.  No new c/o on ROS.  RRR and normal BS.  Plan for CT guided bone marrow biopsy with mod sedation.    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

## 2019-11-11 NOTE — PLAN OF CARE
Discharge the pt home with his friend Mo  Discussed discharge instructions all questions answered/

## 2019-11-11 NOTE — PLAN OF CARE
attempted to call and text pt  unable to reach her yet  Jessica  [phone]    The pt is also using his phone also to reach his ride.    He said he has another option but is  Not sure because his friend will have to close his store

## 2019-11-11 NOTE — PLAN OF CARE
Pt got in touch with His other friend and he will be here in 15 minutes  Dressing dry and tact to his back area, pt verbalized understanding on discharge instructions and will be staying with another friend.

## 2019-11-12 NOTE — DISCHARGE SUMMARY
Radiology Post-Procedure Note    Pre Op Diagnosis: R/O Lymphoma    Post Op Diagnosis:  Same    Procedure:left iliac bone biopsy    Procedure performed by: MD Deondre    Written Informed Consent Obtained: Yes    Specimen Removed: YES marrow aspirate and core    Estimated Blood Loss: Minimal    Findings: Successful bone marrow biopsy with CT guidance and moderate sedation.    Patient tolerated procedure well.    Jhon Mendosa MD  Department of Radiology

## 2019-11-13 LAB
BACTERIA CSF CULT: NO GROWTH
BODY SITE - BONE MARROW: NORMAL
CLINICAL DIAGNOSIS - BONE MARROW: NORMAL
FLOW CYTOMETRY ANTIBODIES ANALYZED - BONE MARROW: NORMAL
FLOW CYTOMETRY COMMENT - BONE MARROW: NORMAL
FLOW CYTOMETRY INTERPRETATION - BONE MARROW: NORMAL
GRAM STN SPEC: NORMAL
GRAM STN SPEC: NORMAL

## 2019-11-14 ENCOUNTER — TELEPHONE (OUTPATIENT)
Dept: HEMATOLOGY/ONCOLOGY | Facility: CLINIC | Age: 59
End: 2019-11-14

## 2019-11-14 NOTE — TELEPHONE ENCOUNTER
Spoke with Jessica. Appointment made for 12/6/19 at 2:00 PM.           ----- Message from Je Harrell sent at 11/14/2019  9:36 AM CST -----  Contact: Jessica Nagy pt friend  Calling to schedule a f/u appt for pt     Contact: [phone]

## 2019-11-19 ENCOUNTER — INFUSION (OUTPATIENT)
Dept: INFUSION THERAPY | Facility: HOSPITAL | Age: 59
End: 2019-11-19
Attending: INTERNAL MEDICINE
Payer: MEDICAID

## 2019-11-19 VITALS
HEART RATE: 71 BPM | BODY MASS INDEX: 22.06 KG/M2 | RESPIRATION RATE: 18 BRPM | WEIGHT: 149.38 LBS | DIASTOLIC BLOOD PRESSURE: 71 MMHG | TEMPERATURE: 99 F | SYSTOLIC BLOOD PRESSURE: 126 MMHG

## 2019-11-19 DIAGNOSIS — E61.1 IRON DEFICIENCY: ICD-10-CM

## 2019-11-19 DIAGNOSIS — C81.18 NODULAR SCLEROSIS HODGKIN LYMPHOMA OF LYMPH NODES OF MULTIPLE REGIONS: ICD-10-CM

## 2019-11-19 DIAGNOSIS — T45.1X5A ANTINEOPLASTIC CHEMOTHERAPY INDUCED ANEMIA: Primary | ICD-10-CM

## 2019-11-19 DIAGNOSIS — D64.81 ANTINEOPLASTIC CHEMOTHERAPY INDUCED ANEMIA: Primary | ICD-10-CM

## 2019-11-19 PROCEDURE — 63600175 PHARM REV CODE 636 W HCPCS: Performed by: INTERNAL MEDICINE

## 2019-11-19 PROCEDURE — A4216 STERILE WATER/SALINE, 10 ML: HCPCS | Performed by: INTERNAL MEDICINE

## 2019-11-19 PROCEDURE — 96523 IRRIG DRUG DELIVERY DEVICE: CPT

## 2019-11-19 PROCEDURE — 25000003 PHARM REV CODE 250: Performed by: INTERNAL MEDICINE

## 2019-11-19 RX ORDER — HEPARIN 100 UNIT/ML
500 SYRINGE INTRAVENOUS
Status: CANCELLED | OUTPATIENT
Start: 2019-11-19

## 2019-11-19 RX ORDER — SODIUM CHLORIDE 0.9 % (FLUSH) 0.9 %
10 SYRINGE (ML) INJECTION
Status: CANCELLED | OUTPATIENT
Start: 2019-11-19

## 2019-11-19 RX ORDER — HEPARIN 100 UNIT/ML
500 SYRINGE INTRAVENOUS
Status: COMPLETED | OUTPATIENT
Start: 2019-11-19 | End: 2019-11-19

## 2019-11-19 RX ORDER — SODIUM CHLORIDE 0.9 % (FLUSH) 0.9 %
10 SYRINGE (ML) INJECTION
Status: COMPLETED | OUTPATIENT
Start: 2019-11-19 | End: 2019-11-19

## 2019-11-19 RX ADMIN — SODIUM CHLORIDE, PRESERVATIVE FREE 10 ML: 5 INJECTION INTRAVENOUS at 03:11

## 2019-11-19 RX ADMIN — HEPARIN 500 UNITS: 100 SYRINGE at 03:11

## 2019-11-20 LAB
CHROM BANDING METHOD: NORMAL
CHROMOSOME ANALYSIS BM ADDITIONAL INFORMATION: NORMAL
CHROMOSOME ANALYSIS BM RELEASED BY: NORMAL
CHROMOSOME ANALYSIS BM RESULT SUMMARY: NORMAL
CLINICAL CYTOGENETICIST REVIEW: NORMAL
KARYOTYP MAR: NORMAL
REASON FOR REFERRAL (NARRATIVE): NORMAL
REF LAB TEST METHOD: NORMAL
SPECIMEN SOURCE: NORMAL
SPECIMEN: NORMAL

## 2019-11-24 LAB
COMMENT: NORMAL
FINAL PATHOLOGIC DIAGNOSIS: NORMAL
GROSS: NORMAL
MICROSCOPIC EXAM: NORMAL
SUPPLEMENTAL DIAGNOSIS: NORMAL

## 2019-12-06 ENCOUNTER — OFFICE VISIT (OUTPATIENT)
Dept: HEMATOLOGY/ONCOLOGY | Facility: CLINIC | Age: 59
End: 2019-12-06
Payer: MEDICAID

## 2019-12-06 VITALS
SYSTOLIC BLOOD PRESSURE: 115 MMHG | DIASTOLIC BLOOD PRESSURE: 58 MMHG | OXYGEN SATURATION: 100 % | HEIGHT: 69 IN | WEIGHT: 142.19 LBS | RESPIRATION RATE: 20 BRPM | TEMPERATURE: 98 F | BODY MASS INDEX: 21.06 KG/M2 | HEART RATE: 87 BPM

## 2019-12-06 DIAGNOSIS — C81.90 HODGKIN'S LYMPHOMA IN RELAPSE: ICD-10-CM

## 2019-12-06 DIAGNOSIS — C81.18 NODULAR SCLEROSIS HODGKIN LYMPHOMA OF LYMPH NODES OF MULTIPLE REGIONS: Primary | ICD-10-CM

## 2019-12-06 PROCEDURE — 99999 PR PBB SHADOW E&M-EST. PATIENT-LVL III: ICD-10-PCS | Mod: PBBFAC,,, | Performed by: INTERNAL MEDICINE

## 2019-12-06 PROCEDURE — 99215 PR OFFICE/OUTPT VISIT, EST, LEVL V, 40-54 MIN: ICD-10-PCS | Mod: S$PBB,,, | Performed by: INTERNAL MEDICINE

## 2019-12-06 PROCEDURE — 99213 OFFICE O/P EST LOW 20 MIN: CPT | Mod: PBBFAC,PO | Performed by: INTERNAL MEDICINE

## 2019-12-06 PROCEDURE — 99215 OFFICE O/P EST HI 40 MIN: CPT | Mod: S$PBB,,, | Performed by: INTERNAL MEDICINE

## 2019-12-06 PROCEDURE — 99999 PR PBB SHADOW E&M-EST. PATIENT-LVL III: CPT | Mod: PBBFAC,,, | Performed by: INTERNAL MEDICINE

## 2019-12-06 NOTE — PROGRESS NOTES
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 on days 1 and 15, dacarbazine 375 mg/m2 IV on days 1 and 15; IGEV, ifosfamide 2000 mg/m2 IV on days 1 to 4, gemcitabine 800 mg/m2 IV on days 1 and 4, vinorelbine 20 mg/m2 IV on day 1, and prednisolone 100 mg orally on days 1 to 4; BEACOPP (escalated), bleomycin 10 UI/m2 IV on day 8, etoposide 200 mg/m2 IV on days 1 to 3, doxorubicin 35 mg/m2 IV on day 1, cyclophosphamide 1250 mg/m2 IV on day 1, vincristine 1.4 mg/m2 IV on day 8, procarbazine 100 mg/m2 orally on days 1 to 7, prednisone 40 mg/m2 orally on days 1 to 14; DHAP, cisplatin 100 mg/m2 by continuous IV infusion over 24 hours, followed by cytosine arabinoside IV in 2 pulses each at a dose of 2 g/m2 given 12 hours apart. Dexamethasone, 40 mg IV, was given on days 1 through 4; Bv, at a dosage of 1.8 mg/kg IV every 3 weeks for a median of 3 courses (range, 2-4); ECOG-PS, Eastern Cooperative Oncology Group-Performance Status; ESR, erythrocyte sedimentation rate; LDH, lactate dehydrogenase.*Four patients received several lines of salvage therapy, before Bv+Bs regimen.Stage I is defined as involvement of 1 donal group or lymphoid organ; stage II is defined as involvement of 2 or more donal groups on the same side of diaphragm; stage III is defined as involvement of donal  groups on both sides of the diaphragm; and stage IV is defined as disseminated involvement of 1 or more extralymphatic organs (eg, lung, bone) with or without any donal involvement.  At the start of the Bv+Bs regimen, the majority of patients had very extensive disease with [place] stage III to IV in 70% of cases, extranodal involvements in 50%, and bulky disease in 30%.    All patients underwent 4 courses of Bv+Bs: median-dose intensity during sequential therapy was 100% (range, 88.6% to 102.4%) for Bv and 100% (range, 88.7% to 102.4%) for Bs. The vigorous support drug treatment and clinical and laboratory monitoring as shown in Figure 1 were systematically performed in all patients.    Adverse events  A total of 10 patients (50%) experienced treatment-related adverse events of grade 3 or 4. The most common grade ?3 hematologic toxicity was neutropenia (3 cases), which required supplemental administrations of granulocyte-colony stimulating factors (G-CSF), whereas the most common grade ?3 extrahematologic toxicity was CMV reactivation with viremia (median CMV-DNA, 1810 IU/mL; range, 620-170?000 IU/mL) with fever (5 patients), which was successfully treated with preemptive therapy with valganciclovir. Notably, by virtue of the specific premedication against acute toxicity during the IV administration of Bv+Bs, serious infusion-related reactions (both during cycle 2 of combination therapy) with fever, chills, and dyspnea occurred in only 2 patients (10%).    Overall, a total of 8 patients (40%) temporarily discontinued therapy due to treatment-related adverse events. In particular, these cases received at least 1 Bv and/or Bs dose modification in terms of delays (n = 4) or reductions (n = 4). Howevsplant and 1 case sibling transplant. Among the patients who proceeded to HSCT, the most used conditioning regimen was BEAM (Carmustine, Etoposide, Cytarabine, and Melphalan) or FEAM (Fotemustine, Etoposide, Cytarabine,  and Melphalan). All patients successfully engrafted: median times to neutrophil and platelet engraftment were 10 and 15 days, respectively.    Long-term follow-up  With a median follow-up of 27 months (range, 4-68) from Bv+Bs regimen initiation, the estimated 2-year PFS of the entire analyzed population was 93.7% (95% confidence interval, 62.7% to 99.6%), as shown in Figure 3.      In this series of patients with cHL at very poor prognosis,1,6  which included 55% with primary refractory disease, 25% with early relapse, and 20% with heavy pretreatments for multiple relapses, the immune-chemotherapy sequential combination of Bv followed by Bs (actually delivered dose-intensity median: 80 mg/m2 per week) obtained a truly powerful tumoricidal effect against Mike-Ángel cells, leading to an impressive improvement of PFS. In fact, the PFS was ?94% at 2 years, indicating that our approach is clinically very promising in terms of efficacy. On the other hand, the reported rates of PFS in patients rescued with standard platinum-based chemotherapy (such as ICE [Ifosfamide, Carboplatin and Etoposide] or DHAP [Cisplatin, Cytosine arabinoside, and Dexamethasone])1  and, more recently, with BeGEV (Bendamustine, Gemcitabine, Etoposide, and Vinblastine)6,18  were ?45% and 62% at 2 years, respectively. Foremost, particular homogeneity of post-Bv+Bs reevaluation findings in terms of achieving a best result of CR was noticed in the study population. At FDG-PET scans, 80% of the treated patients had deep metabolic responses with DS scores ?2 (DS scores 3 in the remaining 20%). According to the Lug[place] classification,19  PET-negative status achieved with salvage therapy is the most important determinant of favorable outcome after HSCT.2,4,6  Second, the number of patients proceeding to HSCT after Bv+Bs regimen was very high (18/20, 90%), and the main reason for this was a lasting response without progression while waiting for  transplant procedure. Thus, the activity of our salvage regimen may serve not only as an initial debulking therapy but also to maintain response duration. However, we do not have enough data regarding the impact of the Bv+Bs regimen on CD34+ cells mobilization and collection (especially in terms of impairments).6  Finally, Bv+Bs treatment-emergent serious adverse events included ?25% grade 3 to 4 neutropenia or infection or infusion-related reaction, substantiating that the combination of Bv+Bs was slightly more toxic than the single agents taken alone.8,9,16  However, a robust primary prophylaxis with broad-spectrum support drug treatment, and strict clinical and laboratory (in particular, serum CMV-DNA) monitoring, is strongly recommended.    Our study has limitations that must be pointed out. First, it is a single-center study with a small number of patients and events, which limits statistical results. Second, the study was not performed in the context of a clinical trial. Hence, our findings need to be validated in a prospective multicenter large trial. Finally, other studies are needed to demonstrate leverage from therapy with Bv+Bs in patients with R/R cHL and aged 60 years and over.20     To the best of our knowledge, this is the first report in a clinical background of a very effective synergistic effect between Bv and high-dose bendamustine when administered thereafter, in patients with R/R cHL. Altogether, our data suggest that Bv+Bs regimen-driven strategy may be a new salvage option to increase the proportion of patients, aged <60 years, achieving long-term disease control of high-risk Hodgkin lymphoma.      3.  Castro M, Will GUPTAK, Jan E, et al. Brentuximab vedotin and bendamustine produce high complete response rates in patients with chemotherapy refractory Hodgkin lymphoma. Br J Haematol. 2018;180(5):757-760.  Google ScholarCrossrefPubMed   4.  Luis AS, Phani RG, Janie REEVES, et al. Brentuximab  vedotin plus bendamustine: a highly active first salvage regimen for relapsed or refractory Hodgkin lymphoma. Blood. 2018;132(1):40-48.  ArticleGoogle ScholarCrossrefPubMed     Advance Care Planning     Living Will  During this visit, I engaged the patient in the advance care planning process.  The patient and I reviewed the role for advance directives and their purpose in directing future healthcare if the patient's unable to speak for him/herself.  At this point in time, the patient does have full decision-making capacity.  We discussed different extreme health states that he could experience, and reviewed what kind of medical care he would want in those situations.  The patient communicated that if he were comatose and had little chance of a meaningful recovery, he would not want machines/life-sustaining treatments used.   The patient  Has not completed a living will to reflect these preferences.  The patient  Has not  already designated a healthcare power of  to make decisions on his behalf.   I spent a total of 5 minutes engaging the patient in this advance care planning discussion.         Thank you for allowing me to evaluate and participate in the care of this pleasant patient. Please fell free to call me with any questions or concerns.    [place],  Amanda Rich MD    This note was dictated with Dragon and briefly proofread. Please excuse any sentences that may be unclear or words misspelled\    Summary is to give methylprednisolone 20 mg IV day 1- 3  with Benadryl 50 mg IV day 1 - 3   Neulasta day 6  Brentuximab 1.8 mg/ KG   2 day 1   Bendamustine 120 mg/m2 day 2 and 3   Every 3 weeks   Bactrim 800 twice a week  Acyclovir 800 mg daily   Check CMV dna e21 days day 1

## 2019-12-09 ENCOUNTER — TELEPHONE (OUTPATIENT)
Dept: HEMATOLOGY/ONCOLOGY | Facility: CLINIC | Age: 59
End: 2019-12-09

## 2019-12-09 NOTE — TELEPHONE ENCOUNTER
Second request  I need help from someone to collect and date the cytology csf order . This is imperative as it is delaying care. Please investigate how the original order was cancelled and how we can quickly get this order properly handled so it does not delay his care further     Can we possibly beg the radiology department to possibly help since they collected the specimen originally but the order was not processed or cancelled prematurely ?  Thank you

## 2019-12-10 ENCOUNTER — DOCUMENTATION ONLY (OUTPATIENT)
Dept: RADIOLOGY | Facility: HOSPITAL | Age: 59
End: 2019-12-10

## 2019-12-10 ENCOUNTER — TELEPHONE (OUTPATIENT)
Dept: HEMATOLOGY/ONCOLOGY | Facility: CLINIC | Age: 59
End: 2019-12-10

## 2019-12-10 DIAGNOSIS — C81.18 NODULAR SCLEROSIS HODGKIN LYMPHOMA OF LYMPH NODES OF MULTIPLE REGIONS: Primary | ICD-10-CM

## 2019-12-10 LAB — FUNGUS SPEC CULT: NORMAL

## 2019-12-10 PROCEDURE — 88108 PR  CYTOPATH FLUIDS,CONCENTRATN,INTERP: ICD-10-PCS | Mod: 26,,, | Performed by: PATHOLOGY

## 2019-12-10 PROCEDURE — 88108 CYTOPATH CONCENTRATE TECH: CPT | Mod: 26,,, | Performed by: PATHOLOGY

## 2019-12-10 PROCEDURE — 88108 CYTOPATH CONCENTRATE TECH: CPT | Performed by: PATHOLOGY

## 2019-12-10 NOTE — TELEPHONE ENCOUNTER
barbi, we added on the csf cytology, they will just note the age of the sample on the report Per Joan in Cytology?PAthology

## 2019-12-11 ENCOUNTER — TELEPHONE (OUTPATIENT)
Dept: HEMATOLOGY/ONCOLOGY | Facility: CLINIC | Age: 59
End: 2019-12-11

## 2019-12-11 LAB — FINAL PATHOLOGIC DIAGNOSIS: NORMAL

## 2019-12-12 PROBLEM — C81.90: Status: ACTIVE | Noted: 2019-12-12

## 2019-12-12 NOTE — PLAN OF CARE
DISCONTINUE OFF PATHWAY REGIMEN - Lymphoma and CLL            Bendamustine (Bendeka(TM))       Rituximab (Rituxan(R))           Additional Orders: Bendamustine use not recommended in patients with   CrCl < 40 mL/min or with moderate (AST/ALT 2.5-10 xULN and total bili 1.5-3   xULN) or severe (total bili > 3 xULN) hepatic impairment. Hepatitis B&C testing   recommended prior to rituximab use on all patients. Final concentration of   rituximab must be between 1 and 4 mg/mL.    **Always confirm dose/schedule in your pharmacy ordering system**    REASON: Other Reason  PRIOR TREATMENT: Off Pathway: Bendamustine + Rituximab (100/375) q21 Days  TREATMENT RESPONSE: Progressive Disease (PD)    START ON PATHWAY REGIMEN - Lymphoma and CLL    [id number]        Brentuximab vedotin (Adcetris(R))           Additional Orders: Peripheral neuropathy requires dosing adjustment.    **Always confirm dose/schedule in your pharmacy ordering system**    Patient Characteristics:  Classical Hodgkin Lymphoma, Second Line, Not a Transplant Candidate  Disease Type: Not Applicable  Disease Type: Not Applicable  Disease Type: Classical Hodgkin Lymphoma  Line of therapy: Second Line  [place] Stage: IVB  Patient Characteristics: Not a Transplant Candidate  Intent of Therapy:  Non-Curative / Palliative Intent, Discussed with Patient  
START OFF PATHWAY REGIMEN - Lymphoma and CLL            Bendamustine (Bendeka(TM))       Rituximab (Rituxan(R))           Additional Orders: Bendamustine use not recommended in patients with   CrCl < 40 mL/min or with moderate (AST/ALT 2.5-10 xULN and total bili 1.5-3   xULN) or severe (total bili > 3 xULN) hepatic impairment. Hepatitis B&C testing   recommended prior to rituximab use on all patients. Final concentration of   rituximab must be between 1 and 4 mg/mL.    **Always confirm dose/schedule in your pharmacy ordering system**    Patient Characteristics:  Classical Hodgkin Lymphoma, Second Line, Transplant Candidate  Disease Type: Not Applicable  Disease Type: Not Applicable  Disease Type: Classical Hodgkin Lymphoma  Line of therapy: Second Line  [place] Stage: IVB  Patient Characteristics: Transplant Candidate  Intent of Therapy:  Non-Curative / Palliative Intent, Discussed with Patient  
0

## 2019-12-13 ENCOUNTER — TELEPHONE (OUTPATIENT)
Dept: HEMATOLOGY/ONCOLOGY | Facility: CLINIC | Age: 59
End: 2019-12-13

## 2019-12-13 DIAGNOSIS — C81.18 NODULAR SCLEROSIS HODGKIN LYMPHOMA OF LYMPH NODES OF MULTIPLE REGIONS: Primary | ICD-10-CM

## 2019-12-13 NOTE — TELEPHONE ENCOUNTER
Spoke with Jessica. Advised that CSF path was negative. Dr Rich has placed chemo orders and infusion will be calling to schedule.           ----- Message from Bunny Espinosa sent at 12/13/2019  2:32 PM CST -----  Contact: Care giver Jessica Nagy  [phone]  Type: Needs Medical Advice    Who Called:  Care giver Jessica Nagy  [phone]    Additional Information: Advised returning a call to the office to Rajni. Please call to advise.

## 2019-12-13 NOTE — TELEPHONE ENCOUNTER
Left message for Jessica regarding CSF path. Advised that it was negative and Dr Rich is placing orders for patient's chemo. Lynette will call to help schedule.

## 2019-12-23 ENCOUNTER — LAB VISIT (OUTPATIENT)
Dept: LAB | Facility: HOSPITAL | Age: 59
End: 2019-12-23
Attending: INTERNAL MEDICINE
Payer: MEDICAID

## 2019-12-23 DIAGNOSIS — C81.18 NODULAR SCLEROSIS HODGKIN LYMPHOMA OF LYMPH NODES OF MULTIPLE REGIONS: ICD-10-CM

## 2019-12-23 DIAGNOSIS — Z09 ONCOLOGY FOLLOW-UP ENCOUNTER: ICD-10-CM

## 2019-12-23 LAB
ALBUMIN SERPL BCP-MCNC: 2.5 G/DL (ref 3.5–5.2)
ALP SERPL-CCNC: 131 U/L (ref 55–135)
ALT SERPL W/O P-5'-P-CCNC: 8 U/L (ref 10–44)
ANION GAP SERPL CALC-SCNC: 10 MMOL/L (ref 8–16)
AST SERPL-CCNC: 8 U/L (ref 10–40)
B2 MICROGLOB SERPL-MCNC: 1.7 UG/ML (ref 0–2.5)
BASOPHILS # BLD AUTO: 0.04 K/UL (ref 0–0.2)
BASOPHILS NFR BLD: 0.3 % (ref 0–1.9)
BILIRUB SERPL-MCNC: 0.9 MG/DL (ref 0.1–1)
BUN SERPL-MCNC: 12 MG/DL (ref 6–20)
CALCIUM SERPL-MCNC: 8.8 MG/DL (ref 8.7–10.5)
CHLORIDE SERPL-SCNC: 102 MMOL/L (ref 95–110)
CO2 SERPL-SCNC: 24 MMOL/L (ref 23–29)
CREAT SERPL-MCNC: 0.9 MG/DL (ref 0.5–1.4)
DIFFERENTIAL METHOD: ABNORMAL
EOSINOPHIL # BLD AUTO: 0.3 K/UL (ref 0–0.5)
EOSINOPHIL NFR BLD: 2.2 % (ref 0–8)
ERYTHROCYTE [DISTWIDTH] IN BLOOD BY AUTOMATED COUNT: 21.5 % (ref 11.5–14.5)
EST. GFR  (AFRICAN AMERICAN): >60 ML/MIN/1.73 M^2
EST. GFR  (NON AFRICAN AMERICAN): >60 ML/MIN/1.73 M^2
GLUCOSE SERPL-MCNC: 133 MG/DL (ref 70–110)
HCT VFR BLD AUTO: 25.5 % (ref 40–54)
HGB BLD-MCNC: 7.4 G/DL (ref 14–18)
IMM GRANULOCYTES # BLD AUTO: 0.11 K/UL (ref 0–0.04)
IRON SERPL-MCNC: 21 UG/DL (ref 45–160)
LYMPHOCYTES # BLD AUTO: 4.3 K/UL (ref 1–4.8)
LYMPHOCYTES NFR BLD: 35.3 % (ref 18–48)
MCH RBC QN AUTO: 23.9 PG (ref 27–31)
MCHC RBC AUTO-ENTMCNC: 29 G/DL (ref 32–36)
MCV RBC AUTO: 82 FL (ref 82–98)
MONOCYTES # BLD AUTO: 0.7 K/UL (ref 0.3–1)
MONOCYTES NFR BLD: 5.9 % (ref 4–15)
NEUTROPHILS # BLD AUTO: 6.8 K/UL (ref 1.8–7.7)
NEUTROPHILS NFR BLD: 55.4 % (ref 38–73)
NRBC BLD-RTO: 0 /100 WBC
PLATELET # BLD AUTO: 311 K/UL (ref 150–350)
PMV BLD AUTO: 8.8 FL (ref 9.2–12.9)
POTASSIUM SERPL-SCNC: 3.5 MMOL/L (ref 3.5–5.1)
PROT SERPL-MCNC: 6.7 G/DL (ref 6–8.4)
RBC # BLD AUTO: 3.1 M/UL (ref 4.6–6.2)
SATURATED IRON: 16 % (ref 20–50)
SODIUM SERPL-SCNC: 136 MMOL/L (ref 136–145)
TOTAL IRON BINDING CAPACITY: 135 UG/DL (ref 250–450)
TRANSFERRIN SERPL-MCNC: 91 MG/DL (ref 200–375)
WBC # BLD AUTO: 12.25 K/UL (ref 3.9–12.7)

## 2019-12-23 PROCEDURE — 36415 COLL VENOUS BLD VENIPUNCTURE: CPT

## 2019-12-23 PROCEDURE — 82232 ASSAY OF BETA-2 PROTEIN: CPT

## 2019-12-23 PROCEDURE — 83540 ASSAY OF IRON: CPT

## 2019-12-23 PROCEDURE — 80053 COMPREHEN METABOLIC PANEL: CPT

## 2019-12-23 PROCEDURE — 85025 COMPLETE CBC W/AUTO DIFF WBC: CPT

## 2019-12-24 ENCOUNTER — INFUSION (OUTPATIENT)
Dept: INFUSION THERAPY | Facility: HOSPITAL | Age: 59
End: 2019-12-24
Attending: INTERNAL MEDICINE
Payer: MEDICAID

## 2019-12-24 ENCOUNTER — OFFICE VISIT (OUTPATIENT)
Dept: HEMATOLOGY/ONCOLOGY | Facility: CLINIC | Age: 59
End: 2019-12-24
Payer: MEDICAID

## 2019-12-24 VITALS
TEMPERATURE: 98 F | SYSTOLIC BLOOD PRESSURE: 80 MMHG | HEIGHT: 69 IN | RESPIRATION RATE: 18 BRPM | WEIGHT: 138.69 LBS | HEART RATE: 65 BPM | BODY MASS INDEX: 20.54 KG/M2 | DIASTOLIC BLOOD PRESSURE: 45 MMHG

## 2019-12-24 VITALS
BODY MASS INDEX: 21.13 KG/M2 | HEART RATE: 59 BPM | TEMPERATURE: 98 F | HEIGHT: 69 IN | DIASTOLIC BLOOD PRESSURE: 56 MMHG | SYSTOLIC BLOOD PRESSURE: 114 MMHG | SYSTOLIC BLOOD PRESSURE: 98 MMHG | RESPIRATION RATE: 16 BRPM | WEIGHT: 142.63 LBS | HEART RATE: 81 BPM | TEMPERATURE: 98 F | DIASTOLIC BLOOD PRESSURE: 60 MMHG | RESPIRATION RATE: 18 BRPM | OXYGEN SATURATION: 99 %

## 2019-12-24 DIAGNOSIS — C81.18 NODULAR SCLEROSIS HODGKIN LYMPHOMA OF LYMPH NODES OF MULTIPLE REGIONS: ICD-10-CM

## 2019-12-24 DIAGNOSIS — T45.1X5A CHEMOTHERAPY INDUCED NEUTROPENIA: ICD-10-CM

## 2019-12-24 DIAGNOSIS — D70.1 CHEMOTHERAPY INDUCED NEUTROPENIA: ICD-10-CM

## 2019-12-24 DIAGNOSIS — C81.90 HODGKIN'S LYMPHOMA IN RELAPSE: Primary | ICD-10-CM

## 2019-12-24 DIAGNOSIS — D64.9 SYMPTOMATIC ANEMIA: Primary | ICD-10-CM

## 2019-12-24 DIAGNOSIS — Z09 ONCOLOGY FOLLOW-UP ENCOUNTER: ICD-10-CM

## 2019-12-24 DIAGNOSIS — D64.9 SYMPTOMATIC ANEMIA: ICD-10-CM

## 2019-12-24 DIAGNOSIS — R53.1 WEAKNESS: ICD-10-CM

## 2019-12-24 DIAGNOSIS — C81.90 HODGKIN'S LYMPHOMA IN RELAPSE: ICD-10-CM

## 2019-12-24 LAB
BLD PROD TYP BPU: NORMAL
BLD PROD TYP BPU: NORMAL
BLOOD UNIT EXPIRATION DATE: NORMAL
BLOOD UNIT EXPIRATION DATE: NORMAL
BLOOD UNIT TYPE CODE: 6200
BLOOD UNIT TYPE CODE: 6200
BLOOD UNIT TYPE: NORMAL
BLOOD UNIT TYPE: NORMAL
CODING SYSTEM: NORMAL
CODING SYSTEM: NORMAL
DISPENSE STATUS: NORMAL
DISPENSE STATUS: NORMAL
NUM UNITS TRANS PACKED RBC: NORMAL
NUM UNITS TRANS PACKED RBC: NORMAL

## 2019-12-24 PROCEDURE — S0028 INJECTION, FAMOTIDINE, 20 MG: HCPCS | Performed by: INTERNAL MEDICINE

## 2019-12-24 PROCEDURE — 63600175 PHARM REV CODE 636 W HCPCS: Performed by: INTERNAL MEDICINE

## 2019-12-24 PROCEDURE — A4216 STERILE WATER/SALINE, 10 ML: HCPCS | Performed by: INTERNAL MEDICINE

## 2019-12-24 PROCEDURE — 36430 TRANSFUSION BLD/BLD COMPNT: CPT

## 2019-12-24 PROCEDURE — 96413 CHEMO IV INFUSION 1 HR: CPT

## 2019-12-24 PROCEDURE — 86920 COMPATIBILITY TEST SPIN: CPT

## 2019-12-24 PROCEDURE — G0444 DEPRESSION SCREEN ANNUAL: HCPCS | Mod: PBBFAC,PO | Performed by: INTERNAL MEDICINE

## 2019-12-24 PROCEDURE — 99215 OFFICE O/P EST HI 40 MIN: CPT | Mod: S$PBB,,, | Performed by: INTERNAL MEDICINE

## 2019-12-24 PROCEDURE — 96375 TX/PRO/DX INJ NEW DRUG ADDON: CPT

## 2019-12-24 PROCEDURE — 96367 TX/PROPH/DG ADDL SEQ IV INF: CPT

## 2019-12-24 PROCEDURE — 99999 PR PBB SHADOW E&M-EST. PATIENT-LVL III: ICD-10-PCS | Mod: PBBFAC,,, | Performed by: INTERNAL MEDICINE

## 2019-12-24 PROCEDURE — 99999 PR PBB SHADOW E&M-EST. PATIENT-LVL III: CPT | Mod: PBBFAC,,, | Performed by: INTERNAL MEDICINE

## 2019-12-24 PROCEDURE — 99215 PR OFFICE/OUTPT VISIT, EST, LEVL V, 40-54 MIN: ICD-10-PCS | Mod: S$PBB,,, | Performed by: INTERNAL MEDICINE

## 2019-12-24 PROCEDURE — P9016 RBC LEUKOCYTES REDUCED: HCPCS

## 2019-12-24 PROCEDURE — 25000003 PHARM REV CODE 250: Performed by: INTERNAL MEDICINE

## 2019-12-24 PROCEDURE — 99213 OFFICE O/P EST LOW 20 MIN: CPT | Mod: PBBFAC,PO | Performed by: INTERNAL MEDICINE

## 2019-12-24 RX ORDER — SODIUM CHLORIDE 0.9 % (FLUSH) 0.9 %
10 SYRINGE (ML) INJECTION
Status: CANCELLED | OUTPATIENT
Start: 2019-12-24

## 2019-12-24 RX ORDER — ACETAMINOPHEN 325 MG/1
650 TABLET ORAL ONCE
Status: DISCONTINUED | OUTPATIENT
Start: 2019-12-24 | End: 2020-01-18 | Stop reason: HOSPADM

## 2019-12-24 RX ORDER — DIPHENHYDRAMINE HCL 25 MG
25 CAPSULE ORAL ONCE
Status: DISCONTINUED | OUTPATIENT
Start: 2019-12-24 | End: 2020-01-18 | Stop reason: HOSPADM

## 2019-12-24 RX ORDER — HYDROCODONE BITARTRATE AND ACETAMINOPHEN 500; 5 MG/1; MG/1
TABLET ORAL ONCE
Status: CANCELLED | OUTPATIENT
Start: 2019-12-24 | End: 2019-12-24

## 2019-12-24 RX ORDER — HEPARIN 100 UNIT/ML
500 SYRINGE INTRAVENOUS
Status: CANCELLED | OUTPATIENT
Start: 2019-12-24

## 2019-12-24 RX ORDER — DIPHENHYDRAMINE HCL 25 MG
25 CAPSULE ORAL ONCE
Status: CANCELLED | OUTPATIENT
Start: 2019-12-24

## 2019-12-24 RX ORDER — FAMOTIDINE 10 MG/ML
20 INJECTION INTRAVENOUS
Status: CANCELLED | OUTPATIENT
Start: 2019-12-24

## 2019-12-24 RX ORDER — HEPARIN 100 UNIT/ML
500 SYRINGE INTRAVENOUS
Status: DISCONTINUED | OUTPATIENT
Start: 2019-12-24 | End: 2019-12-24 | Stop reason: HOSPADM

## 2019-12-24 RX ORDER — ACETAMINOPHEN 325 MG/1
650 TABLET ORAL
Status: CANCELLED | OUTPATIENT
Start: 2019-12-24

## 2019-12-24 RX ORDER — TOBRAMYCIN 3 MG/ML
3 SOLUTION/ DROPS OPHTHALMIC EVERY 4 HOURS
Status: ON HOLD | COMMUNITY
End: 2020-12-23 | Stop reason: HOSPADM

## 2019-12-24 RX ORDER — ACETAMINOPHEN 325 MG/1
650 TABLET ORAL
Status: COMPLETED | OUTPATIENT
Start: 2019-12-24 | End: 2019-12-24

## 2019-12-24 RX ORDER — HYDROCODONE BITARTRATE AND ACETAMINOPHEN 500; 5 MG/1; MG/1
TABLET ORAL ONCE
Status: DISCONTINUED | OUTPATIENT
Start: 2019-12-24 | End: 2020-01-18 | Stop reason: HOSPADM

## 2019-12-24 RX ORDER — HEPARIN 100 UNIT/ML
5 SYRINGE INTRAVENOUS ONCE
Status: COMPLETED | OUTPATIENT
Start: 2019-12-24 | End: 2019-12-24

## 2019-12-24 RX ORDER — FAMOTIDINE 10 MG/ML
20 INJECTION INTRAVENOUS
Status: COMPLETED | OUTPATIENT
Start: 2019-12-24 | End: 2019-12-24

## 2019-12-24 RX ORDER — MEPERIDINE HYDROCHLORIDE 25 MG/ML
25 INJECTION INTRAMUSCULAR; INTRAVENOUS; SUBCUTANEOUS
Status: DISCONTINUED | OUTPATIENT
Start: 2019-12-24 | End: 2019-12-24 | Stop reason: HOSPADM

## 2019-12-24 RX ORDER — ACETAMINOPHEN 325 MG/1
650 TABLET ORAL ONCE
Status: CANCELLED | OUTPATIENT
Start: 2019-12-24

## 2019-12-24 RX ORDER — SODIUM CHLORIDE 0.9 % (FLUSH) 0.9 %
10 SYRINGE (ML) INJECTION
Status: DISCONTINUED | OUTPATIENT
Start: 2019-12-24 | End: 2019-12-24 | Stop reason: HOSPADM

## 2019-12-24 RX ADMIN — SODIUM CHLORIDE, PRESERVATIVE FREE 500 UNITS: 5 INJECTION INTRAVENOUS at 09:12

## 2019-12-24 RX ADMIN — ONDANSETRON 16 MG: 2 INJECTION INTRAMUSCULAR; INTRAVENOUS at 11:12

## 2019-12-24 RX ADMIN — ACETAMINOPHEN 650 MG: 325 TABLET ORAL at 10:12

## 2019-12-24 RX ADMIN — BRENTUXIMAB VEDOTIN 116 MG: 50 INJECTION, POWDER, LYOPHILIZED, FOR SOLUTION INTRAVENOUS at 12:12

## 2019-12-24 RX ADMIN — FAMOTIDINE 20 MG: 10 INJECTION, SOLUTION INTRAVENOUS at 10:12

## 2019-12-24 RX ADMIN — DEXTROSE: 50 INJECTION, SOLUTION INTRAVENOUS at 11:12

## 2019-12-24 RX ADMIN — HEPARIN 500 UNITS: 100 SYRINGE at 12:12

## 2019-12-24 RX ADMIN — DIPHENHYDRAMINE HYDROCHLORIDE 50 MG: 50 INJECTION INTRAMUSCULAR; INTRAVENOUS at 10:12

## 2019-12-24 RX ADMIN — SODIUM CHLORIDE, PRESERVATIVE FREE 10 ML: 5 INJECTION INTRAVENOUS at 12:12
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
## 2019-12-26 ENCOUNTER — INFUSION (OUTPATIENT)
Dept: INFUSION THERAPY | Facility: HOSPITAL | Age: 59
End: 2019-12-26
Attending: INTERNAL MEDICINE
Payer: MEDICAID

## 2019-12-26 VITALS
BODY MASS INDEX: 20.4 KG/M2 | DIASTOLIC BLOOD PRESSURE: 48 MMHG | HEART RATE: 57 BPM | WEIGHT: 137.69 LBS | SYSTOLIC BLOOD PRESSURE: 84 MMHG | RESPIRATION RATE: 18 BRPM | HEIGHT: 69 IN | TEMPERATURE: 97 F

## 2019-12-26 DIAGNOSIS — T45.1X5A ANTINEOPLASTIC CHEMOTHERAPY INDUCED ANEMIA: ICD-10-CM

## 2019-12-26 DIAGNOSIS — C81.90 HODGKIN'S LYMPHOMA IN RELAPSE: Primary | ICD-10-CM

## 2019-12-26 DIAGNOSIS — D64.81 ANTINEOPLASTIC CHEMOTHERAPY INDUCED ANEMIA: ICD-10-CM

## 2019-12-26 DIAGNOSIS — E61.1 IRON DEFICIENCY: ICD-10-CM

## 2019-12-26 DIAGNOSIS — C81.18 NODULAR SCLEROSIS HODGKIN LYMPHOMA OF LYMPH NODES OF MULTIPLE REGIONS: ICD-10-CM

## 2019-12-26 DIAGNOSIS — T45.1X5A CHEMOTHERAPY INDUCED NEUTROPENIA: ICD-10-CM

## 2019-12-26 DIAGNOSIS — D70.1 CHEMOTHERAPY INDUCED NEUTROPENIA: ICD-10-CM

## 2019-12-26 PROCEDURE — 25000003 PHARM REV CODE 250: Performed by: INTERNAL MEDICINE

## 2019-12-26 PROCEDURE — 96409 CHEMO IV PUSH SNGL DRUG: CPT

## 2019-12-26 PROCEDURE — 63600175 PHARM REV CODE 636 W HCPCS: Mod: TB | Performed by: INTERNAL MEDICINE

## 2019-12-26 PROCEDURE — A4216 STERILE WATER/SALINE, 10 ML: HCPCS | Performed by: INTERNAL MEDICINE

## 2019-12-26 RX ORDER — HEPARIN 100 UNIT/ML
500 SYRINGE INTRAVENOUS
Status: CANCELLED | OUTPATIENT
Start: 2019-12-26

## 2019-12-26 RX ORDER — SODIUM CHLORIDE 0.9 % (FLUSH) 0.9 %
10 SYRINGE (ML) INJECTION
Status: CANCELLED | OUTPATIENT
Start: 2019-12-26

## 2019-12-26 RX ORDER — SODIUM CHLORIDE 0.9 % (FLUSH) 0.9 %
10 SYRINGE (ML) INJECTION
Status: COMPLETED | OUTPATIENT
Start: 2019-12-26 | End: 2019-12-26

## 2019-12-26 RX ORDER — HEPARIN 100 UNIT/ML
500 SYRINGE INTRAVENOUS
Status: COMPLETED | OUTPATIENT
Start: 2019-12-26 | End: 2019-12-26

## 2019-12-26 RX ADMIN — SODIUM CHLORIDE, PRESERVATIVE FREE 10 ML: 5 INJECTION INTRAVENOUS at 03:12

## 2019-12-26 RX ADMIN — HEPARIN 500 UNITS: 100 SYRINGE at 03:12

## 2019-12-26 RX ADMIN — BENDAMUSTINE HYDROCHLORIDE 212.5 MG: 25 INJECTION, SOLUTION INTRAVENOUS at 03:12

## 2019-12-27 ENCOUNTER — INFUSION (OUTPATIENT)
Dept: INFUSION THERAPY | Facility: HOSPITAL | Age: 59
End: 2019-12-27
Attending: INTERNAL MEDICINE
Payer: MEDICAID

## 2019-12-27 VITALS
HEART RATE: 66 BPM | TEMPERATURE: 97 F | DIASTOLIC BLOOD PRESSURE: 52 MMHG | WEIGHT: 144.38 LBS | HEIGHT: 69 IN | BODY MASS INDEX: 21.38 KG/M2 | SYSTOLIC BLOOD PRESSURE: 91 MMHG | RESPIRATION RATE: 18 BRPM

## 2019-12-27 DIAGNOSIS — C81.18 NODULAR SCLEROSIS HODGKIN LYMPHOMA OF LYMPH NODES OF MULTIPLE REGIONS: Primary | ICD-10-CM

## 2019-12-27 DIAGNOSIS — T45.1X5A ANTINEOPLASTIC CHEMOTHERAPY INDUCED ANEMIA: ICD-10-CM

## 2019-12-27 DIAGNOSIS — C81.18 NODULAR SCLEROSIS HODGKIN LYMPHOMA OF LYMPH NODES OF MULTIPLE REGIONS: ICD-10-CM

## 2019-12-27 DIAGNOSIS — C81.90 HODGKIN'S LYMPHOMA IN RELAPSE: Primary | ICD-10-CM

## 2019-12-27 DIAGNOSIS — E61.1 IRON DEFICIENCY: ICD-10-CM

## 2019-12-27 DIAGNOSIS — D70.1 CHEMOTHERAPY INDUCED NEUTROPENIA: ICD-10-CM

## 2019-12-27 DIAGNOSIS — D64.81 ANTINEOPLASTIC CHEMOTHERAPY INDUCED ANEMIA: ICD-10-CM

## 2019-12-27 DIAGNOSIS — T45.1X5A CHEMOTHERAPY INDUCED NEUTROPENIA: ICD-10-CM

## 2019-12-27 PROCEDURE — 96409 CHEMO IV PUSH SNGL DRUG: CPT

## 2019-12-27 PROCEDURE — 63600175 PHARM REV CODE 636 W HCPCS: Performed by: INTERNAL MEDICINE

## 2019-12-27 PROCEDURE — A4216 STERILE WATER/SALINE, 10 ML: HCPCS | Performed by: INTERNAL MEDICINE

## 2019-12-27 PROCEDURE — 25000003 PHARM REV CODE 250: Performed by: INTERNAL MEDICINE

## 2019-12-27 RX ORDER — SODIUM CHLORIDE 0.9 % (FLUSH) 0.9 %
10 SYRINGE (ML) INJECTION
Status: COMPLETED | OUTPATIENT
Start: 2019-12-27 | End: 2019-12-27

## 2019-12-27 RX ORDER — HEPARIN 100 UNIT/ML
500 SYRINGE INTRAVENOUS
Status: CANCELLED | OUTPATIENT
Start: 2019-12-27

## 2019-12-27 RX ORDER — SODIUM CHLORIDE 0.9 % (FLUSH) 0.9 %
10 SYRINGE (ML) INJECTION
Status: CANCELLED | OUTPATIENT
Start: 2019-12-27

## 2019-12-27 RX ORDER — HEPARIN 100 UNIT/ML
500 SYRINGE INTRAVENOUS
Status: COMPLETED | OUTPATIENT
Start: 2019-12-27 | End: 2019-12-27

## 2019-12-27 RX ADMIN — HEPARIN 500 UNITS: 100 SYRINGE at 03:12

## 2019-12-27 RX ADMIN — BENDAMUSTINE HYDROCHLORIDE 212.5 MG: 25 INJECTION, SOLUTION INTRAVENOUS at 02:12

## 2019-12-27 RX ADMIN — SODIUM CHLORIDE, PRESERVATIVE FREE 10 ML: 5 INJECTION INTRAVENOUS at 03:12

## 2019-12-30 ENCOUNTER — LAB VISIT (OUTPATIENT)
Dept: LAB | Facility: HOSPITAL | Age: 59
End: 2019-12-30
Attending: INTERNAL MEDICINE
Payer: MEDICAID

## 2019-12-30 DIAGNOSIS — D64.9 SYMPTOMATIC ANEMIA: ICD-10-CM

## 2019-12-30 DIAGNOSIS — Z09 ONCOLOGY FOLLOW-UP ENCOUNTER: ICD-10-CM

## 2019-12-30 LAB
BASOPHILS # BLD AUTO: 0.02 K/UL (ref 0–0.2)
BASOPHILS NFR BLD: 0.4 % (ref 0–1.9)
DIFFERENTIAL METHOD: ABNORMAL
EOSINOPHIL # BLD AUTO: 0.5 K/UL (ref 0–0.5)
EOSINOPHIL NFR BLD: 8.5 % (ref 0–8)
ERYTHROCYTE [DISTWIDTH] IN BLOOD BY AUTOMATED COUNT: 20.9 % (ref 11.5–14.5)
HCT VFR BLD AUTO: 32.4 % (ref 40–54)
HGB BLD-MCNC: 9.8 G/DL (ref 14–18)
IMM GRANULOCYTES # BLD AUTO: 0.02 K/UL (ref 0–0.04)
LYMPHOCYTES # BLD AUTO: 0.9 K/UL (ref 1–4.8)
LYMPHOCYTES NFR BLD: 16.5 % (ref 18–48)
MCH RBC QN AUTO: 25.9 PG (ref 27–31)
MCHC RBC AUTO-ENTMCNC: 30.2 G/DL (ref 32–36)
MCV RBC AUTO: 86 FL (ref 82–98)
MONOCYTES # BLD AUTO: 0.4 K/UL (ref 0.3–1)
MONOCYTES NFR BLD: 6.4 % (ref 4–15)
NEUTROPHILS # BLD AUTO: 3.8 K/UL (ref 1.8–7.7)
NEUTROPHILS NFR BLD: 67.8 % (ref 38–73)
NRBC BLD-RTO: 0 /100 WBC
PLATELET # BLD AUTO: 252 K/UL (ref 150–350)
PMV BLD AUTO: 8.6 FL (ref 9.2–12.9)
RBC # BLD AUTO: 3.79 M/UL (ref 4.6–6.2)
WBC # BLD AUTO: 5.63 K/UL (ref 3.9–12.7)

## 2019-12-30 PROCEDURE — 36415 COLL VENOUS BLD VENIPUNCTURE: CPT

## 2019-12-30 PROCEDURE — 85025 COMPLETE CBC W/AUTO DIFF WBC: CPT

## 2019-12-31 ENCOUNTER — OFFICE VISIT (OUTPATIENT)
Dept: HEMATOLOGY/ONCOLOGY | Facility: CLINIC | Age: 59
End: 2019-12-31
Payer: MEDICAID

## 2019-12-31 VITALS
DIASTOLIC BLOOD PRESSURE: 69 MMHG | HEART RATE: 87 BPM | TEMPERATURE: 98 F | WEIGHT: 141.56 LBS | BODY MASS INDEX: 20.97 KG/M2 | HEIGHT: 69 IN | SYSTOLIC BLOOD PRESSURE: 118 MMHG | RESPIRATION RATE: 12 BRPM | OXYGEN SATURATION: 99 %

## 2019-12-31 DIAGNOSIS — C81.90 HODGKIN'S LYMPHOMA IN RELAPSE: ICD-10-CM

## 2019-12-31 DIAGNOSIS — C81.18 NODULAR SCLEROSIS HODGKIN LYMPHOMA OF LYMPH NODES OF MULTIPLE REGIONS: Primary | ICD-10-CM

## 2019-12-31 DIAGNOSIS — R53.1 WEAKNESS: ICD-10-CM

## 2019-12-31 DIAGNOSIS — Z09 ONCOLOGY FOLLOW-UP ENCOUNTER: ICD-10-CM

## 2019-12-31 PROCEDURE — 99214 OFFICE O/P EST MOD 30 MIN: CPT | Mod: S$PBB,,, | Performed by: INTERNAL MEDICINE

## 2019-12-31 PROCEDURE — 99214 OFFICE O/P EST MOD 30 MIN: CPT | Mod: PBBFAC,PO | Performed by: INTERNAL MEDICINE

## 2019-12-31 PROCEDURE — 99214 PR OFFICE/OUTPT VISIT, EST, LEVL IV, 30-39 MIN: ICD-10-PCS | Mod: S$PBB,,, | Performed by: INTERNAL MEDICINE

## 2019-12-31 PROCEDURE — 99999 PR PBB SHADOW E&M-EST. PATIENT-LVL IV: ICD-10-PCS | Mod: PBBFAC,,, | Performed by: INTERNAL MEDICINE

## 2019-12-31 PROCEDURE — 99999 PR PBB SHADOW E&M-EST. PATIENT-LVL IV: CPT | Mod: PBBFAC,,, | Performed by: INTERNAL MEDICINE

## 2020-01-06 ENCOUNTER — INFUSION (OUTPATIENT)
Dept: INFUSION THERAPY | Facility: HOSPITAL | Age: 60
End: 2020-01-06
Attending: INTERNAL MEDICINE
Payer: MEDICAID

## 2020-01-06 VITALS
OXYGEN SATURATION: 100 % | RESPIRATION RATE: 18 BRPM | WEIGHT: 141 LBS | TEMPERATURE: 98 F | HEART RATE: 68 BPM | SYSTOLIC BLOOD PRESSURE: 130 MMHG | BODY MASS INDEX: 20.82 KG/M2 | DIASTOLIC BLOOD PRESSURE: 69 MMHG

## 2020-01-06 DIAGNOSIS — D64.81 ANEMIA ASSOCIATED WITH CHEMOTHERAPY: Primary | ICD-10-CM

## 2020-01-06 DIAGNOSIS — T45.1X5A ANEMIA ASSOCIATED WITH CHEMOTHERAPY: Primary | ICD-10-CM

## 2020-01-06 DIAGNOSIS — C81.18 NODULAR SCLEROSIS HODGKIN LYMPHOMA OF LYMPH NODES OF MULTIPLE REGIONS: ICD-10-CM

## 2020-01-06 PROCEDURE — 63600175 PHARM REV CODE 636 W HCPCS: Mod: JG | Performed by: INTERNAL MEDICINE

## 2020-01-06 PROCEDURE — 96372 THER/PROPH/DIAG INJ SC/IM: CPT

## 2020-01-06 RX ADMIN — EPOETIN ALFA 40000 UNITS: 20000 SOLUTION INTRAVENOUS; SUBCUTANEOUS at 02:01

## 2020-01-10 LAB
ACID FAST MOD KINY STN SPEC: NORMAL
MYCOBACTERIUM SPEC QL CULT: NORMAL

## 2020-01-13 ENCOUNTER — LAB VISIT (OUTPATIENT)
Dept: LAB | Facility: HOSPITAL | Age: 60
End: 2020-01-13
Attending: INTERNAL MEDICINE
Payer: MEDICAID

## 2020-01-13 DIAGNOSIS — C81.18 NODULAR SCLEROSIS HODGKIN LYMPHOMA OF LYMPH NODES OF MULTIPLE REGIONS: ICD-10-CM

## 2020-01-13 LAB
ALBUMIN SERPL BCP-MCNC: 3.2 G/DL (ref 3.5–5.2)
ALP SERPL-CCNC: 134 U/L (ref 55–135)
ALT SERPL W/O P-5'-P-CCNC: 12 U/L (ref 10–44)
ANION GAP SERPL CALC-SCNC: 8 MMOL/L (ref 8–16)
ANISOCYTOSIS BLD QL SMEAR: SLIGHT
AST SERPL-CCNC: 15 U/L (ref 10–40)
BASOPHILS # BLD AUTO: 0.04 K/UL (ref 0–0.2)
BASOPHILS NFR BLD: 1.6 % (ref 0–1.9)
BILIRUB SERPL-MCNC: 0.6 MG/DL (ref 0.1–1)
BUN SERPL-MCNC: 13 MG/DL (ref 8–23)
CALCIUM SERPL-MCNC: 9.2 MG/DL (ref 8.7–10.5)
CHLORIDE SERPL-SCNC: 107 MMOL/L (ref 95–110)
CO2 SERPL-SCNC: 28 MMOL/L (ref 23–29)
CREAT SERPL-MCNC: 0.8 MG/DL (ref 0.5–1.4)
DIFFERENTIAL METHOD: ABNORMAL
EOSINOPHIL # BLD AUTO: 0.7 K/UL (ref 0–0.5)
EOSINOPHIL NFR BLD: 26.3 % (ref 0–8)
ERYTHROCYTE [DISTWIDTH] IN BLOOD BY AUTOMATED COUNT: 24 % (ref 11.5–14.5)
EST. GFR  (AFRICAN AMERICAN): >60 ML/MIN/1.73 M^2
EST. GFR  (NON AFRICAN AMERICAN): >60 ML/MIN/1.73 M^2
GLUCOSE SERPL-MCNC: 87 MG/DL (ref 70–110)
HCT VFR BLD AUTO: 36.6 % (ref 40–54)
HGB BLD-MCNC: 10.4 G/DL (ref 14–18)
HYPOCHROMIA BLD QL SMEAR: ABNORMAL
IMM GRANULOCYTES # BLD AUTO: 0.01 K/UL (ref 0–0.04)
LYMPHOCYTES # BLD AUTO: 0.4 K/UL (ref 1–4.8)
LYMPHOCYTES NFR BLD: 14.1 % (ref 18–48)
MCH RBC QN AUTO: 25.9 PG (ref 27–31)
MCHC RBC AUTO-ENTMCNC: 28.4 G/DL (ref 32–36)
MCV RBC AUTO: 91 FL (ref 82–98)
MONOCYTES # BLD AUTO: 0.4 K/UL (ref 0.3–1)
MONOCYTES NFR BLD: 14.9 % (ref 4–15)
NEUTROPHILS # BLD AUTO: 1.1 K/UL (ref 1.8–7.7)
NEUTROPHILS NFR BLD: 42.7 % (ref 38–73)
NRBC BLD-RTO: 0 /100 WBC
OVALOCYTES BLD QL SMEAR: ABNORMAL
PLATELET # BLD AUTO: 174 K/UL (ref 150–350)
PMV BLD AUTO: 8.3 FL (ref 9.2–12.9)
POTASSIUM SERPL-SCNC: 4 MMOL/L (ref 3.5–5.1)
PROT SERPL-MCNC: 6.7 G/DL (ref 6–8.4)
RBC # BLD AUTO: 4.01 M/UL (ref 4.6–6.2)
SODIUM SERPL-SCNC: 143 MMOL/L (ref 136–145)
WBC # BLD AUTO: 2.55 K/UL (ref 3.9–12.7)

## 2020-01-13 PROCEDURE — 36415 COLL VENOUS BLD VENIPUNCTURE: CPT

## 2020-01-13 PROCEDURE — 80053 COMPREHEN METABOLIC PANEL: CPT

## 2020-01-13 PROCEDURE — 85025 COMPLETE CBC W/AUTO DIFF WBC: CPT

## 2020-01-14 ENCOUNTER — OFFICE VISIT (OUTPATIENT)
Dept: HEMATOLOGY/ONCOLOGY | Facility: CLINIC | Age: 60
End: 2020-01-14
Payer: MEDICAID

## 2020-01-14 ENCOUNTER — INFUSION (OUTPATIENT)
Dept: INFUSION THERAPY | Facility: HOSPITAL | Age: 60
DRG: 809 | End: 2020-01-14
Attending: INTERNAL MEDICINE
Payer: MEDICAID

## 2020-01-14 VITALS
RESPIRATION RATE: 18 BRPM | WEIGHT: 143.94 LBS | BODY MASS INDEX: 21.32 KG/M2 | HEART RATE: 58 BPM | SYSTOLIC BLOOD PRESSURE: 113 MMHG | HEIGHT: 69 IN | DIASTOLIC BLOOD PRESSURE: 61 MMHG | TEMPERATURE: 98 F

## 2020-01-14 VITALS
SYSTOLIC BLOOD PRESSURE: 121 MMHG | HEART RATE: 66 BPM | HEIGHT: 69 IN | OXYGEN SATURATION: 100 % | RESPIRATION RATE: 16 BRPM | BODY MASS INDEX: 21.32 KG/M2 | WEIGHT: 143.94 LBS | TEMPERATURE: 98 F | DIASTOLIC BLOOD PRESSURE: 62 MMHG

## 2020-01-14 DIAGNOSIS — D70.1 CHEMOTHERAPY INDUCED NEUTROPENIA: ICD-10-CM

## 2020-01-14 DIAGNOSIS — D64.81 ANEMIA ASSOCIATED WITH CHEMOTHERAPY: ICD-10-CM

## 2020-01-14 DIAGNOSIS — T45.1X5A ANEMIA ASSOCIATED WITH CHEMOTHERAPY: ICD-10-CM

## 2020-01-14 DIAGNOSIS — Z09 ONCOLOGY FOLLOW-UP ENCOUNTER: ICD-10-CM

## 2020-01-14 DIAGNOSIS — R89.8 ABNORMAL BONE MARROW EXAMINATION: ICD-10-CM

## 2020-01-14 DIAGNOSIS — Z51.11 ENCOUNTER FOR CHEMOTHERAPY MANAGEMENT: ICD-10-CM

## 2020-01-14 DIAGNOSIS — C81.90 HODGKIN'S LYMPHOMA IN RELAPSE: Primary | ICD-10-CM

## 2020-01-14 DIAGNOSIS — T45.1X5A CHEMOTHERAPY INDUCED NEUTROPENIA: ICD-10-CM

## 2020-01-14 DIAGNOSIS — C81.18 NODULAR SCLEROSIS HODGKIN LYMPHOMA OF LYMPH NODES OF MULTIPLE REGIONS: Primary | ICD-10-CM

## 2020-01-14 DIAGNOSIS — C81.18 NODULAR SCLEROSIS HODGKIN LYMPHOMA OF LYMPH NODES OF MULTIPLE REGIONS: ICD-10-CM

## 2020-01-14 PROCEDURE — 96413 CHEMO IV INFUSION 1 HR: CPT

## 2020-01-14 PROCEDURE — 99999 PR PBB SHADOW E&M-EST. PATIENT-LVL III: ICD-10-PCS | Mod: PBBFAC,,, | Performed by: INTERNAL MEDICINE

## 2020-01-14 PROCEDURE — S0028 INJECTION, FAMOTIDINE, 20 MG: HCPCS | Performed by: INTERNAL MEDICINE

## 2020-01-14 PROCEDURE — 96367 TX/PROPH/DG ADDL SEQ IV INF: CPT

## 2020-01-14 PROCEDURE — 25000003 PHARM REV CODE 250: Performed by: INTERNAL MEDICINE

## 2020-01-14 PROCEDURE — 63600175 PHARM REV CODE 636 W HCPCS: Performed by: INTERNAL MEDICINE

## 2020-01-14 PROCEDURE — G0444 DEPRESSION SCREEN ANNUAL: HCPCS | Mod: PBBFAC,PO | Performed by: INTERNAL MEDICINE

## 2020-01-14 PROCEDURE — 99215 OFFICE O/P EST HI 40 MIN: CPT | Mod: S$PBB,,, | Performed by: INTERNAL MEDICINE

## 2020-01-14 PROCEDURE — 99215 PR OFFICE/OUTPT VISIT, EST, LEVL V, 40-54 MIN: ICD-10-PCS | Mod: S$PBB,,, | Performed by: INTERNAL MEDICINE

## 2020-01-14 PROCEDURE — 96375 TX/PRO/DX INJ NEW DRUG ADDON: CPT

## 2020-01-14 PROCEDURE — 99213 OFFICE O/P EST LOW 20 MIN: CPT | Mod: PBBFAC,PO | Performed by: INTERNAL MEDICINE

## 2020-01-14 PROCEDURE — 99999 PR PBB SHADOW E&M-EST. PATIENT-LVL III: CPT | Mod: PBBFAC,,, | Performed by: INTERNAL MEDICINE

## 2020-01-14 RX ORDER — MEPERIDINE HYDROCHLORIDE 25 MG/ML
25 INJECTION INTRAMUSCULAR; INTRAVENOUS; SUBCUTANEOUS
Status: DISCONTINUED | OUTPATIENT
Start: 2020-01-14 | End: 2020-01-14 | Stop reason: HOSPADM

## 2020-01-14 RX ORDER — ACETAMINOPHEN 325 MG/1
650 TABLET ORAL
Status: CANCELLED | OUTPATIENT
Start: 2020-01-14

## 2020-01-14 RX ORDER — SODIUM CHLORIDE 0.9 % (FLUSH) 0.9 %
10 SYRINGE (ML) INJECTION
Status: DISCONTINUED | OUTPATIENT
Start: 2020-01-14 | End: 2020-01-14 | Stop reason: HOSPADM

## 2020-01-14 RX ORDER — ACETAMINOPHEN 325 MG/1
650 TABLET ORAL
Status: COMPLETED | OUTPATIENT
Start: 2020-01-14 | End: 2020-01-14

## 2020-01-14 RX ORDER — FAMOTIDINE 10 MG/ML
20 INJECTION INTRAVENOUS
Status: COMPLETED | OUTPATIENT
Start: 2020-01-14 | End: 2020-01-14

## 2020-01-14 RX ORDER — HEPARIN 100 UNIT/ML
500 SYRINGE INTRAVENOUS
Status: DISCONTINUED | OUTPATIENT
Start: 2020-01-14 | End: 2020-01-14 | Stop reason: HOSPADM

## 2020-01-14 RX ORDER — FAMOTIDINE 10 MG/ML
20 INJECTION INTRAVENOUS
Status: CANCELLED | OUTPATIENT
Start: 2020-01-14

## 2020-01-14 RX ORDER — SODIUM CHLORIDE 0.9 % (FLUSH) 0.9 %
10 SYRINGE (ML) INJECTION
Status: CANCELLED | OUTPATIENT
Start: 2020-01-14

## 2020-01-14 RX ORDER — HEPARIN 100 UNIT/ML
500 SYRINGE INTRAVENOUS
Status: CANCELLED | OUTPATIENT
Start: 2020-01-14

## 2020-01-14 RX ADMIN — BRENTUXIMAB VEDOTIN 116 MG: 50 INJECTION, POWDER, LYOPHILIZED, FOR SOLUTION INTRAVENOUS at 12:01

## 2020-01-14 RX ADMIN — FAMOTIDINE 20 MG: 10 INJECTION, SOLUTION INTRAVENOUS at 12:01

## 2020-01-14 RX ADMIN — ACETAMINOPHEN 650 MG: 325 TABLET ORAL at 10:01

## 2020-01-14 RX ADMIN — ONDANSETRON 16 MG: 2 INJECTION INTRAMUSCULAR; INTRAVENOUS at 11:01

## 2020-01-14 RX ADMIN — DIPHENHYDRAMINE HYDROCHLORIDE 50 MG: 50 INJECTION INTRAMUSCULAR; INTRAVENOUS at 10:01

## 2020-01-14 RX ADMIN — HEPARIN 500 UNITS: 100 SYRINGE at 12:01

## 2020-01-14 RX ADMIN — SODIUM CHLORIDE: 0.9 INJECTION, SOLUTION INTRAVENOUS at 10:01

## 2020-01-14 RX ADMIN — DEXTROSE: 50 INJECTION, SOLUTION INTRAVENOUS at 10:01
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
## 2020-01-14 NOTE — PLAN OF CARE
Problem: Infection  Goal: Infection Symptom Resolution  Outcome: Ongoing, Progressing  Intervention: Prevent or Manage Infection  Flowsheets (Taken 1/14/2020 1123)  Infection Management: aseptic technique maintained

## 2020-01-15 ENCOUNTER — HOSPITAL ENCOUNTER (INPATIENT)
Facility: HOSPITAL | Age: 60
LOS: 4 days | Discharge: HOME | DRG: 809 | End: 2020-01-19
Attending: EMERGENCY MEDICINE | Admitting: INTERNAL MEDICINE
Payer: MEDICAID

## 2020-01-15 ENCOUNTER — INFUSION (OUTPATIENT)
Dept: INFUSION THERAPY | Facility: HOSPITAL | Age: 60
DRG: 809 | End: 2020-01-15
Attending: INTERNAL MEDICINE
Payer: MEDICAID

## 2020-01-15 VITALS
TEMPERATURE: 98 F | RESPIRATION RATE: 16 BRPM | SYSTOLIC BLOOD PRESSURE: 100 MMHG | HEART RATE: 65 BPM | BODY MASS INDEX: 21.53 KG/M2 | DIASTOLIC BLOOD PRESSURE: 59 MMHG | WEIGHT: 145.81 LBS

## 2020-01-15 DIAGNOSIS — C81.18 NODULAR SCLEROSIS HODGKIN LYMPHOMA OF LYMPH NODES OF MULTIPLE REGIONS: ICD-10-CM

## 2020-01-15 DIAGNOSIS — T45.1X5A CHEMOTHERAPY INDUCED NEUTROPENIA: ICD-10-CM

## 2020-01-15 DIAGNOSIS — D64.81 ANEMIA ASSOCIATED WITH CHEMOTHERAPY: ICD-10-CM

## 2020-01-15 DIAGNOSIS — T45.1X5A ANTINEOPLASTIC CHEMOTHERAPY INDUCED ANEMIA: ICD-10-CM

## 2020-01-15 DIAGNOSIS — R50.81 FEVER IN OTHER DISEASES: ICD-10-CM

## 2020-01-15 DIAGNOSIS — R50.9 FEVER, UNSPECIFIED FEVER CAUSE: Primary | ICD-10-CM

## 2020-01-15 DIAGNOSIS — D70.1 CHEMOTHERAPY INDUCED NEUTROPENIA: ICD-10-CM

## 2020-01-15 DIAGNOSIS — T45.1X5A ANEMIA ASSOCIATED WITH CHEMOTHERAPY: ICD-10-CM

## 2020-01-15 DIAGNOSIS — R53.1 WEAKNESS: ICD-10-CM

## 2020-01-15 DIAGNOSIS — C81.90 HODGKIN'S LYMPHOMA IN RELAPSE: Primary | ICD-10-CM

## 2020-01-15 DIAGNOSIS — E61.1 IRON DEFICIENCY: ICD-10-CM

## 2020-01-15 DIAGNOSIS — D64.81 ANTINEOPLASTIC CHEMOTHERAPY INDUCED ANEMIA: ICD-10-CM

## 2020-01-15 LAB
ALBUMIN SERPL BCP-MCNC: 3.4 G/DL (ref 3.5–5.2)
ALP SERPL-CCNC: 101 U/L (ref 55–135)
ALT SERPL W/O P-5'-P-CCNC: 19 U/L (ref 10–44)
ANION GAP SERPL CALC-SCNC: 8 MMOL/L (ref 8–16)
APTT PPP: 24.7 SEC (ref 23.6–33.3)
AST SERPL-CCNC: 36 U/L (ref 10–40)
BASOPHILS # BLD AUTO: 0 K/UL (ref 0–0.2)
BASOPHILS NFR BLD: 0 % (ref 0–1.9)
BILIRUB SERPL-MCNC: 1 MG/DL (ref 0.1–1)
BILIRUB UR QL STRIP: NEGATIVE
BNP SERPL-MCNC: 100 PG/ML (ref 0–99)
BUN SERPL-MCNC: 18 MG/DL (ref 8–23)
CALCIUM SERPL-MCNC: 8.9 MG/DL (ref 8.7–10.5)
CHLORIDE SERPL-SCNC: 110 MMOL/L (ref 95–110)
CLARITY UR: CLEAR
CO2 SERPL-SCNC: 25 MMOL/L (ref 23–29)
COLOR UR: YELLOW
CREAT SERPL-MCNC: 1 MG/DL (ref 0.5–1.4)
DIFFERENTIAL METHOD: ABNORMAL
EOSINOPHIL # BLD AUTO: 0.1 K/UL (ref 0–0.5)
EOSINOPHIL NFR BLD: 3 % (ref 0–8)
ERYTHROCYTE [DISTWIDTH] IN BLOOD BY AUTOMATED COUNT: 23.9 % (ref 11.5–14.5)
EST. GFR  (AFRICAN AMERICAN): >60 ML/MIN/1.73 M^2
EST. GFR  (NON AFRICAN AMERICAN): >60 ML/MIN/1.73 M^2
GLUCOSE SERPL-MCNC: 101 MG/DL (ref 70–110)
GLUCOSE UR QL STRIP: NEGATIVE
HCT VFR BLD AUTO: 35.2 % (ref 40–54)
HGB BLD-MCNC: 10.6 G/DL (ref 14–18)
HGB UR QL STRIP: NEGATIVE
IMM GRANULOCYTES # BLD AUTO: 0.01 K/UL (ref 0–0.04)
IMM GRANULOCYTES NFR BLD AUTO: 0.5 % (ref 0–0.5)
INFLUENZA A, MOLECULAR: NEGATIVE
INFLUENZA B, MOLECULAR: NEGATIVE
INR PPP: 1.1
KETONES UR QL STRIP: NEGATIVE
LACTATE SERPL-SCNC: 2.4 MMOL/L (ref 0.5–1.9)
LEUKOCYTE ESTERASE UR QL STRIP: NEGATIVE
LYMPHOCYTES # BLD AUTO: 0.1 K/UL (ref 1–4.8)
LYMPHOCYTES NFR BLD: 2.5 % (ref 18–48)
MAGNESIUM SERPL-MCNC: 1.7 MG/DL (ref 1.6–2.6)
MCH RBC QN AUTO: 27.3 PG (ref 27–31)
MCHC RBC AUTO-ENTMCNC: 30.1 G/DL (ref 32–36)
MCV RBC AUTO: 91 FL (ref 82–98)
MONOCYTES # BLD AUTO: 0.2 K/UL (ref 0.3–1)
MONOCYTES NFR BLD: 7.9 % (ref 4–15)
NEUTROPHILS # BLD AUTO: 1.8 K/UL (ref 1.8–7.7)
NEUTROPHILS NFR BLD: 86.1 % (ref 38–73)
NITRITE UR QL STRIP: NEGATIVE
NRBC BLD-RTO: 0 /100 WBC
PH UR STRIP: 7 [PH] (ref 5–8)
PLATELET # BLD AUTO: 115 K/UL (ref 150–350)
PMV BLD AUTO: 9 FL (ref 9.2–12.9)
POTASSIUM SERPL-SCNC: 3.5 MMOL/L (ref 3.5–5.1)
PROT SERPL-MCNC: 6.7 G/DL (ref 6–8.4)
PROT UR QL STRIP: ABNORMAL
PROTHROMBIN TIME: 14.1 SEC (ref 10.6–14.8)
RBC # BLD AUTO: 3.88 M/UL (ref 4.6–6.2)
SODIUM SERPL-SCNC: 143 MMOL/L (ref 136–145)
SP GR UR STRIP: 1.01 (ref 1–1.03)
SPECIMEN SOURCE: NORMAL
TROPONIN I SERPL DL<=0.01 NG/ML-MCNC: <0.03 NG/ML
URN SPEC COLLECT METH UR: ABNORMAL
UROBILINOGEN UR STRIP-ACNC: NEGATIVE EU/DL
WBC # BLD AUTO: 2.03 K/UL (ref 3.9–12.7)

## 2020-01-15 PROCEDURE — 83880 ASSAY OF NATRIURETIC PEPTIDE: CPT

## 2020-01-15 PROCEDURE — 63600175 PHARM REV CODE 636 W HCPCS: Performed by: EMERGENCY MEDICINE

## 2020-01-15 PROCEDURE — 25000003 PHARM REV CODE 250: Performed by: INTERNAL MEDICINE

## 2020-01-15 PROCEDURE — A4216 STERILE WATER/SALINE, 10 ML: HCPCS | Performed by: INTERNAL MEDICINE

## 2020-01-15 PROCEDURE — 96361 HYDRATE IV INFUSION ADD-ON: CPT

## 2020-01-15 PROCEDURE — 81003 URINALYSIS AUTO W/O SCOPE: CPT

## 2020-01-15 PROCEDURE — 63600175 PHARM REV CODE 636 W HCPCS: Mod: TB | Performed by: INTERNAL MEDICINE

## 2020-01-15 PROCEDURE — 96374 THER/PROPH/DIAG INJ IV PUSH: CPT

## 2020-01-15 PROCEDURE — 84145 PROCALCITONIN (PCT): CPT

## 2020-01-15 PROCEDURE — 85730 THROMBOPLASTIN TIME PARTIAL: CPT

## 2020-01-15 PROCEDURE — G0378 HOSPITAL OBSERVATION PER HR: HCPCS

## 2020-01-15 PROCEDURE — 87502 INFLUENZA DNA AMP PROBE: CPT

## 2020-01-15 PROCEDURE — 36415 COLL VENOUS BLD VENIPUNCTURE: CPT

## 2020-01-15 PROCEDURE — 85025 COMPLETE CBC W/AUTO DIFF WBC: CPT

## 2020-01-15 PROCEDURE — 84484 ASSAY OF TROPONIN QUANT: CPT

## 2020-01-15 PROCEDURE — 99285 EMERGENCY DEPT VISIT HI MDM: CPT | Mod: 25

## 2020-01-15 PROCEDURE — 87040 BLOOD CULTURE FOR BACTERIA: CPT | Mod: 59

## 2020-01-15 PROCEDURE — 87086 URINE CULTURE/COLONY COUNT: CPT

## 2020-01-15 PROCEDURE — 93005 ELECTROCARDIOGRAM TRACING: CPT

## 2020-01-15 PROCEDURE — 96409 CHEMO IV PUSH SNGL DRUG: CPT

## 2020-01-15 PROCEDURE — 83605 ASSAY OF LACTIC ACID: CPT

## 2020-01-15 PROCEDURE — 83735 ASSAY OF MAGNESIUM: CPT

## 2020-01-15 PROCEDURE — 85610 PROTHROMBIN TIME: CPT

## 2020-01-15 PROCEDURE — 12000002 HC ACUTE/MED SURGE SEMI-PRIVATE ROOM

## 2020-01-15 PROCEDURE — 80053 COMPREHEN METABOLIC PANEL: CPT

## 2020-01-15 RX ORDER — ONDANSETRON 2 MG/ML
4 INJECTION INTRAMUSCULAR; INTRAVENOUS EVERY 8 HOURS PRN
Status: DISCONTINUED | OUTPATIENT
Start: 2020-01-15 | End: 2020-01-19 | Stop reason: HOSPADM

## 2020-01-15 RX ORDER — CEFEPIME HYDROCHLORIDE 1 G/50ML
2 INJECTION, SOLUTION INTRAVENOUS
Status: DISCONTINUED | OUTPATIENT
Start: 2020-01-15 | End: 2020-01-18

## 2020-01-15 RX ORDER — SODIUM CHLORIDE 0.9 % (FLUSH) 0.9 %
10 SYRINGE (ML) INJECTION
Status: CANCELLED | OUTPATIENT
Start: 2020-01-16

## 2020-01-15 RX ORDER — SODIUM CHLORIDE 0.9 % (FLUSH) 0.9 %
10 SYRINGE (ML) INJECTION
Status: DISCONTINUED | OUTPATIENT
Start: 2020-01-15 | End: 2020-01-19 | Stop reason: HOSPADM

## 2020-01-15 RX ORDER — POTASSIUM CHLORIDE 20 MEQ/15ML
40 SOLUTION ORAL
Status: DISCONTINUED | OUTPATIENT
Start: 2020-01-15 | End: 2020-01-19 | Stop reason: HOSPADM

## 2020-01-15 RX ORDER — SODIUM CHLORIDE 0.9 % (FLUSH) 0.9 %
10 SYRINGE (ML) INJECTION
Status: CANCELLED | OUTPATIENT
Start: 2020-01-15

## 2020-01-15 RX ORDER — HEPARIN 100 UNIT/ML
500 SYRINGE INTRAVENOUS
Status: COMPLETED | OUTPATIENT
Start: 2020-01-15 | End: 2020-01-15

## 2020-01-15 RX ORDER — TOBRAMYCIN 3 MG/ML
3 SOLUTION/ DROPS OPHTHALMIC EVERY 4 HOURS
Status: DISCONTINUED | OUTPATIENT
Start: 2020-01-16 | End: 2020-01-19 | Stop reason: HOSPADM

## 2020-01-15 RX ORDER — LANOLIN ALCOHOL/MO/W.PET/CERES
800 CREAM (GRAM) TOPICAL
Status: DISCONTINUED | OUTPATIENT
Start: 2020-01-15 | End: 2020-01-19 | Stop reason: HOSPADM

## 2020-01-15 RX ORDER — CEFEPIME HYDROCHLORIDE 1 G/50ML
2 INJECTION, SOLUTION INTRAVENOUS
Status: DISCONTINUED | OUTPATIENT
Start: 2020-01-16 | End: 2020-01-15

## 2020-01-15 RX ORDER — HEPARIN 100 UNIT/ML
500 SYRINGE INTRAVENOUS
Status: CANCELLED | OUTPATIENT
Start: 2020-01-16

## 2020-01-15 RX ORDER — SODIUM,POTASSIUM PHOSPHATES 280-250MG
2 POWDER IN PACKET (EA) ORAL
Status: DISCONTINUED | OUTPATIENT
Start: 2020-01-15 | End: 2020-01-19 | Stop reason: HOSPADM

## 2020-01-15 RX ORDER — SODIUM CHLORIDE 9 MG/ML
INJECTION, SOLUTION INTRAVENOUS CONTINUOUS
Status: DISCONTINUED | OUTPATIENT
Start: 2020-01-16 | End: 2020-01-19 | Stop reason: HOSPADM

## 2020-01-15 RX ORDER — HEPARIN 100 UNIT/ML
500 SYRINGE INTRAVENOUS
Status: CANCELLED | OUTPATIENT
Start: 2020-01-15

## 2020-01-15 RX ORDER — ENOXAPARIN SODIUM 100 MG/ML
40 INJECTION SUBCUTANEOUS EVERY 24 HOURS
Status: DISCONTINUED | OUTPATIENT
Start: 2020-01-16 | End: 2020-01-19 | Stop reason: HOSPADM

## 2020-01-15 RX ORDER — ACETAMINOPHEN 325 MG/1
650 TABLET ORAL EVERY 8 HOURS PRN
Status: DISCONTINUED | OUTPATIENT
Start: 2020-01-15 | End: 2020-01-19 | Stop reason: HOSPADM

## 2020-01-15 RX ORDER — SODIUM CHLORIDE 0.9 % (FLUSH) 0.9 %
10 SYRINGE (ML) INJECTION
Status: COMPLETED | OUTPATIENT
Start: 2020-01-15 | End: 2020-01-15

## 2020-01-15 RX ADMIN — BENDAMUSTINE HYDROCHLORIDE 212.5 MG: 25 INJECTION, SOLUTION INTRAVENOUS at 03:01

## 2020-01-15 RX ADMIN — CEFEPIME 2 G: 2 INJECTION, POWDER, FOR SOLUTION INTRAVENOUS at 09:01

## 2020-01-15 RX ADMIN — SODIUM CHLORIDE 1947 ML: 0.9 INJECTION, SOLUTION INTRAVENOUS at 09:01

## 2020-01-15 RX ADMIN — SODIUM CHLORIDE, PRESERVATIVE FREE 10 ML: 5 INJECTION INTRAVENOUS at 03:01

## 2020-01-15 RX ADMIN — HEPARIN 500 UNITS: 100 SYRINGE at 03:01

## 2020-01-16 LAB
ANION GAP SERPL CALC-SCNC: 12 MMOL/L (ref 8–16)
BASOPHILS # BLD AUTO: 0 K/UL (ref 0–0.2)
BASOPHILS NFR BLD: 0 % (ref 0–1.9)
BUN SERPL-MCNC: 18 MG/DL (ref 8–23)
CALCIUM SERPL-MCNC: 8.2 MG/DL (ref 8.7–10.5)
CHLORIDE SERPL-SCNC: 111 MMOL/L (ref 95–110)
CO2 SERPL-SCNC: 20 MMOL/L (ref 23–29)
CREAT SERPL-MCNC: 1 MG/DL (ref 0.5–1.4)
DIFFERENTIAL METHOD: ABNORMAL
EOSINOPHIL # BLD AUTO: 0.1 K/UL (ref 0–0.5)
EOSINOPHIL NFR BLD: 4.9 % (ref 0–8)
ERYTHROCYTE [DISTWIDTH] IN BLOOD BY AUTOMATED COUNT: 23.8 % (ref 11.5–14.5)
EST. GFR  (AFRICAN AMERICAN): >60 ML/MIN/1.73 M^2
EST. GFR  (NON AFRICAN AMERICAN): >60 ML/MIN/1.73 M^2
GLUCOSE SERPL-MCNC: 105 MG/DL (ref 70–110)
GLUCOSE SERPL-MCNC: 110 MG/DL (ref 70–110)
GLUCOSE SERPL-MCNC: 113 MG/DL (ref 70–110)
GLUCOSE SERPL-MCNC: 120 MG/DL (ref 70–110)
GLUCOSE SERPL-MCNC: 90 MG/DL (ref 70–110)
HCT VFR BLD AUTO: 31.1 % (ref 40–54)
HGB BLD-MCNC: 9.6 G/DL (ref 14–18)
IMM GRANULOCYTES # BLD AUTO: 0.01 K/UL (ref 0–0.04)
IMM GRANULOCYTES NFR BLD AUTO: 0.4 % (ref 0–0.5)
LACTATE SERPL-SCNC: 1.5 MMOL/L (ref 0.5–1.9)
LACTATE SERPL-SCNC: 2.6 MMOL/L (ref 0.5–1.9)
LYMPHOCYTES # BLD AUTO: 0 K/UL (ref 1–4.8)
LYMPHOCYTES NFR BLD: 1.2 % (ref 18–48)
MAGNESIUM SERPL-MCNC: 1.6 MG/DL (ref 1.6–2.6)
MCH RBC QN AUTO: 27.3 PG (ref 27–31)
MCHC RBC AUTO-ENTMCNC: 30.9 G/DL (ref 32–36)
MCV RBC AUTO: 88 FL (ref 82–98)
MONOCYTES # BLD AUTO: 0.2 K/UL (ref 0.3–1)
MONOCYTES NFR BLD: 9.4 % (ref 4–15)
NEUTROPHILS # BLD AUTO: 2.1 K/UL (ref 1.8–7.7)
NEUTROPHILS NFR BLD: 84.1 % (ref 38–73)
NRBC BLD-RTO: 0 /100 WBC
PLATELET # BLD AUTO: 101 K/UL (ref 150–350)
PMV BLD AUTO: 8.6 FL (ref 9.2–12.9)
POTASSIUM SERPL-SCNC: 3.2 MMOL/L (ref 3.5–5.1)
PROCALCITONIN SERPL IA-MCNC: 0.49 NG/ML (ref 0–0.5)
RBC # BLD AUTO: 3.52 M/UL (ref 4.6–6.2)
SODIUM SERPL-SCNC: 143 MMOL/L (ref 136–145)
WBC # BLD AUTO: 2.45 K/UL (ref 3.9–12.7)

## 2020-01-16 PROCEDURE — 80048 BASIC METABOLIC PNL TOTAL CA: CPT

## 2020-01-16 PROCEDURE — 25000003 PHARM REV CODE 250: Performed by: INTERNAL MEDICINE

## 2020-01-16 PROCEDURE — 25000003 PHARM REV CODE 250: Performed by: NURSE PRACTITIONER

## 2020-01-16 PROCEDURE — 85025 COMPLETE CBC W/AUTO DIFF WBC: CPT

## 2020-01-16 PROCEDURE — 63600175 PHARM REV CODE 636 W HCPCS: Performed by: EMERGENCY MEDICINE

## 2020-01-16 PROCEDURE — 63600175 PHARM REV CODE 636 W HCPCS: Performed by: NURSE PRACTITIONER

## 2020-01-16 PROCEDURE — 12000002 HC ACUTE/MED SURGE SEMI-PRIVATE ROOM

## 2020-01-16 PROCEDURE — 83735 ASSAY OF MAGNESIUM: CPT

## 2020-01-16 PROCEDURE — 63600175 PHARM REV CODE 636 W HCPCS: Performed by: INTERNAL MEDICINE

## 2020-01-16 PROCEDURE — 83605 ASSAY OF LACTIC ACID: CPT

## 2020-01-16 PROCEDURE — 63600175 PHARM REV CODE 636 W HCPCS

## 2020-01-16 PROCEDURE — 94760 N-INVAS EAR/PLS OXIMETRY 1: CPT

## 2020-01-16 PROCEDURE — 36415 COLL VENOUS BLD VENIPUNCTURE: CPT

## 2020-01-16 PROCEDURE — 83605 ASSAY OF LACTIC ACID: CPT | Mod: 91

## 2020-01-16 PROCEDURE — 94761 N-INVAS EAR/PLS OXIMETRY MLT: CPT

## 2020-01-16 RX ORDER — PANTOPRAZOLE SODIUM 40 MG/1
40 TABLET, DELAYED RELEASE ORAL DAILY
Status: DISCONTINUED | OUTPATIENT
Start: 2020-01-16 | End: 2020-01-19 | Stop reason: HOSPADM

## 2020-01-16 RX ORDER — VANCOMYCIN HCL IN 5 % DEXTROSE 1G/250ML
15 PLASTIC BAG, INJECTION (ML) INTRAVENOUS
Status: DISCONTINUED | OUTPATIENT
Start: 2020-01-16 | End: 2020-01-16

## 2020-01-16 RX ADMIN — VANCOMYCIN HYDROCHLORIDE 1500 MG: 1 INJECTION, POWDER, LYOPHILIZED, FOR SOLUTION INTRAVENOUS at 03:01

## 2020-01-16 RX ADMIN — PANTOPRAZOLE SODIUM 40 MG: 40 TABLET, DELAYED RELEASE ORAL at 06:01

## 2020-01-16 RX ADMIN — TOBRAMYCIN 3 DROP: 3 SOLUTION/ DROPS OPHTHALMIC at 02:01

## 2020-01-16 RX ADMIN — SODIUM CHLORIDE: 0.9 INJECTION, SOLUTION INTRAVENOUS at 03:01

## 2020-01-16 RX ADMIN — VANCOMYCIN HYDROCHLORIDE 1250 MG: 1 INJECTION, POWDER, LYOPHILIZED, FOR SOLUTION INTRAVENOUS at 07:01

## 2020-01-16 RX ADMIN — CEFEPIME 2 G: 2 INJECTION, POWDER, FOR SOLUTION INTRAVENOUS at 06:01

## 2020-01-16 RX ADMIN — SODIUM CHLORIDE 1000 ML: 0.9 INJECTION, SOLUTION INTRAVENOUS at 01:01

## 2020-01-16 RX ADMIN — SODIUM CHLORIDE: 0.9 INJECTION, SOLUTION INTRAVENOUS at 12:01

## 2020-01-16 RX ADMIN — ENOXAPARIN SODIUM 40 MG: 100 INJECTION SUBCUTANEOUS at 06:01

## 2020-01-16 RX ADMIN — CEFEPIME 2 G: 2 INJECTION, POWDER, FOR SOLUTION INTRAVENOUS at 10:01

## 2020-01-16 RX ADMIN — ACETAMINOPHEN 650 MG: 325 TABLET ORAL at 01:01

## 2020-01-16 NOTE — PROGRESS NOTES
"[facility] Medicine  Progress Note    Patient Name: Heriberto Keys  MRN: [id number]  Patient Class: IP- Inpatient   Admission Date: 1/15/2020  Length of Stay: 1 days  Attending Physician: Lizy Hinton MD  Primary Care Provider: Jagruti Davis NP    Subjective:     Principal Problem:Fever    HPI:  The patient is a 60-year-old male with history of Hodgkin lymphoma, undergoing chemotherapy.  He presented to the emergency department with fever.  He states that he had been in his usual state health.  He reports having fever of 103 at home this afternoon, after receiving chemotherapy earlier today.  He reports being weak with mild nausea, and frequent urination.  He denies cough, sputum production, shortness of breath, chest pain, abdominal pain, vomiting, or diarrhea.    Workup in the ED showed WBC of 2.03 with lactic acid of 2.4.  Chest radiograph, personally reviewed, showed no infiltrates.  UA showed no urinary tract infection.  He receive IV bolus at 30 mL per kg in the ED he was also started on Cefepime 2 g IV    Overview/Hospital Course:    1/16:  Pt reports he feels much improved today.  No subjective fever or chills since this AM, but they did continue overnight previously.  Reports increased frequency of urination and also diarrhea.  Currently continues on vanc and cefepime.  Pt was insisting on discharge because he believed he needed further chemotherapy today; his anxiety was alleviated after pt was informed that Dr. Rich has been consulted.        Review of Systems     All systems reviewed and are negative except as noted per above.    Objective:     BP (!) 103/51   Pulse 75   Temp 98.9 °F (37.2 °C) (Oral)   Resp 16   Ht 5' 9" (1.753 m)   Wt 66 kg (145 lb 8.1 oz)   SpO2 100%   BMI 21.49 kg/m²      Physical Exam  Gen: alert, responsive  HEENT:  Eyes - no pallor  External ears with no lesions  Nares patent  Mouth - lips chapped  CV: RRR  Lungs: CTA B/L  Abd: +BS, " soft, NT, ND  Ext: no atrophy or edema  Skin: warm, dry  Neuro: grossly intact  Psych: pleasant    All labs, images, and other studies reviewed personally by me.    Assessment/Plan:      Fever in setting of chemotherapy  Chemotherapy induced neutropenia and anemia  Complicated by urinary frequency and diarrhea  - infectious workup continues  - lactic acid normalized  - continue IVFs  - oncology consulted, thank you  - continue vanc and cefepime  - trending CBC, BMP  - Electrolyte derangement:  Replacement prn    Other chronic conditions: continue home meds as appropriate    VTE Risk Mitigation (From admission, onward)         Ordered     enoxaparin injection 40 mg  Daily      01/15/20 2258     IP VTE HIGH RISK PATIENT  Once      01/15/20 2258                Lizy Hinton MD  Department of Hospital Medicine   [facility]

## 2020-01-16 NOTE — HPI
The patient is a 60-year-old male with history of Hodgkin lymphoma, undergoing chemotherapy.  He presented to the emergency department with fever.  He states that he had been in his usual state health.  He reports having fever of 103 at home this afternoon, after receiving chemotherapy earlier today.  He reports being weak with mild nausea, and frequent urination.  He denies cough, sputum production, shortness of breath, chest pain, abdominal pain, vomiting, or diarrhea.    Workup in the ED showed WBC of 2.03 with lactic acid of 2.4.  Chest radiograph, personally reviewed, showed no infiltrates.  UA showed no urinary tract infection.  He receive IV bolus at 30 mL per kg in the ED he was also started on Cefepime 2 g IV

## 2020-01-16 NOTE — PROGRESS NOTES
VANCOMYCIN PHARMACOKINETIC NOTE:  Vancomycin Day # 1    Objective/Assessment:    Diagnosis/Indication for Vancomycin: Bacteremia     60 y.o., male; Actual Body Weight = 66 kg (145 lb 8.1 oz).    The patient has the following labs:     1/16/2020 Estimated Creatinine Clearance: 72.4 mL/min (based on SCr of 1 mg/dL). Lab Results   Component Value Date    BUN 18 01/15/2020       Lab Results   Component Value Date    WBC 2.03 (L) 01/15/2020          Plan:  Adjust vancomycin dose and/or frequency based on the patient's actual weight and renal function:  Initiate Vancomycin 1500 mg x1 then 1250 mg IV every 18 hours.  Orders have been entered into patient's chart.      Vancomycin trough level has been ordered for 1/19/20 @ 7:30am    Pharmacy will manage vancomycin therapy, monitor serum vancomycin levels, monitor renal function and adjust regimen as necessary.    Thank you for allowing us to participate in this patient's care.     Loc Fairbanks 1/16/2020 1:46 AM  Department of Pharmacy  Ext [phone]

## 2020-01-16 NOTE — ASSESSMENT & PLAN NOTE
Unclear source, no urinary tract infection, no infiltrate on chest radiograph, no cellulitis  With leukopenia  Likely due to chemotherapy  Lactic acid 2.4  Blood culture done  Urine and respiratory culture requested  Received IV bolus at 30 ml/kg n the ED  Continue gentle IV hydration  Immunosuppressive state due to chemotherapy  Cefepime 2 g IV started in the ED, will continue q.8 hours  Follow cultures  Repeat lactic acid in 4 hr  Await for procalcitonin

## 2020-01-16 NOTE — H&P
[facility] Medicine  History & Physical  Date of service:1/15/2020  At 2230    Patient Name: Heriberto Keys  MRN: [id number]  Admission Date: 1/15/2020  Attending Physician: Andrea Diallo MD   Primary Care Provider: Jagruti Davis NP         Patient information was obtained from patient, past medical records and ER records.     Subjective:     Principal Problem:Fever    Chief Complaint:   Chief Complaint   Patient presents with    Fever     On chemotherapy for Lymphoma        HPI: The patient is a 60-year-old male with history of Hodgkin lymphoma, undergoing chemotherapy.  He presented to the emergency department fever.  He states that he had been in his usual state health.  He reports having fever of 103 at home this afternoon, after receiving chemotherapy earlier today.  He reports being weak with mild nausea, and frequent urination.  He denies cough, sputum production, shortness of breath, chest pain, abdominal pain, vomiting, or diarrhea.    Workup in the ED showed WBC of 2.03 with lactic acid of 2.4.  Chest radiograph, personally reviewed, showed no infiltrates.  UA showed no urinary tract infection.  He receive IV bolus at 30 mL per kg in the ED he was also started on Cefepime 2 g IV    Past Medical History:   Diagnosis Date    Anemia     Depression     Encounter for blood transfusion     Lymphoma     Lymphoma     Lymphoma 2019       Past Surgical History:   Procedure Laterality Date    BONE MARROW ASPIRATION Left 11/11/2019    Procedure: ASPIRATION, BONE MARROW;  Surgeon: Nancy Diagnostic Provider;  [facility];  Service: General;  Laterality: Left;    LYMPHADENECTOMY Bilateral        Review of patient's allergies indicates:  No Known Allergies    Current Facility-Administered Medications on File Prior to Encounter   Medication    0.9%  NaCl infusion (for blood administration)    acetaminophen tablet 650 mg    [COMPLETED] bendamustine (BENDEKA) 212.5 mg in sodium  chloride 0.9% 58.5 mL chemo infusion    diphenhydrAMINE capsule 25 mg    [COMPLETED] heparin, porcine (PF) 100 unit/mL injection flush 500 Units    [COMPLETED] sodium chloride 0.9% flush 10 mL     Current Outpatient Medications on File Prior to Encounter   Medication Sig    flu vacc [id number],6mos up,/PF (FLUARIX QUAD 9344-0395, PF, IM) Inject 0.5 mLs into the muscle once.    tobramycin sulfate 0.3% (TOBREX) 0.3 % ophthalmic solution 3 drops every 4 (four) hours.      Family History     None        Tobacco Use    Smoking status: Former Smoker     Packs/day: 1.00     Types: Cigarettes    Smokeless tobacco: Current User   Substance and Sexual Activity    Alcohol use: No     Frequency: Never    Drug use: No    Sexual activity: Not on file     Review of Systems   All other systems reviewed and are negative.    Objective:     Vital Signs (Most Recent):  Temp: (!) 103.2 °F (39.6 °C) (01/15/20 2100)  Pulse: (!) 118 (01/15/20 2300)  Resp: (!) 44 (01/15/20 2300)  BP: 123/64 (01/15/20 2300)  SpO2: 96 % (01/15/20 2300) Vital Signs (24h Range):  Temp:  [98 °F (36.7 °C)-103.2 °F (39.6 °C)] 103.2 °F (39.6 °C)  Pulse:  [] 118  Resp:  [16-69] 44  SpO2:  [96 %-97 %] 96 %  BP: (100-136)/(56-72) 123/64     Weight: 64.9 kg (143 lb)  Body mass index is 21.12 kg/m².    Physical Exam   Constitutional: He is oriented to person, place, and time. No distress.   Thin chronically ill man   HENT:   Head: Normocephalic and atraumatic.   Eyes: Right eye exhibits no discharge. Left eye exhibits no discharge.   Neck: Normal range of motion. Neck supple. No JVD present.   Cardiovascular:   Tachycardic   Pulmonary/Chest: Effort normal.   Coare breath sounds   Abdominal: Soft. Bowel sounds are normal.   Genitourinary: Penis normal.   Genitourinary Comments: No CVA tenderness   Musculoskeletal: Normal range of motion. He exhibits no edema or deformity.   Neurological: He is alert and oriented to person, place, and time.   Skin: Skin is  warm and dry. He is not diaphoretic.   Psychiatric: He has a normal mood and affect.   Vitals reviewed.          Significant Labs:   CBC:   Recent Labs   Lab 01/15/20  2138   WBC 2.03*   HGB 10.6*   HCT 35.2*   *     CMP:   Recent Labs   Lab 01/15/20  2138      K 3.5      CO2 25      BUN 18   CREATININE 1.0   CALCIUM 8.9   PROT 6.7   ALBUMIN 3.4*   BILITOT 1.0   ALKPHOS 101   AST 36   ALT 19   ANIONGAP 8   EGFRNONAA >60.0     Cardiac Markers:   Recent Labs   Lab 01/15/20  2138   *     Lactic Acid:   Recent Labs   Lab 01/15/20  2157   LACTATE 2.4*     Magnesium:   Recent Labs   Lab 01/15/20  2138   MG 1.7     Urine Studies:   Recent Labs   Lab 01/15/20  2139   COLORU Yellow   APPEARANCEUA Clear   PHUR 7.0   SPECGRAV 1.010   PROTEINUA Trace*   GLUCUA Negative   KETONESU Negative   BILIRUBINUA Negative   OCCULTUA Negative   NITRITE Negative   UROBILINOGEN Negative   LEUKOCYTESUR Negative       Significant Imaging: I have reviewed all pertinent imaging results/findings within the past 24 hours.    Assessment/Plan:     * Fever  Unclear source, no urinary tract infection, no infiltrate on chest radiograph, no cellulitis  With leukopenia  Likely due to chemotherapy  Lactic acid 2.4  Blood culture done  Urine and respiratory culture requested  Received IV bolus at 30 ml/kg n the ED  Continue gentle IV hydration  Immunosuppressive state due to chemotherapy  Cefepime 2 g IV started in the ED, will continue q.8 hours  Follow cultures  Repeat lactic acid in 4 hr  Await for procalcitonin      Chemotherapy induced neutropenia  Results for SANDRA ARGUELLES (MRN [id number]) as of 1/15/2020 22:58   Ref. Range 11/11/2019 13:08 12/23/2019 10:37 12/30/2019 08:57 1/13/2020 10:08 1/15/2020 21:38   WBC Latest Ref Range: 3.90 - 12.70 K/uL  12.25 5.63 2.55 (L) 2.03 (L)     Monitor    Anemia associated with chemotherapy  Results for SANDRA ARGUELLES (MRN [id number]) as of 1/15/2020 22:58   Ref. Range 12/23/2019  10:37 12/30/2019 08:57 1/13/2020 10:08 1/15/2020 21:38   Hemoglobin Latest Ref Range: 14.0 - 18.0 g/dL 7.4 (L) 9.8 (L) 10.4 (L) 10.6 (L)   Hematocrit Latest Ref Range: 40.0 - 54.0 % 25.5 (L) 32.4 (L) 36.6 (L) 35.2 (L)     Monitor        VTE Risk Mitigation (From admission, onward)         Ordered     enoxaparin injection 40 mg  Daily      01/15/20 2258     IP VTE HIGH RISK PATIENT  Once      01/15/20 2258                   JAIME Lutz  Department of Hospital Medicine   [facility]

## 2020-01-16 NOTE — ASSESSMENT & PLAN NOTE
Results for SANDRA ARGUELLES (MRN [id number]) as of 1/15/2020 22:58   Ref. Range 12/23/2019 10:37 12/30/2019 08:57 1/13/2020 10:08 1/15/2020 21:38   Hemoglobin Latest Ref Range: 14.0 - 18.0 g/dL 7.4 (L) 9.8 (L) 10.4 (L) 10.6 (L)   Hematocrit Latest Ref Range: 40.0 - 54.0 % 25.5 (L) 32.4 (L) 36.6 (L) 35.2 (L)     Monitor

## 2020-01-16 NOTE — SUBJECTIVE & OBJECTIVE
Past Medical History:   Diagnosis Date    Anemia     Depression     Encounter for blood transfusion     Lymphoma     Lymphoma     Lymphoma 2019       Past Surgical History:   Procedure Laterality Date    BONE MARROW ASPIRATION Left 11/11/2019    Procedure: ASPIRATION, BONE MARROW;  Surgeon: Nancy Diagnostic Provider;  Location: [facility];  Service: General;  Laterality: Left;    LYMPHADENECTOMY Bilateral        Review of patient's allergies indicates:  No Known Allergies    Current Facility-Administered Medications on File Prior to Encounter   Medication    0.9%  NaCl infusion (for blood administration)    acetaminophen tablet 650 mg    [COMPLETED] bendamustine (BENDEKA) 212.5 mg in sodium chloride 0.9% 58.5 mL chemo infusion    diphenhydrAMINE capsule 25 mg    [COMPLETED] heparin, porcine (PF) 100 unit/mL injection flush 500 Units    [COMPLETED] sodium chloride 0.9% flush 10 mL     Current Outpatient Medications on File Prior to Encounter   Medication Sig    flu vacc [id number],6mos up,/PF (FLUARIX QUAD 7290-9437, PF, IM) Inject 0.5 mLs into the muscle once.    tobramycin sulfate 0.3% (TOBREX) 0.3 % ophthalmic solution 3 drops every 4 (four) hours.      Family History     None        Tobacco Use    Smoking status: Former Smoker     Packs/day: 1.00     Types: Cigarettes    Smokeless tobacco: Current User   Substance and Sexual Activity    Alcohol use: No     Frequency: Never    Drug use: No    Sexual activity: Not on file     Review of Systems   All other systems reviewed and are negative.    Objective:     Vital Signs (Most Recent):  Temp: (!) 103.2 °F (39.6 °C) (01/15/20 2100)  Pulse: (!) 118 (01/15/20 2300)  Resp: (!) 44 (01/15/20 2300)  BP: 123/64 (01/15/20 2300)  SpO2: 96 % (01/15/20 2300) Vital Signs (24h Range):  Temp:  [98 °F (36.7 °C)-103.2 °F (39.6 °C)] 103.2 °F (39.6 °C)  Pulse:  [] 118  Resp:  [16-69] 44  SpO2:  [96 %-97 %] 96 %  BP: (100-136)/(56-72) 123/64     Weight: 64.9 kg  (143 lb)  Body mass index is 21.12 kg/m².    Physical Exam   Constitutional: He is oriented to person, place, and time. No distress.   Thin chronically ill man   HENT:   Head: Normocephalic and atraumatic.   Eyes: Right eye exhibits no discharge. Left eye exhibits no discharge.   Neck: Normal range of motion. Neck supple. No JVD present.   Cardiovascular:   Tachycardic   Pulmonary/Chest: Effort normal.   Coare breath sounds   Abdominal: Soft. Bowel sounds are normal.   Genitourinary: Penis normal.   Genitourinary Comments: No CVA tenderness   Musculoskeletal: Normal range of motion. He exhibits no edema or deformity.   Neurological: He is alert and oriented to person, place, and time.   Skin: Skin is warm and dry. He is not diaphoretic.   Psychiatric: He has a normal mood and affect.   Vitals reviewed.          Significant Labs:   CBC:   Recent Labs   Lab 01/15/20  2138   WBC 2.03*   HGB 10.6*   HCT 35.2*   *     CMP:   Recent Labs   Lab 01/15/20  2138      K 3.5      CO2 25      BUN 18   CREATININE 1.0   CALCIUM 8.9   PROT 6.7   ALBUMIN 3.4*   BILITOT 1.0   ALKPHOS 101   AST 36   ALT 19   ANIONGAP 8   EGFRNONAA >60.0     Cardiac Markers:   Recent Labs   Lab 01/15/20  2138   *     Lactic Acid:   Recent Labs   Lab 01/15/20  2157   LACTATE 2.4*     Magnesium:   Recent Labs   Lab 01/15/20  2138   MG 1.7     Urine Studies:   Recent Labs   Lab 01/15/20  2139   COLORU Yellow   APPEARANCEUA Clear   PHUR 7.0   SPECGRAV 1.010   PROTEINUA Trace*   GLUCUA Negative   KETONESU Negative   BILIRUBINUA Negative   OCCULTUA Negative   NITRITE Negative   UROBILINOGEN Negative   LEUKOCYTESUR Negative       Significant Imaging: I have reviewed all pertinent imaging results/findings within the past 24 hours.

## 2020-01-16 NOTE — ASSESSMENT & PLAN NOTE
Results for SANDRA ARGUELLES (MRN [id number]) as of 1/15/2020 22:58   Ref. Range 11/11/2019 13:08 12/23/2019 10:37 12/30/2019 08:57 1/13/2020 10:08 1/15/2020 21:38   WBC Latest Ref Range: 3.90 - 12.70 K/uL  12.25 5.63 2.55 (L) 2.03 (L)     Monitor

## 2020-01-16 NOTE — ED PROVIDER NOTES
Encounter Date: 1/15/2020       History     Chief Complaint   Patient presents with    Fever     On chemotherapy for Lymphoma     60-year-old male with chills after he went home from chemotherapy today.  Patient has history of lymphoma with involvement of bone marrow.  Currently getting chemotherapy and today after he finished chemotherapy and went home started having severe chills and felt weak.  Patient states he is feeling better at this time.  Patient does have a fever of 103 at this time.  Denies nausea or vomiting or cough or chest pain or shortness of breath or abdominal pain.  Patient currently states he feels better and denies any other complaints.        Review of patient's allergies indicates:  No Known Allergies  Past Medical History:   Diagnosis Date    Anemia     Depression     Encounter for blood transfusion     Lymphoma     Lymphoma     Lymphoma 2019     Past Surgical History:   Procedure Laterality Date    BONE MARROW ASPIRATION Left 11/11/2019    Procedure: ASPIRATION, BONE MARROW;  Surgeon: Nancy Diagnostic Provider;  Location: [facility];  Service: General;  Laterality: Left;    LYMPHADENECTOMY Bilateral      No family history on file.  Social History     Tobacco Use    Smoking status: Former Smoker     Packs/day: 1.00     Types: Cigarettes    Smokeless tobacco: Current User   Substance Use Topics    Alcohol use: No     Frequency: Never    Drug use: No     Review of Systems   Constitutional: Positive for chills, fatigue and fever.   HENT: Negative.    Eyes: Negative.    Respiratory: Negative.    Cardiovascular: Negative.    Gastrointestinal: Negative.    Endocrine: Negative.    Genitourinary: Negative.    Musculoskeletal: Positive for myalgias.   Skin: Negative.    Allergic/Immunologic: Negative.    Neurological: Negative.    Hematological: Negative.    Psychiatric/Behavioral: Negative.    All other systems reviewed and are negative.      Physical Exam     Initial Vitals [01/15/20 1938]    BP Pulse Resp Temp SpO2   136/61 107 (!) 22 (!) 101 °F (38.3 °C) 97 %      MAP       --         Physical Exam    Nursing note and vitals reviewed.  Constitutional: He appears well-developed and well-nourished.   Febrile   HENT:   Head: Normocephalic and atraumatic.   Nose: Nose normal.   Mouth/Throat: Oropharynx is clear and moist.   Eyes: Conjunctivae and EOM are normal.   Neck: Normal range of motion. Neck supple. No tracheal deviation present.   Cardiovascular: Normal rate, regular rhythm, normal heart sounds and intact distal pulses. Exam reveals no friction rub.    No murmur heard.  Pulmonary/Chest: Breath sounds normal. No respiratory distress. He has no wheezes. He has no rales.   Abdominal: Soft. He exhibits no distension. There is no tenderness.   Musculoskeletal: Normal range of motion.   Neurological: He is alert and oriented to person, place, and time. He has normal strength. No sensory deficit. GCS score is 15. GCS eye subscore is 4. GCS verbal subscore is 5. GCS motor subscore is 6.   Skin: Skin is warm and dry. Capillary refill takes less than 2 seconds. No erythema.   Psychiatric: He has a normal mood and affect. Thought content normal.         ED Course   Procedures  Labs Reviewed   CULTURE, BLOOD   CULTURE, BLOOD   CBC W/ AUTO DIFFERENTIAL   COMPREHENSIVE METABOLIC PANEL   URINALYSIS, REFLEX TO URINE CULTURE   MAGNESIUM   INFLUENZA A AND B ANTIGEN   TROPONIN I   PROCALCITONIN   APTT   PROTIME-INR   B-TYPE NATRIURETIC PEPTIDE   POCT LACTATE          Imaging Results          X-Ray Chest AP Portable (In process)               X-Rays:   Independently Interpreted Readings:   Other Readings:  Chest x-ray unremarkable    Medical Decision Making:   Differential Diagnosis:   60-year-old male with active to chemotherapy with high-grade fever which started today.  Patient does not have a clear source of infection at this time and fever could be secondary to chemotherapy itself however given patient's  immunocompromise and having active chemotherapy Hospital Medicine consulted for evaluation for observation and further management until the blood cultures return.  Broad-spectrum antibiotics started.  Hospital Medicine consult.  Clinical Tests:   Lab Tests: Reviewed  Radiological Study: Reviewed                                 Clinical Impression:       ICD-10-CM ICD-9-CM   1. Fever, unspecified fever cause R50.9 780.60   2. Weakness R53.1 780.79     Undergoing active chemotherapy                        Jenny Dennis MD  01/15/20 7381

## 2020-01-16 NOTE — ED NOTES
ASSUME CARE OF PT TO ER WITH C/O FEVER X TODAY. PT REPORTS STARTED A NEW CHEMO MEDICINE ON YESTERDAY. PT REPORTS RECEIVED THE NEW CHEMO MEDICINE YESTERDAY AND TODAY. STATES A FEW HOURS AFTER CHEMO HE BEGAN TO HAVE CHILLS. DENIES CP/SOB. DENIES COUGH. (+)N/V X TODAY PER PT. PLACED ON MONITOR. SIDE RAILS UPX2. ER WORKUP IN PROGRESS.

## 2020-01-17 LAB
ANION GAP SERPL CALC-SCNC: 7 MMOL/L (ref 8–16)
BASOPHILS # BLD AUTO: 0.01 K/UL (ref 0–0.2)
BASOPHILS NFR BLD: 0.3 % (ref 0–1.9)
BUN SERPL-MCNC: 18 MG/DL (ref 8–23)
CA-I BLDV-SCNC: 1.16 MMOL/L (ref 1.06–1.42)
CALCIUM SERPL-MCNC: 8.3 MG/DL (ref 8.7–10.5)
CHLORIDE SERPL-SCNC: 109 MMOL/L (ref 95–110)
CO2 SERPL-SCNC: 24 MMOL/L (ref 23–29)
CREAT SERPL-MCNC: 0.8 MG/DL (ref 0.5–1.4)
DIFFERENTIAL METHOD: ABNORMAL
EOSINOPHIL # BLD AUTO: 1.1 K/UL (ref 0–0.5)
EOSINOPHIL NFR BLD: 36.5 % (ref 0–8)
ERYTHROCYTE [DISTWIDTH] IN BLOOD BY AUTOMATED COUNT: 23.6 % (ref 11.5–14.5)
EST. GFR  (AFRICAN AMERICAN): >60 ML/MIN/1.73 M^2
EST. GFR  (NON AFRICAN AMERICAN): >60 ML/MIN/1.73 M^2
GLUCOSE SERPL-MCNC: 113 MG/DL (ref 70–110)
GLUCOSE SERPL-MCNC: 131 MG/DL (ref 70–110)
GLUCOSE SERPL-MCNC: 131 MG/DL (ref 70–110)
GLUCOSE SERPL-MCNC: 84 MG/DL (ref 70–110)
GLUCOSE SERPL-MCNC: 89 MG/DL (ref 70–110)
HCT VFR BLD AUTO: 29.8 % (ref 40–54)
HGB BLD-MCNC: 9.1 G/DL (ref 14–18)
IMM GRANULOCYTES # BLD AUTO: 0.01 K/UL (ref 0–0.04)
IMM GRANULOCYTES NFR BLD AUTO: 0.3 % (ref 0–0.5)
IRON SERPL-MCNC: 137 UG/DL (ref 45–160)
LYMPHOCYTES # BLD AUTO: 0.1 K/UL (ref 1–4.8)
LYMPHOCYTES NFR BLD: 2.6 % (ref 18–48)
MAGNESIUM SERPL-MCNC: 1.8 MG/DL (ref 1.6–2.6)
MCH RBC QN AUTO: 27.2 PG (ref 27–31)
MCHC RBC AUTO-ENTMCNC: 30.5 G/DL (ref 32–36)
MCV RBC AUTO: 89 FL (ref 82–98)
MONOCYTES # BLD AUTO: 0.3 K/UL (ref 0.3–1)
MONOCYTES NFR BLD: 8.7 % (ref 4–15)
NEUTROPHILS # BLD AUTO: 1.6 K/UL (ref 1.8–7.7)
NEUTROPHILS NFR BLD: 51.6 % (ref 38–73)
NRBC BLD-RTO: 0 /100 WBC
PHOSPHATE SERPL-MCNC: 3 MG/DL (ref 2.7–4.5)
PLATELET # BLD AUTO: 82 K/UL (ref 150–350)
PMV BLD AUTO: 9.3 FL (ref 9.2–12.9)
POTASSIUM SERPL-SCNC: 3.3 MMOL/L (ref 3.5–5.1)
RBC # BLD AUTO: 3.34 M/UL (ref 4.6–6.2)
SATURATED IRON: 77 % (ref 20–50)
SODIUM SERPL-SCNC: 140 MMOL/L (ref 136–145)
TOTAL IRON BINDING CAPACITY: 178 UG/DL (ref 250–450)
TRANSFERRIN SERPL-MCNC: 127 MG/DL (ref 200–375)
WBC # BLD AUTO: 3.12 K/UL (ref 3.9–12.7)

## 2020-01-17 PROCEDURE — 36415 COLL VENOUS BLD VENIPUNCTURE: CPT

## 2020-01-17 PROCEDURE — 80048 BASIC METABOLIC PNL TOTAL CA: CPT

## 2020-01-17 PROCEDURE — 83735 ASSAY OF MAGNESIUM: CPT

## 2020-01-17 PROCEDURE — 12000002 HC ACUTE/MED SURGE SEMI-PRIVATE ROOM

## 2020-01-17 PROCEDURE — 99233 PR SUBSEQUENT HOSPITAL CARE,LEVL III: ICD-10-PCS | Mod: ,,, | Performed by: INTERNAL MEDICINE

## 2020-01-17 PROCEDURE — 99233 SBSQ HOSP IP/OBS HIGH 50: CPT | Mod: ,,, | Performed by: INTERNAL MEDICINE

## 2020-01-17 PROCEDURE — 84100 ASSAY OF PHOSPHORUS: CPT

## 2020-01-17 PROCEDURE — 63600175 PHARM REV CODE 636 W HCPCS: Performed by: EMERGENCY MEDICINE

## 2020-01-17 PROCEDURE — 82330 ASSAY OF CALCIUM: CPT

## 2020-01-17 PROCEDURE — 83540 ASSAY OF IRON: CPT

## 2020-01-17 PROCEDURE — 25000003 PHARM REV CODE 250: Performed by: NURSE PRACTITIONER

## 2020-01-17 PROCEDURE — 82787 IGG 1 2 3 OR 4 EACH: CPT | Mod: 91

## 2020-01-17 PROCEDURE — 63600175 PHARM REV CODE 636 W HCPCS: Performed by: FAMILY MEDICINE

## 2020-01-17 PROCEDURE — 80299 QUANTITATIVE ASSAY DRUG: CPT | Mod: 91

## 2020-01-17 PROCEDURE — 63600175 PHARM REV CODE 636 W HCPCS

## 2020-01-17 PROCEDURE — 63600175 PHARM REV CODE 636 W HCPCS: Performed by: NURSE PRACTITIONER

## 2020-01-17 PROCEDURE — 86022 PLATELET ANTIBODIES: CPT

## 2020-01-17 PROCEDURE — 85025 COMPLETE CBC W/AUTO DIFF WBC: CPT

## 2020-01-17 RX ORDER — MAGNESIUM SULFATE HEPTAHYDRATE 40 MG/ML
2 INJECTION, SOLUTION INTRAVENOUS ONCE
Status: COMPLETED | OUTPATIENT
Start: 2020-01-17 | End: 2020-01-18

## 2020-01-17 RX ORDER — HYDROCODONE BITARTRATE AND ACETAMINOPHEN 500; 5 MG/1; MG/1
TABLET ORAL
Status: DISCONTINUED | OUTPATIENT
Start: 2020-01-17 | End: 2020-01-19 | Stop reason: HOSPADM

## 2020-01-17 RX ADMIN — TOBRAMYCIN 3 DROP: 3 SOLUTION/ DROPS OPHTHALMIC at 10:01

## 2020-01-17 RX ADMIN — CEFEPIME 2 G: 2 INJECTION, POWDER, FOR SOLUTION INTRAVENOUS at 11:01

## 2020-01-17 RX ADMIN — CEFEPIME 2 G: 2 INJECTION, POWDER, FOR SOLUTION INTRAVENOUS at 04:01

## 2020-01-17 RX ADMIN — VANCOMYCIN HYDROCHLORIDE 1250 MG: 1 INJECTION, POWDER, LYOPHILIZED, FOR SOLUTION INTRAVENOUS at 02:01

## 2020-01-17 RX ADMIN — CEFEPIME 2 G: 2 INJECTION, POWDER, FOR SOLUTION INTRAVENOUS at 06:01

## 2020-01-17 RX ADMIN — ENOXAPARIN SODIUM 40 MG: 100 INJECTION SUBCUTANEOUS at 05:01

## 2020-01-17 RX ADMIN — POTASSIUM CHLORIDE 40 MEQ: 20 SOLUTION ORAL at 11:01

## 2020-01-17 RX ADMIN — MAGNESIUM SULFATE 2 G: 2 INJECTION INTRAVENOUS at 10:01

## 2020-01-17 NOTE — CONSULTS
[facility]  Hematology/Oncology  Consult Note    Patient Name: Heriberto Keys  MRN: [id number]  Admission Date: 1/15/2020  Hospital Length of Stay: 2 days  Code Status: Full Code   Attending Provider: Lizy Hinton MD  Consulting Provider: Amanda Rich MD  Primary Care Physician: Jagruti Davis NP  Principal Problem:Fever    Inpatient consult to Hematology/Oncology  Consult performed by: Amanda Rich MD  Consult ordered by: Lizy Hinton MD        Subjective:     HPI:   This is a very pleasant patient known to me with recurrent Hodgkin's lymphoma.  He received his 1st dose of brentuximab and bendamustine on January 14, 2020.  He then developed fever  , chills, weakness and presented to the hospital emergency room.  When he presented to the ER his temperature was 101°.  He has been started on vancomycin as tolerated well and currently has been afebrile.  Once patient got to the floor his temperature increased to 103    Oncology Treatment Plan:   OP BRENTUXIMAB and bendamustine Q3W    Medications:  Continuous Infusions:   sodium chloride 0.9% 100 mL/hr at 01/16/20 0335     Scheduled Meds:   ceFEPime (MAXIPIME) IVPB  2 g Intravenous Q8H    enoxaparin  40 mg Subcutaneous Daily    pantoprazole  40 mg Oral Daily    tobramycin sulfate 0.3%  3 drop Both Eyes Q4H    vancomycin (VANCOCIN) IVPB  1,250 mg Intravenous Q18H     PRN Meds:acetaminophen, magnesium oxide, magnesium oxide, ondansetron, potassium chloride 10%, potassium chloride 10%, potassium chloride 10%, potassium, sodium phosphates, potassium, sodium phosphates, potassium, sodium phosphates, promethazine (PHENERGAN) IVPB, sodium chloride 0.9%, Pharmacy to dose Vancomycin consult **AND** vancomycin - pharmacy to dose     Review of patient's allergies indicates:  No Known Allergies     Past Medical History:   Diagnosis Date    Anemia     Depression     Encounter for blood transfusion     Lymphoma     Lymphoma     Lymphoma  2019     Past Surgical History:   Procedure Laterality Date    BONE MARROW ASPIRATION Left 11/11/2019    Procedure: ASPIRATION, BONE MARROW;  Surgeon: Nancy Diagnostic Provider;  Location: [facility];  Service: General;  Laterality: Left;    LYMPHADENECTOMY Bilateral      Family History     None        Tobacco Use    Smoking status: Former Smoker     Packs/day: 1.00     Types: Cigarettes    Smokeless tobacco: Current User   Substance and Sexual Activity    Alcohol use: No     Frequency: Never    Drug use: No    Sexual activity: Not on file       Review of Systems   Constitutional: Negative for fatigue, fever and unexpected weight change.   HENT: Negative for rhinorrhea and sore throat.    Eyes: Negative for photophobia.   Respiratory: Negative for cough and shortness of breath.    Cardiovascular: Negative for chest pain and leg swelling.   Gastrointestinal: Negative for abdominal pain, constipation, diarrhea, nausea and vomiting.   Endocrine: Negative for cold intolerance and heat intolerance.   Genitourinary: Negative for dysuria, frequency, hematuria and urgency.   Musculoskeletal: Negative for arthralgias, back pain and neck pain.   Skin: Negative for pallor and rash.   Neurological: Positive for weakness. Negative for numbness and headaches.   Hematological: Negative for adenopathy. Does not bruise/bleed easily.   Psychiatric/Behavioral: Negative for agitation.   All other systems reviewed and are negative.    Objective:     Vital Signs (Most Recent):  Temp: 98.2 °F (36.8 °C) (01/17/20 0423)  Pulse: 69 (01/17/20 0423)  Resp: 16 (01/17/20 0423)  BP: 107/64 (01/17/20 0423)  SpO2: 100 % (01/17/20 0423) Vital Signs (24h Range):  Temp:  [98.2 °F (36.8 °C)-99.2 °F (37.3 °C)] 98.2 °F (36.8 °C)  Pulse:  [69-81] 69  Resp:  [16-18] 16  SpO2:  [96 %-100 %] 100 %  BP: (102-109)/(45-64) 107/64     Weight: 66 kg (145 lb 8.1 oz)  Body mass index is 21.49 kg/m².  Body surface area is 1.79 meters squared.    No intake or  output data in the 24 hours ending 01/17/20 0814    Physical Exam   Constitutional: He is oriented to person, place, and time. He appears well-developed and well-nourished.   HENT:   Head: Normocephalic and atraumatic.   Mouth/Throat: Oropharynx is clear and moist. No oropharyngeal exudate.   Eyes: Conjunctivae and EOM are normal. No scleral icterus.   Neck: Normal range of motion. Neck supple. No JVD present. No tracheal deviation present.   Cardiovascular: Normal rate, regular rhythm and normal heart sounds.   No murmur (2= capillary refill) heard.  Pulmonary/Chest: Effort normal and breath sounds normal. No respiratory distress. He has no wheezes.   Abdominal: Soft. Bowel sounds are normal. He exhibits no distension.   Musculoskeletal: Normal range of motion. He exhibits no edema.   Neurological: He is alert and oriented to person, place, and time. No cranial nerve deficit.   Steady gait   Skin: Skin is warm and dry. No rash noted. No erythema.   Psychiatric: He has a normal mood and affect. Thought content normal.   Nursing note and vitals reviewed.      Significant Labs:     Lab Results   Component Value Date    WBC 3.12 (L) 01/17/2020    RBC 3.34 (L) 01/17/2020    HGB 9.1 (L) 01/17/2020    HCT 29.8 (L) 01/17/2020    MCV 89 01/17/2020    MCH 27.2 01/17/2020    MCHC 30.5 (L) 01/17/2020    RDW 23.6 (H) 01/17/2020    PLT 82 (L) 01/17/2020    MPV 9.3 01/17/2020    GRAN 1.6 (L) 01/17/2020    GRAN 51.6 01/17/2020    LYMPH 0.1 (L) 01/17/2020    LYMPH 2.6 (L) 01/17/2020    MONO 0.3 01/17/2020    MONO 8.7 01/17/2020    EOS 1.1 (H) 01/17/2020    BASO 0.01 01/17/2020    EOSINOPHIL 36.5 (H) 01/17/2020    BASOPHIL 0.3 01/17/2020         Diagnostic Results:  Noted    Assessment/Plan:     Active Diagnoses:    Diagnosis Date Noted POA    PRINCIPAL PROBLEM:  Fever [R50.9] 10/28/2019 Yes    Chemotherapy induced neutropenia [D70.1, T45.1X5A] 01/14/2020 Yes    Anemia associated with chemotherapy [D64.81, T45.1X5A] 04/09/2019  Yes      Problems Resolved During this Admission:       1.  Febrile neutropenia tolerating vancomycin.  Patient was having fever prior to initiating chemotherapy and it is suspected that is due to his lymphomatous  involvement of his bone marrow  2.  Thrombocytopenia hopefully related to the use of vancomycin but will check heparin-induced thrombocytopenia panel just to be safe.  Given the extensive drop in platelets and he has not had his ho of chemotherapy yet his platelets will continue to drop so I recommend 1 unit of platelets to be transfused.  3.  Anemia multifactorial.  Will check iron studies may need iron while in the hospital to help keep his hemoglobin as high as possible  4.  Immunocompromised due to chemotherapy will check IgG status he may need intravenous immunoglobulin as he is needed in the past to help him recover from this episode    Patient will not receive chemotherapy in the hospital at this time it is common with this type chemotherapy the has anemia sure reaction to such an Will Be  rechallenged in approximately 10-14 days as an outpatient  Will watch his white count very closely the drops any further then would initiate granix  This reaction is expected with Brentuximab   While in house I would be happy to follow along with you thank you very much for the notification  And the consult    Amanda Rich MD  Hematology/Oncology  [facility]

## 2020-01-18 PROBLEM — R50.9 FEVER: Status: RESOLVED | Noted: 2019-10-28 | Resolved: 2020-01-18

## 2020-01-18 LAB
ANION GAP SERPL CALC-SCNC: 5 MMOL/L (ref 8–16)
BACTERIA UR CULT: NO GROWTH
BASOPHILS # BLD AUTO: 0.02 K/UL (ref 0–0.2)
BASOPHILS NFR BLD: 0.6 % (ref 0–1.9)
BLD PROD TYP BPU: NORMAL
BLOOD UNIT EXPIRATION DATE: NORMAL
BLOOD UNIT TYPE CODE: 6200
BLOOD UNIT TYPE: NORMAL
BUN SERPL-MCNC: 13 MG/DL (ref 8–23)
CALCIUM SERPL-MCNC: 8.5 MG/DL (ref 8.7–10.5)
CHLORIDE SERPL-SCNC: 109 MMOL/L (ref 95–110)
CO2 SERPL-SCNC: 25 MMOL/L (ref 23–29)
CODING SYSTEM: NORMAL
CREAT SERPL-MCNC: 0.6 MG/DL (ref 0.5–1.4)
DIFFERENTIAL METHOD: ABNORMAL
DISPENSE STATUS: NORMAL
EOSINOPHIL # BLD AUTO: 1.4 K/UL (ref 0–0.5)
EOSINOPHIL NFR BLD: 42.5 % (ref 0–8)
ERYTHROCYTE [DISTWIDTH] IN BLOOD BY AUTOMATED COUNT: 22.7 % (ref 11.5–14.5)
EST. GFR  (AFRICAN AMERICAN): >60 ML/MIN/1.73 M^2
EST. GFR  (NON AFRICAN AMERICAN): >60 ML/MIN/1.73 M^2
FERRITIN SERPL-MCNC: 1864 NG/ML (ref 20–300)
GLUCOSE SERPL-MCNC: 124 MG/DL (ref 70–110)
GLUCOSE SERPL-MCNC: 83 MG/DL (ref 70–110)
GLUCOSE SERPL-MCNC: 88 MG/DL (ref 70–110)
GLUCOSE SERPL-MCNC: 95 MG/DL (ref 70–110)
GLUCOSE SERPL-MCNC: 99 MG/DL (ref 70–110)
HCT VFR BLD AUTO: 28.7 % (ref 40–54)
HGB BLD-MCNC: 9 G/DL (ref 14–18)
IMM GRANULOCYTES # BLD AUTO: 0.01 K/UL (ref 0–0.04)
IMM GRANULOCYTES NFR BLD AUTO: 0.3 % (ref 0–0.5)
LYMPHOCYTES # BLD AUTO: 0.1 K/UL (ref 1–4.8)
LYMPHOCYTES NFR BLD: 3.1 % (ref 18–48)
MAGNESIUM SERPL-MCNC: 2.2 MG/DL (ref 1.6–2.6)
MCH RBC QN AUTO: 27.6 PG (ref 27–31)
MCHC RBC AUTO-ENTMCNC: 31.4 G/DL (ref 32–36)
MCV RBC AUTO: 88 FL (ref 82–98)
MONOCYTES # BLD AUTO: 0.3 K/UL (ref 0.3–1)
MONOCYTES NFR BLD: 8.7 % (ref 4–15)
NEUTROPHILS # BLD AUTO: 1.4 K/UL (ref 1.8–7.7)
NEUTROPHILS NFR BLD: 44.8 % (ref 38–73)
NRBC BLD-RTO: 0 /100 WBC
PF4 HEPARIN CMPLX IGG SERPL IA: 0.33 OD (ref 0–0.4)
PHOSPHATE SERPL-MCNC: 2.9 MG/DL (ref 2.7–4.5)
PLATELET # BLD AUTO: 79 K/UL (ref 150–350)
PMV BLD AUTO: 9.1 FL (ref 9.2–12.9)
POTASSIUM SERPL-SCNC: 3.4 MMOL/L (ref 3.5–5.1)
RBC # BLD AUTO: 3.26 M/UL (ref 4.6–6.2)
SODIUM SERPL-SCNC: 139 MMOL/L (ref 136–145)
UNIT NUMBER: NORMAL
VANCOMYCIN TROUGH SERPL-MCNC: 6.7 UG/ML (ref 10–22)
WBC # BLD AUTO: 3.22 K/UL (ref 3.9–12.7)

## 2020-01-18 PROCEDURE — 82784 ASSAY IGA/IGD/IGG/IGM EACH: CPT | Mod: 91

## 2020-01-18 PROCEDURE — 36415 COLL VENOUS BLD VENIPUNCTURE: CPT

## 2020-01-18 PROCEDURE — 86022 PLATELET ANTIBODIES: CPT

## 2020-01-18 PROCEDURE — 99233 SBSQ HOSP IP/OBS HIGH 50: CPT | Mod: ,,, | Performed by: INTERNAL MEDICINE

## 2020-01-18 PROCEDURE — 80202 ASSAY OF VANCOMYCIN: CPT

## 2020-01-18 PROCEDURE — 83735 ASSAY OF MAGNESIUM: CPT

## 2020-01-18 PROCEDURE — 12000002 HC ACUTE/MED SURGE SEMI-PRIVATE ROOM

## 2020-01-18 PROCEDURE — 63600175 PHARM REV CODE 636 W HCPCS: Performed by: EMERGENCY MEDICINE

## 2020-01-18 PROCEDURE — P9073 PLATELETS PHERESIS PATH REDU: HCPCS

## 2020-01-18 PROCEDURE — 80048 BASIC METABOLIC PNL TOTAL CA: CPT

## 2020-01-18 PROCEDURE — 84100 ASSAY OF PHOSPHORUS: CPT

## 2020-01-18 PROCEDURE — 82728 ASSAY OF FERRITIN: CPT

## 2020-01-18 PROCEDURE — 36430 TRANSFUSION BLD/BLD COMPNT: CPT

## 2020-01-18 PROCEDURE — 99233 PR SUBSEQUENT HOSPITAL CARE,LEVL III: ICD-10-PCS | Mod: ,,, | Performed by: INTERNAL MEDICINE

## 2020-01-18 PROCEDURE — 25000003 PHARM REV CODE 250: Performed by: FAMILY MEDICINE

## 2020-01-18 PROCEDURE — 63600175 PHARM REV CODE 636 W HCPCS: Performed by: FAMILY MEDICINE

## 2020-01-18 PROCEDURE — 85025 COMPLETE CBC W/AUTO DIFF WBC: CPT

## 2020-01-18 RX ORDER — AMOXICILLIN AND CLAVULANATE POTASSIUM 875; 125 MG/1; MG/1
1 TABLET, FILM COATED ORAL EVERY 12 HOURS
Qty: 28 TABLET | Refills: 0 | Status: SHIPPED | OUTPATIENT
Start: 2020-01-18 | End: 2020-01-19 | Stop reason: SDUPTHER

## 2020-01-18 RX ORDER — AMOXICILLIN AND CLAVULANATE POTASSIUM 875; 125 MG/1; MG/1
1 TABLET, FILM COATED ORAL ONCE
Status: COMPLETED | OUTPATIENT
Start: 2020-01-18 | End: 2020-01-18

## 2020-01-18 RX ORDER — CIPROFLOXACIN 750 MG/1
750 TABLET, FILM COATED ORAL EVERY 12 HOURS
Qty: 28 TABLET | Refills: 0 | Status: SHIPPED | OUTPATIENT
Start: 2020-01-18 | End: 2020-01-19 | Stop reason: SDUPTHER

## 2020-01-18 RX ORDER — LEVOFLOXACIN 750 MG/1
750 TABLET ORAL ONCE
Status: COMPLETED | OUTPATIENT
Start: 2020-01-18 | End: 2020-01-18

## 2020-01-18 RX ADMIN — VANCOMYCIN HYDROCHLORIDE 1250 MG: 1 INJECTION, POWDER, LYOPHILIZED, FOR SOLUTION INTRAVENOUS at 03:01

## 2020-01-18 RX ADMIN — AMOXICILLIN AND CLAVULANATE POTASSIUM 1 TABLET: 875; 125 TABLET, FILM COATED ORAL at 05:01

## 2020-01-18 RX ADMIN — CEFEPIME 2 G: 2 INJECTION, POWDER, FOR SOLUTION INTRAVENOUS at 09:01

## 2020-01-18 RX ADMIN — LEVOFLOXACIN 750 MG: 750 TABLET, FILM COATED ORAL at 06:01

## 2020-01-18 RX ADMIN — CEFEPIME 2 G: 2 INJECTION, POWDER, FOR SOLUTION INTRAVENOUS at 02:01

## 2020-01-18 NOTE — PLAN OF CARE
Received 1 pack of platelets last pm. Tolerated well. No overnight issues or events. Safety maintained.

## 2020-01-18 NOTE — PLAN OF CARE
Problem: Adult Inpatient Plan of Care  Goal: Plan of Care Review  Outcome: Ongoing, Progressing  Goal: Patient-Specific Goal (Individualization)  Outcome: Ongoing, Progressing  Goal: Absence of Hospital-Acquired Illness or Injury  Outcome: Ongoing, Progressing  Goal: Optimal Comfort and Wellbeing  Outcome: Ongoing, Progressing  Goal: Readiness for Transition of Care  Outcome: Ongoing, Progressing  Goal: Rounds/Family Conference  Outcome: Ongoing, Progressing     Problem: Infection  Goal: Infection Symptom Resolution  Outcome: Ongoing, Progressing     Problem: Fall Injury Risk  Goal: Absence of Fall and Fall-Related Injury  Outcome: Ongoing, Progressing

## 2020-01-18 NOTE — PROGRESS NOTES
HPI:     60 year male known to Dr Rcih for recurrent Hodgkin lymphoma.  He previously received ABVD, currently received his 1st dose of brentuximab and bendamustine on January 14, 2020.  He was admitted to hospital for developing a fever post chemo.  Associated symptoms including chills, weakness and at the time in which he was presented to the hospital.  He with febrile temperature 101° in the emergency room.  Antibiotics started, patient currently on cefepime Vanco.  Patient also had history of IVIG for supportive measure involving neutropenic fever        Oncology Treatment Plan:   OP BRENTUXIMAB and bendamustine Q3W    Medications:  Continuous Infusions:   sodium chloride 0.9% 100 mL/hr at 01/16/20 0335     Scheduled Meds:   ceFEPime (MAXIPIME) IVPB  2 g Intravenous Q8H    enoxaparin  40 mg Subcutaneous Daily    pantoprazole  40 mg Oral Daily    tobramycin sulfate 0.3%  3 drop Both Eyes Q4H    vancomycin (VANCOCIN) IVPB  1,250 mg Intravenous Q18H     PRN Meds:sodium chloride, acetaminophen, magnesium oxide, magnesium oxide, ondansetron, potassium chloride 10%, potassium chloride 10%, potassium chloride 10%, potassium, sodium phosphates, potassium, sodium phosphates, potassium, sodium phosphates, promethazine (PHENERGAN) IVPB, sodium chloride 0.9%, Pharmacy to dose Vancomycin consult **AND** vancomycin - pharmacy to dose     Review of patient's allergies indicates:  No Known Allergies     Past Medical History:   Diagnosis Date    Anemia     Depression     Encounter for blood transfusion     Lymphoma     Lymphoma     Lymphoma 2019     Past Surgical History:   Procedure Laterality Date    BONE MARROW ASPIRATION Left 11/11/2019    Procedure: ASPIRATION, BONE MARROW;  Surgeon: Dosc Diagnostic Provider;  Location: [facility];  Service: General;  Laterality: Left;    LYMPHADENECTOMY Bilateral      Family History     None        Tobacco Use    Smoking status: Former Smoker     Packs/day: 1.00     Types:  Cigarettes    Smokeless tobacco: Current User   Substance and Sexual Activity    Alcohol use: No     Frequency: Never    Drug use: No    Sexual activity: Not on file       Review of Systems   Constitutional: Negative for fatigue, fever and unexpected weight change.   HENT: Negative for rhinorrhea and sore throat.    Eyes: Negative for photophobia.   Respiratory: Negative for cough and shortness of breath.    Cardiovascular: Negative for chest pain and leg swelling.   Gastrointestinal: Negative for abdominal pain, constipation, diarrhea, nausea and vomiting.   Endocrine: Negative for cold intolerance and heat intolerance.   Genitourinary: Negative for dysuria, frequency, hematuria and urgency.   Musculoskeletal: Negative for arthralgias, back pain and neck pain.   Skin: Negative for pallor and rash.   Neurological: Positive for weakness. Negative for numbness and headaches.   Hematological: Negative for adenopathy. Does not bruise/bleed easily.   Psychiatric/Behavioral: Negative for agitation.   All other systems reviewed and are negative.    Objective:     Vital Signs (Most Recent):  Temp: 97.9 °F (36.6 °C) (01/18/20 0400)  Pulse: 66 (01/18/20 0400)  Resp: 20 (01/18/20 0400)  BP: 116/68 (01/18/20 0400)  SpO2: 100 % (01/18/20 0400) Vital Signs (24h Range):  Temp:  [97.5 °F (36.4 °C)-99.9 °F (37.7 °C)] 97.9 °F (36.6 °C)  Pulse:  [61-68] 66  Resp:  [15-20] 20  SpO2:  [96 %-100 %] 100 %  BP: (104-128)/(61-72) 116/68     Weight: 66 kg (145 lb 8.1 oz)  Body mass index is 21.49 kg/m².  Body surface area is 1.79 meters squared.      Intake/Output Summary (Last 24 hours) at 1/18/2020 0826  Last data filed at 1/18/2020 0400  Gross per 24 hour   Intake 397.67 ml   Output --   Net 397.67 ml     Gen:  Awake alert  HEENT:  Normocephalic atraumatic pupils equal round reactive to light  CV:  Normal rate  Lung:  Good air movement  Abdomen:  Soft nontender nondistended  Extremity:  Distal pulses intact  Neuro:  Nonfocal  :   Deferred      Significant Labs:     Lab Results   Component Value Date    WBC 3.22 (L) 01/18/2020    RBC 3.26 (L) 01/18/2020    HGB 9.0 (L) 01/18/2020    HCT 28.7 (L) 01/18/2020    MCV 88 01/18/2020    MCH 27.6 01/18/2020    MCHC 31.4 (L) 01/18/2020    RDW 22.7 (H) 01/18/2020    PLT 79 (L) 01/18/2020    MPV 9.1 (L) 01/18/2020    GRAN 1.4 (L) 01/18/2020    GRAN 44.8 01/18/2020    LYMPH 0.1 (L) 01/18/2020    LYMPH 3.1 (L) 01/18/2020    MONO 0.3 01/18/2020    MONO 8.7 01/18/2020    EOS 1.4 (H) 01/18/2020    BASO 0.02 01/18/2020    EOSINOPHIL 42.5 (H) 01/18/2020    BASOPHIL 0.6 01/18/2020         Assessment/Plan:     [] Relapsed Hodgkin lymphoma status post ABVD currently on brentuximab and bendamustine last infusion on 01/14/2020.  > no active chemo as inpatient acute status febrile neutropenia  > follow Dr. Rich out-patient sitting for rechallenge of chemotherapy    [] Febrile neutropenia tolerating cefepime Vanco afebrile overnight.  Blood culture negative x3 days.  Urine culture negative x3 days.  Of note, it was reported patient was having fever prior to initiating chemotherapy and it is suspected that is due to his lymphomatous  involvement of his bone marrow  > continue monitor IV antibiotics    [] Chemo induced thrombocytopenia suspect secondary to antibiotics (vancomycin) but will check heparin-induced thrombocytopenia panel just to be safe.    > HIT panel     []   Anemia multifactorial.    > order iron panel ferritin    []  Immunocompromised due to chemotherapy   > check IgG, IgA, IgM        Sharif Hoover MD  Hematology/Oncology  [facility]

## 2020-01-18 NOTE — PROGRESS NOTES
[facility] Medicine  Progress Note    Patient Name: Heriberto Keys  MRN: [id number]  Patient Class: IP- Inpatient   Admission Date: 1/15/2020  Length of Stay: 2 days  Attending Physician: Lizy Hinton MD  Primary Care Provider: Jagruti Davis NP    Subjective:     Principal Problem:Fever    HPI:  The patient is a 60-year-old male with history of Hodgkin lymphoma, undergoing chemotherapy.  He presented to the emergency department with fever.  He states that he had been in his usual state health.  He reports having fever of 103 at home this afternoon, after receiving chemotherapy earlier today.  He reports being weak with mild nausea, and frequent urination.  He denies cough, sputum production, shortness of breath, chest pain, abdominal pain, vomiting, or diarrhea.    Workup in the ED showed WBC of 2.03 with lactic acid of 2.4.  Chest radiograph, personally reviewed, showed no infiltrates.  UA showed no urinary tract infection.  He receive IV bolus at 30 mL per kg in the ED he was also started on Cefepime 2 g IV    Overview/Hospital Course:    1/16:  Pt reports he feels much improved today.  No subjective fever or chills since this AM, but they did continue overnight previously.  Reports increased frequency of urination and also diarrhea.  Currently continues on vanc and cefepime.  Pt was insisting on discharge because he believed he needed further chemotherapy today; his anxiety was alleviated after pt was informed that Dr. Rich has been consulted.       1/17: I discussed pt's case with Dr. Rich, pt's oncologist.  Pancytopenia expected with pt's chemotherapy regimen.  Providing supportive care.  Transfusing 1 unit platelets as per their recommendations.  HIT panel, iron studies, and IgG status in progress.  Pt very anxious to leave but agrees to stay because Dr Rich recommends it.  Pt continues to feel better.  No subjective fever.  No chills.  Urinary frequency  "resolved.    Review of Systems     All systems reviewed and are negative except as noted per above.    Objective:     BP (!) 103/51   Pulse 75   Temp 98.9 °F (37.2 °C) (Oral)   Resp 16   Ht 5' 9" (1.753 m)   Wt 66 kg (145 lb 8.1 oz)   SpO2 100%   BMI 21.49 kg/m²      Physical Exam  Gen: alert, responsive; ambulating around myrick without difficulty   HEENT:  Eyes - no pallor  External ears with no lesions  Nares patent  Mouth - lips chapped  CV: RRR  Lungs: CTA B/L  Abd: +BS, soft, NT, ND  Ext: no atrophy or edema  Skin: warm, dry  Neuro: grossly intact  Psych: pleasant    All labs, images, and other studies reviewed personally by me.    Assessment/Plan:      Fever in setting of chemotherapy  Chemotherapy induced neutropenia and anemia  Complicated by urinary frequency and diarrhea  - infectious workup continues; negative so far  - continue IVFs  - oncology consulted, appreciate recs  - continue vanc and cefepime  - trending CBC, BMP, IgG status, iron studies, HIT panel  - Electrolyte derangement:  Replacement prn    Other chronic conditions: continue home meds as appropriate    VTE Risk Mitigation (From admission, onward)         Ordered     enoxaparin injection 40 mg  Daily      01/15/20 2258     IP VTE HIGH RISK PATIENT  Once      01/15/20 2258              Lizy Hinton MD  Department of Hospital Medicine   [facility]  "

## 2020-01-19 VITALS
BODY MASS INDEX: 21.55 KG/M2 | SYSTOLIC BLOOD PRESSURE: 116 MMHG | RESPIRATION RATE: 16 BRPM | DIASTOLIC BLOOD PRESSURE: 66 MMHG | WEIGHT: 145.5 LBS | TEMPERATURE: 98 F | HEIGHT: 69 IN | HEART RATE: 57 BPM | OXYGEN SATURATION: 100 %

## 2020-01-19 LAB
ANION GAP SERPL CALC-SCNC: 6 MMOL/L (ref 8–16)
BASOPHILS # BLD AUTO: 0.01 K/UL (ref 0–0.2)
BASOPHILS NFR BLD: 0.3 % (ref 0–1.9)
BUN SERPL-MCNC: 15 MG/DL (ref 8–23)
CALCIUM SERPL-MCNC: 8.5 MG/DL (ref 8.7–10.5)
CHLORIDE SERPL-SCNC: 110 MMOL/L (ref 95–110)
CO2 SERPL-SCNC: 25 MMOL/L (ref 23–29)
CREAT SERPL-MCNC: 0.7 MG/DL (ref 0.5–1.4)
DIFFERENTIAL METHOD: ABNORMAL
EOSINOPHIL # BLD AUTO: 1.3 K/UL (ref 0–0.5)
EOSINOPHIL NFR BLD: 43.6 % (ref 0–8)
ERYTHROCYTE [DISTWIDTH] IN BLOOD BY AUTOMATED COUNT: 22.2 % (ref 11.5–14.5)
EST. GFR  (AFRICAN AMERICAN): >60 ML/MIN/1.73 M^2
EST. GFR  (NON AFRICAN AMERICAN): >60 ML/MIN/1.73 M^2
GLUCOSE SERPL-MCNC: 110 MG/DL (ref 70–110)
GLUCOSE SERPL-MCNC: 92 MG/DL (ref 70–110)
HCT VFR BLD AUTO: 28.4 % (ref 40–54)
HGB BLD-MCNC: 8.8 G/DL (ref 14–18)
IMM GRANULOCYTES # BLD AUTO: 0.01 K/UL (ref 0–0.04)
IMM GRANULOCYTES NFR BLD AUTO: 0.3 % (ref 0–0.5)
LYMPHOCYTES # BLD AUTO: 0.1 K/UL (ref 1–4.8)
LYMPHOCYTES NFR BLD: 4.5 % (ref 18–48)
MAGNESIUM SERPL-MCNC: 1.9 MG/DL (ref 1.6–2.6)
MCH RBC QN AUTO: 27 PG (ref 27–31)
MCHC RBC AUTO-ENTMCNC: 31 G/DL (ref 32–36)
MCV RBC AUTO: 87 FL (ref 82–98)
MONOCYTES # BLD AUTO: 0.2 K/UL (ref 0.3–1)
MONOCYTES NFR BLD: 7.6 % (ref 4–15)
NEUTROPHILS # BLD AUTO: 1.3 K/UL (ref 1.8–7.7)
NEUTROPHILS NFR BLD: 43.7 % (ref 38–73)
NRBC BLD-RTO: 0 /100 WBC
PHOSPHATE SERPL-MCNC: 3.5 MG/DL (ref 2.7–4.5)
PLATELET # BLD AUTO: 87 K/UL (ref 150–350)
PMV BLD AUTO: 9.4 FL (ref 9.2–12.9)
POTASSIUM SERPL-SCNC: 3.5 MMOL/L (ref 3.5–5.1)
RBC # BLD AUTO: 3.26 M/UL (ref 4.6–6.2)
SODIUM SERPL-SCNC: 141 MMOL/L (ref 136–145)
WBC # BLD AUTO: 2.89 K/UL (ref 3.9–12.7)

## 2020-01-19 PROCEDURE — 99233 PR SUBSEQUENT HOSPITAL CARE,LEVL III: ICD-10-PCS | Mod: ,,, | Performed by: INTERNAL MEDICINE

## 2020-01-19 PROCEDURE — 80048 BASIC METABOLIC PNL TOTAL CA: CPT

## 2020-01-19 PROCEDURE — 83735 ASSAY OF MAGNESIUM: CPT

## 2020-01-19 PROCEDURE — 36415 COLL VENOUS BLD VENIPUNCTURE: CPT

## 2020-01-19 PROCEDURE — 99233 SBSQ HOSP IP/OBS HIGH 50: CPT | Mod: ,,, | Performed by: INTERNAL MEDICINE

## 2020-01-19 PROCEDURE — 84100 ASSAY OF PHOSPHORUS: CPT

## 2020-01-19 PROCEDURE — 85025 COMPLETE CBC W/AUTO DIFF WBC: CPT

## 2020-01-19 PROCEDURE — 63600175 PHARM REV CODE 636 W HCPCS: Performed by: FAMILY MEDICINE

## 2020-01-19 PROCEDURE — 25000003 PHARM REV CODE 250: Performed by: FAMILY MEDICINE

## 2020-01-19 RX ORDER — AMOXICILLIN AND CLAVULANATE POTASSIUM 875; 125 MG/1; MG/1
1 TABLET, FILM COATED ORAL EVERY 12 HOURS
Qty: 28 TABLET | Refills: 0 | Status: SHIPPED | OUTPATIENT
Start: 2020-01-19 | End: 2020-02-02

## 2020-01-19 RX ORDER — LEVOFLOXACIN 750 MG/1
750 TABLET ORAL ONCE
Status: COMPLETED | OUTPATIENT
Start: 2020-01-19 | End: 2020-01-19

## 2020-01-19 RX ORDER — CIPROFLOXACIN 750 MG/1
750 TABLET, FILM COATED ORAL EVERY 12 HOURS
Qty: 28 TABLET | Refills: 0 | Status: SHIPPED | OUTPATIENT
Start: 2020-01-19 | End: 2020-02-02

## 2020-01-19 RX ORDER — POTASSIUM CHLORIDE 20 MEQ/1
40 TABLET, EXTENDED RELEASE ORAL ONCE
Status: COMPLETED | OUTPATIENT
Start: 2020-01-19 | End: 2020-01-19

## 2020-01-19 RX ORDER — AMOXICILLIN AND CLAVULANATE POTASSIUM 875; 125 MG/1; MG/1
1 TABLET, FILM COATED ORAL ONCE
Status: COMPLETED | OUTPATIENT
Start: 2020-01-19 | End: 2020-01-19

## 2020-01-19 RX ADMIN — LEVOFLOXACIN 750 MG: 750 TABLET, FILM COATED ORAL at 09:01

## 2020-01-19 RX ADMIN — POTASSIUM CHLORIDE 40 MEQ: 20 TABLET, EXTENDED RELEASE ORAL at 09:01

## 2020-01-19 RX ADMIN — VANCOMYCIN HYDROCHLORIDE 1250 MG: 1 INJECTION, POWDER, LYOPHILIZED, FOR SOLUTION INTRAVENOUS at 04:01

## 2020-01-19 RX ADMIN — AMOXICILLIN AND CLAVULANATE POTASSIUM 1 TABLET: 875; 125 TABLET, FILM COATED ORAL at 09:01

## 2020-01-19 NOTE — NURSING
20g to LAC leaking. Pt frustrated with positioning of IV but refused offer to move IV location earlier.  Pt now requests IV to be removed, but refuses a new IV at this time.  Pt wants new IV put in before next dose of antibiotic.   
Notified Dr. Diallo of T 103 (tylenol po given), R 22, . New orders noted.  
Pt awake but refused to have his IV restarted at this time. Pt aware of antibiotic therapy due, still requests to wait.   
Pt discharged to home. No acute distress noted. Discharge paper work went over with pt and friend at bedside  
Repeat lactic 2.6. Dr. Diallo notified. No new orders noted.  
Vancomycin still infusing due IV position in AC. Offered pt arm board but pt refuses.   
Depressed

## 2020-01-19 NOTE — DISCHARGE SUMMARY
[facility] Medicine  Discharge Summary      Patient Name: Heriberto Keys  MRN: [id number]  Admission Date: 1/15/2020  Discharge Date and Time:  01/19/2020 8:48 AM  Attending Physician: Lizy Hinton MD   Discharging Provider: Lizy Hinton MD  Primary Care Provider: Jagruti Davis NP    HPI:     The patient is a 60-year-old male with history of Hodgkin lymphoma, undergoing chemotherapy.  He presented to the emergency department with fever.  He states that he had been in his usual state health.  He reports having fever of 103 at home this afternoon, after receiving chemotherapy earlier today.  He reports being weak with mild nausea, and frequent urination.  He denies cough, sputum production, shortness of breath, chest pain, abdominal pain, vomiting, or diarrhea.    Workup in the ED showed WBC of 2.03 with lactic acid of 2.4.  Chest radiograph, personally reviewed, showed no infiltrates.  UA showed no urinary tract infection.  He receive IV bolus at 30 mL per kg in the ED he was also started on Cefepime 2 g IV    * No surgery found *      Hospital Course:      1/16:  Pt reports he feels much improved today.  No subjective fever or chills since this AM, but they did continue overnight previously.  Reports increased frequency of urination and also diarrhea.  Currently continues on vanc and cefepime.  Pt was insisting on discharge because he believed he needed further chemotherapy today; his anxiety was alleviated after pt was informed that Dr. Rich has been consulted.        1/17: I discussed pt's case with Dr. Rich, pt's oncologist.  Pancytopenia expected with pt's chemotherapy regimen.  Providing supportive care.  Transfusing 1 unit platelets as per their recommendations.  HIT panel, iron studies, and IgG status in progress.  Pt very anxious to leave but agrees to stay because Dr Rich recommends it.  Pt continues to feel better.  No subjective fever.  No chills.  Urinary  frequency resolved.     1/18:  Pt reports he feels well; we discussed his blood work and he agrees to stay for further monitoring.  No fevers overnight.  Appreciate oncology recs.    1/19:  Pt considered stable for discharge.  Transitioned to po abx overnight and tolerated well; see med rec below.  Pt to follow-up soon with oncology.  Pt instructed to take his antibiotics until instructed to stop by Dr. Rich.  Pt also given strong ED return precautions.  Pt v/u and agreement.    DISCHARGE PHYSICAL EXAM  Temp:  [98.1 °F (36.7 °C)-98.5 °F (36.9 °C)] 98.1 °F (36.7 °C)  Pulse:  [57-76] 57  Resp:  [16-18] 16  SpO2:  [68 %-100 %] 100 %  BP: ()/(50-70) 116/66    Gen: alert, responsive  HEENT:  Eyes - no pallor  External ears with no lesions  Nares patent  Mouth - lips chapped  CV: RRR  Lungs: CTA B/L  Abd: +BS, soft, NT, ND  Ext: no atrophy or edema  Skin: warm, dry  Neuro: grossly intact  Psych: pleasant    Consults:   Consults (From admission, onward)        Status Ordering Provider     Inpatient consult to Hematology Oncology  Once     Provider:  Amanda Rich MD    Completed KATE HIGGINS     Inpatient consult to Hematology Oncology  Once     Provider:  Amanda Rich MD    Acknowledged KATE HIGGINS     Inpatient consult to Hospitalist  Once     Provider:  Andrea Diallo MD    Acknowledged ENID HU     Pharmacy to dose Vancomycin consult  Once     Provider:  (Not yet assigned)    Acknowledged ANDREA DIALLO        Final Active Diagnoses:    Diagnosis Date Noted POA    Chemotherapy induced neutropenia [D70.1, T45.1X5A] 01/14/2020 Yes    Anemia associated with chemotherapy [D64.81, T45.1X5A] 04/09/2019 Yes      Problems Resolved During this Admission:    Diagnosis Date Noted Date Resolved POA    PRINCIPAL PROBLEM:  Fever [R50.9] 10/28/2019 01/18/2020 Yes       Discharged Condition: stable    Disposition: Home or Self Care    Follow Up:  Follow-up Information     Schedule an appointment as soon as possible  for a visit with Jagruti Davis NP.    Specialty:  Family Medicine  Why:  for neutropenic fever and hospital discharge follow-up.  THEY NEED TO CHECK YOUR CBC (BLOOD COUNTS)  Contact information:  [address]  [facility] [address] [phone]                 Patient Instructions:      Ambulatory Referral to Hematology / Oncology   Referral Priority: Routine Referral Type: Consultation   Referral Reason: Specialty Services Required   Referred to Provider: MARINE VILLA Requested Specialty: Hematology and Oncology   Number of Visits Requested: 1       Pending Diagnostic Studies:     Procedure Component Value Units Date/Time    Heparin-induced platelet antibody [[id number]] Collected:  01/18/20 1146    Order Status:  Sent Lab Status:  In process Updated:  01/18/20 1214    Specimen:  Blood     IgG 1, 2, 3, and 4 [[id number]] Collected:  01/17/20 0902    Order Status:  Sent Lab Status:  In process Updated:  01/17/20 0905    Specimen:  Blood     Immunoglobulins (IgG, IgA, IgM) Quantitative [[id number]] Collected:  01/18/20 1146    Order Status:  Sent Lab Status:  In process Updated:  01/18/20 1214    Specimen:  Blood          Medications:  Reconciled Home Medications:      Medication List      START taking these medications    amoxicillin-clavulanate 875-125mg 875-125 mg per tablet  Commonly known as:  AUGMENTIN  Take 1 tablet by mouth every 12 (twelve) hours. for 14 days     ciprofloxacin HCl 750 MG tablet  Commonly known as:  CIPRO  Take 1 tablet (750 mg total) by mouth every 12 (twelve) hours. for 14 days        CONTINUE taking these medications    tobramycin sulfate 0.3% 0.3 % ophthalmic solution  Commonly known as:  TOBREX  3 drops every 4 (four) hours.        STOP taking these medications    FLUARIX QUAD [id number] (PF) TIA Hinton MD  Department of Hospital Medicine  [facility]

## 2020-01-19 NOTE — PROGRESS NOTES
HPI:     60 year male known to Dr Rich for recurrent Hodgkin lymphoma.  He previously received ABVD, currently received his 1st dose of brentuximab and bendamustine on January 14, 2020.  He was admitted to hospital for developing a fever post chemo.  Associated symptoms including chills, weakness and at the time in which he was presented to the hospital.  He with febrile temperature 101° in the emergency room.  Antibiotics started, patient currently on cefepime Vanco.  Patient also had history of IVIG for supportive measure involving neutropenic fever    No acute event over night.     Oncology Treatment Plan:   OP BRENTUXIMAB and bendamustine Q3W    Medications:  Continuous Infusions:   sodium chloride 0.9% 100 mL/hr at 01/16/20 0335     Scheduled Meds:   enoxaparin  40 mg Subcutaneous Daily    pantoprazole  40 mg Oral Daily    tobramycin sulfate 0.3%  3 drop Both Eyes Q4H    vancomycin (VANCOCIN) IVPB  1,250 mg Intravenous Q12H     PRN Meds:sodium chloride, acetaminophen, magnesium oxide, magnesium oxide, ondansetron, potassium chloride 10%, potassium chloride 10%, potassium chloride 10%, potassium, sodium phosphates, potassium, sodium phosphates, potassium, sodium phosphates, promethazine (PHENERGAN) IVPB, sodium chloride 0.9%, Pharmacy to dose Vancomycin consult **AND** vancomycin - pharmacy to dose     Review of patient's allergies indicates:  No Known Allergies     Past Medical History:   Diagnosis Date    Anemia     Depression     Encounter for blood transfusion     Lymphoma     Lymphoma     Lymphoma 2019     Past Surgical History:   Procedure Laterality Date    BONE MARROW ASPIRATION Left 11/11/2019    Procedure: ASPIRATION, BONE MARROW;  Surgeon: Nancy Diagnostic Provider;  Location: [facility];  Service: General;  Laterality: Left;    LYMPHADENECTOMY Bilateral      Family History     None        Tobacco Use    Smoking status: Former Smoker     Packs/day: 1.00     Types: Cigarettes    Smokeless  tobacco: Current User   Substance and Sexual Activity    Alcohol use: No     Frequency: Never    Drug use: No    Sexual activity: Not on file       Review of Systems   Constitutional: Negative for fatigue, fever and unexpected weight change.   HENT: Negative for rhinorrhea and sore throat.    Eyes: Negative for photophobia.   Respiratory: Negative for cough and shortness of breath.    Cardiovascular: Negative for chest pain and leg swelling.   Gastrointestinal: Negative for abdominal pain, constipation, diarrhea, nausea and vomiting.   Endocrine: Negative for cold intolerance and heat intolerance.   Genitourinary: Negative for dysuria, frequency, hematuria and urgency.   Musculoskeletal: Negative for arthralgias, back pain and neck pain.   Skin: Negative for pallor and rash.   Neurological: Positive for weakness. Negative for numbness and headaches.   Hematological: Negative for adenopathy. Does not bruise/bleed easily.   Psychiatric/Behavioral: Negative for agitation.   All other systems reviewed and are negative.    Objective:     Vital Signs (Most Recent):  Temp: 98.1 °F (36.7 °C) (01/19/20 0751)  Pulse: (!) 57 (01/19/20 0751)  Resp: 16 (01/19/20 0751)  BP: 116/66 (01/19/20 0751)  SpO2: 100 % (01/19/20 0751) Vital Signs (24h Range):  Temp:  [98.1 °F (36.7 °C)-98.5 °F (36.9 °C)] 98.1 °F (36.7 °C)  Pulse:  [57-76] 57  Resp:  [16-18] 16  SpO2:  [68 %-100 %] 100 %  BP: ()/(50-70) 116/66     Weight: 66 kg (145 lb 8.1 oz)  Body mass index is 21.49 kg/m².  Body surface area is 1.79 meters squared.    No intake or output data in the 24 hours ending 01/19/20 0754  Gen:  Awake alert  HEENT:  Normocephalic atraumatic pupils equal round reactive to light  CV:  Normal rate  Lung:  Good air movement  Abdomen:  Soft nontender nondistended  Extremity:  Distal pulses intact  Neuro:  Nonfocal  :  Deferred      Significant Labs:     Lab Results   Component Value Date    WBC 2.89 (L) 01/19/2020    RBC 3.26 (L) 01/19/2020     HGB 8.8 (L) 01/19/2020    HCT 28.4 (L) 01/19/2020    MCV 87 01/19/2020    MCH 27.0 01/19/2020    MCHC 31.0 (L) 01/19/2020    RDW 22.2 (H) 01/19/2020    PLT 87 (L) 01/19/2020    MPV 9.4 01/19/2020    GRAN 1.3 (L) 01/19/2020    GRAN 43.7 01/19/2020    LYMPH 0.1 (L) 01/19/2020    LYMPH 4.5 (L) 01/19/2020    MONO 0.2 (L) 01/19/2020    MONO 7.6 01/19/2020    EOS 1.3 (H) 01/19/2020    BASO 0.01 01/19/2020    EOSINOPHIL 43.6 (H) 01/19/2020    BASOPHIL 0.3 01/19/2020         Assessment/Plan:     [] Relapsed Hodgkin lymphoma status post ABVD currently on brentuximab and bendamustine last infusion on 01/14/2020.  > no active chemo as inpatient acute status febrile neutropenia  > follow Dr. Rich out-patient sitting for rechallenge of chemotherapy   > Pancytopenia appear to be stable, if discharge close follow up with Dr Rich regarding IgG level and needs of IVIG   > continue out patient ABX    [] Febrile neutropenia resolved. Current ANC 1300.  Patient was transition to oral ABX.  afebrile Blood culture negative x3 days.  Urine culture negative x3 days.  Of note, it was reported patient was having fever prior to initiating chemotherapy and it is suspected that is due to his lymphomatous  involvement of his bone marrow  > continue ABX    [] Chemo induced thrombocytopenia suspect secondary to antibiotics (vancomycin) but will check heparin-induced thrombocytopenia panel just to be safe.    > HIT panel order pending     []   Anemia multifactorial.    > iron panel ferritin    []  Immunocompromised due to chemotherapy   > IgG, IgA, IgM pending         Sharif Hoover MD  Hematology/Oncology  [facility]

## 2020-01-19 NOTE — PROGRESS NOTES
[facility] Medicine  Progress Note    Patient Name: Heriberto Keys  MRN: [id number]  Patient Class: IP- Inpatient   Admission Date: 1/15/2020  Length of Stay: 3 days  Attending Physician: Lizy Hinton MD  Primary Care Provider: Jagruti Davis NP    Subjective:     Principal Problem:Fever    HPI:  The patient is a 60-year-old male with history of Hodgkin lymphoma, undergoing chemotherapy.  He presented to the emergency department with fever.  He states that he had been in his usual state health.  He reports having fever of 103 at home this afternoon, after receiving chemotherapy earlier today.  He reports being weak with mild nausea, and frequent urination.  He denies cough, sputum production, shortness of breath, chest pain, abdominal pain, vomiting, or diarrhea.    Workup in the ED showed WBC of 2.03 with lactic acid of 2.4.  Chest radiograph, personally reviewed, showed no infiltrates.  UA showed no urinary tract infection.  He receive IV bolus at 30 mL per kg in the ED he was also started on Cefepime 2 g IV    Overview/Hospital Course:    1/16:  Pt reports he feels much improved today.  No subjective fever or chills since this AM, but they did continue overnight previously.  Reports increased frequency of urination and also diarrhea.  Currently continues on vanc and cefepime.  Pt was insisting on discharge because he believed he needed further chemotherapy today; his anxiety was alleviated after pt was informed that Dr. Rich has been consulted.       1/17: I discussed pt's case with Dr. Rich, pt's oncologist.  Pancytopenia expected with pt's chemotherapy regimen.  Providing supportive care.  Transfusing 1 unit platelets as per their recommendations.  HIT panel, iron studies, and IgG status in progress.  Pt very anxious to leave but agrees to stay because Dr Rich recommends it.  Pt continues to feel better.  No subjective fever.  No chills.  Urinary frequency resolved.    1/18:  " Pt reports he feels well; we discussed his blood work and he agrees to stay for further monitoring.  No fevers overnight.  Appreciate oncology recs.    Review of Systems     All systems reviewed and are negative except as noted per above.    Objective:     /64 (BP Location: Right arm, Patient Position: Sitting)   Pulse 76   Temp 98.5 °F (36.9 °C) (Oral)   Resp 18   Ht 5' 9" (1.753 m)   Wt 66 kg (145 lb 8.1 oz)   SpO2 97%   BMI 21.49 kg/m²       Physical Exam  Gen: alert, responsive; ambulating around myrick without difficulty   HEENT:  Eyes - no pallor  External ears with no lesions  Nares patent  Mouth - lips chapped  CV: RRR  Lungs: CTA B/L  Abd: +BS, soft, NT, ND  Ext: no atrophy or edema  Skin: warm, dry  Neuro: grossly intact  Psych: pleasant    All labs, images, and other studies reviewed personally by me.    Assessment/Plan:      Fever in setting of chemotherapy  Chemotherapy induced neutropenia and anemia  Complicated by urinary frequency and diarrhea  - infectious workup continues; negative so far  - continue IVFs  - oncology consulted, appreciate recs  - continue vanc and cefepime  - trending CBC, BMP, IgG status, iron studies, HIT panel  - Electrolyte derangement:  Replacement prn    Other chronic conditions: continue home meds as appropriate    VTE Risk Mitigation (From admission, onward)         Ordered     enoxaparin injection 40 mg  Daily      01/15/20 2258     IP VTE HIGH RISK PATIENT  Once      01/15/20 2258              Lizy Hinton MD  Department of Hospital Medicine   [facility]  "

## 2020-01-19 NOTE — PROGRESS NOTES
Therapy with Vancomycin complete and / or consult / order discontinued by 1/19 @ 0800  Pharmacy will sign off, please re-consult as needed.  Thank you for allowing us to participate in this patient's care.  Yvette Amaya 1/19/2020 8:41 AM  Dept of Pharmacy  Ext [phone]

## 2020-01-20 LAB
BACTERIA BLD CULT: NORMAL
BACTERIA BLD CULT: NORMAL
IGG SERPL-MCNC: 805 MG/DL (ref 700–1600)
IGG1 SER-MCNC: 504 MG/DL (ref 248–810)
IGG2 SER-MCNC: 141 MG/DL (ref 130–555)
IGG3 SER-MCNC: 27 MG/DL (ref 15–102)
IGG4 SER-MCNC: 50 MG/DL (ref 2–96)

## 2020-01-21 LAB
IGA SERPL-MCNC: 154 MG/DL (ref 61–437)
IGG SERPL-MCNC: 942 MG/DL (ref 700–1600)
IGM SERPL-MCNC: 43 MG/DL (ref 20–172)
PF4 HEPARIN CMPLX IGG SERPL IA: 0.28 OD (ref 0–0.4)

## 2020-01-22 ENCOUNTER — INFUSION (OUTPATIENT)
Dept: INFUSION THERAPY | Facility: HOSPITAL | Age: 60
End: 2020-01-22
Attending: INTERNAL MEDICINE
Payer: MEDICAID

## 2020-01-22 VITALS
BODY MASS INDEX: 22.03 KG/M2 | TEMPERATURE: 98 F | RESPIRATION RATE: 16 BRPM | OXYGEN SATURATION: 100 % | SYSTOLIC BLOOD PRESSURE: 124 MMHG | WEIGHT: 149.19 LBS | DIASTOLIC BLOOD PRESSURE: 70 MMHG | HEART RATE: 72 BPM

## 2020-01-22 DIAGNOSIS — E61.1 IRON DEFICIENCY: ICD-10-CM

## 2020-01-22 DIAGNOSIS — T45.1X5A CHEMOTHERAPY INDUCED NEUTROPENIA: Primary | ICD-10-CM

## 2020-01-22 DIAGNOSIS — D64.81 ANTINEOPLASTIC CHEMOTHERAPY INDUCED ANEMIA: ICD-10-CM

## 2020-01-22 DIAGNOSIS — C81.90 HODGKIN'S LYMPHOMA IN RELAPSE: ICD-10-CM

## 2020-01-22 DIAGNOSIS — D70.1 CHEMOTHERAPY INDUCED NEUTROPENIA: Primary | ICD-10-CM

## 2020-01-22 DIAGNOSIS — T45.1X5A ANTINEOPLASTIC CHEMOTHERAPY INDUCED ANEMIA: ICD-10-CM

## 2020-01-22 DIAGNOSIS — C81.18 NODULAR SCLEROSIS HODGKIN LYMPHOMA OF LYMPH NODES OF MULTIPLE REGIONS: ICD-10-CM

## 2020-01-22 PROCEDURE — 63600175 PHARM REV CODE 636 W HCPCS: Mod: JG | Performed by: INTERNAL MEDICINE

## 2020-01-22 PROCEDURE — 96372 THER/PROPH/DIAG INJ SC/IM: CPT

## 2020-01-22 RX ORDER — HEPARIN 100 UNIT/ML
500 SYRINGE INTRAVENOUS
Status: CANCELLED | OUTPATIENT
Start: 2020-01-23

## 2020-01-22 RX ORDER — SODIUM CHLORIDE 0.9 % (FLUSH) 0.9 %
10 SYRINGE (ML) INJECTION
Status: CANCELLED | OUTPATIENT
Start: 2020-01-23

## 2020-01-22 RX ADMIN — FILGRASTIM 300 MCG: 300 INJECTION, SOLUTION INTRAVENOUS; SUBCUTANEOUS at 03:01

## 2020-01-22 NOTE — PLAN OF CARE
Problem: Neutropenia  Goal: Absence of Infection  Outcome: Ongoing, Progressing  Intervention: Prevent Infection and Maximize Resistance  Flowsheets (Taken 1/22/2020 1440)  Infection Prevention: equipment surfaces disinfected; rest/sleep promoted

## 2020-01-23 ENCOUNTER — LAB VISIT (OUTPATIENT)
Dept: LAB | Facility: HOSPITAL | Age: 60
End: 2020-01-23
Attending: INTERNAL MEDICINE
Payer: MEDICAID

## 2020-01-23 DIAGNOSIS — C81.18 NODULAR SCLEROSIS HODGKIN LYMPHOMA OF LYMPH NODES OF MULTIPLE REGIONS: ICD-10-CM

## 2020-01-23 LAB
ALBUMIN SERPL BCP-MCNC: 3 G/DL (ref 3.5–5.2)
ALP SERPL-CCNC: 102 U/L (ref 55–135)
ALT SERPL W/O P-5'-P-CCNC: 14 U/L (ref 10–44)
ANION GAP SERPL CALC-SCNC: 5 MMOL/L (ref 8–16)
AST SERPL-CCNC: 15 U/L (ref 10–40)
BASOPHILS # BLD AUTO: 0.05 K/UL (ref 0–0.2)
BASOPHILS NFR BLD: 0.4 % (ref 0–1.9)
BILIRUB SERPL-MCNC: 0.7 MG/DL (ref 0.1–1)
BUN SERPL-MCNC: 15 MG/DL (ref 8–23)
CALCIUM SERPL-MCNC: 8.8 MG/DL (ref 8.7–10.5)
CHLORIDE SERPL-SCNC: 107 MMOL/L (ref 95–110)
CO2 SERPL-SCNC: 31 MMOL/L (ref 23–29)
CREAT SERPL-MCNC: 0.8 MG/DL (ref 0.5–1.4)
DIFFERENTIAL METHOD: ABNORMAL
EOSINOPHIL # BLD AUTO: 1.3 K/UL (ref 0–0.5)
EOSINOPHIL NFR BLD: 10.3 % (ref 0–8)
ERYTHROCYTE [DISTWIDTH] IN BLOOD BY AUTOMATED COUNT: 21.4 % (ref 11.5–14.5)
EST. GFR  (AFRICAN AMERICAN): >60 ML/MIN/1.73 M^2
EST. GFR  (NON AFRICAN AMERICAN): >60 ML/MIN/1.73 M^2
GLUCOSE SERPL-MCNC: 94 MG/DL (ref 70–110)
HCT VFR BLD AUTO: 28.5 % (ref 40–54)
HGB BLD-MCNC: 8.7 G/DL (ref 14–18)
IMM GRANULOCYTES # BLD AUTO: 0.14 K/UL (ref 0–0.04)
LYMPHOCYTES # BLD AUTO: 0.3 K/UL (ref 1–4.8)
LYMPHOCYTES NFR BLD: 2.3 % (ref 18–48)
MCH RBC QN AUTO: 27.6 PG (ref 27–31)
MCHC RBC AUTO-ENTMCNC: 30.5 G/DL (ref 32–36)
MCV RBC AUTO: 91 FL (ref 82–98)
MONOCYTES # BLD AUTO: 0.3 K/UL (ref 0.3–1)
MONOCYTES NFR BLD: 2.3 % (ref 4–15)
NEUTROPHILS # BLD AUTO: 10.4 K/UL (ref 1.8–7.7)
NEUTROPHILS NFR BLD: 83.6 % (ref 38–73)
NRBC BLD-RTO: 0 /100 WBC
PLATELET # BLD AUTO: 57 K/UL (ref 150–350)
PMV BLD AUTO: 8.6 FL (ref 9.2–12.9)
POTASSIUM SERPL-SCNC: 3.6 MMOL/L (ref 3.5–5.1)
PROT SERPL-MCNC: 5.8 G/DL (ref 6–8.4)
RBC # BLD AUTO: 3.15 M/UL (ref 4.6–6.2)
SODIUM SERPL-SCNC: 143 MMOL/L (ref 136–145)
WBC # BLD AUTO: 12.42 K/UL (ref 3.9–12.7)

## 2020-01-23 PROCEDURE — 80053 COMPREHEN METABOLIC PANEL: CPT

## 2020-01-23 PROCEDURE — 36415 COLL VENOUS BLD VENIPUNCTURE: CPT

## 2020-01-23 PROCEDURE — 85025 COMPLETE CBC W/AUTO DIFF WBC: CPT

## 2020-01-24 ENCOUNTER — OFFICE VISIT (OUTPATIENT)
Dept: HEMATOLOGY/ONCOLOGY | Facility: CLINIC | Age: 60
End: 2020-01-24
Payer: MEDICAID

## 2020-01-24 ENCOUNTER — TELEPHONE (OUTPATIENT)
Dept: INFUSION THERAPY | Facility: HOSPITAL | Age: 60
End: 2020-01-24

## 2020-01-24 ENCOUNTER — INFUSION (OUTPATIENT)
Dept: INFUSION THERAPY | Facility: HOSPITAL | Age: 60
End: 2020-01-24
Attending: INTERNAL MEDICINE
Payer: MEDICAID

## 2020-01-24 VITALS
DIASTOLIC BLOOD PRESSURE: 58 MMHG | SYSTOLIC BLOOD PRESSURE: 120 MMHG | WEIGHT: 148 LBS | HEART RATE: 67 BPM | BODY MASS INDEX: 21.86 KG/M2 | RESPIRATION RATE: 18 BRPM | TEMPERATURE: 98 F

## 2020-01-24 VITALS
WEIGHT: 146.81 LBS | RESPIRATION RATE: 12 BRPM | HEART RATE: 73 BPM | OXYGEN SATURATION: 98 % | SYSTOLIC BLOOD PRESSURE: 101 MMHG | BODY MASS INDEX: 21.74 KG/M2 | HEIGHT: 69 IN | TEMPERATURE: 98 F | DIASTOLIC BLOOD PRESSURE: 63 MMHG

## 2020-01-24 DIAGNOSIS — T45.1X5A ANEMIA ASSOCIATED WITH CHEMOTHERAPY: Primary | ICD-10-CM

## 2020-01-24 DIAGNOSIS — C81.18 NODULAR SCLEROSIS HODGKIN LYMPHOMA OF LYMPH NODES OF MULTIPLE REGIONS: ICD-10-CM

## 2020-01-24 DIAGNOSIS — C81.90 HODGKIN'S LYMPHOMA IN RELAPSE: Primary | ICD-10-CM

## 2020-01-24 DIAGNOSIS — R20.2 PARESTHESIAS: ICD-10-CM

## 2020-01-24 DIAGNOSIS — D64.81 ANEMIA ASSOCIATED WITH CHEMOTHERAPY: Primary | ICD-10-CM

## 2020-01-24 DIAGNOSIS — E61.1 IRON DEFICIENCY: ICD-10-CM

## 2020-01-24 PROCEDURE — 99215 OFFICE O/P EST HI 40 MIN: CPT | Mod: S$PBB,,, | Performed by: INTERNAL MEDICINE

## 2020-01-24 PROCEDURE — 99215 PR OFFICE/OUTPT VISIT, EST, LEVL V, 40-54 MIN: ICD-10-PCS | Mod: S$PBB,,, | Performed by: INTERNAL MEDICINE

## 2020-01-24 PROCEDURE — G0444 DEPRESSION SCREEN ANNUAL: HCPCS | Mod: PBBFAC,PO | Performed by: INTERNAL MEDICINE

## 2020-01-24 PROCEDURE — 99999 PR PBB SHADOW E&M-EST. PATIENT-LVL IV: ICD-10-PCS | Mod: PBBFAC,,, | Performed by: INTERNAL MEDICINE

## 2020-01-24 PROCEDURE — 99999 PR PBB SHADOW E&M-EST. PATIENT-LVL IV: CPT | Mod: PBBFAC,,, | Performed by: INTERNAL MEDICINE

## 2020-01-24 PROCEDURE — 96372 THER/PROPH/DIAG INJ SC/IM: CPT

## 2020-01-24 PROCEDURE — 63600175 PHARM REV CODE 636 W HCPCS: Mod: JG | Performed by: INTERNAL MEDICINE

## 2020-01-24 PROCEDURE — 99214 OFFICE O/P EST MOD 30 MIN: CPT | Mod: PBBFAC,PO,25 | Performed by: INTERNAL MEDICINE

## 2020-01-24 RX ADMIN — EPOETIN ALFA 40000 UNITS: 20000 SOLUTION INTRAVENOUS; SUBCUTANEOUS at 11:01
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
## 2020-01-24 NOTE — TELEPHONE ENCOUNTER
Request received to add on a Procrit appointment for today, patient is being sent to Infusion from the office.       Also per Rajni/office to adjust count to dose #2 today instead of #3 of 4, and adjust next appts to weekly.

## 2020-01-28 ENCOUNTER — LAB VISIT (OUTPATIENT)
Dept: LAB | Facility: HOSPITAL | Age: 60
End: 2020-01-28
Attending: INTERNAL MEDICINE
Payer: MEDICAID

## 2020-01-28 DIAGNOSIS — C81.90 HODGKIN'S LYMPHOMA IN RELAPSE: ICD-10-CM

## 2020-01-28 LAB
ANISOCYTOSIS BLD QL SMEAR: SLIGHT
BASOPHILS NFR BLD: 0 % (ref 0–1.9)
DIFFERENTIAL METHOD: ABNORMAL
EOSINOPHIL NFR BLD: 37 % (ref 0–8)
ERYTHROCYTE [DISTWIDTH] IN BLOOD BY AUTOMATED COUNT: 22.2 % (ref 11.5–14.5)
HCT VFR BLD AUTO: 27.4 % (ref 40–54)
HGB BLD-MCNC: 8.6 G/DL (ref 14–18)
IGG SERPL-MCNC: 835 MG/DL (ref 650–1600)
IMM GRANULOCYTES # BLD AUTO: ABNORMAL K/UL
LYMPHOCYTES NFR BLD: 26 % (ref 18–48)
MCH RBC QN AUTO: 28.3 PG (ref 27–31)
MCHC RBC AUTO-ENTMCNC: 31.4 G/DL (ref 32–36)
MCV RBC AUTO: 90 FL (ref 82–98)
MONOCYTES NFR BLD: 6 % (ref 4–15)
NEUTROPHILS NFR BLD: 31 % (ref 38–73)
NRBC BLD-RTO: 0 /100 WBC
OVALOCYTES BLD QL SMEAR: ABNORMAL
PLATELET # BLD AUTO: 77 K/UL (ref 150–350)
PLATELET BLD QL SMEAR: ABNORMAL
PMV BLD AUTO: 9.8 FL (ref 9.2–12.9)
RBC # BLD AUTO: 3.04 M/UL (ref 4.6–6.2)
WBC # BLD AUTO: 1.96 K/UL (ref 3.9–12.7)

## 2020-01-28 PROCEDURE — 85007 BL SMEAR W/DIFF WBC COUNT: CPT

## 2020-01-28 PROCEDURE — 85027 COMPLETE CBC AUTOMATED: CPT

## 2020-01-28 PROCEDURE — 36415 COLL VENOUS BLD VENIPUNCTURE: CPT

## 2020-01-28 PROCEDURE — 82784 ASSAY IGA/IGD/IGG/IGM EACH: CPT

## 2020-01-31 ENCOUNTER — OFFICE VISIT (OUTPATIENT)
Dept: HEMATOLOGY/ONCOLOGY | Facility: CLINIC | Age: 60
End: 2020-01-31
Payer: MEDICAID

## 2020-01-31 ENCOUNTER — INFUSION (OUTPATIENT)
Dept: INFUSION THERAPY | Facility: HOSPITAL | Age: 60
End: 2020-01-31
Attending: INTERNAL MEDICINE
Payer: MEDICAID

## 2020-01-31 VITALS
RESPIRATION RATE: 18 BRPM | HEIGHT: 69 IN | BODY MASS INDEX: 21.94 KG/M2 | WEIGHT: 148.13 LBS | HEART RATE: 73 BPM | TEMPERATURE: 98 F | SYSTOLIC BLOOD PRESSURE: 122 MMHG | DIASTOLIC BLOOD PRESSURE: 66 MMHG

## 2020-01-31 VITALS — WEIGHT: 148.13 LBS | BODY MASS INDEX: 21.88 KG/M2

## 2020-01-31 DIAGNOSIS — D64.81 ANTINEOPLASTIC CHEMOTHERAPY INDUCED ANEMIA: ICD-10-CM

## 2020-01-31 DIAGNOSIS — C81.18 NODULAR SCLEROSIS HODGKIN LYMPHOMA OF LYMPH NODES OF MULTIPLE REGIONS: ICD-10-CM

## 2020-01-31 DIAGNOSIS — T45.1X5A ANEMIA ASSOCIATED WITH CHEMOTHERAPY: ICD-10-CM

## 2020-01-31 DIAGNOSIS — D70.1 CHEMOTHERAPY INDUCED NEUTROPENIA: ICD-10-CM

## 2020-01-31 DIAGNOSIS — T45.1X5A ANTINEOPLASTIC CHEMOTHERAPY INDUCED ANEMIA: ICD-10-CM

## 2020-01-31 DIAGNOSIS — D64.81 ANEMIA ASSOCIATED WITH CHEMOTHERAPY: ICD-10-CM

## 2020-01-31 DIAGNOSIS — M79.604 PAIN IN BOTH LOWER EXTREMITIES: ICD-10-CM

## 2020-01-31 DIAGNOSIS — T45.1X5A CHEMOTHERAPY INDUCED NEUTROPENIA: Primary | ICD-10-CM

## 2020-01-31 DIAGNOSIS — C81.90 HODGKIN'S LYMPHOMA IN RELAPSE: Primary | ICD-10-CM

## 2020-01-31 DIAGNOSIS — E61.1 IRON DEFICIENCY: ICD-10-CM

## 2020-01-31 DIAGNOSIS — C81.90 HODGKIN'S LYMPHOMA IN RELAPSE: ICD-10-CM

## 2020-01-31 DIAGNOSIS — R20.2 PARESTHESIAS: ICD-10-CM

## 2020-01-31 DIAGNOSIS — T45.1X5A CHEMOTHERAPY INDUCED NEUTROPENIA: ICD-10-CM

## 2020-01-31 DIAGNOSIS — D70.1 CHEMOTHERAPY INDUCED NEUTROPENIA: Primary | ICD-10-CM

## 2020-01-31 DIAGNOSIS — M79.605 PAIN IN BOTH LOWER EXTREMITIES: ICD-10-CM

## 2020-01-31 PROCEDURE — 99999 PR PBB SHADOW E&M-EST. PATIENT-LVL II: ICD-10-PCS | Mod: PBBFAC,,, | Performed by: INTERNAL MEDICINE

## 2020-01-31 PROCEDURE — 99212 OFFICE O/P EST SF 10 MIN: CPT | Mod: PBBFAC,PO | Performed by: INTERNAL MEDICINE

## 2020-01-31 PROCEDURE — 99999 PR PBB SHADOW E&M-EST. PATIENT-LVL II: CPT | Mod: PBBFAC,,, | Performed by: INTERNAL MEDICINE

## 2020-01-31 PROCEDURE — 96372 THER/PROPH/DIAG INJ SC/IM: CPT

## 2020-01-31 PROCEDURE — 99215 PR OFFICE/OUTPT VISIT, EST, LEVL V, 40-54 MIN: ICD-10-PCS | Mod: S$PBB,,, | Performed by: INTERNAL MEDICINE

## 2020-01-31 PROCEDURE — G0444 DEPRESSION SCREEN ANNUAL: HCPCS | Mod: PBBFAC,PO | Performed by: INTERNAL MEDICINE

## 2020-01-31 PROCEDURE — 63600175 PHARM REV CODE 636 W HCPCS: Mod: JG | Performed by: INTERNAL MEDICINE

## 2020-01-31 PROCEDURE — 99215 OFFICE O/P EST HI 40 MIN: CPT | Mod: S$PBB,,, | Performed by: INTERNAL MEDICINE

## 2020-01-31 RX ORDER — SODIUM CHLORIDE 0.9 % (FLUSH) 0.9 %
10 SYRINGE (ML) INJECTION
Status: CANCELLED | OUTPATIENT
Start: 2020-02-01

## 2020-01-31 RX ORDER — HEPARIN 100 UNIT/ML
500 SYRINGE INTRAVENOUS
Status: CANCELLED | OUTPATIENT
Start: 2020-02-01

## 2020-01-31 RX ADMIN — FILGRASTIM 300 MCG: 300 INJECTION, SOLUTION INTRAVENOUS; SUBCUTANEOUS at 02:01

## 2020-01-31 RX ADMIN — EPOETIN ALFA 40000 UNITS: 20000 SOLUTION INTRAVENOUS; SUBCUTANEOUS at 02:01
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
## 2020-02-01 ENCOUNTER — INFUSION (OUTPATIENT)
Dept: INFUSION THERAPY | Facility: HOSPITAL | Age: 60
End: 2020-02-01
Attending: INTERNAL MEDICINE
Payer: MEDICAID

## 2020-02-01 ENCOUNTER — HOSPITAL ENCOUNTER (OUTPATIENT)
Dept: RADIOLOGY | Facility: HOSPITAL | Age: 60
Discharge: HOME | End: 2020-02-01
Attending: INTERNAL MEDICINE
Payer: MEDICAID

## 2020-02-01 DIAGNOSIS — M79.604 PAIN IN BOTH LOWER EXTREMITIES: ICD-10-CM

## 2020-02-01 DIAGNOSIS — M79.605 PAIN IN BOTH LOWER EXTREMITIES: ICD-10-CM

## 2020-02-01 DIAGNOSIS — C81.90 HODGKIN'S LYMPHOMA IN RELAPSE: ICD-10-CM

## 2020-02-01 DIAGNOSIS — T45.1X5A CHEMOTHERAPY INDUCED NEUTROPENIA: Primary | ICD-10-CM

## 2020-02-01 DIAGNOSIS — E61.1 IRON DEFICIENCY: ICD-10-CM

## 2020-02-01 DIAGNOSIS — D70.1 CHEMOTHERAPY INDUCED NEUTROPENIA: Primary | ICD-10-CM

## 2020-02-01 DIAGNOSIS — C81.18 NODULAR SCLEROSIS HODGKIN LYMPHOMA OF LYMPH NODES OF MULTIPLE REGIONS: ICD-10-CM

## 2020-02-01 DIAGNOSIS — T45.1X5A ANTINEOPLASTIC CHEMOTHERAPY INDUCED ANEMIA: ICD-10-CM

## 2020-02-01 DIAGNOSIS — D64.81 ANTINEOPLASTIC CHEMOTHERAPY INDUCED ANEMIA: ICD-10-CM

## 2020-02-01 PROCEDURE — 96372 THER/PROPH/DIAG INJ SC/IM: CPT

## 2020-02-01 PROCEDURE — 63600175 PHARM REV CODE 636 W HCPCS: Mod: JG | Performed by: INTERNAL MEDICINE

## 2020-02-01 PROCEDURE — 73590 X-RAY EXAM OF LOWER LEG: CPT | Mod: TC,50

## 2020-02-01 PROCEDURE — 73552 X-RAY EXAM OF FEMUR 2/>: CPT | Mod: TC,50

## 2020-02-01 RX ORDER — HEPARIN 100 UNIT/ML
500 SYRINGE INTRAVENOUS
Status: DISCONTINUED | OUTPATIENT
Start: 2020-02-01 | End: 2020-02-01 | Stop reason: HOSPADM

## 2020-02-01 RX ORDER — SODIUM CHLORIDE 0.9 % (FLUSH) 0.9 %
10 SYRINGE (ML) INJECTION
Status: CANCELLED | OUTPATIENT
Start: 2020-02-02

## 2020-02-01 RX ORDER — HEPARIN 100 UNIT/ML
500 SYRINGE INTRAVENOUS
Status: CANCELLED | OUTPATIENT
Start: 2020-02-02

## 2020-02-01 RX ORDER — SODIUM CHLORIDE 0.9 % (FLUSH) 0.9 %
10 SYRINGE (ML) INJECTION
Status: DISCONTINUED | OUTPATIENT
Start: 2020-02-01 | End: 2020-02-01 | Stop reason: HOSPADM

## 2020-02-01 RX ADMIN — TBO-FILGRASTIM 300 MCG: 300 INJECTION, SOLUTION SUBCUTANEOUS at 11:02

## 2020-02-03 ENCOUNTER — LAB VISIT (OUTPATIENT)
Dept: LAB | Facility: HOSPITAL | Age: 60
End: 2020-02-03
Attending: INTERNAL MEDICINE
Payer: MEDICAID

## 2020-02-03 DIAGNOSIS — D70.1 CHEMOTHERAPY INDUCED NEUTROPENIA: ICD-10-CM

## 2020-02-03 DIAGNOSIS — T45.1X5A CHEMOTHERAPY INDUCED NEUTROPENIA: ICD-10-CM

## 2020-02-03 DIAGNOSIS — C81.18 NODULAR SCLEROSIS HODGKIN LYMPHOMA OF LYMPH NODES OF MULTIPLE REGIONS: ICD-10-CM

## 2020-02-03 DIAGNOSIS — Z51.11 ENCOUNTER FOR CHEMOTHERAPY MANAGEMENT: ICD-10-CM

## 2020-02-03 LAB
ALBUMIN SERPL BCP-MCNC: 3.3 G/DL (ref 3.5–5.2)
ALP SERPL-CCNC: 98 U/L (ref 55–135)
ALT SERPL W/O P-5'-P-CCNC: 10 U/L (ref 10–44)
ANION GAP SERPL CALC-SCNC: 8 MMOL/L (ref 8–16)
AST SERPL-CCNC: 18 U/L (ref 10–40)
B2 MICROGLOB SERPL-MCNC: 5.1 UG/ML (ref 0–2.5)
BASOPHILS # BLD AUTO: 0.01 K/UL (ref 0–0.2)
BASOPHILS NFR BLD: 0.3 % (ref 0–1.9)
BILIRUB SERPL-MCNC: 0.9 MG/DL (ref 0.1–1)
BUN SERPL-MCNC: 12 MG/DL (ref 8–23)
CALCIUM SERPL-MCNC: 8.7 MG/DL (ref 8.7–10.5)
CHLORIDE SERPL-SCNC: 106 MMOL/L (ref 95–110)
CO2 SERPL-SCNC: 25 MMOL/L (ref 23–29)
CREAT SERPL-MCNC: 0.8 MG/DL (ref 0.5–1.4)
DIFFERENTIAL METHOD: ABNORMAL
EOSINOPHIL # BLD AUTO: 0.3 K/UL (ref 0–0.5)
EOSINOPHIL NFR BLD: 10.6 % (ref 0–8)
ERYTHROCYTE [DISTWIDTH] IN BLOOD BY AUTOMATED COUNT: 22.5 % (ref 11.5–14.5)
EST. GFR  (AFRICAN AMERICAN): >60 ML/MIN/1.73 M^2
EST. GFR  (NON AFRICAN AMERICAN): >60 ML/MIN/1.73 M^2
GLUCOSE SERPL-MCNC: 116 MG/DL (ref 70–110)
HCT VFR BLD AUTO: 30 % (ref 40–54)
HGB BLD-MCNC: 9.2 G/DL (ref 14–18)
IMM GRANULOCYTES # BLD AUTO: 0.04 K/UL (ref 0–0.04)
LDH SERPL L TO P-CCNC: 288 U/L (ref 110–260)
LYMPHOCYTES # BLD AUTO: 0.4 K/UL (ref 1–4.8)
LYMPHOCYTES NFR BLD: 11.2 % (ref 18–48)
MCH RBC QN AUTO: 27.6 PG (ref 27–31)
MCHC RBC AUTO-ENTMCNC: 30.7 G/DL (ref 32–36)
MCV RBC AUTO: 90 FL (ref 82–98)
MONOCYTES # BLD AUTO: 0.4 K/UL (ref 0.3–1)
MONOCYTES NFR BLD: 12.5 % (ref 4–15)
NEUTROPHILS # BLD AUTO: 2 K/UL (ref 1.8–7.7)
NEUTROPHILS NFR BLD: 64.1 % (ref 38–73)
NRBC BLD-RTO: 0 /100 WBC
PLATELET # BLD AUTO: 136 K/UL (ref 150–350)
PLATELET BLD QL SMEAR: ABNORMAL
PMV BLD AUTO: 9.1 FL (ref 9.2–12.9)
POTASSIUM SERPL-SCNC: 3.3 MMOL/L (ref 3.5–5.1)
PROT SERPL-MCNC: 6.1 G/DL (ref 6–8.4)
RBC # BLD AUTO: 3.33 M/UL (ref 4.6–6.2)
SODIUM SERPL-SCNC: 139 MMOL/L (ref 136–145)
URATE SERPL-MCNC: 4.9 MG/DL (ref 3.4–7)
WBC # BLD AUTO: 3.12 K/UL (ref 3.9–12.7)

## 2020-02-03 PROCEDURE — 86644 CMV ANTIBODY: CPT

## 2020-02-03 PROCEDURE — 36415 COLL VENOUS BLD VENIPUNCTURE: CPT

## 2020-02-03 PROCEDURE — 85025 COMPLETE CBC W/AUTO DIFF WBC: CPT

## 2020-02-03 PROCEDURE — 80053 COMPREHEN METABOLIC PANEL: CPT

## 2020-02-03 PROCEDURE — 86644 CMV ANTIBODY: CPT | Mod: 91

## 2020-02-03 PROCEDURE — 82232 ASSAY OF BETA-2 PROTEIN: CPT

## 2020-02-03 PROCEDURE — 84550 ASSAY OF BLOOD/URIC ACID: CPT

## 2020-02-03 PROCEDURE — 83615 LACTATE (LD) (LDH) ENZYME: CPT

## 2020-02-04 ENCOUNTER — OFFICE VISIT (OUTPATIENT)
Dept: HEMATOLOGY/ONCOLOGY | Facility: CLINIC | Age: 60
End: 2020-02-04
Payer: MEDICAID

## 2020-02-04 ENCOUNTER — TELEPHONE (OUTPATIENT)
Dept: INFUSION THERAPY | Facility: HOSPITAL | Age: 60
End: 2020-02-04

## 2020-02-04 VITALS
DIASTOLIC BLOOD PRESSURE: 54 MMHG | RESPIRATION RATE: 20 BRPM | BODY MASS INDEX: 21.62 KG/M2 | OXYGEN SATURATION: 100 % | SYSTOLIC BLOOD PRESSURE: 96 MMHG | WEIGHT: 145.94 LBS | HEART RATE: 71 BPM | TEMPERATURE: 98 F | HEIGHT: 69 IN

## 2020-02-04 DIAGNOSIS — C81.18 NODULAR SCLEROSIS HODGKIN LYMPHOMA OF LYMPH NODES OF MULTIPLE REGIONS: ICD-10-CM

## 2020-02-04 DIAGNOSIS — R20.2 PARESTHESIAS: ICD-10-CM

## 2020-02-04 DIAGNOSIS — T45.1X5A CHEMOTHERAPY INDUCED NEUTROPENIA: ICD-10-CM

## 2020-02-04 DIAGNOSIS — C81.90 HODGKIN'S LYMPHOMA IN RELAPSE: ICD-10-CM

## 2020-02-04 DIAGNOSIS — T45.1X5A ANEMIA ASSOCIATED WITH CHEMOTHERAPY: ICD-10-CM

## 2020-02-04 DIAGNOSIS — I95.9 HYPOTENSION, UNSPECIFIED HYPOTENSION TYPE: Primary | ICD-10-CM

## 2020-02-04 DIAGNOSIS — D70.1 CHEMOTHERAPY INDUCED NEUTROPENIA: ICD-10-CM

## 2020-02-04 DIAGNOSIS — D64.81 ANEMIA ASSOCIATED WITH CHEMOTHERAPY: ICD-10-CM

## 2020-02-04 LAB — CMV IGG SERPL QL IA: REACTIVE

## 2020-02-04 PROCEDURE — 99215 PR OFFICE/OUTPT VISIT, EST, LEVL V, 40-54 MIN: ICD-10-PCS | Mod: S$PBB,,, | Performed by: INTERNAL MEDICINE

## 2020-02-04 PROCEDURE — 99999 PR PBB SHADOW E&M-EST. PATIENT-LVL III: ICD-10-PCS | Mod: PBBFAC,,, | Performed by: INTERNAL MEDICINE

## 2020-02-04 PROCEDURE — 99999 PR PBB SHADOW E&M-EST. PATIENT-LVL III: CPT | Mod: PBBFAC,,, | Performed by: INTERNAL MEDICINE

## 2020-02-04 PROCEDURE — 99215 OFFICE O/P EST HI 40 MIN: CPT | Mod: S$PBB,,, | Performed by: INTERNAL MEDICINE

## 2020-02-04 PROCEDURE — G0444 DEPRESSION SCREEN ANNUAL: HCPCS | Mod: PBBFAC,PO | Performed by: INTERNAL MEDICINE

## 2020-02-04 PROCEDURE — 99213 OFFICE O/P EST LOW 20 MIN: CPT | Mod: PBBFAC,PO | Performed by: INTERNAL MEDICINE

## 2020-02-04 RX ORDER — CETIRIZINE HYDROCHLORIDE 10 MG/1
10 TABLET ORAL DAILY PRN
COMMUNITY

## 2020-02-04 RX ORDER — FAMOTIDINE 10 MG/ML
20 INJECTION INTRAVENOUS
Status: CANCELLED | OUTPATIENT
Start: 2020-02-04

## 2020-02-04 RX ORDER — SODIUM CHLORIDE 0.9 % (FLUSH) 0.9 %
10 SYRINGE (ML) INJECTION
Status: CANCELLED | OUTPATIENT
Start: 2020-02-04

## 2020-02-04 RX ORDER — HEPARIN 100 UNIT/ML
500 SYRINGE INTRAVENOUS
Status: CANCELLED | OUTPATIENT
Start: 2020-02-04

## 2020-02-04 RX ORDER — ACETAMINOPHEN 325 MG/1
650 TABLET ORAL
Status: CANCELLED | OUTPATIENT
Start: 2020-02-04

## 2020-02-04 RX ORDER — MIDODRINE HYDROCHLORIDE 2.5 MG/1
2.5 TABLET ORAL DAILY
Qty: 60 TABLET | Refills: 0 | Status: SHIPPED | OUTPATIENT
Start: 2020-02-04 | End: 2020-02-18
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
## 2020-02-05 ENCOUNTER — INFUSION (OUTPATIENT)
Dept: INFUSION THERAPY | Facility: HOSPITAL | Age: 60
End: 2020-02-05
Attending: INTERNAL MEDICINE
Payer: MEDICAID

## 2020-02-05 VITALS
SYSTOLIC BLOOD PRESSURE: 92 MMHG | HEART RATE: 58 BPM | WEIGHT: 146.19 LBS | RESPIRATION RATE: 18 BRPM | DIASTOLIC BLOOD PRESSURE: 56 MMHG | TEMPERATURE: 98 F | HEIGHT: 69 IN | BODY MASS INDEX: 21.65 KG/M2

## 2020-02-05 DIAGNOSIS — C81.90 HODGKIN'S LYMPHOMA IN RELAPSE: Primary | ICD-10-CM

## 2020-02-05 DIAGNOSIS — D70.1 CHEMOTHERAPY INDUCED NEUTROPENIA: ICD-10-CM

## 2020-02-05 DIAGNOSIS — C81.18 NODULAR SCLEROSIS HODGKIN LYMPHOMA OF LYMPH NODES OF MULTIPLE REGIONS: ICD-10-CM

## 2020-02-05 DIAGNOSIS — T45.1X5A CHEMOTHERAPY INDUCED NEUTROPENIA: ICD-10-CM

## 2020-02-05 LAB — CMV AB TOTAL, DONOR EVAL (BLOOD CENTER): REACTIVE

## 2020-02-05 PROCEDURE — 96367 TX/PROPH/DG ADDL SEQ IV INF: CPT

## 2020-02-05 PROCEDURE — S0028 INJECTION, FAMOTIDINE, 20 MG: HCPCS | Performed by: INTERNAL MEDICINE

## 2020-02-05 PROCEDURE — 25000003 PHARM REV CODE 250: Performed by: INTERNAL MEDICINE

## 2020-02-05 PROCEDURE — 96413 CHEMO IV INFUSION 1 HR: CPT

## 2020-02-05 PROCEDURE — 63600175 PHARM REV CODE 636 W HCPCS: Performed by: INTERNAL MEDICINE

## 2020-02-05 PROCEDURE — 96375 TX/PRO/DX INJ NEW DRUG ADDON: CPT

## 2020-02-05 RX ORDER — MEPERIDINE HYDROCHLORIDE 25 MG/ML
25 INJECTION INTRAMUSCULAR; INTRAVENOUS; SUBCUTANEOUS
Status: DISCONTINUED | OUTPATIENT
Start: 2020-02-05 | End: 2020-02-05 | Stop reason: HOSPADM

## 2020-02-05 RX ORDER — SODIUM CHLORIDE 0.9 % (FLUSH) 0.9 %
10 SYRINGE (ML) INJECTION
Status: DISCONTINUED | OUTPATIENT
Start: 2020-02-05 | End: 2020-02-05 | Stop reason: HOSPADM

## 2020-02-05 RX ORDER — ACETAMINOPHEN 325 MG/1
650 TABLET ORAL
Status: COMPLETED | OUTPATIENT
Start: 2020-02-05 | End: 2020-02-05

## 2020-02-05 RX ORDER — FAMOTIDINE 10 MG/ML
20 INJECTION INTRAVENOUS
Status: COMPLETED | OUTPATIENT
Start: 2020-02-05 | End: 2020-02-05

## 2020-02-05 RX ORDER — HEPARIN 100 UNIT/ML
500 SYRINGE INTRAVENOUS
Status: DISCONTINUED | OUTPATIENT
Start: 2020-02-05 | End: 2020-02-05 | Stop reason: HOSPADM

## 2020-02-05 RX ORDER — SODIUM CHLORIDE 0.9 % (FLUSH) 0.9 %
10 SYRINGE (ML) INJECTION
Status: CANCELLED | OUTPATIENT
Start: 2020-02-06

## 2020-02-05 RX ORDER — HEPARIN 100 UNIT/ML
500 SYRINGE INTRAVENOUS
Status: CANCELLED | OUTPATIENT
Start: 2020-02-06

## 2020-02-05 RX ADMIN — HEPARIN 500 UNITS: 100 SYRINGE at 02:02

## 2020-02-05 RX ADMIN — ACETAMINOPHEN 650 MG: 325 TABLET ORAL at 12:02

## 2020-02-05 RX ADMIN — DEXTROSE: 50 INJECTION, SOLUTION INTRAVENOUS at 12:02

## 2020-02-05 RX ADMIN — FAMOTIDINE 20 MG: 10 INJECTION, SOLUTION INTRAVENOUS at 01:02

## 2020-02-05 RX ADMIN — DIPHENHYDRAMINE HYDROCHLORIDE 50 MG: 50 INJECTION INTRAMUSCULAR; INTRAVENOUS at 12:02

## 2020-02-05 RX ADMIN — BRENTUXIMAB VEDOTIN 116 MG: 50 INJECTION, POWDER, LYOPHILIZED, FOR SOLUTION INTRAVENOUS at 01:02

## 2020-02-05 RX ADMIN — ONDANSETRON 16 MG: 2 INJECTION INTRAMUSCULAR; INTRAVENOUS at 01:02

## 2020-02-05 NOTE — PROGRESS NOTES
Notified by infusion to change the orders and delete a past order.  I am not tell eating any orders at changes the treatment plan the day 3 which was not given will stay in the treatment plan just do not access this order and proceed with cycle 3 day 1.  I will have my nurse explained this to the infusion nurse I do appreciate her reaching out to our office

## 2020-02-06 ENCOUNTER — INFUSION (OUTPATIENT)
Dept: INFUSION THERAPY | Facility: HOSPITAL | Age: 60
End: 2020-02-06
Attending: INTERNAL MEDICINE
Payer: MEDICAID

## 2020-02-06 VITALS
WEIGHT: 147.69 LBS | HEART RATE: 67 BPM | BODY MASS INDEX: 21.87 KG/M2 | TEMPERATURE: 97 F | HEIGHT: 69 IN | SYSTOLIC BLOOD PRESSURE: 95 MMHG | RESPIRATION RATE: 18 BRPM | DIASTOLIC BLOOD PRESSURE: 59 MMHG

## 2020-02-06 DIAGNOSIS — D70.1 CHEMOTHERAPY INDUCED NEUTROPENIA: ICD-10-CM

## 2020-02-06 DIAGNOSIS — E61.1 IRON DEFICIENCY: ICD-10-CM

## 2020-02-06 DIAGNOSIS — T45.1X5A CHEMOTHERAPY INDUCED NEUTROPENIA: ICD-10-CM

## 2020-02-06 DIAGNOSIS — T45.1X5A ANTINEOPLASTIC CHEMOTHERAPY INDUCED ANEMIA: ICD-10-CM

## 2020-02-06 DIAGNOSIS — D64.81 ANTINEOPLASTIC CHEMOTHERAPY INDUCED ANEMIA: ICD-10-CM

## 2020-02-06 DIAGNOSIS — C81.90 HODGKIN'S LYMPHOMA IN RELAPSE: Primary | ICD-10-CM

## 2020-02-06 DIAGNOSIS — C81.18 NODULAR SCLEROSIS HODGKIN LYMPHOMA OF LYMPH NODES OF MULTIPLE REGIONS: ICD-10-CM

## 2020-02-06 PROCEDURE — 96409 CHEMO IV PUSH SNGL DRUG: CPT

## 2020-02-06 PROCEDURE — 25000003 PHARM REV CODE 250: Performed by: INTERNAL MEDICINE

## 2020-02-06 PROCEDURE — A4216 STERILE WATER/SALINE, 10 ML: HCPCS | Performed by: INTERNAL MEDICINE

## 2020-02-06 PROCEDURE — 63600175 PHARM REV CODE 636 W HCPCS: Performed by: INTERNAL MEDICINE

## 2020-02-06 RX ORDER — HEPARIN 100 UNIT/ML
500 SYRINGE INTRAVENOUS
Status: CANCELLED | OUTPATIENT
Start: 2020-02-07

## 2020-02-06 RX ORDER — HEPARIN 100 UNIT/ML
500 SYRINGE INTRAVENOUS
Status: COMPLETED | OUTPATIENT
Start: 2020-02-06 | End: 2020-02-06

## 2020-02-06 RX ORDER — SODIUM CHLORIDE 0.9 % (FLUSH) 0.9 %
10 SYRINGE (ML) INJECTION
Status: COMPLETED | OUTPATIENT
Start: 2020-02-06 | End: 2020-02-06

## 2020-02-06 RX ORDER — SODIUM CHLORIDE 0.9 % (FLUSH) 0.9 %
10 SYRINGE (ML) INJECTION
Status: CANCELLED | OUTPATIENT
Start: 2020-02-07

## 2020-02-06 RX ADMIN — BENDAMUSTINE HYDROCHLORIDE 212.5 MG: 25 INJECTION, SOLUTION INTRAVENOUS at 11:02

## 2020-02-06 RX ADMIN — HEPARIN 500 UNITS: 100 SYRINGE at 11:02

## 2020-02-06 RX ADMIN — SODIUM CHLORIDE, PRESERVATIVE FREE 10 ML: 5 INJECTION INTRAVENOUS at 11:02

## 2020-02-07 ENCOUNTER — INFUSION (OUTPATIENT)
Dept: INFUSION THERAPY | Facility: HOSPITAL | Age: 60
End: 2020-02-07
Attending: INTERNAL MEDICINE
Payer: MEDICAID

## 2020-02-07 VITALS
BODY MASS INDEX: 22.11 KG/M2 | HEIGHT: 69 IN | TEMPERATURE: 99 F | RESPIRATION RATE: 18 BRPM | DIASTOLIC BLOOD PRESSURE: 54 MMHG | HEART RATE: 76 BPM | SYSTOLIC BLOOD PRESSURE: 86 MMHG | WEIGHT: 149.25 LBS

## 2020-02-07 DIAGNOSIS — T45.1X5A ANTINEOPLASTIC CHEMOTHERAPY INDUCED ANEMIA: ICD-10-CM

## 2020-02-07 DIAGNOSIS — D64.81 ANEMIA ASSOCIATED WITH CHEMOTHERAPY: ICD-10-CM

## 2020-02-07 DIAGNOSIS — T45.1X5A ANEMIA ASSOCIATED WITH CHEMOTHERAPY: ICD-10-CM

## 2020-02-07 DIAGNOSIS — D64.81 ANTINEOPLASTIC CHEMOTHERAPY INDUCED ANEMIA: ICD-10-CM

## 2020-02-07 DIAGNOSIS — C81.18 NODULAR SCLEROSIS HODGKIN LYMPHOMA OF LYMPH NODES OF MULTIPLE REGIONS: ICD-10-CM

## 2020-02-07 DIAGNOSIS — T45.1X5A CHEMOTHERAPY INDUCED NEUTROPENIA: ICD-10-CM

## 2020-02-07 DIAGNOSIS — C81.90 HODGKIN'S LYMPHOMA IN RELAPSE: Primary | ICD-10-CM

## 2020-02-07 DIAGNOSIS — E61.1 IRON DEFICIENCY: ICD-10-CM

## 2020-02-07 DIAGNOSIS — D70.1 CHEMOTHERAPY INDUCED NEUTROPENIA: ICD-10-CM

## 2020-02-07 PROCEDURE — 96409 CHEMO IV PUSH SNGL DRUG: CPT

## 2020-02-07 PROCEDURE — 96372 THER/PROPH/DIAG INJ SC/IM: CPT | Mod: 59

## 2020-02-07 PROCEDURE — 63600175 PHARM REV CODE 636 W HCPCS: Performed by: INTERNAL MEDICINE

## 2020-02-07 RX ORDER — SODIUM CHLORIDE 0.9 % (FLUSH) 0.9 %
10 SYRINGE (ML) INJECTION
Status: DISCONTINUED | OUTPATIENT
Start: 2020-02-07 | End: 2020-02-07 | Stop reason: HOSPADM

## 2020-02-07 RX ORDER — HEPARIN 100 UNIT/ML
500 SYRINGE INTRAVENOUS
Status: CANCELLED | OUTPATIENT
Start: 2020-02-08

## 2020-02-07 RX ORDER — HEPARIN 100 UNIT/ML
500 SYRINGE INTRAVENOUS
Status: COMPLETED | OUTPATIENT
Start: 2020-02-07 | End: 2020-02-07

## 2020-02-07 RX ORDER — SODIUM CHLORIDE 0.9 % (FLUSH) 0.9 %
10 SYRINGE (ML) INJECTION
Status: CANCELLED | OUTPATIENT
Start: 2020-02-08

## 2020-02-07 RX ADMIN — EPOETIN ALFA 40000 UNITS: 20000 SOLUTION INTRAVENOUS; SUBCUTANEOUS at 01:02

## 2020-02-07 RX ADMIN — BENDAMUSTINE HYDROCHLORIDE 212.5 MG: 25 INJECTION, SOLUTION INTRAVENOUS at 01:02

## 2020-02-07 RX ADMIN — HEPARIN 500 UNITS: 100 SYRINGE at 01:02

## 2020-02-10 ENCOUNTER — LAB VISIT (OUTPATIENT)
Dept: LAB | Facility: HOSPITAL | Age: 60
End: 2020-02-10
Attending: INTERNAL MEDICINE
Payer: MEDICAID

## 2020-02-10 DIAGNOSIS — T45.1X5A ANEMIA ASSOCIATED WITH CHEMOTHERAPY: ICD-10-CM

## 2020-02-10 DIAGNOSIS — D70.1 CHEMOTHERAPY INDUCED NEUTROPENIA: ICD-10-CM

## 2020-02-10 DIAGNOSIS — D64.81 ANEMIA ASSOCIATED WITH CHEMOTHERAPY: ICD-10-CM

## 2020-02-10 DIAGNOSIS — T45.1X5A CHEMOTHERAPY INDUCED NEUTROPENIA: ICD-10-CM

## 2020-02-10 LAB
ANISOCYTOSIS BLD QL SMEAR: ABNORMAL
BASOPHILS # BLD AUTO: 0 K/UL (ref 0–0.2)
BASOPHILS NFR BLD: 0 % (ref 0–1.9)
DIFFERENTIAL METHOD: ABNORMAL
EOSINOPHIL # BLD AUTO: 0.2 K/UL (ref 0–0.5)
EOSINOPHIL NFR BLD: 11.1 % (ref 0–8)
ERYTHROCYTE [DISTWIDTH] IN BLOOD BY AUTOMATED COUNT: 21.3 % (ref 11.5–14.5)
HCT VFR BLD AUTO: 27 % (ref 40–54)
HGB BLD-MCNC: 8.5 G/DL (ref 14–18)
HYPOCHROMIA BLD QL SMEAR: ABNORMAL
IMM GRANULOCYTES # BLD AUTO: 0.02 K/UL (ref 0–0.04)
LYMPHOCYTES # BLD AUTO: 0 K/UL (ref 1–4.8)
LYMPHOCYTES NFR BLD: 1.8 % (ref 18–48)
MCH RBC QN AUTO: 28.5 PG (ref 27–31)
MCHC RBC AUTO-ENTMCNC: 31.5 G/DL (ref 32–36)
MCV RBC AUTO: 91 FL (ref 82–98)
MONOCYTES # BLD AUTO: 0.1 K/UL (ref 0.3–1)
MONOCYTES NFR BLD: 6 % (ref 4–15)
NEUTROPHILS # BLD AUTO: 1.7 K/UL (ref 1.8–7.7)
NEUTROPHILS NFR BLD: 80.2 % (ref 38–73)
NRBC BLD-RTO: 0 /100 WBC
PLATELET # BLD AUTO: 115 K/UL (ref 150–350)
PLATELET BLD QL SMEAR: ABNORMAL
PMV BLD AUTO: 8.9 FL (ref 9.2–12.9)
RBC # BLD AUTO: 2.98 M/UL (ref 4.6–6.2)
WBC # BLD AUTO: 2.17 K/UL (ref 3.9–12.7)

## 2020-02-10 PROCEDURE — 36415 COLL VENOUS BLD VENIPUNCTURE: CPT

## 2020-02-10 PROCEDURE — 85025 COMPLETE CBC W/AUTO DIFF WBC: CPT

## 2020-02-11 ENCOUNTER — OFFICE VISIT (OUTPATIENT)
Dept: HEMATOLOGY/ONCOLOGY | Facility: CLINIC | Age: 60
End: 2020-02-11
Payer: MEDICAID

## 2020-02-11 ENCOUNTER — INFUSION (OUTPATIENT)
Dept: INFUSION THERAPY | Facility: HOSPITAL | Age: 60
End: 2020-02-11
Attending: INTERNAL MEDICINE
Payer: MEDICAID

## 2020-02-11 VITALS
RESPIRATION RATE: 16 BRPM | WEIGHT: 147.69 LBS | OXYGEN SATURATION: 100 % | BODY MASS INDEX: 21.87 KG/M2 | DIASTOLIC BLOOD PRESSURE: 55 MMHG | SYSTOLIC BLOOD PRESSURE: 92 MMHG | HEART RATE: 62 BPM | TEMPERATURE: 98 F | HEIGHT: 69 IN

## 2020-02-11 DIAGNOSIS — D64.81 ANTINEOPLASTIC CHEMOTHERAPY INDUCED ANEMIA: ICD-10-CM

## 2020-02-11 DIAGNOSIS — D64.9 SYMPTOMATIC ANEMIA: Primary | ICD-10-CM

## 2020-02-11 DIAGNOSIS — D70.1 CHEMOTHERAPY INDUCED NEUTROPENIA: Primary | ICD-10-CM

## 2020-02-11 DIAGNOSIS — B25.9 CMV INFECTION, ACUTE: ICD-10-CM

## 2020-02-11 DIAGNOSIS — T45.1X5A ANTINEOPLASTIC CHEMOTHERAPY INDUCED ANEMIA: ICD-10-CM

## 2020-02-11 DIAGNOSIS — C81.90 HODGKIN'S LYMPHOMA IN RELAPSE: ICD-10-CM

## 2020-02-11 DIAGNOSIS — E61.1 IRON DEFICIENCY: ICD-10-CM

## 2020-02-11 DIAGNOSIS — T45.1X5A CHEMOTHERAPY INDUCED NEUTROPENIA: Primary | ICD-10-CM

## 2020-02-11 DIAGNOSIS — Z09 CHEMOTHERAPY FOLLOW-UP EXAMINATION: ICD-10-CM

## 2020-02-11 DIAGNOSIS — C81.18 NODULAR SCLEROSIS HODGKIN LYMPHOMA OF LYMPH NODES OF MULTIPLE REGIONS: ICD-10-CM

## 2020-02-11 DIAGNOSIS — C85.99 BONE MARROW MALIGNANT LYMPHOMA OF UNKNOWN EBV STATUS: ICD-10-CM

## 2020-02-11 DIAGNOSIS — C81.90 HODGKIN'S LYMPHOMA IN RELAPSE: Primary | ICD-10-CM

## 2020-02-11 DIAGNOSIS — D64.9 ANEMIA, UNSPECIFIED TYPE: ICD-10-CM

## 2020-02-11 PROCEDURE — 86920 COMPATIBILITY TEST SPIN: CPT

## 2020-02-11 PROCEDURE — 99999 PR PBB SHADOW E&M-EST. PATIENT-LVL IV: CPT | Mod: PBBFAC,,, | Performed by: INTERNAL MEDICINE

## 2020-02-11 PROCEDURE — 63600175 PHARM REV CODE 636 W HCPCS: Mod: JG | Performed by: INTERNAL MEDICINE

## 2020-02-11 PROCEDURE — G0444 DEPRESSION SCREEN ANNUAL: HCPCS | Mod: PBBFAC,PO | Performed by: INTERNAL MEDICINE

## 2020-02-11 PROCEDURE — 94760 N-INVAS EAR/PLS OXIMETRY 1: CPT | Mod: PBBFAC,PO | Performed by: INTERNAL MEDICINE

## 2020-02-11 PROCEDURE — 96372 THER/PROPH/DIAG INJ SC/IM: CPT

## 2020-02-11 PROCEDURE — 99214 OFFICE O/P EST MOD 30 MIN: CPT | Mod: PBBFAC,PO,25 | Performed by: INTERNAL MEDICINE

## 2020-02-11 PROCEDURE — 99999 PR PBB SHADOW E&M-EST. PATIENT-LVL IV: ICD-10-PCS | Mod: PBBFAC,,, | Performed by: INTERNAL MEDICINE

## 2020-02-11 PROCEDURE — 99215 OFFICE O/P EST HI 40 MIN: CPT | Mod: S$PBB,,, | Performed by: INTERNAL MEDICINE

## 2020-02-11 PROCEDURE — 99215 PR OFFICE/OUTPT VISIT, EST, LEVL V, 40-54 MIN: ICD-10-PCS | Mod: S$PBB,,, | Performed by: INTERNAL MEDICINE

## 2020-02-11 RX ORDER — ACETAMINOPHEN 325 MG/1
650 TABLET ORAL
Status: CANCELLED | OUTPATIENT
Start: 2020-02-11

## 2020-02-11 RX ORDER — SODIUM CHLORIDE 0.9 % (FLUSH) 0.9 %
10 SYRINGE (ML) INJECTION
Status: CANCELLED | OUTPATIENT
Start: 2020-02-12

## 2020-02-11 RX ORDER — HEPARIN 100 UNIT/ML
500 SYRINGE INTRAVENOUS
Status: CANCELLED | OUTPATIENT
Start: 2020-02-12

## 2020-02-11 RX ORDER — VALGANCICLOVIR 450 MG/1
900 TABLET, FILM COATED ORAL DAILY
Qty: 60 TABLET | Refills: 2 | Status: SHIPPED | OUTPATIENT
Start: 2020-02-11 | End: 2020-03-12

## 2020-02-11 RX ORDER — HYDROCODONE BITARTRATE AND ACETAMINOPHEN 500; 5 MG/1; MG/1
TABLET ORAL ONCE
Status: CANCELLED | OUTPATIENT
Start: 2020-02-11 | End: 2020-02-11

## 2020-02-11 RX ADMIN — FILGRASTIM 300 MCG: 300 INJECTION, SOLUTION INTRAVENOUS; SUBCUTANEOUS at 12:02
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
## 2020-02-11 NOTE — PLAN OF CARE
Problem: Fatigue  Goal: Improved Activity Tolerance  Outcome: Ongoing, Progressing  Intervention: Promote Energy Conservation  Flowsheets (Taken 2/11/2020 1223)  Fatigue Management: frequent rest breaks encouraged  Sleep/Rest Enhancement: regular sleep/rest pattern promoted  Activity Management: ambulated - L4; activity encouraged

## 2020-02-12 ENCOUNTER — TELEPHONE (OUTPATIENT)
Dept: HEMATOLOGY/ONCOLOGY | Facility: CLINIC | Age: 60
End: 2020-02-12

## 2020-02-12 ENCOUNTER — INFUSION (OUTPATIENT)
Dept: INFUSION THERAPY | Facility: HOSPITAL | Age: 60
End: 2020-02-12
Attending: INTERNAL MEDICINE
Payer: MEDICAID

## 2020-02-12 VITALS
HEART RATE: 70 BPM | RESPIRATION RATE: 16 BRPM | SYSTOLIC BLOOD PRESSURE: 102 MMHG | BODY MASS INDEX: 21.71 KG/M2 | WEIGHT: 147 LBS | TEMPERATURE: 98 F | DIASTOLIC BLOOD PRESSURE: 57 MMHG | OXYGEN SATURATION: 100 %

## 2020-02-12 DIAGNOSIS — D64.9 ANEMIA, UNSPECIFIED TYPE: ICD-10-CM

## 2020-02-12 PROCEDURE — 36430 TRANSFUSION BLD/BLD COMPNT: CPT

## 2020-02-12 PROCEDURE — 25000003 PHARM REV CODE 250: Performed by: INTERNAL MEDICINE

## 2020-02-12 PROCEDURE — 96374 THER/PROPH/DIAG INJ IV PUSH: CPT

## 2020-02-12 PROCEDURE — P9016 RBC LEUKOCYTES REDUCED: HCPCS

## 2020-02-12 PROCEDURE — 63600175 PHARM REV CODE 636 W HCPCS: Performed by: INTERNAL MEDICINE

## 2020-02-12 RX ORDER — HYDROCODONE BITARTRATE AND ACETAMINOPHEN 500; 5 MG/1; MG/1
TABLET ORAL ONCE
Status: COMPLETED | OUTPATIENT
Start: 2020-02-12 | End: 2020-02-12

## 2020-02-12 RX ORDER — FUROSEMIDE 10 MG/ML
20 INJECTION INTRAMUSCULAR; INTRAVENOUS ONCE
Status: COMPLETED | OUTPATIENT
Start: 2020-02-12 | End: 2020-02-12

## 2020-02-12 RX ORDER — ACETAMINOPHEN 325 MG/1
650 TABLET ORAL
Status: COMPLETED | OUTPATIENT
Start: 2020-02-12 | End: 2020-02-12

## 2020-02-12 RX ADMIN — FUROSEMIDE 20 MG: 10 INJECTION, SOLUTION INTRAMUSCULAR; INTRAVENOUS at 09:02

## 2020-02-12 RX ADMIN — SODIUM CHLORIDE: 0.9 INJECTION, SOLUTION INTRAVENOUS at 06:02

## 2020-02-12 RX ADMIN — TBO-FILGRASTIM 300 MCG: 300 INJECTION, SOLUTION SUBCUTANEOUS at 01:02

## 2020-02-12 RX ADMIN — ACETAMINOPHEN 650 MG: 325 TABLET ORAL at 06:02

## 2020-02-12 NOTE — TELEPHONE ENCOUNTER
Rec'd a call from Kevin Pennington, who is taking care of pt for blood transfusion requesting to give pt his Granix injection while he is there, per pt request. Pt had appt at [facility] this morning, but since he is getting blood today, pt would prefer to have Granix injection there. Orders are in.

## 2020-02-12 NOTE — PATIENT INSTRUCTIONS
Follow up with Dr. Rich as directed. Go to ER for any chest pain, shortness of breath or other concerning symptoms.

## 2020-02-13 ENCOUNTER — INFUSION (OUTPATIENT)
Dept: INFUSION THERAPY | Facility: HOSPITAL | Age: 60
End: 2020-02-13
Attending: INTERNAL MEDICINE
Payer: MEDICAID

## 2020-02-13 VITALS
BODY MASS INDEX: 21.37 KG/M2 | WEIGHT: 144.69 LBS | TEMPERATURE: 98 F | DIASTOLIC BLOOD PRESSURE: 56 MMHG | OXYGEN SATURATION: 100 % | RESPIRATION RATE: 18 BRPM | HEART RATE: 69 BPM | SYSTOLIC BLOOD PRESSURE: 104 MMHG

## 2020-02-13 DIAGNOSIS — C81.90 HODGKIN'S LYMPHOMA IN RELAPSE: Primary | ICD-10-CM

## 2020-02-13 DIAGNOSIS — D70.1 CHEMOTHERAPY INDUCED NEUTROPENIA: ICD-10-CM

## 2020-02-13 DIAGNOSIS — T45.1X5A CHEMOTHERAPY INDUCED NEUTROPENIA: ICD-10-CM

## 2020-02-13 DIAGNOSIS — C81.18 NODULAR SCLEROSIS HODGKIN LYMPHOMA OF LYMPH NODES OF MULTIPLE REGIONS: ICD-10-CM

## 2020-02-13 PROCEDURE — 96372 THER/PROPH/DIAG INJ SC/IM: CPT

## 2020-02-13 PROCEDURE — 63600175 PHARM REV CODE 636 W HCPCS: Mod: JG | Performed by: INTERNAL MEDICINE

## 2020-02-13 RX ADMIN — FILGRASTIM 300 MCG: 300 INJECTION, SOLUTION INTRAVENOUS; SUBCUTANEOUS at 02:02

## 2020-02-13 NOTE — PLAN OF CARE
Problem: Neutropenia  Goal: Absence of Infection  Outcome: Ongoing, Progressing  Intervention: Prevent Infection and Maximize Resistance  Flowsheets (Taken 2/13/2020 7385)  Infection Prevention: equipment surfaces disinfected; rest/sleep promoted

## 2020-02-14 ENCOUNTER — INFUSION (OUTPATIENT)
Dept: INFUSION THERAPY | Facility: HOSPITAL | Age: 60
End: 2020-02-14
Attending: INTERNAL MEDICINE
Payer: MEDICAID

## 2020-02-14 VITALS
SYSTOLIC BLOOD PRESSURE: 110 MMHG | HEART RATE: 67 BPM | TEMPERATURE: 98 F | WEIGHT: 144.19 LBS | RESPIRATION RATE: 18 BRPM | BODY MASS INDEX: 21.29 KG/M2 | DIASTOLIC BLOOD PRESSURE: 58 MMHG

## 2020-02-14 DIAGNOSIS — T45.1X5A ANTINEOPLASTIC CHEMOTHERAPY INDUCED ANEMIA: ICD-10-CM

## 2020-02-14 DIAGNOSIS — D64.81 ANTINEOPLASTIC CHEMOTHERAPY INDUCED ANEMIA: ICD-10-CM

## 2020-02-14 DIAGNOSIS — E61.1 IRON DEFICIENCY: ICD-10-CM

## 2020-02-14 DIAGNOSIS — T45.1X5A CHEMOTHERAPY INDUCED NEUTROPENIA: Primary | ICD-10-CM

## 2020-02-14 DIAGNOSIS — D70.1 CHEMOTHERAPY INDUCED NEUTROPENIA: Primary | ICD-10-CM

## 2020-02-14 DIAGNOSIS — C81.18 NODULAR SCLEROSIS HODGKIN LYMPHOMA OF LYMPH NODES OF MULTIPLE REGIONS: ICD-10-CM

## 2020-02-14 DIAGNOSIS — C81.90 HODGKIN'S LYMPHOMA IN RELAPSE: ICD-10-CM

## 2020-02-14 PROCEDURE — 63600175 PHARM REV CODE 636 W HCPCS: Mod: JG | Performed by: INTERNAL MEDICINE

## 2020-02-14 PROCEDURE — 96372 THER/PROPH/DIAG INJ SC/IM: CPT

## 2020-02-14 RX ORDER — SODIUM CHLORIDE 0.9 % (FLUSH) 0.9 %
10 SYRINGE (ML) INJECTION
Status: CANCELLED | OUTPATIENT
Start: 2020-02-15

## 2020-02-14 RX ORDER — HEPARIN 100 UNIT/ML
500 SYRINGE INTRAVENOUS
Status: CANCELLED | OUTPATIENT
Start: 2020-02-15

## 2020-02-14 RX ADMIN — FILGRASTIM 300 MCG: 300 INJECTION, SOLUTION INTRAVENOUS; SUBCUTANEOUS at 01:02

## 2020-02-17 ENCOUNTER — LAB VISIT (OUTPATIENT)
Dept: LAB | Facility: HOSPITAL | Age: 60
End: 2020-02-17
Attending: INTERNAL MEDICINE
Payer: MEDICAID

## 2020-02-17 DIAGNOSIS — C85.99 BONE MARROW MALIGNANT LYMPHOMA OF UNKNOWN EBV STATUS: ICD-10-CM

## 2020-02-17 DIAGNOSIS — T45.1X5A ANEMIA ASSOCIATED WITH CHEMOTHERAPY: ICD-10-CM

## 2020-02-17 DIAGNOSIS — D64.81 ANEMIA DUE TO CHEMOTHERAPY: Primary | ICD-10-CM

## 2020-02-17 DIAGNOSIS — B25.9 CMV INFECTION, ACUTE: ICD-10-CM

## 2020-02-17 DIAGNOSIS — C81.90 HODGKIN'S LYMPHOMA IN RELAPSE: ICD-10-CM

## 2020-02-17 DIAGNOSIS — D70.1 CHEMOTHERAPY INDUCED NEUTROPENIA: ICD-10-CM

## 2020-02-17 DIAGNOSIS — T45.1X5A ANEMIA DUE TO CHEMOTHERAPY: Primary | ICD-10-CM

## 2020-02-17 DIAGNOSIS — D64.81 ANEMIA FOLLOWING USE OF CHEMOTHERAPEUTIC DRUG: Primary | ICD-10-CM

## 2020-02-17 DIAGNOSIS — T45.1X5A CHEMOTHERAPY INDUCED NEUTROPENIA: ICD-10-CM

## 2020-02-17 DIAGNOSIS — D64.81 ANEMIA ASSOCIATED WITH CHEMOTHERAPY: ICD-10-CM

## 2020-02-17 DIAGNOSIS — Z09 CHEMOTHERAPY FOLLOW-UP EXAMINATION: ICD-10-CM

## 2020-02-17 LAB
ALBUMIN SERPL BCP-MCNC: 3 G/DL (ref 3.5–5.2)
ALP SERPL-CCNC: 82 U/L (ref 55–135)
ALT SERPL W/O P-5'-P-CCNC: 9 U/L (ref 10–44)
ANION GAP SERPL CALC-SCNC: 8 MMOL/L (ref 8–16)
AST SERPL-CCNC: 18 U/L (ref 10–40)
BASOPHILS # BLD AUTO: 0.01 K/UL (ref 0–0.2)
BASOPHILS NFR BLD: 0.4 % (ref 0–1.9)
BILIRUB SERPL-MCNC: 0.7 MG/DL (ref 0.1–1)
BLD GP AB SCN CELLS X3 SERPL QL: NORMAL
BUN SERPL-MCNC: 10 MG/DL (ref 8–23)
CALCIUM SERPL-MCNC: 8.3 MG/DL (ref 8.7–10.5)
CHLORIDE SERPL-SCNC: 107 MMOL/L (ref 95–110)
CO2 SERPL-SCNC: 27 MMOL/L (ref 23–29)
CREAT SERPL-MCNC: 0.8 MG/DL (ref 0.5–1.4)
DAT IGG-SP REAG RBC-IMP: NORMAL
DIFFERENTIAL METHOD: ABNORMAL
EOSINOPHIL # BLD AUTO: 0.5 K/UL (ref 0–0.5)
EOSINOPHIL NFR BLD: 22.2 % (ref 0–8)
ERYTHROCYTE [DISTWIDTH] IN BLOOD BY AUTOMATED COUNT: 20.3 % (ref 11.5–14.5)
EST. GFR  (AFRICAN AMERICAN): >60 ML/MIN/1.73 M^2
EST. GFR  (NON AFRICAN AMERICAN): >60 ML/MIN/1.73 M^2
GLUCOSE SERPL-MCNC: 87 MG/DL (ref 70–110)
HAPTOGLOB SERPL-MCNC: 26 MG/DL (ref 30–250)
HCT VFR BLD AUTO: 35.9 % (ref 40–54)
HGB BLD-MCNC: 11 G/DL (ref 14–18)
IMM GRANULOCYTES # BLD AUTO: 0.04 K/UL (ref 0–0.04)
LYMPHOCYTES # BLD AUTO: 0.6 K/UL (ref 1–4.8)
LYMPHOCYTES NFR BLD: 25.6 % (ref 18–48)
MCH RBC QN AUTO: 28.7 PG (ref 27–31)
MCHC RBC AUTO-ENTMCNC: 30.6 G/DL (ref 32–36)
MCV RBC AUTO: 94 FL (ref 82–98)
MONOCYTES # BLD AUTO: 0.4 K/UL (ref 0.3–1)
MONOCYTES NFR BLD: 15.4 % (ref 4–15)
NEUTROPHILS # BLD AUTO: 0.8 K/UL (ref 1.8–7.7)
NEUTROPHILS NFR BLD: 34.7 % (ref 38–73)
NRBC BLD-RTO: 0 /100 WBC
PLATELET # BLD AUTO: 160 K/UL (ref 150–350)
PMV BLD AUTO: 9.3 FL (ref 9.2–12.9)
POTASSIUM SERPL-SCNC: 3.2 MMOL/L (ref 3.5–5.1)
PROT SERPL-MCNC: 5.4 G/DL (ref 6–8.4)
RBC # BLD AUTO: 3.83 M/UL (ref 4.6–6.2)
SODIUM SERPL-SCNC: 142 MMOL/L (ref 136–145)
WBC # BLD AUTO: 2.34 K/UL (ref 3.9–12.7)

## 2020-02-17 PROCEDURE — 86880 COOMBS TEST DIRECT: CPT

## 2020-02-17 PROCEDURE — 83010 ASSAY OF HAPTOGLOBIN QUANT: CPT

## 2020-02-17 PROCEDURE — 86850 RBC ANTIBODY SCREEN: CPT

## 2020-02-17 PROCEDURE — 80053 COMPREHEN METABOLIC PANEL: CPT

## 2020-02-17 PROCEDURE — 85025 COMPLETE CBC W/AUTO DIFF WBC: CPT

## 2020-02-17 NOTE — PROGRESS NOTES
3rd request from [facility] to change anemia code without the [place] code yet this does not exist in epic. I sent an email with a picture of the codes availableto me andthey all have the T code attached to the D code . I cannot fix this

## 2020-02-18 ENCOUNTER — OFFICE VISIT (OUTPATIENT)
Dept: HEMATOLOGY/ONCOLOGY | Facility: CLINIC | Age: 60
End: 2020-02-18
Payer: MEDICAID

## 2020-02-18 ENCOUNTER — INFUSION (OUTPATIENT)
Dept: INFUSION THERAPY | Facility: HOSPITAL | Age: 60
End: 2020-02-18
Attending: INTERNAL MEDICINE
Payer: MEDICAID

## 2020-02-18 VITALS
BODY MASS INDEX: 22.04 KG/M2 | TEMPERATURE: 99 F | RESPIRATION RATE: 16 BRPM | WEIGHT: 148.81 LBS | DIASTOLIC BLOOD PRESSURE: 54 MMHG | SYSTOLIC BLOOD PRESSURE: 97 MMHG | HEART RATE: 74 BPM | HEIGHT: 69 IN | OXYGEN SATURATION: 99 %

## 2020-02-18 VITALS
WEIGHT: 148.88 LBS | DIASTOLIC BLOOD PRESSURE: 60 MMHG | SYSTOLIC BLOOD PRESSURE: 107 MMHG | HEART RATE: 61 BPM | RESPIRATION RATE: 18 BRPM | HEIGHT: 69 IN | BODY MASS INDEX: 22.05 KG/M2 | TEMPERATURE: 98 F | OXYGEN SATURATION: 100 %

## 2020-02-18 DIAGNOSIS — T45.1X5A ANTINEOPLASTIC CHEMOTHERAPY INDUCED ANEMIA: ICD-10-CM

## 2020-02-18 DIAGNOSIS — D70.1 CHEMOTHERAPY INDUCED NEUTROPENIA: Primary | ICD-10-CM

## 2020-02-18 DIAGNOSIS — R76.8 CMV (CYTOMEGALOVIRUS) ANTIBODY POSITIVE: ICD-10-CM

## 2020-02-18 DIAGNOSIS — T45.1X5A CHEMOTHERAPY INDUCED NEUTROPENIA: Primary | ICD-10-CM

## 2020-02-18 DIAGNOSIS — D64.81 ANTINEOPLASTIC CHEMOTHERAPY INDUCED ANEMIA: ICD-10-CM

## 2020-02-18 DIAGNOSIS — R20.2 PARESTHESIAS: ICD-10-CM

## 2020-02-18 DIAGNOSIS — J90 BILATERAL PLEURAL EFFUSION: ICD-10-CM

## 2020-02-18 DIAGNOSIS — E61.1 IRON DEFICIENCY: ICD-10-CM

## 2020-02-18 DIAGNOSIS — I95.9 HYPOTENSION, UNSPECIFIED HYPOTENSION TYPE: ICD-10-CM

## 2020-02-18 DIAGNOSIS — C81.18 NODULAR SCLEROSIS HODGKIN LYMPHOMA OF LYMPH NODES OF MULTIPLE REGIONS: ICD-10-CM

## 2020-02-18 DIAGNOSIS — C81.90 HODGKIN'S LYMPHOMA IN RELAPSE: ICD-10-CM

## 2020-02-18 DIAGNOSIS — C81.90 HODGKIN'S LYMPHOMA IN RELAPSE: Primary | ICD-10-CM

## 2020-02-18 DIAGNOSIS — D64.9 ANEMIA, UNSPECIFIED TYPE: ICD-10-CM

## 2020-02-18 PROCEDURE — 99999 PR PBB SHADOW E&M-EST. PATIENT-LVL III: CPT | Mod: PBBFAC,,, | Performed by: INTERNAL MEDICINE

## 2020-02-18 PROCEDURE — 63600175 PHARM REV CODE 636 W HCPCS: Mod: JG | Performed by: INTERNAL MEDICINE

## 2020-02-18 PROCEDURE — G0444 DEPRESSION SCREEN ANNUAL: HCPCS | Mod: PBBFAC,PO | Performed by: INTERNAL MEDICINE

## 2020-02-18 PROCEDURE — 99999 PR PBB SHADOW E&M-EST. PATIENT-LVL III: ICD-10-PCS | Mod: PBBFAC,,, | Performed by: INTERNAL MEDICINE

## 2020-02-18 PROCEDURE — 99213 OFFICE O/P EST LOW 20 MIN: CPT | Mod: PBBFAC,PO | Performed by: INTERNAL MEDICINE

## 2020-02-18 PROCEDURE — 99215 PR OFFICE/OUTPT VISIT, EST, LEVL V, 40-54 MIN: ICD-10-PCS | Mod: S$PBB,,, | Performed by: INTERNAL MEDICINE

## 2020-02-18 PROCEDURE — 99215 OFFICE O/P EST HI 40 MIN: CPT | Mod: S$PBB,,, | Performed by: INTERNAL MEDICINE

## 2020-02-18 PROCEDURE — 96372 THER/PROPH/DIAG INJ SC/IM: CPT

## 2020-02-18 PROCEDURE — 94760 N-INVAS EAR/PLS OXIMETRY 1: CPT | Mod: PBBFAC,PO | Performed by: INTERNAL MEDICINE

## 2020-02-18 RX ORDER — SODIUM CHLORIDE 0.9 % (FLUSH) 0.9 %
10 SYRINGE (ML) INJECTION
Status: CANCELLED | OUTPATIENT
Start: 2020-02-19

## 2020-02-18 RX ORDER — SODIUM CHLORIDE 0.9 % (FLUSH) 0.9 %
10 SYRINGE (ML) INJECTION
Status: DISCONTINUED | OUTPATIENT
Start: 2020-02-18 | End: 2020-02-18 | Stop reason: HOSPADM

## 2020-02-18 RX ORDER — HEPARIN 100 UNIT/ML
500 SYRINGE INTRAVENOUS
Status: DISCONTINUED | OUTPATIENT
Start: 2020-02-18 | End: 2020-02-18 | Stop reason: HOSPADM

## 2020-02-18 RX ORDER — HEPARIN 100 UNIT/ML
500 SYRINGE INTRAVENOUS
Status: CANCELLED | OUTPATIENT
Start: 2020-02-19

## 2020-02-18 RX ORDER — MIDODRINE HYDROCHLORIDE 5 MG/1
5 TABLET ORAL 2 TIMES DAILY WITH MEALS
Qty: 60 TABLET | Refills: 1 | Status: SHIPPED | OUTPATIENT
Start: 2020-02-18 | End: 2020-03-19

## 2020-02-18 RX ADMIN — FILGRASTIM 300 MCG: 300 INJECTION, SOLUTION INTRAVENOUS; SUBCUTANEOUS at 12:02
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
## 2020-02-19 ENCOUNTER — TELEPHONE (OUTPATIENT)
Dept: INFUSION THERAPY | Facility: HOSPITAL | Age: 60
End: 2020-02-19

## 2020-02-19 ENCOUNTER — INFUSION (OUTPATIENT)
Dept: INFUSION THERAPY | Facility: HOSPITAL | Age: 60
End: 2020-02-19
Attending: INTERNAL MEDICINE
Payer: MEDICAID

## 2020-02-19 VITALS
TEMPERATURE: 99 F | DIASTOLIC BLOOD PRESSURE: 57 MMHG | HEART RATE: 66 BPM | RESPIRATION RATE: 16 BRPM | SYSTOLIC BLOOD PRESSURE: 110 MMHG | WEIGHT: 147.38 LBS | OXYGEN SATURATION: 97 % | BODY MASS INDEX: 21.77 KG/M2

## 2020-02-19 DIAGNOSIS — C81.90 HODGKIN'S LYMPHOMA IN RELAPSE: Primary | ICD-10-CM

## 2020-02-19 DIAGNOSIS — D70.1 CHEMOTHERAPY INDUCED NEUTROPENIA: ICD-10-CM

## 2020-02-19 DIAGNOSIS — C81.18 NODULAR SCLEROSIS HODGKIN LYMPHOMA OF LYMPH NODES OF MULTIPLE REGIONS: ICD-10-CM

## 2020-02-19 DIAGNOSIS — T45.1X5A CHEMOTHERAPY INDUCED NEUTROPENIA: ICD-10-CM

## 2020-02-19 PROCEDURE — 96372 THER/PROPH/DIAG INJ SC/IM: CPT

## 2020-02-19 PROCEDURE — 63600175 PHARM REV CODE 636 W HCPCS: Mod: JG | Performed by: INTERNAL MEDICINE

## 2020-02-19 RX ADMIN — FILGRASTIM 300 MCG: 300 INJECTION, SOLUTION INTRAVENOUS; SUBCUTANEOUS at 03:02

## 2020-02-19 NOTE — PLAN OF CARE
Problem: Muscle Strength Impairment  Goal: Improved Muscle Strength  Outcome: Ongoing, Progressing  Intervention: Optimize Muscle Strength  Flowsheets (Taken 2/19/2020 1524)  Self-Care Promotion: independence encouraged

## 2020-02-20 ENCOUNTER — INFUSION (OUTPATIENT)
Dept: INFUSION THERAPY | Facility: HOSPITAL | Age: 60
End: 2020-02-20
Attending: INTERNAL MEDICINE
Payer: MEDICAID

## 2020-02-20 VITALS
RESPIRATION RATE: 17 BRPM | SYSTOLIC BLOOD PRESSURE: 100 MMHG | WEIGHT: 146.63 LBS | TEMPERATURE: 98 F | HEART RATE: 67 BPM | BODY MASS INDEX: 21.65 KG/M2 | DIASTOLIC BLOOD PRESSURE: 62 MMHG

## 2020-02-20 DIAGNOSIS — D64.9 ANEMIA: ICD-10-CM

## 2020-02-20 DIAGNOSIS — E61.1 IRON DEFICIENCY: ICD-10-CM

## 2020-02-20 DIAGNOSIS — D70.1 CHEMOTHERAPY INDUCED NEUTROPENIA: Primary | ICD-10-CM

## 2020-02-20 DIAGNOSIS — C81.90 HODGKIN'S LYMPHOMA IN RELAPSE: ICD-10-CM

## 2020-02-20 DIAGNOSIS — T45.1X5A CHEMOTHERAPY INDUCED NEUTROPENIA: Primary | ICD-10-CM

## 2020-02-20 DIAGNOSIS — C81.18 NODULAR SCLEROSIS HODGKIN LYMPHOMA OF LYMPH NODES OF MULTIPLE REGIONS: ICD-10-CM

## 2020-02-20 PROCEDURE — 96372 THER/PROPH/DIAG INJ SC/IM: CPT

## 2020-02-20 PROCEDURE — 63600175 PHARM REV CODE 636 W HCPCS: Mod: JG | Performed by: INTERNAL MEDICINE

## 2020-02-20 RX ORDER — SODIUM CHLORIDE 0.9 % (FLUSH) 0.9 %
10 SYRINGE (ML) INJECTION
Status: CANCELLED | OUTPATIENT
Start: 2020-02-21

## 2020-02-20 RX ORDER — HEPARIN 100 UNIT/ML
500 SYRINGE INTRAVENOUS
Status: CANCELLED | OUTPATIENT
Start: 2020-02-21

## 2020-02-20 RX ADMIN — FILGRASTIM 300 MCG: 300 INJECTION, SOLUTION INTRAVENOUS; SUBCUTANEOUS at 01:02

## 2020-02-23 ENCOUNTER — LAB VISIT (OUTPATIENT)
Dept: LAB | Facility: HOSPITAL | Age: 60
End: 2020-02-23
Attending: INTERNAL MEDICINE
Payer: MEDICAID

## 2020-02-23 DIAGNOSIS — C81.18 NODULAR SCLEROSIS HODGKIN LYMPHOMA OF LYMPH NODES OF MULTIPLE REGIONS: ICD-10-CM

## 2020-02-23 DIAGNOSIS — R53.1 WEAKNESS: ICD-10-CM

## 2020-02-23 DIAGNOSIS — Z09 ONCOLOGY FOLLOW-UP ENCOUNTER: ICD-10-CM

## 2020-02-23 DIAGNOSIS — C81.90 HODGKIN'S LYMPHOMA IN RELAPSE: ICD-10-CM

## 2020-02-23 LAB
ALBUMIN SERPL BCP-MCNC: 3.1 G/DL (ref 3.5–5.2)
ALP SERPL-CCNC: 75 U/L (ref 55–135)
ALT SERPL W/O P-5'-P-CCNC: 11 U/L (ref 10–44)
ANION GAP SERPL CALC-SCNC: 9 MMOL/L (ref 8–16)
ANISOCYTOSIS BLD QL SMEAR: SLIGHT
AST SERPL-CCNC: 19 U/L (ref 10–40)
BASOPHILS NFR BLD: 2 % (ref 0–1.9)
BILIRUB SERPL-MCNC: 0.7 MG/DL (ref 0.1–1)
BUN SERPL-MCNC: 14 MG/DL (ref 8–23)
CALCIUM SERPL-MCNC: 8.1 MG/DL (ref 8.7–10.5)
CHLORIDE SERPL-SCNC: 109 MMOL/L (ref 95–110)
CO2 SERPL-SCNC: 23 MMOL/L (ref 23–29)
CREAT SERPL-MCNC: 0.8 MG/DL (ref 0.5–1.4)
DIFFERENTIAL METHOD: ABNORMAL
EOSINOPHIL NFR BLD: 5 % (ref 0–8)
ERYTHROCYTE [DISTWIDTH] IN BLOOD BY AUTOMATED COUNT: 19.3 % (ref 11.5–14.5)
EST. GFR  (AFRICAN AMERICAN): >60 ML/MIN/1.73 M^2
EST. GFR  (NON AFRICAN AMERICAN): >60 ML/MIN/1.73 M^2
GLUCOSE SERPL-MCNC: 119 MG/DL (ref 70–110)
HCT VFR BLD AUTO: 33.1 % (ref 40–54)
HGB BLD-MCNC: 10.7 G/DL (ref 14–18)
IMM GRANULOCYTES # BLD AUTO: ABNORMAL K/UL
LYMPHOCYTES NFR BLD: 18 % (ref 18–48)
MCH RBC QN AUTO: 29.7 PG (ref 27–31)
MCHC RBC AUTO-ENTMCNC: 32.3 G/DL (ref 32–36)
MCV RBC AUTO: 92 FL (ref 82–98)
MONOCYTES NFR BLD: 5 % (ref 4–15)
NEUTROPHILS NFR BLD: 70 % (ref 38–73)
NRBC BLD-RTO: 0 /100 WBC
PLATELET # BLD AUTO: 102 K/UL (ref 150–350)
PLATELET BLD QL SMEAR: ABNORMAL
PMV BLD AUTO: 8.5 FL (ref 9.2–12.9)
POTASSIUM SERPL-SCNC: 3.6 MMOL/L (ref 3.5–5.1)
PROT SERPL-MCNC: 5.4 G/DL (ref 6–8.4)
RBC # BLD AUTO: 3.6 M/UL (ref 4.6–6.2)
SODIUM SERPL-SCNC: 141 MMOL/L (ref 136–145)
WBC # BLD AUTO: 2.29 K/UL (ref 3.9–12.7)

## 2020-02-23 PROCEDURE — 80053 COMPREHEN METABOLIC PANEL: CPT

## 2020-02-23 PROCEDURE — 36415 COLL VENOUS BLD VENIPUNCTURE: CPT

## 2020-02-23 PROCEDURE — 85025 COMPLETE CBC W/AUTO DIFF WBC: CPT

## 2020-02-24 ENCOUNTER — OFFICE VISIT (OUTPATIENT)
Dept: HEMATOLOGY/ONCOLOGY | Facility: CLINIC | Age: 60
End: 2020-02-24
Payer: MEDICAID

## 2020-02-24 VITALS
HEART RATE: 61 BPM | TEMPERATURE: 98 F | SYSTOLIC BLOOD PRESSURE: 107 MMHG | WEIGHT: 145.5 LBS | BODY MASS INDEX: 21.49 KG/M2 | DIASTOLIC BLOOD PRESSURE: 58 MMHG

## 2020-02-24 DIAGNOSIS — Z09 CHEMOTHERAPY FOLLOW-UP EXAMINATION: ICD-10-CM

## 2020-02-24 DIAGNOSIS — D64.81 ANEMIA FOLLOWING USE OF CHEMOTHERAPEUTIC DRUG: ICD-10-CM

## 2020-02-24 DIAGNOSIS — C81.90 HODGKIN'S LYMPHOMA IN RELAPSE: Primary | ICD-10-CM

## 2020-02-24 DIAGNOSIS — R06.09 DOE (DYSPNEA ON EXERTION): ICD-10-CM

## 2020-02-24 DIAGNOSIS — T45.1X5A LEUKOPENIA DUE TO ANTINEOPLASTIC CHEMOTHERAPY: ICD-10-CM

## 2020-02-24 DIAGNOSIS — R53.83 FATIGUE, UNSPECIFIED TYPE: ICD-10-CM

## 2020-02-24 DIAGNOSIS — R76.8 CMV (CYTOMEGALOVIRUS) ANTIBODY POSITIVE: ICD-10-CM

## 2020-02-24 DIAGNOSIS — R20.2 PARESTHESIAS: ICD-10-CM

## 2020-02-24 DIAGNOSIS — Z01.818 EXAMINATION PRIOR TO CHEMOTHERAPY: ICD-10-CM

## 2020-02-24 DIAGNOSIS — D70.1 LEUKOPENIA DUE TO ANTINEOPLASTIC CHEMOTHERAPY: ICD-10-CM

## 2020-02-24 PROCEDURE — 99215 OFFICE O/P EST HI 40 MIN: CPT | Mod: S$PBB,,, | Performed by: INTERNAL MEDICINE

## 2020-02-24 PROCEDURE — 99999 PR PBB SHADOW E&M-EST. PATIENT-LVL III: CPT | Mod: PBBFAC,,, | Performed by: INTERNAL MEDICINE

## 2020-02-24 PROCEDURE — G0444 DEPRESSION SCREEN ANNUAL: HCPCS | Mod: PBBFAC,PO | Performed by: INTERNAL MEDICINE

## 2020-02-24 PROCEDURE — 99999 PR PBB SHADOW E&M-EST. PATIENT-LVL III: ICD-10-PCS | Mod: PBBFAC,,, | Performed by: INTERNAL MEDICINE

## 2020-02-24 PROCEDURE — 94760 N-INVAS EAR/PLS OXIMETRY 1: CPT | Mod: PBBFAC,PO | Performed by: INTERNAL MEDICINE

## 2020-02-24 PROCEDURE — 99213 OFFICE O/P EST LOW 20 MIN: CPT | Mod: PBBFAC,PO,25 | Performed by: INTERNAL MEDICINE

## 2020-02-24 PROCEDURE — 99215 PR OFFICE/OUTPT VISIT, EST, LEVL V, 40-54 MIN: ICD-10-PCS | Mod: S$PBB,,, | Performed by: INTERNAL MEDICINE

## 2020-02-24 RX ORDER — FAMOTIDINE 10 MG/ML
20 INJECTION INTRAVENOUS
Status: CANCELLED | OUTPATIENT
Start: 2020-02-27

## 2020-02-24 RX ORDER — HEPARIN 100 UNIT/ML
500 SYRINGE INTRAVENOUS
Status: CANCELLED | OUTPATIENT
Start: 2020-02-27

## 2020-02-24 RX ORDER — ACETAMINOPHEN 325 MG/1
650 TABLET ORAL
Status: CANCELLED | OUTPATIENT
Start: 2020-02-27

## 2020-02-24 RX ORDER — SODIUM CHLORIDE 0.9 % (FLUSH) 0.9 %
10 SYRINGE (ML) INJECTION
Status: CANCELLED | OUTPATIENT
Start: 2020-02-27
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
## 2020-02-26 ENCOUNTER — INFUSION (OUTPATIENT)
Dept: INFUSION THERAPY | Facility: HOSPITAL | Age: 60
End: 2020-02-26
Attending: INTERNAL MEDICINE
Payer: MEDICAID

## 2020-02-26 VITALS
BODY MASS INDEX: 21.72 KG/M2 | DIASTOLIC BLOOD PRESSURE: 66 MMHG | HEART RATE: 51 BPM | SYSTOLIC BLOOD PRESSURE: 98 MMHG | TEMPERATURE: 98 F | RESPIRATION RATE: 18 BRPM | WEIGHT: 146.63 LBS | OXYGEN SATURATION: 100 % | HEIGHT: 69 IN

## 2020-02-26 DIAGNOSIS — C81.90 HODGKIN'S LYMPHOMA IN RELAPSE: Primary | ICD-10-CM

## 2020-02-26 DIAGNOSIS — C81.18 NODULAR SCLEROSIS HODGKIN LYMPHOMA OF LYMPH NODES OF MULTIPLE REGIONS: ICD-10-CM

## 2020-02-26 DIAGNOSIS — D70.1 CHEMOTHERAPY INDUCED NEUTROPENIA: ICD-10-CM

## 2020-02-26 DIAGNOSIS — T45.1X5A CHEMOTHERAPY INDUCED NEUTROPENIA: ICD-10-CM

## 2020-02-26 PROCEDURE — 96375 TX/PRO/DX INJ NEW DRUG ADDON: CPT

## 2020-02-26 PROCEDURE — 96413 CHEMO IV INFUSION 1 HR: CPT

## 2020-02-26 PROCEDURE — 96367 TX/PROPH/DG ADDL SEQ IV INF: CPT

## 2020-02-26 PROCEDURE — 63600175 PHARM REV CODE 636 W HCPCS: Mod: JG | Performed by: INTERNAL MEDICINE

## 2020-02-26 PROCEDURE — A4216 STERILE WATER/SALINE, 10 ML: HCPCS | Performed by: INTERNAL MEDICINE

## 2020-02-26 PROCEDURE — 25000003 PHARM REV CODE 250: Performed by: INTERNAL MEDICINE

## 2020-02-26 PROCEDURE — S0028 INJECTION, FAMOTIDINE, 20 MG: HCPCS | Performed by: INTERNAL MEDICINE

## 2020-02-26 RX ORDER — MEPERIDINE HYDROCHLORIDE 25 MG/ML
25 INJECTION INTRAMUSCULAR; INTRAVENOUS; SUBCUTANEOUS
Status: DISCONTINUED | OUTPATIENT
Start: 2020-02-26 | End: 2020-02-26 | Stop reason: HOSPADM

## 2020-02-26 RX ORDER — SODIUM CHLORIDE 0.9 % (FLUSH) 0.9 %
10 SYRINGE (ML) INJECTION
Status: DISCONTINUED | OUTPATIENT
Start: 2020-02-26 | End: 2020-02-26 | Stop reason: HOSPADM

## 2020-02-26 RX ORDER — ACETAMINOPHEN 325 MG/1
650 TABLET ORAL
Status: COMPLETED | OUTPATIENT
Start: 2020-02-26 | End: 2020-02-26

## 2020-02-26 RX ORDER — FAMOTIDINE 10 MG/ML
20 INJECTION INTRAVENOUS
Status: COMPLETED | OUTPATIENT
Start: 2020-02-26 | End: 2020-02-26

## 2020-02-26 RX ORDER — HEPARIN 100 UNIT/ML
500 SYRINGE INTRAVENOUS
Status: DISCONTINUED | OUTPATIENT
Start: 2020-02-26 | End: 2020-02-26 | Stop reason: HOSPADM

## 2020-02-26 RX ADMIN — FAMOTIDINE 20 MG: 10 INJECTION, SOLUTION INTRAVENOUS at 10:02

## 2020-02-26 RX ADMIN — DEXTROSE: 50 INJECTION, SOLUTION INTRAVENOUS at 11:02

## 2020-02-26 RX ADMIN — Medication 16 MG: at 11:02

## 2020-02-26 RX ADMIN — SODIUM CHLORIDE, PRESERVATIVE FREE 10 ML: 5 INJECTION INTRAVENOUS at 12:02

## 2020-02-26 RX ADMIN — BRENTUXIMAB VEDOTIN 116 MG: 50 INJECTION, POWDER, LYOPHILIZED, FOR SOLUTION INTRAVENOUS at 11:02

## 2020-02-26 RX ADMIN — DIPHENHYDRAMINE HYDROCHLORIDE 50 MG: 50 INJECTION INTRAMUSCULAR; INTRAVENOUS at 10:02

## 2020-02-26 RX ADMIN — SODIUM CHLORIDE: 0.9 INJECTION, SOLUTION INTRAVENOUS at 10:02

## 2020-02-26 RX ADMIN — HEPARIN 500 UNITS: 100 SYRINGE at 12:02

## 2020-02-26 RX ADMIN — ACETAMINOPHEN 650 MG: 325 TABLET ORAL at 10:02

## 2020-02-26 NOTE — PLAN OF CARE
Problem: Fatigue  Goal: Improved Activity Tolerance  Outcome: Ongoing, Progressing  Intervention: Promote Energy Conservation  Flowsheets (Taken 2/26/2020 1012)  Fatigue Management: frequent rest breaks encouraged; paced activity encouraged  Activity Management: activity encouraged

## 2020-02-27 ENCOUNTER — INFUSION (OUTPATIENT)
Dept: INFUSION THERAPY | Facility: HOSPITAL | Age: 60
End: 2020-02-27
Attending: INTERNAL MEDICINE
Payer: MEDICAID

## 2020-02-27 VITALS
TEMPERATURE: 97 F | SYSTOLIC BLOOD PRESSURE: 99 MMHG | HEIGHT: 69 IN | BODY MASS INDEX: 21.89 KG/M2 | RESPIRATION RATE: 18 BRPM | WEIGHT: 147.81 LBS | HEART RATE: 60 BPM | DIASTOLIC BLOOD PRESSURE: 56 MMHG | OXYGEN SATURATION: 100 %

## 2020-02-27 DIAGNOSIS — D70.1 CHEMOTHERAPY INDUCED NEUTROPENIA: ICD-10-CM

## 2020-02-27 DIAGNOSIS — C81.90 HODGKIN'S LYMPHOMA IN RELAPSE: Primary | ICD-10-CM

## 2020-02-27 DIAGNOSIS — T45.1X5A CHEMOTHERAPY INDUCED NEUTROPENIA: ICD-10-CM

## 2020-02-27 DIAGNOSIS — D64.9 ANEMIA, UNSPECIFIED TYPE: ICD-10-CM

## 2020-02-27 DIAGNOSIS — C81.18 NODULAR SCLEROSIS HODGKIN LYMPHOMA OF LYMPH NODES OF MULTIPLE REGIONS: ICD-10-CM

## 2020-02-27 DIAGNOSIS — E61.1 IRON DEFICIENCY: ICD-10-CM

## 2020-02-27 PROCEDURE — 63600175 PHARM REV CODE 636 W HCPCS: Performed by: INTERNAL MEDICINE

## 2020-02-27 PROCEDURE — A4216 STERILE WATER/SALINE, 10 ML: HCPCS | Performed by: INTERNAL MEDICINE

## 2020-02-27 PROCEDURE — 25000003 PHARM REV CODE 250: Performed by: INTERNAL MEDICINE

## 2020-02-27 PROCEDURE — 96409 CHEMO IV PUSH SNGL DRUG: CPT

## 2020-02-27 RX ORDER — SODIUM CHLORIDE 0.9 % (FLUSH) 0.9 %
10 SYRINGE (ML) INJECTION
Status: CANCELLED | OUTPATIENT
Start: 2020-02-28

## 2020-02-27 RX ORDER — SODIUM CHLORIDE 0.9 % (FLUSH) 0.9 %
10 SYRINGE (ML) INJECTION
Status: COMPLETED | OUTPATIENT
Start: 2020-02-27 | End: 2020-02-27

## 2020-02-27 RX ORDER — HEPARIN 100 UNIT/ML
500 SYRINGE INTRAVENOUS
Status: CANCELLED | OUTPATIENT
Start: 2020-02-28

## 2020-02-27 RX ORDER — HEPARIN 100 UNIT/ML
500 SYRINGE INTRAVENOUS
Status: COMPLETED | OUTPATIENT
Start: 2020-02-27 | End: 2020-02-27

## 2020-02-27 RX ADMIN — SODIUM CHLORIDE, PRESERVATIVE FREE 10 ML: 5 INJECTION INTRAVENOUS at 01:02

## 2020-02-27 RX ADMIN — BENDAMUSTINE HYDROCHLORIDE 212.5 MG: 25 INJECTION, SOLUTION INTRAVENOUS at 01:02

## 2020-02-27 RX ADMIN — HEPARIN 500 UNITS: 100 SYRINGE at 01:02

## 2020-02-27 NOTE — PLAN OF CARE
Problem: Fatigue  Goal: Improved Activity Tolerance  Outcome: Ongoing, Progressing  Intervention: Promote Energy Conservation  Flowsheets (Taken 2/27/2020 1247)  Fatigue Management: frequent rest breaks encouraged; paced activity encouraged  Activity Management: activity encouraged

## 2020-02-28 ENCOUNTER — OFFICE VISIT (OUTPATIENT)
Dept: HEMATOLOGY/ONCOLOGY | Facility: CLINIC | Age: 60
End: 2020-02-28
Payer: MEDICAID

## 2020-02-28 VITALS
SYSTOLIC BLOOD PRESSURE: 90 MMHG | DIASTOLIC BLOOD PRESSURE: 54 MMHG | WEIGHT: 149.94 LBS | RESPIRATION RATE: 20 BRPM | TEMPERATURE: 98 F | HEIGHT: 69 IN | OXYGEN SATURATION: 100 % | HEART RATE: 95 BPM | BODY MASS INDEX: 22.21 KG/M2

## 2020-02-28 DIAGNOSIS — R76.8 CMV (CYTOMEGALOVIRUS) ANTIBODY POSITIVE: ICD-10-CM

## 2020-02-28 DIAGNOSIS — D70.1 CHEMOTHERAPY INDUCED NEUTROPENIA: ICD-10-CM

## 2020-02-28 DIAGNOSIS — R19.7 DIARRHEA, UNSPECIFIED TYPE: Primary | ICD-10-CM

## 2020-02-28 DIAGNOSIS — I95.9 HYPOTENSION, UNSPECIFIED HYPOTENSION TYPE: ICD-10-CM

## 2020-02-28 DIAGNOSIS — C81.18 NODULAR SCLEROSIS HODGKIN LYMPHOMA OF LYMPH NODES OF MULTIPLE REGIONS: ICD-10-CM

## 2020-02-28 DIAGNOSIS — R11.10 VOMITING, INTRACTABILITY OF VOMITING NOT SPECIFIED, PRESENCE OF NAUSEA NOT SPECIFIED, UNSPECIFIED VOMITING TYPE: ICD-10-CM

## 2020-02-28 DIAGNOSIS — D64.81 ANEMIA FOLLOWING USE OF CHEMOTHERAPEUTIC DRUG: ICD-10-CM

## 2020-02-28 DIAGNOSIS — R20.2 PARESTHESIAS: ICD-10-CM

## 2020-02-28 DIAGNOSIS — T45.1X5A CHEMOTHERAPY INDUCED NEUTROPENIA: ICD-10-CM

## 2020-02-28 PROCEDURE — 99999 PR PBB SHADOW E&M-EST. PATIENT-LVL III: CPT | Mod: PBBFAC,,, | Performed by: INTERNAL MEDICINE

## 2020-02-28 PROCEDURE — G0444 DEPRESSION SCREEN ANNUAL: HCPCS | Mod: PBBFAC,PO | Performed by: INTERNAL MEDICINE

## 2020-02-28 PROCEDURE — 99497 PR ADVNCD CARE PLAN 30 MIN: ICD-10-PCS | Mod: S$PBB,,, | Performed by: INTERNAL MEDICINE

## 2020-02-28 PROCEDURE — 94760 N-INVAS EAR/PLS OXIMETRY 1: CPT | Mod: PBBFAC,PO | Performed by: INTERNAL MEDICINE

## 2020-02-28 PROCEDURE — 99213 OFFICE O/P EST LOW 20 MIN: CPT | Mod: PBBFAC,PO,25 | Performed by: INTERNAL MEDICINE

## 2020-02-28 PROCEDURE — 99214 OFFICE O/P EST MOD 30 MIN: CPT | Mod: S$PBB,,, | Performed by: INTERNAL MEDICINE

## 2020-02-28 PROCEDURE — 99214 PR OFFICE/OUTPT VISIT, EST, LEVL IV, 30-39 MIN: ICD-10-PCS | Mod: S$PBB,,, | Performed by: INTERNAL MEDICINE

## 2020-02-28 PROCEDURE — 99497 ADVNCD CARE PLAN 30 MIN: CPT | Mod: S$PBB,,, | Performed by: INTERNAL MEDICINE

## 2020-02-28 PROCEDURE — 99999 PR PBB SHADOW E&M-EST. PATIENT-LVL III: ICD-10-PCS | Mod: PBBFAC,,, | Performed by: INTERNAL MEDICINE

## 2020-02-28 PROCEDURE — 99497 ADVNCD CARE PLAN 30 MIN: CPT | Mod: PBBFAC,PO,59 | Performed by: INTERNAL MEDICINE
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
## 2020-03-04 ENCOUNTER — HOSPITAL ENCOUNTER (OUTPATIENT)
Dept: RADIOLOGY | Facility: HOSPITAL | Age: 60
Discharge: HOME | End: 2020-03-04
Attending: INTERNAL MEDICINE
Payer: MEDICAID

## 2020-03-04 ENCOUNTER — DOCUMENTATION ONLY (OUTPATIENT)
Dept: HEMATOLOGY/ONCOLOGY | Facility: CLINIC | Age: 60
End: 2020-03-04

## 2020-03-04 ENCOUNTER — LAB VISIT (OUTPATIENT)
Dept: LAB | Facility: HOSPITAL | Age: 60
End: 2020-03-04
Attending: INTERNAL MEDICINE
Payer: MEDICAID

## 2020-03-04 VITALS — BODY MASS INDEX: 21.77 KG/M2 | WEIGHT: 147 LBS | HEIGHT: 69 IN

## 2020-03-04 DIAGNOSIS — C81.18 NODULAR SCLEROSIS HODGKIN LYMPHOMA OF LYMPH NODES OF MULTIPLE REGIONS: ICD-10-CM

## 2020-03-04 DIAGNOSIS — Z09 ONCOLOGY FOLLOW-UP ENCOUNTER: ICD-10-CM

## 2020-03-04 DIAGNOSIS — C81.90 HODGKIN'S LYMPHOMA IN RELAPSE: ICD-10-CM

## 2020-03-04 DIAGNOSIS — R53.1 WEAKNESS: ICD-10-CM

## 2020-03-04 DIAGNOSIS — D64.9 ANEMIA, UNSPECIFIED TYPE: ICD-10-CM

## 2020-03-04 LAB
ALBUMIN SERPL BCP-MCNC: 3.1 G/DL (ref 3.5–5.2)
ALP SERPL-CCNC: 64 U/L (ref 55–135)
ALT SERPL W/O P-5'-P-CCNC: 12 U/L (ref 10–44)
ANION GAP SERPL CALC-SCNC: 7 MMOL/L (ref 8–16)
ANISOCYTOSIS BLD QL SMEAR: SLIGHT
AST SERPL-CCNC: 18 U/L (ref 10–40)
BASOPHILS NFR BLD: 2 % (ref 0–1.9)
BILIRUB SERPL-MCNC: 0.7 MG/DL (ref 0.1–1)
BUN SERPL-MCNC: 13 MG/DL (ref 8–23)
CALCIUM SERPL-MCNC: 8.4 MG/DL (ref 8.7–10.5)
CHLORIDE SERPL-SCNC: 110 MMOL/L (ref 95–110)
CO2 SERPL-SCNC: 26 MMOL/L (ref 23–29)
CREAT SERPL-MCNC: 0.7 MG/DL (ref 0.5–1.4)
DIFFERENTIAL METHOD: ABNORMAL
EOSINOPHIL NFR BLD: 16 % (ref 0–8)
ERYTHROCYTE [DISTWIDTH] IN BLOOD BY AUTOMATED COUNT: 17.7 % (ref 11.5–14.5)
EST. GFR  (AFRICAN AMERICAN): >60 ML/MIN/1.73 M^2
EST. GFR  (NON AFRICAN AMERICAN): >60 ML/MIN/1.73 M^2
GLUCOSE SERPL-MCNC: 88 MG/DL (ref 70–110)
GLUCOSE SERPL-MCNC: 98 MG/DL (ref 70–110)
HCT VFR BLD AUTO: 29.6 % (ref 40–54)
HGB BLD-MCNC: 9.6 G/DL (ref 14–18)
IMM GRANULOCYTES # BLD AUTO: ABNORMAL K/UL
LYMPHOCYTES NFR BLD: 8 % (ref 18–48)
MCH RBC QN AUTO: 30 PG (ref 27–31)
MCHC RBC AUTO-ENTMCNC: 32.4 G/DL (ref 32–36)
MCV RBC AUTO: 93 FL (ref 82–98)
MONOCYTES NFR BLD: 4 % (ref 4–15)
NEUTROPHILS NFR BLD: 70 % (ref 38–73)
NRBC BLD-RTO: 0 /100 WBC
PLATELET # BLD AUTO: 64 K/UL (ref 150–350)
PLATELET BLD QL SMEAR: ABNORMAL
PMV BLD AUTO: 9.2 FL (ref 9.2–12.9)
POTASSIUM SERPL-SCNC: 3.5 MMOL/L (ref 3.5–5.1)
PROT SERPL-MCNC: 5.4 G/DL (ref 6–8.4)
RBC # BLD AUTO: 3.2 M/UL (ref 4.6–6.2)
SODIUM SERPL-SCNC: 143 MMOL/L (ref 136–145)
WBC # BLD AUTO: 0.52 K/UL (ref 3.9–12.7)

## 2020-03-04 PROCEDURE — 78815 PET IMAGE W/CT SKULL-THIGH: CPT | Mod: TC,PO

## 2020-03-04 PROCEDURE — 85027 COMPLETE CBC AUTOMATED: CPT

## 2020-03-04 PROCEDURE — A9552 F18 FDG: HCPCS | Mod: PO

## 2020-03-04 PROCEDURE — 36415 COLL VENOUS BLD VENIPUNCTURE: CPT

## 2020-03-04 PROCEDURE — 85007 BL SMEAR W/DIFF WBC COUNT: CPT

## 2020-03-04 PROCEDURE — 80053 COMPREHEN METABOLIC PANEL: CPT

## 2020-03-06 ENCOUNTER — OFFICE VISIT (OUTPATIENT)
Dept: HEMATOLOGY/ONCOLOGY | Facility: CLINIC | Age: 60
End: 2020-03-06
Payer: MEDICAID

## 2020-03-06 ENCOUNTER — INFUSION (OUTPATIENT)
Dept: INFUSION THERAPY | Facility: HOSPITAL | Age: 60
End: 2020-03-06
Attending: INTERNAL MEDICINE
Payer: MEDICAID

## 2020-03-06 VITALS
RESPIRATION RATE: 18 BRPM | TEMPERATURE: 98 F | HEIGHT: 69 IN | HEART RATE: 61 BPM | WEIGHT: 148.13 LBS | BODY MASS INDEX: 21.94 KG/M2 | OXYGEN SATURATION: 100 % | SYSTOLIC BLOOD PRESSURE: 126 MMHG | DIASTOLIC BLOOD PRESSURE: 58 MMHG

## 2020-03-06 VITALS
WEIGHT: 150 LBS | TEMPERATURE: 98 F | HEART RATE: 63 BPM | DIASTOLIC BLOOD PRESSURE: 62 MMHG | SYSTOLIC BLOOD PRESSURE: 121 MMHG | RESPIRATION RATE: 18 BRPM | BODY MASS INDEX: 22.15 KG/M2

## 2020-03-06 DIAGNOSIS — T45.1X5A ANEMIA ASSOCIATED WITH CHEMOTHERAPY: ICD-10-CM

## 2020-03-06 DIAGNOSIS — D64.81 ANEMIA ASSOCIATED WITH CHEMOTHERAPY: ICD-10-CM

## 2020-03-06 DIAGNOSIS — T45.1X5A CHEMOTHERAPY INDUCED NEUTROPENIA: ICD-10-CM

## 2020-03-06 DIAGNOSIS — C81.18 NODULAR SCLEROSIS HODGKIN LYMPHOMA OF LYMPH NODES OF MULTIPLE REGIONS: Primary | ICD-10-CM

## 2020-03-06 DIAGNOSIS — D70.1 CHEMOTHERAPY INDUCED NEUTROPENIA: ICD-10-CM

## 2020-03-06 DIAGNOSIS — D64.81 ANEMIA FOLLOWING USE OF CHEMOTHERAPEUTIC DRUG: ICD-10-CM

## 2020-03-06 DIAGNOSIS — C81.90 HODGKIN'S LYMPHOMA IN RELAPSE: Primary | ICD-10-CM

## 2020-03-06 DIAGNOSIS — E27.40 ADRENAL INSUFFICIENCY: ICD-10-CM

## 2020-03-06 DIAGNOSIS — C81.18 NODULAR SCLEROSIS HODGKIN LYMPHOMA OF LYMPH NODES OF MULTIPLE REGIONS: ICD-10-CM

## 2020-03-06 PROCEDURE — 99215 PR OFFICE/OUTPT VISIT, EST, LEVL V, 40-54 MIN: ICD-10-PCS | Mod: S$PBB,,, | Performed by: INTERNAL MEDICINE

## 2020-03-06 PROCEDURE — 99999 PR PBB SHADOW E&M-EST. PATIENT-LVL III: ICD-10-PCS | Mod: PBBFAC,,, | Performed by: INTERNAL MEDICINE

## 2020-03-06 PROCEDURE — 94760 N-INVAS EAR/PLS OXIMETRY 1: CPT | Mod: PBBFAC,PO | Performed by: INTERNAL MEDICINE

## 2020-03-06 PROCEDURE — 96372 THER/PROPH/DIAG INJ SC/IM: CPT

## 2020-03-06 PROCEDURE — 63600175 PHARM REV CODE 636 W HCPCS: Mod: JG | Performed by: INTERNAL MEDICINE

## 2020-03-06 PROCEDURE — G0444 DEPRESSION SCREEN ANNUAL: HCPCS | Mod: PBBFAC,PO | Performed by: INTERNAL MEDICINE

## 2020-03-06 PROCEDURE — 99999 PR PBB SHADOW E&M-EST. PATIENT-LVL III: CPT | Mod: PBBFAC,,, | Performed by: INTERNAL MEDICINE

## 2020-03-06 PROCEDURE — 99215 OFFICE O/P EST HI 40 MIN: CPT | Mod: S$PBB,,, | Performed by: INTERNAL MEDICINE

## 2020-03-06 PROCEDURE — 99213 OFFICE O/P EST LOW 20 MIN: CPT | Mod: PBBFAC,PO | Performed by: INTERNAL MEDICINE

## 2020-03-06 RX ADMIN — EPOETIN ALFA 40000 UNITS: 20000 SOLUTION INTRAVENOUS; SUBCUTANEOUS at 02:03

## 2020-03-06 RX ADMIN — FILGRASTIM 300 MCG: 300 INJECTION, SOLUTION INTRAVENOUS; SUBCUTANEOUS at 02:03

## 2020-03-06 NOTE — PROGRESS NOTES
CC:   I feel great     Heriberto CESAR Keys is a 60 y.o.    This is a 60 yr old gentleman here for Hospital F/U for neutropenic fever     F/U of Hodgkins disease He received ABVD cycle 1 while hospitalized at [facility] in past. He had neurological presentation with dizziness and confusion yet LP negative for CSF involvement. He received levaquin for sinusitis and his mentation improved. CHemo did cause severe marrow suppression requiring GF      Pt had chemo in the hospital cycle 1   Due for ABVD   Post IV iron and procrit   He had been on carvidolol with lasix for HTn   History IV iron infusions    December 6, 2019  Cytology from CSF still pending but bone marrow with definite invasive Hodgkin's lymphoma    12-  Here to start chemo second regimen for recurrence   Weak, cannot stand for lengthy time, SOB intermittently     12/31/2019:  Patient walking with his own power.  Patient is here for 1 week follow-up to see if need of transfusion.  Patient states that sometime he have stomach pain after completion of meal.  No fever no chills.  No chest pain or shortness breath    1- : here for cycle 2   Leg pain continues, + itching all over , weak     1- Here for cycle 3  Hospitalized after cycle 2 + chills and bronchitis and did not complete granix because of such     January 31 2020  Here for blood counts   New leg pain   Headaches and chills with a drop in his WBC    2/4/2020   Pt started zyrtec and rhinorrhea is better   Receiving retacrit with ni complication     2/24/2020 : here for clearance cycle 4 chemo  Fatigued, no fever , no chills   Post treatment for cmv     2/28/2020  Had chemo yesterday   Was vomiting with chills and diarrhea like last chemo all night     March 6 2020 here for pet ct results which show remission   Had chemo 2- here to check labs : in ho  Past Medical History:   Diagnosis Date    Anemia     Depression     Encounter for blood transfusion     Lymphoma     Lymphoma      Lymphoma 2019     Past Surgical History:   Procedure Laterality Date    BONE MARROW ASPIRATION Left 2019    Procedure: ASPIRATION, BONE MARROW;  [facility];  Location: [facility] OR;  Service: General;  Laterality: Left;    LYMPHADENECTOMY Bilateral        Current Outpatient Medications:     cetirizine (ZYRTEC) 10 MG tablet, Take 10 mg by mouth daily as needed for Allergies., Disp: , Rfl:     midodrine (PROAMATINE) 5 MG Tab, Take 1 tablet (5 mg total) by mouth 2 (two) times daily with meals., Disp: 60 tablet, Rfl: 1    tobramycin sulfate 0.3% (TOBREX) 0.3 % ophthalmic solution, 3 drops every 4 (four) hours. , Disp: , Rfl:     valGANciclovir (VALCYTE) 450 mg Tab, Take 2 tablets (900 mg total) by mouth once daily., Disp: 60 tablet, Rfl: 2  Review of patient's allergies indicates:  No Known Allergies  Social History     Tobacco Use    Smoking status: Former Smoker     Packs/day: 1.00     Types: Cigarettes     Last attempt to quit: 2015     Years since quittin.0    Smokeless tobacco: Current User   Substance Use Topics    Alcohol use: No     Frequency: Never    Drug use: No     No family history on file.  Symptoms of HYPOTENSION continue   CONSTITUTIONAL No further  fevers, no further chills, night sweats,  No wt. Loss No confusion   SKIN: no rashes or itching  ENT: No headaches, head trauma, vision changes, stable  change in taste   LYMPH NODES: None noticedNeck pain stable : doing physical therapy   CV: No chest pain, palpitations.  No orthopnea or PND  RESP: No dyspnea on exertion, cough,  or hemoptysis  GI: no vomiting and diarrhea   : No dysuria, hematuria, urgency, or frequency   HEME: No easy bruising, bleeding problems  PSYCHIATRIC: No depression, anxiety, no psychosis   NEURO: No seizures,   difficulty sleeping positive dizziness  MSK:  + leg pain, Positive weakness no itching       BP (!) 126/58 (BP Location: Left arm, Patient Position: Sitting)   Pulse 61   Temp  "98.1 °F (36.7 °C) (Oral)   Resp 18   Ht 5' 9" (1.753 m)   Wt 67.2 kg (148 lb 2.4 oz)   SpO2 100%   BMI 21.88 kg/m²     CSF fluid  Negative   Physical exam  VS reviewed   Height / weight / VS reviewed  GENERAL.: Well-developed, well-nourished, in no acute distress  PSYCH: pleasant affect, no anxiety, no depression  HEENT: Normocephalic, lids intact, conjunctiva pink, sinuses nontender to palpation TMs intact, non-boggy turbinates,Normal auricula, nasal septum, dentition, gums and mucosa ,OP clear,no palatal pallor  NECK: Supple,trachea midline, no palpable abnormalities  LYMPHATICS: No cervical or axillary adenopathy  RESPIRATORY: CTAB, no wheezes, rubs or rhonchi  Good respiratory effort without any retractions or diaphragmatic movement  CARDIOVASCULAR: no JVD, S1 / S2 with RRR, no murmur, no rub,2+ capillary refill  ABDOMEN: NTND, normal bowel sounds, no palpable HSM or mass  EXTREMITIES: No cyanosis, no clubbing, no edema, no joint effusion  NEUROLOGICAL: alert and oriented x 3, cranial nerves grossly intact  SKIN: Warm, dry, no rash or lesions noted, no tenting, no petechiae or ecchymosis  Lab Results   Component Value Date    WBC 3.12 (L) 02/03/2020    HGB 9.2 (L) 02/03/2020    HCT 30.0 (L) 02/03/2020    MCV 90 02/03/2020     (L) 02/03/2020     Lab Results   Component Value Date    WBC 2.34 (L) 02/17/2020    HGB 11.0 (L) 02/17/2020    HCT 35.9 (L) 02/17/2020    MCV 94 02/17/2020     02/17/2020       Height / weight / VS reviewed  GENERAL.: weak but functioning  in no acute distress  PSYCH: pleasant affect, no anxiety, no depression  HEENT: Normocephalic, lids intact, conjunctiva pink, sinuses nontender to palpation,Normal auricula, nasal septum, dentition, gums and mucosa   NECK: Supple,trachea midline, no palpable abnormalities  LYMPHATICS: No cervical or axillary adenopathy  RESPIRATORY: CTAB, no wheezes, rubs or rhonchi  Good respiratory effort without any retractions or diaphragmatic " movement  CARDIOVASCULAR: no JVD, S1 / S2 with RRR, no murmur, no rub   ABDOMEN: NTND, normal bowel sounds, no palpable HSM or mass  EXTREMITIES: No cyanosis, no clubbing, no edema, no joint effusion  NEUROLOGICAL: alert and oriented x 3, cranial nerves grossly intact  SKIN: Warm, dry, no rash or lesions noted, no tenting, no petechiae or ecchymosis      Lab Results   Component Value Date    WBC 0.52 (LL) 03/04/2020    RBC 3.20 (L) 03/04/2020    HGB 9.6 (L) 03/04/2020    HCT 29.6 (L) 03/04/2020    MCV 93 03/04/2020    MCH 30.0 03/04/2020    MCHC 32.4 03/04/2020    RDW 17.7 (H) 03/04/2020    PLT 64 (L) 03/04/2020    MPV 9.2 03/04/2020    GRAN 70.0 03/04/2020    LYMPH 8.0 (L) 03/04/2020    MONO 4.0 03/04/2020    EOS 0.5 02/17/2020    BASO 0.01 02/17/2020    EOSINOPHIL 16.0 (H) 03/04/2020    BASOPHIL 2.0 (H) 03/04/2020       CMP  Sodium   Date Value Ref Range Status   03/04/2020 143 136 - 145 mmol/L Final   03/07/2019 138 134 - 144 mmol/L      Potassium   Date Value Ref Range Status   03/04/2020 3.5 3.5 - 5.1 mmol/L Final     Chloride   Date Value Ref Range Status   03/04/2020 110 95 - 110 mmol/L Final   03/07/2019 105 98 - 110 mmol/L      CO2   Date Value Ref Range Status   03/04/2020 26 23 - 29 mmol/L Final     Glucose   Date Value Ref Range Status   03/04/2020 98 70 - 110 mg/dL Final   03/07/2019 106 (H) 70 - 99 mg/dL      BUN, Bld   Date Value Ref Range Status   03/04/2020 13 8 - 23 mg/dL Final     Creatinine   Date Value Ref Range Status   03/04/2020 0.7 0.5 - 1.4 mg/dL Final   03/07/2019 0.54 (L) 0.60 - 1.40 mg/dL      Calcium   Date Value Ref Range Status   03/04/2020 8.4 (L) 8.7 - 10.5 mg/dL Final     Total Protein   Date Value Ref Range Status   03/04/2020 5.4 (L) 6.0 - 8.4 g/dL Final     Albumin   Date Value Ref Range Status   03/04/2020 3.1 (L) 3.5 - 5.2 g/dL Final   03/07/2019 3.0 (L) 3.1 - 4.7 g/dL      Total Bilirubin   Date Value Ref Range Status   03/04/2020 0.7 0.1 - 1.0 mg/dL Final     Comment:      For infants and newborns, interpretation of results should be based  on gestational age, weight and in agreement with clinical  observations.  Premature Infant recommended reference ranges:  Up to 24 hours.............<8.0 mg/dL  Up to 48 hours............<12.0 mg/dL  3-5 days..................<15.0 mg/dL  6-29 days.................<15.0 mg/dL       Alkaline Phosphatase   Date Value Ref Range Status   03/04/2020 64 55 - 135 U/L Final     AST   Date Value Ref Range Status   03/04/2020 18 10 - 40 U/L Final     ALT   Date Value Ref Range Status   03/04/2020 12 10 - 44 U/L Final     Anion Gap   Date Value Ref Range Status   03/04/2020 7 (L) 8 - 16 mmol/L Final     eGFR if    Date Value Ref Range Status   03/04/2020 >60 >60 mL/min/1.73 m^2 Final     eGFR if non    Date Value Ref Range Status   03/04/2020 >60 >60 mL/min/1.73 m^2 Final     Comment:     Calculation used to obtain the estimated glomerular filtration  rate (eGFR) is the CKD-EPI equation.            CT Neck Chest With Contrast (XPD)   Order: [id number]   Status:  Final result   Visible to patient:  No (Not Released) Next appt:  None Dx:  Iron deficiency; Nodular sclerosis Ho...   Details     Reading Physician Reading Date Result Priority   Jhon Mendosa MD 4/10/2019 Routine      Narrative     EXAMINATION:  CT NECK CHEST WITH CONTRAST (XPD)    CLINICAL HISTORY:  neck pain and left shoulder pain; Nodular sclerosis Hodgkin lymphoma, lymph nodes of multiple sites    TECHNIQUE:  Low dose axial images, coronal and sagittal reformations were obtained from the skull base to the lung bases following the intravenous administration of 75 mL of Omnipaque 350.    COMPARISON:  None.    FINDINGS:  Included intracranial structures and orbital contents are unremarkable.  Paranasal sinuses are clear.  There is a 1.5 cm hypodense lesion an adjacent 9 mm hypodense lesion with macrocalcification in the right thyroid lobe.  Remaining glandular  tissue unremarkable.  Aero digestive tract is unremarkable with no nodular or masslike enhancement.  No cervical adenopathy.  Atherosclerosis.  Straightening of normal cervical lordosis.  2 mm anterolisthesis C3 on C4.  Moderate degenerative change at the anterior C1-2 articulation.  Moderate degenerative disc disease at C4-5, C5-6, C6-7.  Uncovertebral spurs contribute to bony neural foraminal narrowing on the right at C4-5 through C6-7 and left at C6-7 as well as C3-4.    Patchy ground-glass opacity in the lungs, peribronchovascular distribution.  Dependent atelectasis in both lower lobes.  8 mm ground-glass nodule in the right upper lobe series 3, image 77.  Bilateral pleural fluid.  Normal sized heart.  Port-A-Cath right chest wall tip in the SVC.  Enlarged prevascular lymph nodes which measure 1.7 and 1.2 cm respectively series 3, image 87.  Partially imaged cystic lesion along the left renal midpole.  Remote trauma along the posterior right upper thoracic cage ribs 3 through 5 with retained metallic fragments.      Impression       1. No cervical adenopathy. Enlarged mediastinal lymph nodes are presumably in keeping with provided history of lymphoma. Any comparison exams would be helpful.  2. Cervical degenerative change.  3. Right thyroid nodules which could be further characterized with ultrasound as clinically warranted.  4. Moderate-sized bilateral pleural effusions.  5. Patchy pulmonary interstitial disease with differential favoring edema.      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  -     CBC auto differential; Standing  -     CMP; Future; Expected date: 03/06/2020    Anemia associated with chemotherapy    Chemotherapy induced neutropenia    Adrenal insufficiency        Pancytopenia due to chemo    No further chemo at this time in remission   granix must be administered   Proceed with granix or neupogen x 3 days   Hypotensive add midodrine take this twice a day  5mg   Proceed with retacrit  Once this week and next   PND can take zyrtec twice  A day  Stop ganciclovir   rtc 1 week with labs and likely needs retacrit again   Advance Care Planning     Power of   I initiated the process of advance care planning today and explained the importance of this process to the patient.  I introduced the concept of advance directives to the patient, as well. Then the patient received detailed information about the importance of designating a Health Care Power of  (HCPOA). He was also instructed to communicate with this person about their wishes for future healthcare, should he become sick and lose decision-making capacity. The patient has not previously appointed a HCPOA.           Living Will  During this visit, I engaged the patient  in the advance care planning process.  The patient and I reviewed the role for advance directives and their purpose in directing future healthcare if the patient's unable to speak for him/herself.  At this point in time, the patient does have full decision-making capacity.  We discussed different extreme health states that he could experience, and reviewed what kind of medical care he would want in those situations.  The patient communicated that if he were comatose and had little chance of a meaningful recovery, he would want machines/life-sustaining treatments used.  I spent a total of  30 minutes consecutive OV engaging the patient in this advance care planning discussion.

## 2020-03-07 ENCOUNTER — INFUSION (OUTPATIENT)
Dept: INFUSION THERAPY | Facility: HOSPITAL | Age: 60
End: 2020-03-07
Attending: INTERNAL MEDICINE
Payer: MEDICAID

## 2020-03-07 VITALS
SYSTOLIC BLOOD PRESSURE: 103 MMHG | HEART RATE: 63 BPM | DIASTOLIC BLOOD PRESSURE: 56 MMHG | OXYGEN SATURATION: 100 % | TEMPERATURE: 98 F | RESPIRATION RATE: 16 BRPM

## 2020-03-07 DIAGNOSIS — D70.1 CHEMOTHERAPY INDUCED NEUTROPENIA: ICD-10-CM

## 2020-03-07 DIAGNOSIS — T45.1X5A CHEMOTHERAPY INDUCED NEUTROPENIA: ICD-10-CM

## 2020-03-07 DIAGNOSIS — C81.18 NODULAR SCLEROSIS HODGKIN LYMPHOMA OF LYMPH NODES OF MULTIPLE REGIONS: ICD-10-CM

## 2020-03-07 DIAGNOSIS — C81.90 HODGKIN'S LYMPHOMA IN RELAPSE: Primary | ICD-10-CM

## 2020-03-07 PROCEDURE — 96372 THER/PROPH/DIAG INJ SC/IM: CPT

## 2020-03-07 PROCEDURE — 63600175 PHARM REV CODE 636 W HCPCS: Mod: JG | Performed by: INTERNAL MEDICINE

## 2020-03-07 RX ADMIN — TBO-FILGRASTIM 300 MCG: 300 INJECTION, SOLUTION SUBCUTANEOUS at 09:03

## 2020-03-07 NOTE — PROGRESS NOTES
VS stable, Pt given Granix 300mcg subq to back of upper left arm. Injection 2 of 3. Pt tolerated well.

## 2020-03-08 ENCOUNTER — INFUSION (OUTPATIENT)
Dept: INFUSION THERAPY | Facility: HOSPITAL | Age: 60
End: 2020-03-08
Attending: INTERNAL MEDICINE
Payer: MEDICAID

## 2020-03-08 VITALS
OXYGEN SATURATION: 100 % | RESPIRATION RATE: 16 BRPM | HEART RATE: 66 BPM | TEMPERATURE: 99 F | SYSTOLIC BLOOD PRESSURE: 106 MMHG | DIASTOLIC BLOOD PRESSURE: 61 MMHG

## 2020-03-08 DIAGNOSIS — D70.1 CHEMOTHERAPY INDUCED NEUTROPENIA: ICD-10-CM

## 2020-03-08 DIAGNOSIS — C81.18 NODULAR SCLEROSIS HODGKIN LYMPHOMA OF LYMPH NODES OF MULTIPLE REGIONS: ICD-10-CM

## 2020-03-08 DIAGNOSIS — T45.1X5A CHEMOTHERAPY INDUCED NEUTROPENIA: ICD-10-CM

## 2020-03-08 DIAGNOSIS — C81.90 HODGKIN'S LYMPHOMA IN RELAPSE: Primary | ICD-10-CM

## 2020-03-08 PROCEDURE — 96372 THER/PROPH/DIAG INJ SC/IM: CPT

## 2020-03-08 PROCEDURE — 63600175 PHARM REV CODE 636 W HCPCS: Mod: JG | Performed by: INTERNAL MEDICINE

## 2020-03-08 RX ADMIN — TBO-FILGRASTIM 300 MCG: 300 INJECTION, SOLUTION SUBCUTANEOUS at 08:03

## 2020-03-12 ENCOUNTER — TELEPHONE (OUTPATIENT)
Dept: INFUSION THERAPY | Facility: HOSPITAL | Age: 60
End: 2020-03-12

## 2020-03-12 ENCOUNTER — DOCUMENTATION ONLY (OUTPATIENT)
Dept: HEMATOLOGY/ONCOLOGY | Facility: CLINIC | Age: 60
End: 2020-03-12

## 2020-03-12 ENCOUNTER — LAB VISIT (OUTPATIENT)
Dept: LAB | Facility: HOSPITAL | Age: 60
End: 2020-03-12
Attending: INTERNAL MEDICINE
Payer: MEDICAID

## 2020-03-12 DIAGNOSIS — C81.18 NODULAR SCLEROSIS HODGKIN LYMPHOMA OF LYMPH NODES OF MULTIPLE REGIONS: ICD-10-CM

## 2020-03-12 LAB
ALBUMIN SERPL BCP-MCNC: 3 G/DL (ref 3.5–5.2)
ALP SERPL-CCNC: 64 U/L (ref 55–135)
ALT SERPL W/O P-5'-P-CCNC: 9 U/L (ref 10–44)
ANION GAP SERPL CALC-SCNC: 6 MMOL/L (ref 8–16)
ANISOCYTOSIS BLD QL SMEAR: SLIGHT
AST SERPL-CCNC: 14 U/L (ref 10–40)
BASOPHILS NFR BLD: 0 % (ref 0–1.9)
BILIRUB SERPL-MCNC: 0.5 MG/DL (ref 0.1–1)
BUN SERPL-MCNC: 11 MG/DL (ref 8–23)
CALCIUM SERPL-MCNC: 8.2 MG/DL (ref 8.7–10.5)
CHLORIDE SERPL-SCNC: 109 MMOL/L (ref 95–110)
CO2 SERPL-SCNC: 27 MMOL/L (ref 23–29)
CREAT SERPL-MCNC: 0.7 MG/DL (ref 0.5–1.4)
DIFFERENTIAL METHOD: ABNORMAL
EOSINOPHIL NFR BLD: 7 % (ref 0–8)
ERYTHROCYTE [DISTWIDTH] IN BLOOD BY AUTOMATED COUNT: 18.9 % (ref 11.5–14.5)
EST. GFR  (AFRICAN AMERICAN): >60 ML/MIN/1.73 M^2
EST. GFR  (NON AFRICAN AMERICAN): >60 ML/MIN/1.73 M^2
GLUCOSE SERPL-MCNC: 108 MG/DL (ref 70–110)
HCT VFR BLD AUTO: 26.9 % (ref 40–54)
HGB BLD-MCNC: 8.6 G/DL (ref 14–18)
IMM GRANULOCYTES # BLD AUTO: ABNORMAL K/UL
LYMPHOCYTES NFR BLD: 43 % (ref 18–48)
MCH RBC QN AUTO: 30.2 PG (ref 27–31)
MCHC RBC AUTO-ENTMCNC: 32 G/DL (ref 32–36)
MCV RBC AUTO: 94 FL (ref 82–98)
MONOCYTES NFR BLD: 23 % (ref 4–15)
NEUTROPHILS NFR BLD: 27 % (ref 38–73)
NRBC BLD-RTO: 0 /100 WBC
PLATELET # BLD AUTO: 98 K/UL (ref 150–350)
PLATELET BLD QL SMEAR: ABNORMAL
PMV BLD AUTO: 9.2 FL (ref 9.2–12.9)
POTASSIUM SERPL-SCNC: 3.2 MMOL/L (ref 3.5–5.1)
PROT SERPL-MCNC: 5.2 G/DL (ref 6–8.4)
RBC # BLD AUTO: 2.85 M/UL (ref 4.6–6.2)
SODIUM SERPL-SCNC: 142 MMOL/L (ref 136–145)
WBC # BLD AUTO: 0.84 K/UL (ref 3.9–12.7)

## 2020-03-12 PROCEDURE — 36415 COLL VENOUS BLD VENIPUNCTURE: CPT

## 2020-03-12 PROCEDURE — 85027 COMPLETE CBC AUTOMATED: CPT

## 2020-03-12 PROCEDURE — 80053 COMPREHEN METABOLIC PANEL: CPT

## 2020-03-12 PROCEDURE — 85007 BL SMEAR W/DIFF WBC COUNT: CPT

## 2020-03-13 ENCOUNTER — INFUSION (OUTPATIENT)
Dept: INFUSION THERAPY | Facility: HOSPITAL | Age: 60
End: 2020-03-13
Attending: INTERNAL MEDICINE
Payer: MEDICAID

## 2020-03-13 ENCOUNTER — OFFICE VISIT (OUTPATIENT)
Dept: HEMATOLOGY/ONCOLOGY | Facility: CLINIC | Age: 60
End: 2020-03-13
Payer: MEDICAID

## 2020-03-13 ENCOUNTER — TELEPHONE (OUTPATIENT)
Dept: INFUSION THERAPY | Facility: HOSPITAL | Age: 60
End: 2020-03-13

## 2020-03-13 VITALS
OXYGEN SATURATION: 100 % | HEIGHT: 69 IN | TEMPERATURE: 98 F | BODY MASS INDEX: 22.11 KG/M2 | SYSTOLIC BLOOD PRESSURE: 101 MMHG | WEIGHT: 149.25 LBS | HEART RATE: 76 BPM | RESPIRATION RATE: 12 BRPM | DIASTOLIC BLOOD PRESSURE: 57 MMHG

## 2020-03-13 VITALS
HEART RATE: 71 BPM | SYSTOLIC BLOOD PRESSURE: 105 MMHG | TEMPERATURE: 98 F | RESPIRATION RATE: 17 BRPM | BODY MASS INDEX: 22.06 KG/M2 | DIASTOLIC BLOOD PRESSURE: 57 MMHG | WEIGHT: 149.38 LBS

## 2020-03-13 DIAGNOSIS — R20.2 PARESTHESIAS: ICD-10-CM

## 2020-03-13 DIAGNOSIS — E61.1 IRON DEFICIENCY: ICD-10-CM

## 2020-03-13 DIAGNOSIS — C81.90 HODGKIN'S LYMPHOMA IN RELAPSE: ICD-10-CM

## 2020-03-13 DIAGNOSIS — D64.81 ANEMIA FOLLOWING USE OF CHEMOTHERAPEUTIC DRUG: ICD-10-CM

## 2020-03-13 DIAGNOSIS — D70.1 CHEMOTHERAPY INDUCED NEUTROPENIA: Primary | ICD-10-CM

## 2020-03-13 DIAGNOSIS — T45.1X5A CHEMOTHERAPY INDUCED NEUTROPENIA: Primary | ICD-10-CM

## 2020-03-13 DIAGNOSIS — C81.18 NODULAR SCLEROSIS HODGKIN LYMPHOMA OF LYMPH NODES OF MULTIPLE REGIONS: ICD-10-CM

## 2020-03-13 DIAGNOSIS — I95.9 HYPOTENSION, UNSPECIFIED HYPOTENSION TYPE: ICD-10-CM

## 2020-03-13 DIAGNOSIS — C81.18 NODULAR SCLEROSIS HODGKIN LYMPHOMA OF LYMPH NODES OF MULTIPLE REGIONS: Primary | ICD-10-CM

## 2020-03-13 DIAGNOSIS — R76.8 CMV (CYTOMEGALOVIRUS) ANTIBODY POSITIVE: ICD-10-CM

## 2020-03-13 DIAGNOSIS — D61.818 PANCYTOPENIA: ICD-10-CM

## 2020-03-13 DIAGNOSIS — D64.9 ANEMIA, UNSPECIFIED TYPE: ICD-10-CM

## 2020-03-13 DIAGNOSIS — D50.9 IRON DEFICIENCY ANEMIA, UNSPECIFIED IRON DEFICIENCY ANEMIA TYPE: ICD-10-CM

## 2020-03-13 PROCEDURE — 99215 OFFICE O/P EST HI 40 MIN: CPT | Mod: S$PBB,,, | Performed by: INTERNAL MEDICINE

## 2020-03-13 PROCEDURE — G0444 DEPRESSION SCREEN ANNUAL: HCPCS | Mod: PBBFAC,PO | Performed by: INTERNAL MEDICINE

## 2020-03-13 PROCEDURE — 99213 OFFICE O/P EST LOW 20 MIN: CPT | Mod: PBBFAC,PO,25 | Performed by: INTERNAL MEDICINE

## 2020-03-13 PROCEDURE — 63600175 PHARM REV CODE 636 W HCPCS: Mod: JG | Performed by: INTERNAL MEDICINE

## 2020-03-13 PROCEDURE — 96372 THER/PROPH/DIAG INJ SC/IM: CPT

## 2020-03-13 PROCEDURE — 63600175 PHARM REV CODE 636 W HCPCS: Mod: JG | Performed by: PHYSICIAN ASSISTANT

## 2020-03-13 PROCEDURE — 99215 PR OFFICE/OUTPT VISIT, EST, LEVL V, 40-54 MIN: ICD-10-PCS | Mod: S$PBB,,, | Performed by: INTERNAL MEDICINE

## 2020-03-13 PROCEDURE — 99999 PR PBB SHADOW E&M-EST. PATIENT-LVL III: ICD-10-PCS | Mod: PBBFAC,,, | Performed by: INTERNAL MEDICINE

## 2020-03-13 PROCEDURE — 99999 PR PBB SHADOW E&M-EST. PATIENT-LVL III: CPT | Mod: PBBFAC,,, | Performed by: INTERNAL MEDICINE

## 2020-03-13 RX ORDER — SODIUM CHLORIDE 0.9 % (FLUSH) 0.9 %
10 SYRINGE (ML) INJECTION
Status: DISCONTINUED | OUTPATIENT
Start: 2020-03-13 | End: 2020-03-13 | Stop reason: HOSPADM

## 2020-03-13 RX ORDER — HEPARIN 100 UNIT/ML
500 SYRINGE INTRAVENOUS
Status: DISCONTINUED | OUTPATIENT
Start: 2020-03-13 | End: 2020-03-13 | Stop reason: HOSPADM

## 2020-03-13 RX ORDER — SODIUM CHLORIDE 0.9 % (FLUSH) 0.9 %
10 SYRINGE (ML) INJECTION
Status: CANCELLED | OUTPATIENT
Start: 2020-03-14

## 2020-03-13 RX ORDER — HEPARIN 100 UNIT/ML
500 SYRINGE INTRAVENOUS
Status: CANCELLED | OUTPATIENT
Start: 2020-03-14

## 2020-03-13 RX ADMIN — FILGRASTIM 300 MCG: 300 INJECTION, SOLUTION INTRAVENOUS; SUBCUTANEOUS at 11:03

## 2020-03-13 RX ADMIN — EPOETIN ALFA 40000 UNITS: 20000 SOLUTION INTRAVENOUS; SUBCUTANEOUS at 11:03

## 2020-03-13 NOTE — PROGRESS NOTES
CC:   I feel weak     Heriberto Keys is a 60 y.o.    This is a 60 yr old gentleman here for Hospital F/U for neutropenic fever     F/U of Hodgkins disease He received ABVD cycle 1 while hospitalized at [facility] in past. He had neurological presentation with dizziness and confusion yet LP negative for CSF involvement. He received levaquin for sinusitis and his mentation improved. CHemo did cause severe marrow suppression requiring GF      Pt had chemo in the hospital cycle 1   Due for ABVD   Post IV iron and procrit   He had been on carvidolol with lasix for HTn   History IV iron infusions    December 6, 2019  Cytology from CSF still pending but bone marrow with definite invasive Hodgkin's lymphoma    12-  Here to start chemo second regimen for recurrence   Weak, cannot stand for lengthy time, SOB intermittently     12/31/2019:  Patient walking with his own power.  Patient is here for 1 week follow-up to see if need of transfusion.  Patient states that sometime he have stomach pain after completion of meal.  No fever no chills.  No chest pain or shortness breath    1- : here for cycle 2   Leg pain continues, + itching all over , weak     1- Here for cycle 3  Hospitalized after cycle 2 + chills and bronchitis and did not complete granix because of such     January 31 2020  Here for blood counts   New leg pain   Headaches and chills with a drop in his WBC    2/4/2020   Pt started zyrtec and rhinorrhea is better   Receiving retacrit with ni complication     2/24/2020 : here for clearance cycle 4 chemo  Fatigued, no fever , no chills   Post treatment for cmv     2/28/2020  Had chemo yesterday   Was vomiting with chills and diarrhea like last chemo all night     March 6 2020 here for pet ct results which show remission   Had chemo 2- here to check labs : in ho    March 13 2020   He is confused about his condition  He is on midodrine   In remission  Bone marrow remains suppressed   Lethargic and  weak today   Past Medical History:   Diagnosis Date    Anemia     Depression     Encounter for blood transfusion     Lymphoma     Lymphoma     Lymphoma 2019     Past Surgical History:   Procedure Laterality Date    BONE MARROW ASPIRATION Left 2019    Procedure: ASPIRATION, BONE MARROW;  Surgeon: Nancy Diagnostic Provider;  Location: [facility];  Service: General;  Laterality: Left;    LYMPHADENECTOMY Bilateral        Current Outpatient Medications:     cetirizine (ZYRTEC) 10 MG tablet, Take 10 mg by mouth daily as needed for Allergies., Disp: , Rfl:     midodrine (PROAMATINE) 5 MG Tab, Take 1 tablet (5 mg total) by mouth 2 (two) times daily with meals., Disp: 60 tablet, Rfl: 1    tobramycin sulfate 0.3% (TOBREX) 0.3 % ophthalmic solution, 3 drops every 4 (four) hours. , Disp: , Rfl:   Review of patient's allergies indicates:  No Known Allergies  Social History     Tobacco Use    Smoking status: Former Smoker     Packs/day: 1.00     Types: Cigarettes     Last attempt to quit: 2015     Years since quittin.0    Smokeless tobacco: Current User   Substance Use Topics    Alcohol use: No     Frequency: Never    Drug use: No     No family history on file.  Symptoms of HYPOTENSION continue   CONSTITUTIONAL No further  fevers, no further chills, night sweats, + confusion   SKIN: no rashes or itching  ENT: No headaches, head trauma, vision changes, stable  change in taste   LYMPH NODES: None noticedNeck pain stable : doing physical therapy   CV: No chest pain, palpitations.  No orthopnea or PND  RESP: No dyspnea on exertion, cough,  or hemoptysis  GI: no vomiting and diarrhea   : No dysuria, hematuria, urgency, or frequency   HEME: No easy bruising, bleeding problems  PSYCHIATRIC: No depression, anxiety, no psychosis   NEURO: No seizures,   difficulty sleeping positive dizziness  MSK:  + leg pain, Positive weakness no itching       BP (!) 101/57   Pulse 76   Temp 97.8 °F (36.6 °C) (Oral)   Resp  "12   Ht 5' 9" (1.753 m)   Wt 67.7 kg (149 lb 4 oz)   SpO2 100%   BMI 22.04 kg/m²     CSF fluid  Negative   Physical exam  VS reviewed   Height / weight / VS reviewed  GENERAL.: Well-developed, well-nourished, in no acute distress  PSYCH: pleasant affect, no anxiety, no depression  HEENT: Normocephalic, lids intact, conjunctiva pink + rhinorrhea  NECK: Supple,trachea midline, no palpable abnormalities  LYMPHATICS: No cervical or axillary adenopathy  RESPIRATORY: CTAB, no wheezes, rubs or rhonchi  Good respiratory effort without any retractions or diaphragmatic movement  CARDIOVASCULAR: no JVD, S1 / S2 with RRR, no murmur, no rub,2+ capillary refill  ABDOMEN: NTND, normal bowel sounds, no palpable HSM or mass  EXTREMITIES: No cyanosis, no clubbing, no edema, no joint effusion  NEUROLOGICAL: alert and oriented x 3, cranial nerves grossly intact  SKIN: Warm, dry, no rash or lesions noted, no tenting, no petechiae    Lab Results   Component Value Date    WBC 3.12 (L) 02/03/2020    HGB 9.2 (L) 02/03/2020    HCT 30.0 (L) 02/03/2020    MCV 90 02/03/2020     (L) 02/03/2020     Lab Results   Component Value Date    WBC 2.34 (L) 02/17/2020    HGB 11.0 (L) 02/17/2020    HCT 35.9 (L) 02/17/2020    MCV 94 02/17/2020     02/17/2020       Lab Results   Component Value Date    WBC 0.84 (LL) 03/12/2020    RBC 2.85 (L) 03/12/2020    HGB 8.6 (L) 03/12/2020    HCT 26.9 (L) 03/12/2020    MCV 94 03/12/2020    MCH 30.2 03/12/2020    MCHC 32.0 03/12/2020    RDW 18.9 (H) 03/12/2020    PLT 98 (L) 03/12/2020    MPV 9.2 03/12/2020    GRAN 27.0 (L) 03/12/2020    LYMPH 43.0 03/12/2020    MONO 23.0 (H) 03/12/2020    EOS 0.5 02/17/2020    BASO 0.01 02/17/2020    EOSINOPHIL 7.0 03/12/2020    BASOPHIL 0.0 03/12/2020       CMP  Sodium   Date Value Ref Range Status   03/12/2020 142 136 - 145 mmol/L Final   03/07/2019 138 134 - 144 mmol/L      Potassium   Date Value Ref Range Status   03/12/2020 3.2 (L) 3.5 - 5.1 mmol/L Final "     Chloride   Date Value Ref Range Status   03/12/2020 109 95 - 110 mmol/L Final   03/07/2019 105 98 - 110 mmol/L      CO2   Date Value Ref Range Status   03/12/2020 27 23 - 29 mmol/L Final     Glucose   Date Value Ref Range Status   03/12/2020 108 70 - 110 mg/dL Final   03/07/2019 106 (H) 70 - 99 mg/dL      BUN, Bld   Date Value Ref Range Status   03/12/2020 11 8 - 23 mg/dL Final     Creatinine   Date Value Ref Range Status   03/12/2020 0.7 0.5 - 1.4 mg/dL Final   03/07/2019 0.54 (L) 0.60 - 1.40 mg/dL      Calcium   Date Value Ref Range Status   03/12/2020 8.2 (L) 8.7 - 10.5 mg/dL Final     Total Protein   Date Value Ref Range Status   03/12/2020 5.2 (L) 6.0 - 8.4 g/dL Final     Albumin   Date Value Ref Range Status   03/12/2020 3.0 (L) 3.5 - 5.2 g/dL Final   03/07/2019 3.0 (L) 3.1 - 4.7 g/dL      Total Bilirubin   Date Value Ref Range Status   03/12/2020 0.5 0.1 - 1.0 mg/dL Final     Comment:     For infants and newborns, interpretation of results should be based  on gestational age, weight and in agreement with clinical  observations.  Premature Infant recommended reference ranges:  Up to 24 hours.............<8.0 mg/dL  Up to 48 hours............<12.0 mg/dL  3-5 days..................<15.0 mg/dL  6-29 days.................<15.0 mg/dL       Alkaline Phosphatase   Date Value Ref Range Status   03/12/2020 64 55 - 135 U/L Final     AST   Date Value Ref Range Status   03/12/2020 14 10 - 40 U/L Final     ALT   Date Value Ref Range Status   03/12/2020 9 (L) 10 - 44 U/L Final     Anion Gap   Date Value Ref Range Status   03/12/2020 6 (L) 8 - 16 mmol/L Final     eGFR if    Date Value Ref Range Status   03/12/2020 >60 >60 mL/min/1.73 m^2 Final     eGFR if non    Date Value Ref Range Status   03/12/2020 >60 >60 mL/min/1.73 m^2 Final     Comment:     Calculation used to obtain the estimated glomerular filtration  rate (eGFR) is the CKD-EPI equation.            CT Neck Chest With Contrast  (XPD)   Order: [id number]   Status:  Final result   Visible to patient:  No (Not Released) Next appt:  None Dx:  Iron deficiency; Nodular sclerosis Ho...   Details     Reading Physician Reading Date Result Priority   Jhon Mendosa MD 4/10/2019 Routine      Narrative     EXAMINATION:  CT NECK CHEST WITH CONTRAST (XPD)    CLINICAL HISTORY:  neck pain and left shoulder pain; Nodular sclerosis Hodgkin lymphoma, lymph nodes of multiple sites    TECHNIQUE:  Low dose axial images, coronal and sagittal reformations were obtained from the skull base to the lung bases following the intravenous administration of 75 mL of Omnipaque 350.    COMPARISON:  None.    FINDINGS:  Included intracranial structures and orbital contents are unremarkable.  Paranasal sinuses are clear.  There is a 1.5 cm hypodense lesion an adjacent 9 mm hypodense lesion with macrocalcification in the right thyroid lobe.  Remaining glandular tissue unremarkable.  Aero digestive tract is unremarkable with no nodular or masslike enhancement.  No cervical adenopathy.  Atherosclerosis.  Straightening of normal cervical lordosis.  2 mm anterolisthesis C3 on C4.  Moderate degenerative change at the anterior C1-2 articulation.  Moderate degenerative disc disease at C4-5, C5-6, C6-7.  Uncovertebral spurs contribute to bony neural foraminal narrowing on the right at C4-5 through C6-7 and left at C6-7 as well as C3-4.    Patchy ground-glass opacity in the lungs, peribronchovascular distribution.  Dependent atelectasis in both lower lobes.  8 mm ground-glass nodule in the right upper lobe series 3, image 77.  Bilateral pleural fluid.  Normal sized heart.  Port-A-Cath right chest wall tip in the SVC.  Enlarged prevascular lymph nodes which measure 1.7 and 1.2 cm respectively series 3, image 87.  Partially imaged cystic lesion along the left renal midpole.  Remote trauma along the posterior right upper thoracic cage ribs 3 through 5 with retained metallic fragments.       Impression       1. No cervical adenopathy. Enlarged mediastinal lymph nodes are presumably in keeping with provided history of lymphoma. Any comparison exams would be helpful.  2. Cervical degenerative change.  3. Right thyroid nodules which could be further characterized with ultrasound as clinically warranted.  4. Moderate-sized bilateral pleural effusions.  5. Patchy pulmonary interstitial disease with differential favoring edema.      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    CMV (cytomegalovirus) antibody positive    Paresthesias    Pancytopenia    Hypotension, unspecified hypotension type    Iron deficiency anemia, unspecified iron deficiency anemia type  -     ferric carboxymaltose (INJECTAFER) 750 mg in sodium chloride 0.9% 265 mL infusion  -     ferric carboxymaltose (INJECTAFER) 750 mg in sodium chloride 0.9% 265 mL infusion    Other orders  -     tbo-filgrastim (GRANIX) injection 300 mcg/0.5 mL       Pancytopenia due to chemo  Neutropenia 5 days of neupogen  retacrit weekly  IV iron times two weekly dosages     No further chemo at this time in remission   Hypotensive add midodrine take this twice a day  5mg   PND can take zyrtec twice  A day  Stop ganciclovir   rtc 2 weeks with cbc and beta 2 and ldh  Do not take ganciclovir  If develops temp greater than 100.2 or chills or further weakness to ER   Advance Care Planning     Power of   I initiated the process of advance care planning today and explained the importance of this process to the patient.  I introduced the concept of advance directives to the patient, as well. Then the patient received detailed information about the importance of designating a Health Care Power of  (HCPOA). He was also  instructed to communicate with this person about their wishes for future healthcare, should he become sick and lose decision-making capacity. The patient has not previously appointed a HCPOA.           Living Will  During this visit, I engaged the patient  in the advance care planning process.  The patient and I reviewed the role for advance directives and their purpose in directing future healthcare if the patient's unable to speak for him/herself.  At this point in time, the patient does have full decision-making capacity.  We discussed different extreme health states that he could experience, and reviewed what kind of medical care he would want in those situations.  The patient communicated that if he were comatose and had little chance of a meaningful recovery, he would want machines/life-sustaining treatments used.  I spent a total of  30 minutes consecutive OV engaging the patient in this advance care planning discussion.

## 2020-03-14 ENCOUNTER — INFUSION (OUTPATIENT)
Dept: INFUSION THERAPY | Facility: HOSPITAL | Age: 60
End: 2020-03-14
Attending: INTERNAL MEDICINE
Payer: MEDICAID

## 2020-03-14 VITALS
HEART RATE: 72 BPM | TEMPERATURE: 98 F | DIASTOLIC BLOOD PRESSURE: 57 MMHG | SYSTOLIC BLOOD PRESSURE: 102 MMHG | OXYGEN SATURATION: 98 % | RESPIRATION RATE: 16 BRPM

## 2020-03-14 DIAGNOSIS — T45.1X5A CHEMOTHERAPY INDUCED NEUTROPENIA: Primary | ICD-10-CM

## 2020-03-14 DIAGNOSIS — D70.1 CHEMOTHERAPY INDUCED NEUTROPENIA: Primary | ICD-10-CM

## 2020-03-14 DIAGNOSIS — C81.18 NODULAR SCLEROSIS HODGKIN LYMPHOMA OF LYMPH NODES OF MULTIPLE REGIONS: ICD-10-CM

## 2020-03-14 DIAGNOSIS — C81.90 HODGKIN'S LYMPHOMA IN RELAPSE: ICD-10-CM

## 2020-03-14 DIAGNOSIS — D64.9 ANEMIA, UNSPECIFIED TYPE: ICD-10-CM

## 2020-03-14 DIAGNOSIS — E61.1 IRON DEFICIENCY: ICD-10-CM

## 2020-03-14 PROCEDURE — 63600175 PHARM REV CODE 636 W HCPCS: Mod: JG | Performed by: INTERNAL MEDICINE

## 2020-03-14 PROCEDURE — 96372 THER/PROPH/DIAG INJ SC/IM: CPT

## 2020-03-14 RX ORDER — SODIUM CHLORIDE 0.9 % (FLUSH) 0.9 %
10 SYRINGE (ML) INJECTION
Status: CANCELLED | OUTPATIENT
Start: 2020-03-15

## 2020-03-14 RX ORDER — HEPARIN 100 UNIT/ML
500 SYRINGE INTRAVENOUS
Status: CANCELLED | OUTPATIENT
Start: 2020-03-15

## 2020-03-14 RX ADMIN — TBO-FILGRASTIM 300 MCG: 300 INJECTION, SOLUTION SUBCUTANEOUS at 09:03

## 2020-03-15 ENCOUNTER — INFUSION (OUTPATIENT)
Dept: INFUSION THERAPY | Facility: HOSPITAL | Age: 60
End: 2020-03-15
Attending: INTERNAL MEDICINE
Payer: MEDICAID

## 2020-03-15 VITALS
DIASTOLIC BLOOD PRESSURE: 63 MMHG | RESPIRATION RATE: 16 BRPM | TEMPERATURE: 98 F | SYSTOLIC BLOOD PRESSURE: 122 MMHG | HEART RATE: 69 BPM | OXYGEN SATURATION: 98 %

## 2020-03-15 DIAGNOSIS — C81.18 NODULAR SCLEROSIS HODGKIN LYMPHOMA OF LYMPH NODES OF MULTIPLE REGIONS: ICD-10-CM

## 2020-03-15 DIAGNOSIS — D70.1 CHEMOTHERAPY INDUCED NEUTROPENIA: Primary | ICD-10-CM

## 2020-03-15 DIAGNOSIS — E61.1 IRON DEFICIENCY: ICD-10-CM

## 2020-03-15 DIAGNOSIS — C81.90 HODGKIN'S LYMPHOMA IN RELAPSE: ICD-10-CM

## 2020-03-15 DIAGNOSIS — T45.1X5A CHEMOTHERAPY INDUCED NEUTROPENIA: Primary | ICD-10-CM

## 2020-03-15 DIAGNOSIS — D64.9 ANEMIA, UNSPECIFIED TYPE: ICD-10-CM

## 2020-03-15 PROCEDURE — 63600175 PHARM REV CODE 636 W HCPCS: Mod: JG | Performed by: INTERNAL MEDICINE

## 2020-03-15 PROCEDURE — 96372 THER/PROPH/DIAG INJ SC/IM: CPT

## 2020-03-15 RX ORDER — HEPARIN 100 UNIT/ML
500 SYRINGE INTRAVENOUS
Status: CANCELLED | OUTPATIENT
Start: 2020-03-16

## 2020-03-15 RX ORDER — SODIUM CHLORIDE 0.9 % (FLUSH) 0.9 %
10 SYRINGE (ML) INJECTION
Status: CANCELLED | OUTPATIENT
Start: 2020-03-16

## 2020-03-15 RX ADMIN — TBO-FILGRASTIM 300 MCG: 300 INJECTION, SOLUTION SUBCUTANEOUS at 08:03

## 2020-03-15 NOTE — PROGRESS NOTES
VS stable, Pt given Granix 300mcg subq to back of upper right arm. Injection 3 of 3. Pt tolerated well.

## 2020-03-16 ENCOUNTER — INFUSION (OUTPATIENT)
Dept: INFUSION THERAPY | Facility: HOSPITAL | Age: 60
End: 2020-03-16
Attending: INTERNAL MEDICINE
Payer: MEDICAID

## 2020-03-16 ENCOUNTER — TELEPHONE (OUTPATIENT)
Dept: INFUSION THERAPY | Facility: HOSPITAL | Age: 60
End: 2020-03-16

## 2020-03-16 VITALS
BODY MASS INDEX: 22.02 KG/M2 | DIASTOLIC BLOOD PRESSURE: 62 MMHG | RESPIRATION RATE: 16 BRPM | OXYGEN SATURATION: 99 % | WEIGHT: 149.13 LBS | SYSTOLIC BLOOD PRESSURE: 103 MMHG | TEMPERATURE: 98 F | HEART RATE: 67 BPM

## 2020-03-16 DIAGNOSIS — D70.1 CHEMOTHERAPY INDUCED NEUTROPENIA: Primary | ICD-10-CM

## 2020-03-16 DIAGNOSIS — C81.18 NODULAR SCLEROSIS HODGKIN LYMPHOMA OF LYMPH NODES OF MULTIPLE REGIONS: ICD-10-CM

## 2020-03-16 DIAGNOSIS — E61.1 IRON DEFICIENCY: ICD-10-CM

## 2020-03-16 DIAGNOSIS — C81.90 HODGKIN'S LYMPHOMA IN RELAPSE: ICD-10-CM

## 2020-03-16 DIAGNOSIS — T45.1X5A CHEMOTHERAPY INDUCED NEUTROPENIA: Primary | ICD-10-CM

## 2020-03-16 DIAGNOSIS — D64.9 ANEMIA, UNSPECIFIED TYPE: ICD-10-CM

## 2020-03-16 PROCEDURE — 96372 THER/PROPH/DIAG INJ SC/IM: CPT

## 2020-03-16 PROCEDURE — 63600175 PHARM REV CODE 636 W HCPCS: Mod: JG | Performed by: INTERNAL MEDICINE

## 2020-03-16 RX ORDER — SODIUM CHLORIDE 0.9 % (FLUSH) 0.9 %
10 SYRINGE (ML) INJECTION
Status: CANCELLED | OUTPATIENT
Start: 2020-03-17

## 2020-03-16 RX ORDER — HEPARIN 100 UNIT/ML
500 SYRINGE INTRAVENOUS
Status: CANCELLED | OUTPATIENT
Start: 2020-03-17

## 2020-03-16 RX ADMIN — FILGRASTIM 300 MCG: 300 INJECTION, SOLUTION INTRAVENOUS; SUBCUTANEOUS at 09:03

## 2020-03-16 NOTE — PLAN OF CARE
Problem: Neutropenia  Goal: Absence of Infection  Outcome: Ongoing, Progressing  Intervention: Prevent Infection and Maximize Resistance  Flowsheets (Taken 3/16/2020 0939)  Infection Prevention: equipment surfaces disinfected; visitors restricted/screened

## 2020-03-17 ENCOUNTER — INFUSION (OUTPATIENT)
Dept: INFUSION THERAPY | Facility: HOSPITAL | Age: 60
End: 2020-03-17
Attending: INTERNAL MEDICINE
Payer: MEDICAID

## 2020-03-17 VITALS
DIASTOLIC BLOOD PRESSURE: 64 MMHG | HEART RATE: 75 BPM | SYSTOLIC BLOOD PRESSURE: 121 MMHG | BODY MASS INDEX: 21.89 KG/M2 | OXYGEN SATURATION: 98 % | RESPIRATION RATE: 16 BRPM | TEMPERATURE: 98 F | WEIGHT: 148.19 LBS

## 2020-03-17 DIAGNOSIS — E61.1 IRON DEFICIENCY: ICD-10-CM

## 2020-03-17 DIAGNOSIS — D64.9 ANEMIA, UNSPECIFIED TYPE: ICD-10-CM

## 2020-03-17 DIAGNOSIS — T45.1X5A CHEMOTHERAPY INDUCED NEUTROPENIA: Primary | ICD-10-CM

## 2020-03-17 DIAGNOSIS — C81.18 NODULAR SCLEROSIS HODGKIN LYMPHOMA OF LYMPH NODES OF MULTIPLE REGIONS: ICD-10-CM

## 2020-03-17 DIAGNOSIS — C81.90 HODGKIN'S LYMPHOMA IN RELAPSE: ICD-10-CM

## 2020-03-17 DIAGNOSIS — D70.1 CHEMOTHERAPY INDUCED NEUTROPENIA: Primary | ICD-10-CM

## 2020-03-17 PROCEDURE — 63600175 PHARM REV CODE 636 W HCPCS: Mod: JG | Performed by: INTERNAL MEDICINE

## 2020-03-17 PROCEDURE — 96372 THER/PROPH/DIAG INJ SC/IM: CPT

## 2020-03-17 RX ORDER — HEPARIN 100 UNIT/ML
500 SYRINGE INTRAVENOUS
Status: CANCELLED | OUTPATIENT
Start: 2020-03-18

## 2020-03-17 RX ORDER — SODIUM CHLORIDE 0.9 % (FLUSH) 0.9 %
10 SYRINGE (ML) INJECTION
Status: CANCELLED | OUTPATIENT
Start: 2020-03-18

## 2020-03-17 RX ADMIN — FILGRASTIM 300 MCG: 300 INJECTION, SOLUTION INTRAVENOUS; SUBCUTANEOUS at 03:03

## 2020-03-17 NOTE — PLAN OF CARE
Problem: Neutropenia  Goal: Absence of Infection  Outcome: Ongoing, Progressing  Intervention: Prevent Infection and Maximize Resistance  Flowsheets (Taken 3/17/2020 1517)  Infection Prevention: equipment surfaces disinfected; visitors restricted/screened

## 2020-03-18 DIAGNOSIS — I95.9 HYPOTENSION, UNSPECIFIED HYPOTENSION TYPE: ICD-10-CM

## 2020-03-19 RX ORDER — MIDODRINE HYDROCHLORIDE 5 MG/1
5 TABLET ORAL 2 TIMES DAILY WITH MEALS
Qty: 60 TABLET | Refills: 1 | Status: SHIPPED | OUTPATIENT
Start: 2020-03-19 | End: 2020-06-04

## 2020-03-27 ENCOUNTER — TELEPHONE (OUTPATIENT)
Dept: INFUSION THERAPY | Facility: HOSPITAL | Age: 60
End: 2020-03-27

## 2020-03-27 NOTE — TELEPHONE ENCOUNTER
Contacted clinic and spoke with Preeti.  Advised that we needed order for Neupogen to schedule pt.  Also needed to know how many doses of Neupogen Dr. Rich wanted to schedule patient for at this time and if it would be over the weekend.  She stated she could give Suly the message since she is the one who originally made the request.

## 2020-03-30 ENCOUNTER — LAB VISIT (OUTPATIENT)
Dept: LAB | Facility: HOSPITAL | Age: 60
End: 2020-03-30
Attending: INTERNAL MEDICINE
Payer: MEDICAID

## 2020-03-30 ENCOUNTER — TELEPHONE (OUTPATIENT)
Dept: HEMATOLOGY/ONCOLOGY | Facility: CLINIC | Age: 60
End: 2020-03-30

## 2020-03-30 DIAGNOSIS — D64.9 ANEMIA, UNSPECIFIED TYPE: ICD-10-CM

## 2020-03-30 DIAGNOSIS — C81.90 HODGKIN'S LYMPHOMA IN RELAPSE: ICD-10-CM

## 2020-03-30 LAB
ALBUMIN SERPL BCP-MCNC: 3.5 G/DL (ref 3.5–5.2)
ALP SERPL-CCNC: 89 U/L (ref 55–135)
ALT SERPL W/O P-5'-P-CCNC: 15 U/L (ref 10–44)
ANION GAP SERPL CALC-SCNC: 7 MMOL/L (ref 8–16)
ANISOCYTOSIS BLD QL SMEAR: SLIGHT
AST SERPL-CCNC: 22 U/L (ref 10–40)
BASOPHILS NFR BLD: 0 % (ref 0–1.9)
BILIRUB SERPL-MCNC: 0.5 MG/DL (ref 0.1–1)
BUN SERPL-MCNC: 14 MG/DL (ref 8–23)
CALCIUM SERPL-MCNC: 8.6 MG/DL (ref 8.7–10.5)
CHLORIDE SERPL-SCNC: 108 MMOL/L (ref 95–110)
CO2 SERPL-SCNC: 25 MMOL/L (ref 23–29)
CREAT SERPL-MCNC: 0.8 MG/DL (ref 0.5–1.4)
DIFFERENTIAL METHOD: ABNORMAL
EOSINOPHIL NFR BLD: 48 % (ref 0–8)
ERYTHROCYTE [DISTWIDTH] IN BLOOD BY AUTOMATED COUNT: 21.3 % (ref 11.5–14.5)
EST. GFR  (AFRICAN AMERICAN): >60 ML/MIN/1.73 M^2
EST. GFR  (NON AFRICAN AMERICAN): >60 ML/MIN/1.73 M^2
GLUCOSE SERPL-MCNC: 111 MG/DL (ref 70–110)
HCT VFR BLD AUTO: 30.5 % (ref 40–54)
HGB BLD-MCNC: 9.8 G/DL (ref 14–18)
IMM GRANULOCYTES # BLD AUTO: ABNORMAL K/UL
IMM GRANULOCYTES NFR BLD AUTO: ABNORMAL %
LYMPHOCYTES NFR BLD: 35 % (ref 18–48)
MCH RBC QN AUTO: 32.3 PG (ref 27–31)
MCHC RBC AUTO-ENTMCNC: 32.1 G/DL (ref 32–36)
MCV RBC AUTO: 101 FL (ref 82–98)
MONOCYTES NFR BLD: 4 % (ref 4–15)
NEUTROPHILS NFR BLD: 13 % (ref 38–73)
NRBC BLD-RTO: 0 /100 WBC
PLATELET # BLD AUTO: 90 K/UL (ref 150–350)
PLATELET BLD QL SMEAR: ABNORMAL
PMV BLD AUTO: 9.7 FL (ref 9.2–12.9)
POTASSIUM SERPL-SCNC: 3.8 MMOL/L (ref 3.5–5.1)
PROT SERPL-MCNC: 5.7 G/DL (ref 6–8.4)
RBC # BLD AUTO: 3.03 M/UL (ref 4.6–6.2)
SODIUM SERPL-SCNC: 140 MMOL/L (ref 136–145)
WBC # BLD AUTO: 3.68 K/UL (ref 3.9–12.7)

## 2020-03-30 PROCEDURE — 85007 BL SMEAR W/DIFF WBC COUNT: CPT

## 2020-03-30 PROCEDURE — 80053 COMPREHEN METABOLIC PANEL: CPT

## 2020-03-30 PROCEDURE — 36415 COLL VENOUS BLD VENIPUNCTURE: CPT

## 2020-03-30 PROCEDURE — 85027 COMPLETE CBC AUTOMATED: CPT

## 2020-03-31 ENCOUNTER — OFFICE VISIT (OUTPATIENT)
Dept: HEMATOLOGY/ONCOLOGY | Facility: CLINIC | Age: 60
End: 2020-03-31
Payer: MEDICAID

## 2020-03-31 DIAGNOSIS — R20.2 PARESTHESIAS: ICD-10-CM

## 2020-03-31 DIAGNOSIS — R76.8 CMV (CYTOMEGALOVIRUS) ANTIBODY POSITIVE: ICD-10-CM

## 2020-03-31 DIAGNOSIS — Z09 CHEMOTHERAPY FOLLOW-UP EXAMINATION: ICD-10-CM

## 2020-03-31 DIAGNOSIS — D61.818 PANCYTOPENIA: ICD-10-CM

## 2020-03-31 DIAGNOSIS — C81.18 NODULAR SCLEROSIS HODGKIN LYMPHOMA OF LYMPH NODES OF MULTIPLE REGIONS: Primary | ICD-10-CM

## 2020-03-31 PROCEDURE — 97802 MEDICAL NUTRITION INDIV IN: CPT | Mod: PBBFAC,PO | Performed by: INTERNAL MEDICINE

## 2020-03-31 PROCEDURE — 99214 PR OFFICE/OUTPT VISIT, EST, LEVL IV, 30-39 MIN: ICD-10-PCS | Mod: 95,S$GLB,, | Performed by: INTERNAL MEDICINE

## 2020-03-31 PROCEDURE — 99214 OFFICE O/P EST MOD 30 MIN: CPT | Mod: 95,S$GLB,, | Performed by: INTERNAL MEDICINE

## 2020-03-31 NOTE — PROGRESS NOTES
The patient location is:  At home  The chief complaint leading to consultation is:  How are my blood counts  Visit type: Virtual visit with audio   Total time spent with patient: over 30 min   Greater than 50% was spent on counseling and/or coordination of care.  Each patient to whom he or she provides medical services by telemedicine is:  (1) informed of the relationship between the physician and patient and the respective role of any other health care provider with respect to management of the patient; and (2) notified that he or she may decline to receive medical services by telemedicine and may withdraw from such care at any time.        Heriberto Keys is a 60 y.o.    This is a 60 yr old gentleman here for Hospital F/U for neutropenic fever     F/U of Hodgkins disease He received ABVD cycle 1 while hospitalized at [facility] in past. He had neurological presentation with dizziness and confusion yet LP negative for CSF involvement. He received levaquin for sinusitis and his mentation improved. CHemo did cause severe marrow suppression requiring GF      Pt had chemo in the hospital cycle 1   Due for ABVD   Post IV iron and procrit   He had been on carvidolol with lasix for HTn   History IV iron infusions    December 6, 2019  Cytology from CSF still pending but bone marrow with definite invasive Hodgkin's lymphoma    12-  Here to start chemo second regimen for recurrence   Weak, cannot stand for lengthy time, SOB intermittently     12/31/2019:  Patient walking with his own power.  Patient is here for 1 week follow-up to see if need of transfusion.  Patient states that sometime he have stomach pain after completion of meal.  No fever no chills.  No chest pain or shortness breath    1- : here for cycle 2   Leg pain continues, + itching all over , weak     1- Here for cycle 3  Hospitalized after cycle 2 + chills and bronchitis and did not complete granix because of such     January 31 2020  Here for  blood counts   New leg pain   Headaches and chills with a drop in his WBC    2/4/2020   Pt started zyrtec and rhinorrhea is better   Receiving retacrit with ni complication     2/24/2020 : here for clearance cycle 4 chemo  Fatigued, no fever , no chills   Post treatment for cmv     2/28/2020  Had chemo yesterday   Was vomiting with chills and diarrhea like last chemo all night     March 6 2020 here for pet ct results which show remission   Had chemo 2- here to check labs : in ho    March 13 2020   He is confused about his condition  He is on midodrine   In remission  Bone marrow remains suppressed   Lethargic and weak today      March 31, 2020 follow-up on counts Hodgkin's is in remission he is extremely weak trying to make himself eat weight loss is stabilizing he is tolerating medication to help such as Megace and remains on midodrine to help with hypotension which I suspect is was due to adrenal insufficiency from long-term use of steroids that were given with his chemotherapy  Past Medical History:   Diagnosis Date    Anemia     Depression     Encounter for blood transfusion     Lymphoma     Lymphoma     Lymphoma 2019     Past Surgical History:   Procedure Laterality Date    BONE MARROW ASPIRATION Left 11/11/2019    Procedure: ASPIRATION, BONE MARROW;  Surgeon: Nancy Diagnostic Provider;  Location: [facility];  Service: General;  Laterality: Left;    LYMPHADENECTOMY Bilateral        Current Outpatient Medications:     cetirizine (ZYRTEC) 10 MG tablet, Take 10 mg by mouth daily as needed for Allergies., Disp: , Rfl:     midodrine (PROAMATINE) 5 MG Tab, TAKE 1 TABLET (5 MG TOTAL) BY MOUTH 2 (TWO) TIMES DAILY WITH MEALS., Disp: 60 tablet, Rfl: 1    tobramycin sulfate 0.3% (TOBREX) 0.3 % ophthalmic solution, 3 drops every 4 (four) hours. , Disp: , Rfl:   Review of patient's allergies indicates:  No Known Allergies  Social History     Tobacco Use    Smoking status: Former Smoker     Packs/day: 1.00      Types: Cigarettes     Last attempt to quit: 2015     Years since quittin.1    Smokeless tobacco: Current User   Substance Use Topics    Alcohol use: No     Frequency: Never    Drug use: No     No family history on file.  Symptoms of HYPOTENSION continue   CONSTITUTIONAL No further  fevers, no further chills, night sweats, + confusion   SKIN: no rashes or itching  ENT: No headaches, head trauma, vision changes, stable  change in taste   LYMPH NODES: None noticedNeck pain stable : doing physical therapy   CV: No chest pain, palpitations.  No orthopnea or PND  RESP: No dyspnea on exertion, cough,  or hemoptysis  GI: no vomiting and diarrhea   : No dysuria, hematuria, urgency, or frequency   HEME: No easy bruising, bleeding problems  PSYCHIATRIC: No depression, anxiety, no psychosis   NEURO: No seizures,   difficulty sleeping    MSK:  + leg pain, Positive weakness         no labored breathing over the phone speech was intact patient sounds as if he is stable he reports no difficulty with ambulating    Lab Results   Component Value Date    WBC 3.68 (L) 2020    HGB 9.8 (L) 2020    HCT 30.5 (L) 2020     (H) 2020    PLT 90 (L) 2020         CMP  Sodium   Date Value Ref Range Status   2020 140 136 - 145 mmol/L Final   2019 138 134 - 144 mmol/L      Potassium   Date Value Ref Range Status   2020 3.8 3.5 - 5.1 mmol/L Final     Chloride   Date Value Ref Range Status   2020 108 95 - 110 mmol/L Final   2019 105 98 - 110 mmol/L      CO2   Date Value Ref Range Status   2020 25 23 - 29 mmol/L Final     Glucose   Date Value Ref Range Status   2020 111 (H) 70 - 110 mg/dL Final   2019 106 (H) 70 - 99 mg/dL      BUN, Bld   Date Value Ref Range Status   2020 14 8 - 23 mg/dL Final     Creatinine   Date Value Ref Range Status   2020 0.8 0.5 - 1.4 mg/dL Final   2019 0.54 (L) 0.60 - 1.40 mg/dL      Calcium   Date Value Ref  Range Status   03/30/2020 8.6 (L) 8.7 - 10.5 mg/dL Final     Total Protein   Date Value Ref Range Status   03/30/2020 5.7 (L) 6.0 - 8.4 g/dL Final     Albumin   Date Value Ref Range Status   03/30/2020 3.5 3.5 - 5.2 g/dL Final   03/07/2019 3.0 (L) 3.1 - 4.7 g/dL      Total Bilirubin   Date Value Ref Range Status   03/30/2020 0.5 0.1 - 1.0 mg/dL Final     Comment:     For infants and newborns, interpretation of results should be based  on gestational age, weight and in agreement with clinical  observations.  Premature Infant recommended reference ranges:  Up to 24 hours.............<8.0 mg/dL  Up to 48 hours............<12.0 mg/dL  3-5 days..................<15.0 mg/dL  6-29 days.................<15.0 mg/dL       Alkaline Phosphatase   Date Value Ref Range Status   03/30/2020 89 55 - 135 U/L Final     AST   Date Value Ref Range Status   03/30/2020 22 10 - 40 U/L Final     ALT   Date Value Ref Range Status   03/30/2020 15 10 - 44 U/L Final     Anion Gap   Date Value Ref Range Status   03/30/2020 7 (L) 8 - 16 mmol/L Final     eGFR if    Date Value Ref Range Status   03/30/2020 >60 >60 mL/min/1.73 m^2 Final     eGFR if non    Date Value Ref Range Status   03/30/2020 >60 >60 mL/min/1.73 m^2 Final     Comment:     Calculation used to obtain the estimated glomerular filtration  rate (eGFR) is the CKD-EPI equation.            CT Neck Chest With Contrast (XPD)   Order: [id number]   Status:  Final result   Visible to patient:  No (Not Released) Next appt:  None Dx:  Iron deficiency; Nodular sclerosis Ho...   Details     Reading Physician Reading Date Result Priority   Jhon Mendosa MD 4/10/2019 Routine      Narrative     EXAMINATION:  CT NECK CHEST WITH CONTRAST (XPD)    CLINICAL HISTORY:  neck pain and left shoulder pain; Nodular sclerosis Hodgkin lymphoma, lymph nodes of multiple sites    TECHNIQUE:  Low dose axial images, coronal and sagittal reformations were obtained from the skull  base to the lung bases following the intravenous administration of 75 mL of Omnipaque 350.    COMPARISON:  None.    FINDINGS:  Included intracranial structures and orbital contents are unremarkable.  Paranasal sinuses are clear.  There is a 1.5 cm hypodense lesion an adjacent 9 mm hypodense lesion with macrocalcification in the right thyroid lobe.  Remaining glandular tissue unremarkable.  Aero digestive tract is unremarkable with no nodular or masslike enhancement.  No cervical adenopathy.  Atherosclerosis.  Straightening of normal cervical lordosis.  2 mm anterolisthesis C3 on C4.  Moderate degenerative change at the anterior C1-2 articulation.  Moderate degenerative disc disease at C4-5, C5-6, C6-7.  Uncovertebral spurs contribute to bony neural foraminal narrowing on the right at C4-5 through C6-7 and left at C6-7 as well as C3-4.    Patchy ground-glass opacity in the lungs, peribronchovascular distribution.  Dependent atelectasis in both lower lobes.  8 mm ground-glass nodule in the right upper lobe series 3, image 77.  Bilateral pleural fluid.  Normal sized heart.  Port-A-Cath right chest wall tip in the SVC.  Enlarged prevascular lymph nodes which measure 1.7 and 1.2 cm respectively series 3, image 87.  Partially imaged cystic lesion along the left renal midpole.  Remote trauma along the posterior right upper thoracic cage ribs 3 through 5 with retained metallic fragments.      Impression       1. No cervical adenopathy. Enlarged mediastinal lymph nodes are presumably in keeping with provided history of lymphoma. Any comparison exams would be helpful.  2. Cervical degenerative change.  3. Right thyroid nodules which could be further characterized with ultrasound as clinically warranted.  4. Moderate-sized bilateral pleural effusions.  5. Patchy pulmonary interstitial disease with differential favoring edema.      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  -     CBC auto differential; Standing  -     CMP; Future; Expected date: 03/31/2020  -     Lactate dehydrogenase; Future; Expected date: 03/31/2020  -     Beta 2 Microglobulin, Serum; Future; Expected date: 03/31/2020    Chemotherapy follow-up examination  -     CBC auto differential; Standing  -     CMP; Future; Expected date: 03/31/2020  -     Lactate dehydrogenase; Future; Expected date: 03/31/2020  -     Beta 2 Microglobulin, Serum; Future; Expected date: 03/31/2020    Pancytopenia  -     CBC auto differential; Standing  -     CMP; Future; Expected date: 03/31/2020  -     Lactate dehydrogenase; Future; Expected date: 03/31/2020  -     Beta 2 Microglobulin, Serum; Future; Expected date: 03/31/2020    Paresthesias    CMV (cytomegalovirus) antibody positive       Pancytopenia due to chemo improving   No further need for neupogen or procrit  Weakness should improve with continued exercise and nutritional support  Discussed diet for over 15 minutes   No further chemo at this time in remission   Hypotensive add midodrine take this twice a day  5mg   PND can take zyrtec twice  A day    Visit in a month with labs  Needs colonoscopy in future   Scans in 2 months   Do not take ganciclovir  If develops temp greater than 100.2 or chills or further weakness to ER   Advance Care Planning     Power of   I initiated the process of advance care planning today and explained the importance of this process to the patient.  I introduced the concept of advance directives to the patient, as well. Then the patient received detailed information about the importance of designating a Health Care Power of  (HCPOA). He was also instructed to communicate with this person about their wishes for future healthcare, should he become sick and  lose decision-making capacity. The patient has not previously appointed a HCPOA.           Living Will  During this visit, I engaged the patient  in the advance care planning process.  The patient and I reviewed the role for advance directives and their purpose in directing future healthcare if the patient's unable to speak for him/herself.  At this point in time, the patient does have full decision-making capacity.  We discussed different extreme health states that he could experience, and reviewed what kind of medical care he would want in those situations.  The patient communicated that if he were comatose and had little chance of a meaningful recovery, he would want machines/life-sustaining treatments used.  I spent a total of  30 minutes consecutive OV engaging the patient in this advance care planning discussion.

## 2020-08-24 ENCOUNTER — TELEPHONE (OUTPATIENT)
Dept: HEMATOLOGY/ONCOLOGY | Facility: CLINIC | Age: 60
End: 2020-08-24

## 2020-08-24 NOTE — TELEPHONE ENCOUNTER
Patient walked in clinic and requested lab work and follow up with Dr. Rich. Patient scheduled for labs 8/31and OV with Dr. Rich 9/3. Patient verbalized understanding.  No further questions/concerns noted at this time.

## 2020-08-28 ENCOUNTER — LAB VISIT (OUTPATIENT)
Dept: LAB | Facility: HOSPITAL | Age: 60
End: 2020-08-28
Attending: INTERNAL MEDICINE
Payer: MEDICAID

## 2020-08-28 ENCOUNTER — OFFICE VISIT (OUTPATIENT)
Dept: HEMATOLOGY/ONCOLOGY | Facility: CLINIC | Age: 60
End: 2020-08-28
Payer: MEDICAID

## 2020-08-28 ENCOUNTER — TELEPHONE (OUTPATIENT)
Dept: HEMATOLOGY/ONCOLOGY | Facility: CLINIC | Age: 60
End: 2020-08-28

## 2020-08-28 VITALS
DIASTOLIC BLOOD PRESSURE: 66 MMHG | OXYGEN SATURATION: 99 % | RESPIRATION RATE: 18 BRPM | WEIGHT: 169.75 LBS | TEMPERATURE: 98 F | SYSTOLIC BLOOD PRESSURE: 112 MMHG | HEART RATE: 60 BPM | BODY MASS INDEX: 25.07 KG/M2

## 2020-08-28 DIAGNOSIS — D64.9 ANEMIA, UNSPECIFIED TYPE: ICD-10-CM

## 2020-08-28 DIAGNOSIS — Z09 CHEMOTHERAPY FOLLOW-UP EXAMINATION: ICD-10-CM

## 2020-08-28 DIAGNOSIS — C81.18 NODULAR SCLEROSIS HODGKIN LYMPHOMA OF LYMPH NODES OF MULTIPLE REGIONS: ICD-10-CM

## 2020-08-28 DIAGNOSIS — R53.1 WEAKNESS: Primary | ICD-10-CM

## 2020-08-28 DIAGNOSIS — R76.8 CMV (CYTOMEGALOVIRUS) ANTIBODY POSITIVE: ICD-10-CM

## 2020-08-28 DIAGNOSIS — D61.818 PANCYTOPENIA: ICD-10-CM

## 2020-08-28 LAB
ALBUMIN SERPL BCP-MCNC: 2.5 G/DL (ref 3.5–5.2)
ALP SERPL-CCNC: 98 U/L (ref 55–135)
ALT SERPL W/O P-5'-P-CCNC: 31 U/L (ref 10–44)
ANION GAP SERPL CALC-SCNC: 10 MMOL/L (ref 8–16)
AST SERPL-CCNC: 18 U/L (ref 10–40)
BASOPHILS # BLD AUTO: 0.05 K/UL (ref 0–0.2)
BASOPHILS NFR BLD: 0.6 % (ref 0–1.9)
BILIRUB SERPL-MCNC: 0.5 MG/DL (ref 0.1–1)
BUN SERPL-MCNC: 16 MG/DL (ref 8–23)
CALCIUM SERPL-MCNC: 9.4 MG/DL (ref 8.7–10.5)
CHLORIDE SERPL-SCNC: 106 MMOL/L (ref 95–110)
CO2 SERPL-SCNC: 27 MMOL/L (ref 23–29)
CREAT SERPL-MCNC: 0.9 MG/DL (ref 0.5–1.4)
DIFFERENTIAL METHOD: ABNORMAL
EOSINOPHIL # BLD AUTO: 0.5 K/UL (ref 0–0.5)
EOSINOPHIL NFR BLD: 5.9 % (ref 0–8)
ERYTHROCYTE [DISTWIDTH] IN BLOOD BY AUTOMATED COUNT: 12.8 % (ref 11.5–14.5)
EST. GFR  (AFRICAN AMERICAN): >60 ML/MIN/1.73 M^2
EST. GFR  (NON AFRICAN AMERICAN): >60 ML/MIN/1.73 M^2
GLUCOSE SERPL-MCNC: 110 MG/DL (ref 70–110)
HCT VFR BLD AUTO: 35.8 % (ref 40–54)
HGB BLD-MCNC: 11.9 G/DL (ref 14–18)
IMM GRANULOCYTES # BLD AUTO: 0.13 K/UL (ref 0–0.04)
IMM GRANULOCYTES NFR BLD AUTO: 1.4 % (ref 0–0.5)
LDH SERPL L TO P-CCNC: 395 U/L (ref 110–260)
LYMPHOCYTES # BLD AUTO: 4 K/UL (ref 1–4.8)
LYMPHOCYTES NFR BLD: 43.9 % (ref 18–48)
MCH RBC QN AUTO: 30.4 PG (ref 27–31)
MCHC RBC AUTO-ENTMCNC: 33.2 G/DL (ref 32–36)
MCV RBC AUTO: 92 FL (ref 82–98)
MONOCYTES # BLD AUTO: 0.5 K/UL (ref 0.3–1)
MONOCYTES NFR BLD: 5.7 % (ref 4–15)
NEUTROPHILS # BLD AUTO: 3.8 K/UL (ref 1.8–7.7)
NEUTROPHILS NFR BLD: 42.5 % (ref 38–73)
NRBC BLD-RTO: 0 /100 WBC
PLATELET # BLD AUTO: 211 K/UL (ref 150–350)
PMV BLD AUTO: 9.6 FL (ref 9.2–12.9)
POTASSIUM SERPL-SCNC: 3.6 MMOL/L (ref 3.5–5.1)
PROT SERPL-MCNC: 7.1 G/DL (ref 6–8.4)
RBC # BLD AUTO: 3.91 M/UL (ref 4.6–6.2)
SODIUM SERPL-SCNC: 143 MMOL/L (ref 136–145)
WBC # BLD AUTO: 9.02 K/UL (ref 3.9–12.7)

## 2020-08-28 PROCEDURE — 99215 OFFICE O/P EST HI 40 MIN: CPT | Mod: S$PBB,,, | Performed by: INTERNAL MEDICINE

## 2020-08-28 PROCEDURE — 82232 ASSAY OF BETA-2 PROTEIN: CPT

## 2020-08-28 PROCEDURE — 99999 PR PBB SHADOW E&M-EST. PATIENT-LVL IV: CPT | Mod: PBBFAC,,, | Performed by: INTERNAL MEDICINE

## 2020-08-28 PROCEDURE — 85025 COMPLETE CBC W/AUTO DIFF WBC: CPT

## 2020-08-28 PROCEDURE — 99999 PR PBB SHADOW E&M-EST. PATIENT-LVL IV: ICD-10-PCS | Mod: PBBFAC,,, | Performed by: INTERNAL MEDICINE

## 2020-08-28 PROCEDURE — 36415 COLL VENOUS BLD VENIPUNCTURE: CPT

## 2020-08-28 PROCEDURE — 99215 PR OFFICE/OUTPT VISIT, EST, LEVL V, 40-54 MIN: ICD-10-PCS | Mod: S$PBB,,, | Performed by: INTERNAL MEDICINE

## 2020-08-28 PROCEDURE — 83615 LACTATE (LD) (LDH) ENZYME: CPT

## 2020-08-28 PROCEDURE — 99214 OFFICE O/P EST MOD 30 MIN: CPT | Mod: PBBFAC,PO | Performed by: INTERNAL MEDICINE

## 2020-08-28 PROCEDURE — 80053 COMPREHEN METABOLIC PANEL: CPT

## 2020-08-28 NOTE — PROGRESS NOTES
CC urgent visit  Pt not feeling well    See below for problems addressed       Heriberto Keys is a 60 y.o.    This is a 60 yr old gentleman here for Hospital F/U for neutropenic fever     F/U of Hodgkins disease He received ABVD cycle 1 while hospitalized at [facility] in past. He had neurological presentation with dizziness and confusion yet LP negative for CSF involvement. He received levaquin for sinusitis and his mentation improved. CHemo did cause severe marrow suppression requiring GF      Pt had chemo in the hospital cycle 1   Due for ABVD   Post IV iron and procrit   He had been on carvidolol with lasix for HTn   History IV iron infusions    December 6, 2019  Cytology from CSF still pending but bone marrow with definite invasive Hodgkin's lymphoma    12-  Here to start chemo second regimen for recurrence   Weak, cannot stand for lengthy time, SOB intermittently     12/31/2019:  Patient walking with his own power.  Patient is here for 1 week follow-up to see if need of transfusion.  Patient states that sometime he have stomach pain after completion of meal.  No fever no chills.  No chest pain or shortness breath    1- : here for cycle 2   Leg pain continues, + itching all over , weak     1- Here for cycle 3  Hospitalized after cycle 2 + chills and bronchitis and did not complete granix because of such     January 31 2020  Here for blood counts   New leg pain   Headaches and chills with a drop in his WBC    2/4/2020   Pt started zyrtec and rhinorrhea is better   Receiving retacrit with ni complication     2/24/2020 : here for clearance cycle 4 chemo  Fatigued, no fever , no chills   Post treatment for cmv     2/28/2020  Had chemo yesterday   Was vomiting with chills and diarrhea like last chemo all night     March 6 2020 here for pet ct results which show remission   Had chemo 2- here to check labs : in ho    March 13 2020   He is confused about his condition  He is on midodrine   In  remission  Bone marrow remains suppressed   Lethargic and weak today      2020 follow-up on counts Hodgkin's is in remission he is extremely weak trying to make himself eat weight loss is stabilizing he is tolerating medication to help such as Megace and remains on midodrine to help with hypotension which I suspect is was due to adrenal insufficiency from long-term use of steroids that were given with his chemotherapy    2020   Weight loss , weakness , no energy , inability to perform adl     Past Medical History:   Diagnosis Date    Anemia     Depression     Encounter for blood transfusion     Lymphoma     Lymphoma     Lymphoma      Past Surgical History:   Procedure Laterality Date    BONE MARROW ASPIRATION Left 2019    Procedure: ASPIRATION, BONE MARROW;  Surgeon: Nancy Diagnostic Provider;  Location: [facility];  Service: General;  Laterality: Left;    LYMPHADENECTOMY Bilateral        Current Outpatient Medications:     midodrine (PROAMATINE) 5 MG Tab, TAKE 1 TABLET (5 MG TOTAL) BY MOUTH 2 (TWO) TIMES DAILY WITH MEALS., Disp: 60 tablet, Rfl: 1    cetirizine (ZYRTEC) 10 MG tablet, Take 10 mg by mouth daily as needed for Allergies., Disp: , Rfl:     tobramycin sulfate 0.3% (TOBREX) 0.3 % ophthalmic solution, 3 drops every 4 (four) hours. , Disp: , Rfl:   Review of patient's allergies indicates:  No Known Allergies  Social History     Tobacco Use    Smoking status: Former Smoker     Packs/day: 1.00     Types: Cigarettes     Quit date: 2015     Years since quittin.5    Smokeless tobacco: Current User   Substance Use Topics    Alcohol use: No     Frequency: Never    Drug use: No     No family history on file.  Symptoms of HYPOTENSION continue   CONSTITUTIONAL No further  fevers, no further chills, night sweats, no  confusion   SKIN: no rashes or itching  ENT: No headaches, head trauma, vision changes, stable  change in taste   LYMPH NODES: None noticedNeck pain stable :  doing physical therapy   CV: No chest pain, palpitations.  No orthopnea or PND  RESP: No dyspnea on exertion, cough,  or hemoptysis  GI: no vomiting and diarrhea   : No dysuria, hematuria, urgency, or frequency   HEME: No easy bruising, bleeding problems  PSYCHIATRIC: No depression, anxiety, no psychosis   NEURO: No seizures,   difficulty sleeping    MSK:  + leg pain, Positive weakness          /66 (BP Location: Right arm, Patient Position: Sitting, BP Method: Medium (Automatic))   Pulse 60   Temp 97.5 °F (36.4 °C) (Temporal)   Resp 18   Wt 77 kg (169 lb 12.1 oz)   SpO2 99%   BMI 25.07 kg/m²   Height / weight / VS reviewed  GENERAL.: Well-developed, well-nourished, in no acute distress  PSYCH: pleasant affect, no anxiety, no depression  HEENT: Normocephalic, lids intact, conjunctiva pink,cnon-boggy turbinates,Normal auricula, nasal septum, dentition, gums and mucosa ,OP clear,no palatal pallor  NECK: Supple,trachea midline, no palpable abnormalities  LYMPHATICS: No cervical or axillary adenopathy  RESPIRATORY: CTAB, no wheezes, rubs or rhonchi  Good respiratory effort without any retractions or diaphragmatic movement  CARDIOVASCULAR: no JVD, S1 / S2 with RRR, no murmurc  ABDOMEN: NTND, normal bowel sounds, no palpable HSM or mass  EXTREMITIES: No cyanosis, no clubbing, no edema, no joint effusion  NEUROLOGICAL: alert and oriented x 3c  SKIN: Warm, dry, no rash or lesions noted, no tentingc      Lab Results   Component Value Date    WBC 3.68 (L) 03/30/2020    HGB 9.8 (L) 03/30/2020    HCT 30.5 (L) 03/30/2020     (H) 03/30/2020    PLT 90 (L) 03/30/2020     Lab Results   Component Value Date    WBC 9.02 08/28/2020    HGB 11.9 (L) 08/28/2020    HCT 35.8 (L) 08/28/2020    MCV 92 08/28/2020     08/28/2020           CMP  Sodium   Date Value Ref Range Status   08/28/2020 143 136 - 145 mmol/L Final   03/07/2019 138 134 - 144 mmol/L      Potassium   Date Value Ref Range Status   08/28/2020 3.6 3.5  - 5.1 mmol/L Final     Chloride   Date Value Ref Range Status   08/28/2020 106 95 - 110 mmol/L Final   03/07/2019 105 98 - 110 mmol/L      CO2   Date Value Ref Range Status   08/28/2020 27 23 - 29 mmol/L Final     Glucose   Date Value Ref Range Status   08/28/2020 110 70 - 110 mg/dL Final   03/07/2019 106 (H) 70 - 99 mg/dL      BUN, Bld   Date Value Ref Range Status   08/28/2020 16 8 - 23 mg/dL Final     Creatinine   Date Value Ref Range Status   08/28/2020 0.9 0.5 - 1.4 mg/dL Final   03/07/2019 0.54 (L) 0.60 - 1.40 mg/dL      Calcium   Date Value Ref Range Status   08/28/2020 9.4 8.7 - 10.5 mg/dL Final     Total Protein   Date Value Ref Range Status   08/28/2020 7.1 6.0 - 8.4 g/dL Final     Albumin   Date Value Ref Range Status   08/28/2020 2.5 (L) 3.5 - 5.2 g/dL Final   03/07/2019 3.0 (L) 3.1 - 4.7 g/dL      Total Bilirubin   Date Value Ref Range Status   08/28/2020 0.5 0.1 - 1.0 mg/dL Final     Comment:     For infants and newborns, interpretation of results should be based  on gestational age, weight and in agreement with clinical  observations.  Premature Infant recommended reference ranges:  Up to 24 hours.............<8.0 mg/dL  Up to 48 hours............<12.0 mg/dL  3-5 days..................<15.0 mg/dL  6-29 days.................<15.0 mg/dL       Alkaline Phosphatase   Date Value Ref Range Status   08/28/2020 98 55 - 135 U/L Final     AST   Date Value Ref Range Status   08/28/2020 18 10 - 40 U/L Final     ALT   Date Value Ref Range Status   08/28/2020 31 10 - 44 U/L Final     Anion Gap   Date Value Ref Range Status   08/28/2020 10 8 - 16 mmol/L Final     eGFR if    Date Value Ref Range Status   08/28/2020 >60 >60 mL/min/1.73 m^2 Final     eGFR if non    Date Value Ref Range Status   08/28/2020 >60 >60 mL/min/1.73 m^2 Final     Comment:     Calculation used to obtain the estimated glomerular filtration  rate (eGFR) is the CKD-EPI equation.            CT Neck Chest With  Contrast (XPD)   Order: [id number]   Status:  Final result   Visible to patient:  No (Not Released) Next appt:  None Dx:  Iron deficiency; Nodular sclerosis Ho...   Details     Reading Physician Reading Date Result Priority   Jhon Mendosa MD 4/10/2019 Routine      Narrative     EXAMINATION:  CT NECK CHEST WITH CONTRAST (XPD)    CLINICAL HISTORY:  neck pain and left shoulder pain; Nodular sclerosis Hodgkin lymphoma, lymph nodes of multiple sites    TECHNIQUE:  Low dose axial images, coronal and sagittal reformations were obtained from the skull base to the lung bases following the intravenous administration of 75 mL of Omnipaque 350.    COMPARISON:  None.    FINDINGS:  Included intracranial structures and orbital contents are unremarkable.  Paranasal sinuses are clear.  There is a 1.5 cm hypodense lesion an adjacent 9 mm hypodense lesion with macrocalcification in the right thyroid lobe.  Remaining glandular tissue unremarkable.  Aero digestive tract is unremarkable with no nodular or masslike enhancement.  No cervical adenopathy.  Atherosclerosis.  Straightening of normal cervical lordosis.  2 mm anterolisthesis C3 on C4.  Moderate degenerative change at the anterior C1-2 articulation.  Moderate degenerative disc disease at C4-5, C5-6, C6-7.  Uncovertebral spurs contribute to bony neural foraminal narrowing on the right at C4-5 through C6-7 and left at C6-7 as well as C3-4.    Patchy ground-glass opacity in the lungs, peribronchovascular distribution.  Dependent atelectasis in both lower lobes.  8 mm ground-glass nodule in the right upper lobe series 3, image 77.  Bilateral pleural fluid.  Normal sized heart.  Port-A-Cath right chest wall tip in the SVC.  Enlarged prevascular lymph nodes which measure 1.7 and 1.2 cm respectively series 3, image 87.  Partially imaged cystic lesion along the left renal midpole.  Remote trauma along the posterior right upper thoracic cage ribs 3 through 5 with retained metallic  fragments.      Impression       1. No cervical adenopathy. Enlarged mediastinal lymph nodes are presumably in keeping with provided history of lymphoma. Any comparison exams would be helpful.  2. Cervical degenerative change.  3. Right thyroid nodules which could be further characterized with ultrasound as clinically warranted.  4. Moderate-sized bilateral pleural effusions.  5. Patchy pulmonary interstitial disease with differential favoring edema.      Electronically signed by: Jhon Mendosa  Date: 04/10/2019  Time: 13:25             Last Resulted: 04/10/19                PET CT reviewed Negative except slight uptake near rectum     Assessment and plan   Weakness  -     Ambulatory referral/consult to Endocrinology; Future; Expected date: 09/04/2020  -     NM PET CT Routine Skull to Mid Thigh  -     EMG W/ ULTRASOUND AND NERVE CONDUCTION TEST 2 Extremities; Future  -     Ambulatory referral/consult to Dermatology; Future; Expected date: 09/04/2020  -     Ambulatory referral/consult to Physical Medicine Rehab; Future; Expected date: 09/04/2020    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  -     NM PET CT Routine Skull to Mid Thigh  -     EMG W/ ULTRASOUND AND NERVE CONDUCTION TEST 2 Extremities; Future  -     Ambulatory referral/consult to Dermatology; Future; Expected date: 09/04/2020  -     Ambulatory referral/consult to Physical Medicine Rehab; Future; Expected date: 09/04/2020    Anemia, unspecified type    CMV (cytomegalovirus) antibody positive    Chemotherapy follow-up examination     weakness search for possible underlying endocrine or neurological issues   Due for pet ct   Hold midodrine  To dermatology for skin lesions: not sure if these areas of hypopigmentation were bullous lesions or not  Discussed diet for over 15 minutes   No further chemo at this time in remission   Hypotensive add midodrine take this twice a day  5mg   PND can take zyrtec twice  A day    Visit in a  month with labs  Needs colonoscopy in future   Scans in 2 months   Do not take ganciclovir  If develops temp greater than 100.2 or chills or further weakness to ER   Advance Care Planning     Power of   I initiated the process of advance care planning today and explained the importance of this process to the patient.  I introduced the concept of advance directives to the patient, as well. Then the patient received detailed information about the importance of designating a Health Care Power of  (HCPOA). He was also instructed to communicate with this person about their wishes for future healthcare, should he become sick and lose decision-making capacity. The patient has not previously appointed a HCPOA.         Living Will  During this visit, I engaged the patient  in the advance care planning process.  The patient and I reviewed the role for advance directives and their purpose in directing future healthcare if the patient's unable to speak for him/herself.  At this point in time, the patient does have full decision-making capacity.  We discussed different extreme health states that he could experience, and reviewed what kind of medical care he would want in those situations.  The patient communicated that if he were comatose and had little chance of a meaningful recovery, he would want machines/life-sustaining treatments used.  I spent a total of  30 minutes consecutive OV engaging the patient in this advance care planning discussion.

## 2020-08-28 NOTE — TELEPHONE ENCOUNTER
Pt arrived at clinic asking to be seen. Pt had labs done today and reports that he feels weak and would like to see Dr Rich. Dr Rich made aware, states she will see pt once labs are resulted. Pt informed, encouraged to come back to clinic around 2pm to see MD. Pt verbalized understanding.

## 2020-08-29 LAB — B2 MICROGLOB SERPL-MCNC: 2.9 UG/ML (ref 0–2.5)

## 2020-09-10 ENCOUNTER — TELEPHONE (OUTPATIENT)
Dept: PHYSICAL MEDICINE AND REHAB | Facility: CLINIC | Age: 60
End: 2020-09-10

## 2020-10-12 ENCOUNTER — HOSPITAL ENCOUNTER (EMERGENCY)
Facility: HOSPITAL | Age: 60
Discharge: HOME | End: 2020-10-12
Attending: EMERGENCY MEDICINE
Payer: MEDICAID

## 2020-10-12 VITALS
BODY MASS INDEX: 23.7 KG/M2 | HEIGHT: 69 IN | DIASTOLIC BLOOD PRESSURE: 67 MMHG | WEIGHT: 160 LBS | OXYGEN SATURATION: 98 % | RESPIRATION RATE: 20 BRPM | HEART RATE: 72 BPM | TEMPERATURE: 98 F | SYSTOLIC BLOOD PRESSURE: 144 MMHG

## 2020-10-12 DIAGNOSIS — R50.9 FEVER: ICD-10-CM

## 2020-10-12 DIAGNOSIS — R53.1 WEAKNESS: Primary | ICD-10-CM

## 2020-10-12 LAB
ALBUMIN SERPL BCP-MCNC: 2.7 G/DL (ref 3.5–5.2)
ALBUMIN SERPL BCP-MCNC: 2.7 G/DL (ref 3.5–5.2)
ALP SERPL-CCNC: 86 U/L (ref 55–135)
ALP SERPL-CCNC: 88 U/L (ref 55–135)
ALT SERPL W/O P-5'-P-CCNC: 44 U/L (ref 10–44)
ALT SERPL W/O P-5'-P-CCNC: 45 U/L (ref 10–44)
ANION GAP SERPL CALC-SCNC: 10 MMOL/L (ref 8–16)
ANION GAP SERPL CALC-SCNC: 9 MMOL/L (ref 8–16)
AST SERPL-CCNC: 42 U/L (ref 10–40)
AST SERPL-CCNC: 48 U/L (ref 10–40)
BASOPHILS # BLD AUTO: 0.02 K/UL (ref 0–0.2)
BASOPHILS NFR BLD: 0.2 % (ref 0–1.9)
BILIRUB SERPL-MCNC: 1 MG/DL (ref 0.1–1)
BILIRUB SERPL-MCNC: 1.1 MG/DL (ref 0.1–1)
BILIRUB UR QL STRIP: NEGATIVE
BUN SERPL-MCNC: 17 MG/DL (ref 8–23)
BUN SERPL-MCNC: 18 MG/DL (ref 8–23)
CALCIUM SERPL-MCNC: 8.6 MG/DL (ref 8.7–10.5)
CALCIUM SERPL-MCNC: 8.7 MG/DL (ref 8.7–10.5)
CHLORIDE SERPL-SCNC: 101 MMOL/L (ref 95–110)
CHLORIDE SERPL-SCNC: 102 MMOL/L (ref 95–110)
CLARITY UR: CLEAR
CO2 SERPL-SCNC: 23 MMOL/L (ref 23–29)
CO2 SERPL-SCNC: 23 MMOL/L (ref 23–29)
COLOR UR: YELLOW
CREAT SERPL-MCNC: 0.9 MG/DL (ref 0.5–1.4)
CREAT SERPL-MCNC: 0.9 MG/DL (ref 0.5–1.4)
DIFFERENTIAL METHOD: ABNORMAL
EOSINOPHIL # BLD AUTO: 0.3 K/UL (ref 0–0.5)
EOSINOPHIL NFR BLD: 3.7 % (ref 0–8)
ERYTHROCYTE [DISTWIDTH] IN BLOOD BY AUTOMATED COUNT: 16 % (ref 11.5–14.5)
EST. GFR  (AFRICAN AMERICAN): >60 ML/MIN/1.73 M^2
EST. GFR  (AFRICAN AMERICAN): >60 ML/MIN/1.73 M^2
EST. GFR  (NON AFRICAN AMERICAN): >60 ML/MIN/1.73 M^2
EST. GFR  (NON AFRICAN AMERICAN): >60 ML/MIN/1.73 M^2
GLUCOSE SERPL-MCNC: 118 MG/DL (ref 70–110)
GLUCOSE SERPL-MCNC: 127 MG/DL (ref 70–110)
GLUCOSE UR QL STRIP: NEGATIVE
HCT VFR BLD AUTO: 26.6 % (ref 40–54)
HGB BLD-MCNC: 8.9 G/DL (ref 14–18)
HGB UR QL STRIP: ABNORMAL
IMM GRANULOCYTES # BLD AUTO: 0.11 K/UL (ref 0–0.04)
IMM GRANULOCYTES NFR BLD AUTO: 1.2 % (ref 0–0.5)
KETONES UR QL STRIP: NEGATIVE
LACTATE SERPL-SCNC: 1 MMOL/L (ref 0.5–1.9)
LEUKOCYTE ESTERASE UR QL STRIP: NEGATIVE
LYMPHOCYTES # BLD AUTO: 1.4 K/UL (ref 1–4.8)
LYMPHOCYTES NFR BLD: 15.9 % (ref 18–48)
MCH RBC QN AUTO: 28.1 PG (ref 27–31)
MCHC RBC AUTO-ENTMCNC: 33.5 G/DL (ref 32–36)
MCV RBC AUTO: 84 FL (ref 82–98)
MONOCYTES # BLD AUTO: 0.6 K/UL (ref 0.3–1)
MONOCYTES NFR BLD: 6.3 % (ref 4–15)
NEUTROPHILS # BLD AUTO: 6.5 K/UL (ref 1.8–7.7)
NEUTROPHILS NFR BLD: 72.7 % (ref 38–73)
NITRITE UR QL STRIP: NEGATIVE
NRBC BLD-RTO: 0 /100 WBC
PH UR STRIP: 6 [PH] (ref 5–8)
PLATELET # BLD AUTO: 165 K/UL (ref 150–350)
PMV BLD AUTO: 10.7 FL (ref 9.2–12.9)
POTASSIUM SERPL-SCNC: 3.4 MMOL/L (ref 3.5–5.1)
POTASSIUM SERPL-SCNC: 3.8 MMOL/L (ref 3.5–5.1)
PROT SERPL-MCNC: 6.4 G/DL (ref 6–8.4)
PROT SERPL-MCNC: 6.7 G/DL (ref 6–8.4)
PROT UR QL STRIP: ABNORMAL
RBC # BLD AUTO: 3.17 M/UL (ref 4.6–6.2)
SARS-COV-2 RDRP RESP QL NAA+PROBE: NEGATIVE
SODIUM SERPL-SCNC: 134 MMOL/L (ref 136–145)
SODIUM SERPL-SCNC: 134 MMOL/L (ref 136–145)
SP GR UR STRIP: 1.02 (ref 1–1.03)
URN SPEC COLLECT METH UR: ABNORMAL
UROBILINOGEN UR STRIP-ACNC: ABNORMAL EU/DL
WBC # BLD AUTO: 8.95 K/UL (ref 3.9–12.7)

## 2020-10-12 PROCEDURE — 80053 COMPREHEN METABOLIC PANEL: CPT

## 2020-10-12 PROCEDURE — 85025 COMPLETE CBC W/AUTO DIFF WBC: CPT

## 2020-10-12 PROCEDURE — 63600175 PHARM REV CODE 636 W HCPCS: Performed by: EMERGENCY MEDICINE

## 2020-10-12 PROCEDURE — U0002 COVID-19 LAB TEST NON-CDC: HCPCS

## 2020-10-12 PROCEDURE — 83605 ASSAY OF LACTIC ACID: CPT

## 2020-10-12 PROCEDURE — 25000003 PHARM REV CODE 250: Performed by: NURSE PRACTITIONER

## 2020-10-12 PROCEDURE — 36415 COLL VENOUS BLD VENIPUNCTURE: CPT

## 2020-10-12 PROCEDURE — 93005 ELECTROCARDIOGRAM TRACING: CPT | Performed by: INTERNAL MEDICINE

## 2020-10-12 PROCEDURE — 87040 BLOOD CULTURE FOR BACTERIA: CPT | Mod: 59

## 2020-10-12 PROCEDURE — 80053 COMPREHEN METABOLIC PANEL: CPT | Mod: 91

## 2020-10-12 PROCEDURE — 93010 EKG 12-LEAD: ICD-10-PCS | Mod: ,,, | Performed by: INTERNAL MEDICINE

## 2020-10-12 PROCEDURE — 96361 HYDRATE IV INFUSION ADD-ON: CPT

## 2020-10-12 PROCEDURE — 87040 BLOOD CULTURE FOR BACTERIA: CPT

## 2020-10-12 PROCEDURE — 81003 URINALYSIS AUTO W/O SCOPE: CPT

## 2020-10-12 PROCEDURE — 99285 EMERGENCY DEPT VISIT HI MDM: CPT | Mod: 25

## 2020-10-12 PROCEDURE — 96360 HYDRATION IV INFUSION INIT: CPT

## 2020-10-12 PROCEDURE — 93010 ELECTROCARDIOGRAM REPORT: CPT | Mod: ,,, | Performed by: INTERNAL MEDICINE

## 2020-10-12 RX ORDER — ACETAMINOPHEN 325 MG/1
650 TABLET ORAL
Status: COMPLETED | OUTPATIENT
Start: 2020-10-12 | End: 2020-10-12

## 2020-10-12 RX ADMIN — ACETAMINOPHEN 650 MG: 325 TABLET, FILM COATED ORAL at 11:10

## 2020-10-12 RX ADMIN — SODIUM CHLORIDE, SODIUM LACTATE, POTASSIUM CHLORIDE, AND CALCIUM CHLORIDE 2121 ML: .6; .31; .03; .02 INJECTION, SOLUTION INTRAVENOUS at 01:10

## 2020-10-12 NOTE — ED NOTES
Pt presents to the ED with c/o fever and generalized weakness. Pt reports generalized weakness x 1 day. Pt states he has a history of bone cancer and chemo. Pt denies cp, sob, headache, abdominal pain, n/v.

## 2020-10-12 NOTE — ED NOTES
Pt resting in bed watching tv. Pt denies any pain at this time. Pt denies any needs. Updated pt on pending results. Pt verbalized understanding
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
## 2020-10-12 NOTE — FIRST PROVIDER EVALUATION
"Medical screening exam completed.  I have conducted a focused provider triage encounter, findings are as follows:    Brief history of present illness:  Generalized weakness for 2 wks  Fever Onset today    Vitals:    10/12/20 1058 10/12/20 1102   BP: (!) 142/67    Pulse: 92    Resp: 18    Temp: (!) 103.2 °F (39.6 °C) (!) 103.2 °F (39.6 °C)   TempSrc: Oral    SpO2: 96%    Weight: 72.6 kg (160 lb)    Height: 5' 9" (1.753 m)        Pertinent physical exam: HRR   Lungs CTA    Brief workup plan:  ED fever work up  Tylenol and cultures    Preliminary workup initiated; this workup will be continued and followed by the physician or advanced practice provider that is assigned to the patient when roomed.  "

## 2020-10-12 NOTE — ED NOTES
Pt educated on discharge instructions and follow up care. Pt verbalized understanding. Pt stable at time of discharge

## 2020-10-12 NOTE — DISCHARGE INSTRUCTIONS
RETURN TO EMERGENCY DEPARTMENT WITHOUT FAIL, IF YOUR SYMPTOMS WORSEN, IF YOU GET NEW OR DIFFERENT SYMPTOMS, IF YOU ARE UNABLE TO FOLLOW UP AS DIRECTED, OR IF YOU HAVE ANY CONCERNS OR WORRIES.    Follow-up with primary care provider for further evaluation of symptoms in 1-2 days.

## 2020-10-12 NOTE — ED NOTES
Pt sleeping, easily woken. rr even and unlabored on ra. Nadn. Pt denies any pain or needs at this time.

## 2020-10-17 LAB
BACTERIA BLD CULT: NORMAL
BACTERIA BLD CULT: NORMAL

## 2020-10-27 ENCOUNTER — TELEPHONE (OUTPATIENT)
Dept: HEMATOLOGY/ONCOLOGY | Facility: CLINIC | Age: 60
End: 2020-10-27

## 2020-10-27 NOTE — TELEPHONE ENCOUNTER
----- Message from Aris Carmona sent at 10/27/2020  2:41 PM CDT -----  Regarding: appointment  Contact: friend  Type:  Sooner Apoointment Request    Caller is requesting a sooner appointment.  Caller declined first available appointment listed below.  Caller will not accept being placed on the waitlist and is requesting a message be sent to doctor.    Name of Caller:  friend-Jessica Nagy  When is the first available appointment?    Symptoms:    Best Call Back Number:  [phone]  Additional Information: Friend requesting to schedule appointment for the patient.

## 2020-11-02 ENCOUNTER — TELEPHONE (OUTPATIENT)
Dept: HEMATOLOGY/ONCOLOGY | Facility: CLINIC | Age: 60
End: 2020-11-02

## 2020-11-03 ENCOUNTER — TELEPHONE (OUTPATIENT)
Dept: HEMATOLOGY/ONCOLOGY | Facility: CLINIC | Age: 60
End: 2020-11-03

## 2020-11-03 NOTE — TELEPHONE ENCOUNTER
----- Message from Dottie Quarles sent at 11/3/2020  1:45 PM CST -----  Contact: makayla  Type:  Patient Returning Call    Who Called:  makayla-family friend  Who Left Message for Patient:  Malou Titi  Does the patient know what this is regarding?:  sooner appt  Best Call Back Number:  [phone] (home)

## 2020-11-03 NOTE — TELEPHONE ENCOUNTER
Called patient's home # and mobile #. Left vm on mobile to schedule appts. First attempt (for Keren).

## 2020-11-06 ENCOUNTER — TELEPHONE (OUTPATIENT)
Dept: HEMATOLOGY/ONCOLOGY | Facility: CLINIC | Age: 60
End: 2020-11-06

## 2020-11-06 DIAGNOSIS — C81.18 NODULAR SCLEROSIS HODGKIN LYMPHOMA OF LYMPH NODES OF MULTIPLE REGIONS: Primary | ICD-10-CM

## 2020-11-06 NOTE — TELEPHONE ENCOUNTER
Confirmed Nov appts with caregiver. Advised to have pt go to ER with any health emergencies. No further questions or concerns.

## 2020-11-09 ENCOUNTER — NURSE TRIAGE (OUTPATIENT)
Dept: ADMINISTRATIVE | Facility: CLINIC | Age: 60
End: 2020-11-09

## 2020-11-09 ENCOUNTER — TELEPHONE (OUTPATIENT)
Dept: HEMATOLOGY/ONCOLOGY | Facility: CLINIC | Age: 60
End: 2020-11-09

## 2020-11-09 ENCOUNTER — HOSPITAL ENCOUNTER (INPATIENT)
Facility: HOSPITAL | Age: 60
LOS: 4 days | Discharge: HOME | DRG: 812 | End: 2020-11-13
Attending: EMERGENCY MEDICINE | Admitting: INTERNAL MEDICINE
Payer: MEDICAID

## 2020-11-09 DIAGNOSIS — E61.1 IRON DEFICIENCY: ICD-10-CM

## 2020-11-09 DIAGNOSIS — D64.9 SYMPTOMATIC ANEMIA: Primary | ICD-10-CM

## 2020-11-09 DIAGNOSIS — E83.51 HYPOCALCEMIA: ICD-10-CM

## 2020-11-09 DIAGNOSIS — R79.1 ELEVATED INR: ICD-10-CM

## 2020-11-09 DIAGNOSIS — R53.1 WEAKNESS: ICD-10-CM

## 2020-11-09 DIAGNOSIS — D72.829 LEUKOCYTOSIS, UNSPECIFIED TYPE: ICD-10-CM

## 2020-11-09 DIAGNOSIS — R53.1 GENERALIZED WEAKNESS: ICD-10-CM

## 2020-11-09 DIAGNOSIS — E86.0 DEHYDRATION: ICD-10-CM

## 2020-11-09 DIAGNOSIS — R07.9 CHEST PAIN: ICD-10-CM

## 2020-11-09 DIAGNOSIS — C81.18 NODULAR SCLEROSIS HODGKIN LYMPHOMA OF LYMPH NODES OF MULTIPLE REGIONS: ICD-10-CM

## 2020-11-09 DIAGNOSIS — D69.6 THROMBOCYTOPENIA: ICD-10-CM

## 2020-11-09 PROBLEM — E87.20 LACTIC ACIDOSIS: Status: ACTIVE | Noted: 2020-11-09

## 2020-11-09 PROBLEM — J90 BILATERAL PLEURAL EFFUSION: Status: RESOLVED | Noted: 2019-05-07 | Resolved: 2020-11-09

## 2020-11-09 PROBLEM — D61.818 PANCYTOPENIA: Status: ACTIVE | Noted: 2020-11-09

## 2020-11-09 LAB
ABO + RH BLD: NORMAL
ALBUMIN SERPL BCP-MCNC: 2.1 G/DL (ref 3.5–5.2)
ALP SERPL-CCNC: 112 U/L (ref 55–135)
ALT SERPL W/O P-5'-P-CCNC: 18 U/L (ref 10–44)
ANION GAP SERPL CALC-SCNC: 13 MMOL/L (ref 8–16)
APTT PPP: 34.7 SEC (ref 23.6–33.3)
AST SERPL-CCNC: 16 U/L (ref 10–40)
BACTERIA #/AREA URNS HPF: NEGATIVE /HPF
BASOPHILS # BLD AUTO: 0.02 K/UL (ref 0–0.2)
BASOPHILS # BLD AUTO: 0.03 K/UL (ref 0–0.2)
BASOPHILS NFR BLD: 0.1 % (ref 0–1.9)
BASOPHILS NFR BLD: 0.2 % (ref 0–1.9)
BILIRUB SERPL-MCNC: 2.1 MG/DL (ref 0.1–1)
BILIRUB UR QL STRIP: ABNORMAL
BLD GP AB SCN CELLS X3 SERPL QL: NORMAL
BLD PROD TYP BPU: NORMAL
BLD PROD TYP BPU: NORMAL
BLOOD UNIT EXPIRATION DATE: NORMAL
BLOOD UNIT EXPIRATION DATE: NORMAL
BLOOD UNIT TYPE CODE: 6200
BLOOD UNIT TYPE CODE: 6200
BLOOD UNIT TYPE: NORMAL
BLOOD UNIT TYPE: NORMAL
BNP SERPL-MCNC: 200 PG/ML (ref 0–99)
BUN SERPL-MCNC: 20 MG/DL (ref 8–23)
CALCIUM SERPL-MCNC: 8.4 MG/DL (ref 8.7–10.5)
CHLORIDE SERPL-SCNC: 99 MMOL/L (ref 95–110)
CLARITY UR: CLEAR
CO2 SERPL-SCNC: 25 MMOL/L (ref 23–29)
CODING SYSTEM: NORMAL
CODING SYSTEM: NORMAL
COLOR UR: ABNORMAL
CREAT SERPL-MCNC: 0.9 MG/DL (ref 0.5–1.4)
DAT IGG-SP REAG RBC-IMP: NORMAL
DIFFERENTIAL METHOD: ABNORMAL
DIFFERENTIAL METHOD: ABNORMAL
DISPENSE STATUS: NORMAL
DISPENSE STATUS: NORMAL
EOSINOPHIL # BLD AUTO: 0.2 K/UL (ref 0–0.5)
EOSINOPHIL # BLD AUTO: 0.3 K/UL (ref 0–0.5)
EOSINOPHIL NFR BLD: 1.3 % (ref 0–8)
EOSINOPHIL NFR BLD: 1.9 % (ref 0–8)
ERYTHROCYTE [DISTWIDTH] IN BLOOD BY AUTOMATED COUNT: 20.2 % (ref 11.5–14.5)
ERYTHROCYTE [DISTWIDTH] IN BLOOD BY AUTOMATED COUNT: 20.3 % (ref 11.5–14.5)
EST. GFR  (AFRICAN AMERICAN): >60 ML/MIN/1.73 M^2
EST. GFR  (NON AFRICAN AMERICAN): >60 ML/MIN/1.73 M^2
FERRITIN SERPL-MCNC: 5761 NG/ML (ref 20–300)
FOLATE SERPL-MCNC: 8.6 NG/ML (ref 4–24)
GLUCOSE SERPL-MCNC: 120 MG/DL (ref 70–110)
GLUCOSE UR QL STRIP: NEGATIVE
HCT VFR BLD AUTO: 22.4 % (ref 40–54)
HCT VFR BLD AUTO: 24.9 % (ref 40–54)
HGB BLD-MCNC: 6.6 G/DL (ref 14–18)
HGB BLD-MCNC: 7.4 G/DL (ref 14–18)
HGB UR QL STRIP: NEGATIVE
HYALINE CASTS #/AREA URNS LPF: 19 /LPF
IMM GRANULOCYTES # BLD AUTO: 0.25 K/UL (ref 0–0.04)
IMM GRANULOCYTES # BLD AUTO: 0.3 K/UL (ref 0–0.04)
IMM GRANULOCYTES NFR BLD AUTO: 1.7 % (ref 0–0.5)
IMM GRANULOCYTES NFR BLD AUTO: 2.2 % (ref 0–0.5)
INFLUENZA A, MOLECULAR: NEGATIVE
INFLUENZA B, MOLECULAR: NEGATIVE
INR PPP: 2
IRON SERPL-MCNC: 45 UG/DL (ref 45–160)
KETONES UR QL STRIP: NEGATIVE
LACTATE SERPL-SCNC: 2.1 MMOL/L (ref 0.5–1.9)
LDH SERPL L TO P-CCNC: 169 U/L (ref 110–260)
LEUKOCYTE ESTERASE UR QL STRIP: NEGATIVE
LIPASE SERPL-CCNC: 22 U/L (ref 4–60)
LYMPHOCYTES # BLD AUTO: 2 K/UL (ref 1–4.8)
LYMPHOCYTES # BLD AUTO: 2.1 K/UL (ref 1–4.8)
LYMPHOCYTES NFR BLD: 13.4 % (ref 18–48)
LYMPHOCYTES NFR BLD: 15.4 % (ref 18–48)
MCH RBC QN AUTO: 25.6 PG (ref 27–31)
MCH RBC QN AUTO: 25.7 PG (ref 27–31)
MCHC RBC AUTO-ENTMCNC: 29.5 G/DL (ref 32–36)
MCHC RBC AUTO-ENTMCNC: 29.7 G/DL (ref 32–36)
MCV RBC AUTO: 87 FL (ref 82–98)
MCV RBC AUTO: 87 FL (ref 82–98)
MICROSCOPIC COMMENT: ABNORMAL
MONOCYTES # BLD AUTO: 0.3 K/UL (ref 0.3–1)
MONOCYTES # BLD AUTO: 0.3 K/UL (ref 0.3–1)
MONOCYTES NFR BLD: 2.1 % (ref 4–15)
MONOCYTES NFR BLD: 2.2 % (ref 4–15)
NEUTROPHILS # BLD AUTO: 10.7 K/UL (ref 1.8–7.7)
NEUTROPHILS # BLD AUTO: 11.8 K/UL (ref 1.8–7.7)
NEUTROPHILS NFR BLD: 78.9 % (ref 38–73)
NEUTROPHILS NFR BLD: 80.6 % (ref 38–73)
NITRITE UR QL STRIP: NEGATIVE
NRBC BLD-RTO: 1 /100 WBC
NRBC BLD-RTO: 2 /100 WBC
NUM UNITS TRANS PACKED RBC: NORMAL
NUM UNITS TRANS PACKED RBC: NORMAL
OB PNL STL: NEGATIVE
PATH REV BLD -IMP: NORMAL
PH UR STRIP: 6 [PH] (ref 5–8)
PLATELET # BLD AUTO: 51 K/UL (ref 150–350)
PLATELET # BLD AUTO: 66 K/UL (ref 150–350)
PMV BLD AUTO: 12.4 FL (ref 9.2–12.9)
PMV BLD AUTO: 13.2 FL (ref 9.2–12.9)
POTASSIUM SERPL-SCNC: 3.6 MMOL/L (ref 3.5–5.1)
PROCALCITONIN SERPL IA-MCNC: 1.52 NG/ML (ref 0–0.5)
PROT SERPL-MCNC: 5.2 G/DL (ref 6–8.4)
PROT UR QL STRIP: ABNORMAL
PROTHROMBIN TIME: 21.6 SEC (ref 10.6–14.8)
RBC # BLD AUTO: 2.58 M/UL (ref 4.6–6.2)
RBC # BLD AUTO: 2.88 M/UL (ref 4.6–6.2)
RBC #/AREA URNS HPF: 2 /HPF (ref 0–4)
SARS-COV-2 RDRP RESP QL NAA+PROBE: NEGATIVE
SATURATED IRON: ABNORMAL % (ref 20–50)
SODIUM SERPL-SCNC: 137 MMOL/L (ref 136–145)
SP GR UR STRIP: 1.02 (ref 1–1.03)
SPECIMEN SOURCE: NORMAL
SQUAMOUS #/AREA URNS HPF: 3 /HPF
TOTAL IRON BINDING CAPACITY: ABNORMAL UG/DL (ref 250–450)
TRANSFERRIN SERPL-MCNC: <70 MG/DL (ref 200–375)
TROPONIN I SERPL DL<=0.01 NG/ML-MCNC: <0.03 NG/ML
TROPONIN I SERPL DL<=0.01 NG/ML-MCNC: <0.03 NG/ML
URATE SERPL-MCNC: 3.1 MG/DL (ref 3.4–7)
URN SPEC COLLECT METH UR: ABNORMAL
UROBILINOGEN UR STRIP-ACNC: >=8 EU/DL
VIT B12 SERPL-MCNC: 937 PG/ML (ref 210–950)
WBC # BLD AUTO: 13.61 K/UL (ref 3.9–12.7)
WBC # BLD AUTO: 14.68 K/UL (ref 3.9–12.7)
WBC #/AREA URNS HPF: 2 /HPF (ref 0–5)

## 2020-11-09 PROCEDURE — 84550 ASSAY OF BLOOD/URIC ACID: CPT

## 2020-11-09 PROCEDURE — 93010 EKG 12-LEAD: ICD-10-PCS | Mod: ,,, | Performed by: INTERNAL MEDICINE

## 2020-11-09 PROCEDURE — 36430 TRANSFUSION BLD/BLD COMPNT: CPT

## 2020-11-09 PROCEDURE — 25000003 PHARM REV CODE 250: Performed by: EMERGENCY MEDICINE

## 2020-11-09 PROCEDURE — 85610 PROTHROMBIN TIME: CPT

## 2020-11-09 PROCEDURE — U0002 COVID-19 LAB TEST NON-CDC: HCPCS

## 2020-11-09 PROCEDURE — 87502 INFLUENZA DNA AMP PROBE: CPT

## 2020-11-09 PROCEDURE — 82607 VITAMIN B-12: CPT

## 2020-11-09 PROCEDURE — 83605 ASSAY OF LACTIC ACID: CPT

## 2020-11-09 PROCEDURE — 12000002 HC ACUTE/MED SURGE SEMI-PRIVATE ROOM

## 2020-11-09 PROCEDURE — 82272 OCCULT BLD FECES 1-3 TESTS: CPT

## 2020-11-09 PROCEDURE — 83540 ASSAY OF IRON: CPT

## 2020-11-09 PROCEDURE — 86920 COMPATIBILITY TEST SPIN: CPT

## 2020-11-09 PROCEDURE — 84145 PROCALCITONIN (PCT): CPT

## 2020-11-09 PROCEDURE — 99233 SBSQ HOSP IP/OBS HIGH 50: CPT | Mod: ,,, | Performed by: PHYSICIAN ASSISTANT

## 2020-11-09 PROCEDURE — 82728 ASSAY OF FERRITIN: CPT

## 2020-11-09 PROCEDURE — 99233 PR SUBSEQUENT HOSPITAL CARE,LEVL III: ICD-10-PCS | Mod: ,,, | Performed by: PHYSICIAN ASSISTANT

## 2020-11-09 PROCEDURE — 85730 THROMBOPLASTIN TIME PARTIAL: CPT

## 2020-11-09 PROCEDURE — 36415 COLL VENOUS BLD VENIPUNCTURE: CPT

## 2020-11-09 PROCEDURE — 83690 ASSAY OF LIPASE: CPT

## 2020-11-09 PROCEDURE — 84484 ASSAY OF TROPONIN QUANT: CPT | Mod: 91

## 2020-11-09 PROCEDURE — 93010 ELECTROCARDIOGRAM REPORT: CPT | Mod: ,,, | Performed by: INTERNAL MEDICINE

## 2020-11-09 PROCEDURE — 99291 CRITICAL CARE FIRST HOUR: CPT

## 2020-11-09 PROCEDURE — 83010 ASSAY OF HAPTOGLOBIN QUANT: CPT

## 2020-11-09 PROCEDURE — 93005 ELECTROCARDIOGRAM TRACING: CPT | Performed by: INTERNAL MEDICINE

## 2020-11-09 PROCEDURE — 82746 ASSAY OF FOLIC ACID SERUM: CPT

## 2020-11-09 PROCEDURE — 83880 ASSAY OF NATRIURETIC PEPTIDE: CPT

## 2020-11-09 PROCEDURE — 80053 COMPREHEN METABOLIC PANEL: CPT

## 2020-11-09 PROCEDURE — 25000003 PHARM REV CODE 250: Performed by: NURSE PRACTITIONER

## 2020-11-09 PROCEDURE — 83615 LACTATE (LD) (LDH) ENZYME: CPT

## 2020-11-09 PROCEDURE — 86900 BLOOD TYPING SEROLOGIC ABO: CPT

## 2020-11-09 PROCEDURE — P9016 RBC LEUKOCYTES REDUCED: HCPCS

## 2020-11-09 PROCEDURE — 25500020 PHARM REV CODE 255: Performed by: INTERNAL MEDICINE

## 2020-11-09 PROCEDURE — 86880 COOMBS TEST DIRECT: CPT

## 2020-11-09 PROCEDURE — 85025 COMPLETE CBC W/AUTO DIFF WBC: CPT

## 2020-11-09 PROCEDURE — 81001 URINALYSIS AUTO W/SCOPE: CPT

## 2020-11-09 PROCEDURE — 84484 ASSAY OF TROPONIN QUANT: CPT

## 2020-11-09 PROCEDURE — 87040 BLOOD CULTURE FOR BACTERIA: CPT

## 2020-11-09 RX ORDER — CETIRIZINE HYDROCHLORIDE 10 MG/1
10 TABLET ORAL DAILY
Status: DISCONTINUED | OUTPATIENT
Start: 2020-11-09 | End: 2020-11-13 | Stop reason: HOSPADM

## 2020-11-09 RX ORDER — MAGNESIUM SULFATE HEPTAHYDRATE 40 MG/ML
2 INJECTION, SOLUTION INTRAVENOUS
Status: DISCONTINUED | OUTPATIENT
Start: 2020-11-09 | End: 2020-11-13 | Stop reason: HOSPADM

## 2020-11-09 RX ORDER — MAGNESIUM SULFATE HEPTAHYDRATE 40 MG/ML
4 INJECTION, SOLUTION INTRAVENOUS
Status: DISCONTINUED | OUTPATIENT
Start: 2020-11-09 | End: 2020-11-13 | Stop reason: HOSPADM

## 2020-11-09 RX ORDER — POTASSIUM CHLORIDE 20 MEQ/1
20 TABLET, EXTENDED RELEASE ORAL
Status: DISCONTINUED | OUTPATIENT
Start: 2020-11-09 | End: 2020-11-13 | Stop reason: HOSPADM

## 2020-11-09 RX ORDER — AMOXICILLIN 250 MG
1 CAPSULE ORAL 2 TIMES DAILY
Status: DISCONTINUED | OUTPATIENT
Start: 2020-11-09 | End: 2020-11-13 | Stop reason: HOSPADM

## 2020-11-09 RX ORDER — MAGNESIUM SULFATE 1 G/100ML
1 INJECTION INTRAVENOUS
Status: DISCONTINUED | OUTPATIENT
Start: 2020-11-09 | End: 2020-11-13 | Stop reason: HOSPADM

## 2020-11-09 RX ORDER — POTASSIUM CHLORIDE 7.45 MG/ML
40 INJECTION INTRAVENOUS
Status: DISCONTINUED | OUTPATIENT
Start: 2020-11-09 | End: 2020-11-13 | Stop reason: HOSPADM

## 2020-11-09 RX ORDER — POTASSIUM CHLORIDE 7.45 MG/ML
20 INJECTION INTRAVENOUS
Status: DISCONTINUED | OUTPATIENT
Start: 2020-11-09 | End: 2020-11-13 | Stop reason: HOSPADM

## 2020-11-09 RX ORDER — LANOLIN ALCOHOL/MO/W.PET/CERES
800 CREAM (GRAM) TOPICAL
Status: DISCONTINUED | OUTPATIENT
Start: 2020-11-09 | End: 2020-11-13 | Stop reason: HOSPADM

## 2020-11-09 RX ORDER — POTASSIUM CHLORIDE 20 MEQ/1
40 TABLET, EXTENDED RELEASE ORAL
Status: DISCONTINUED | OUTPATIENT
Start: 2020-11-09 | End: 2020-11-13 | Stop reason: HOSPADM

## 2020-11-09 RX ORDER — ONDANSETRON 2 MG/ML
4 INJECTION INTRAMUSCULAR; INTRAVENOUS EVERY 8 HOURS PRN
Status: DISCONTINUED | OUTPATIENT
Start: 2020-11-09 | End: 2020-11-13 | Stop reason: HOSPADM

## 2020-11-09 RX ORDER — ACETAMINOPHEN 325 MG/1
650 TABLET ORAL EVERY 4 HOURS PRN
Status: DISCONTINUED | OUTPATIENT
Start: 2020-11-09 | End: 2020-11-13 | Stop reason: HOSPADM

## 2020-11-09 RX ORDER — SODIUM CHLORIDE 0.9 % (FLUSH) 0.9 %
10 SYRINGE (ML) INJECTION
Status: DISCONTINUED | OUTPATIENT
Start: 2020-11-09 | End: 2020-11-13 | Stop reason: HOSPADM

## 2020-11-09 RX ORDER — TALC
6 POWDER (GRAM) TOPICAL NIGHTLY PRN
Status: DISCONTINUED | OUTPATIENT
Start: 2020-11-09 | End: 2020-11-13 | Stop reason: HOSPADM

## 2020-11-09 RX ORDER — POLYETHYLENE GLYCOL 3350 17 G/17G
17 POWDER, FOR SOLUTION ORAL 2 TIMES DAILY PRN
Status: DISCONTINUED | OUTPATIENT
Start: 2020-11-09 | End: 2020-11-13 | Stop reason: HOSPADM

## 2020-11-09 RX ORDER — HYDROCODONE BITARTRATE AND ACETAMINOPHEN 500; 5 MG/1; MG/1
TABLET ORAL
Status: DISCONTINUED | OUTPATIENT
Start: 2020-11-09 | End: 2020-11-13 | Stop reason: HOSPADM

## 2020-11-09 RX ADMIN — SODIUM CHLORIDE 2178 ML: 0.9 INJECTION, SOLUTION INTRAVENOUS at 12:11

## 2020-11-09 RX ADMIN — CETIRIZINE HYDROCHLORIDE 10 MG: 10 TABLET, FILM COATED ORAL at 03:11

## 2020-11-09 RX ADMIN — POTASSIUM CHLORIDE 40 MEQ: 20 TABLET, EXTENDED RELEASE ORAL at 03:11

## 2020-11-09 RX ADMIN — IOHEXOL 100 ML: 350 INJECTION, SOLUTION INTRAVENOUS at 10:11
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
## 2020-11-10 LAB
ANION GAP SERPL CALC-SCNC: 13 MMOL/L (ref 8–16)
ANISOCYTOSIS BLD QL SMEAR: SLIGHT
BASOPHILS # BLD AUTO: 0.02 K/UL (ref 0–0.2)
BASOPHILS NFR BLD: 0.2 % (ref 0–1.9)
BUN SERPL-MCNC: 20 MG/DL (ref 8–23)
CALCIUM SERPL-MCNC: 8.3 MG/DL (ref 8.7–10.5)
CHLORIDE SERPL-SCNC: 104 MMOL/L (ref 95–110)
CO2 SERPL-SCNC: 21 MMOL/L (ref 23–29)
CREAT SERPL-MCNC: 0.8 MG/DL (ref 0.5–1.4)
DIFFERENTIAL METHOD: ABNORMAL
EOSINOPHIL # BLD AUTO: 0.2 K/UL (ref 0–0.5)
EOSINOPHIL NFR BLD: 2 % (ref 0–8)
ERYTHROCYTE [DISTWIDTH] IN BLOOD BY AUTOMATED COUNT: 19.8 % (ref 11.5–14.5)
EST. GFR  (AFRICAN AMERICAN): >60 ML/MIN/1.73 M^2
EST. GFR  (NON AFRICAN AMERICAN): >60 ML/MIN/1.73 M^2
GLUCOSE SERPL-MCNC: 135 MG/DL (ref 70–110)
HCT VFR BLD AUTO: 28.6 % (ref 40–54)
HGB BLD-MCNC: 8.9 G/DL (ref 14–18)
IMM GRANULOCYTES # BLD AUTO: 0.24 K/UL (ref 0–0.04)
IMM GRANULOCYTES NFR BLD AUTO: 2.3 % (ref 0–0.5)
LACTATE SERPL-SCNC: 2.1 MMOL/L (ref 0.5–1.9)
LYMPHOCYTES # BLD AUTO: 1 K/UL (ref 1–4.8)
LYMPHOCYTES NFR BLD: 9.6 % (ref 18–48)
MAGNESIUM SERPL-MCNC: 1.8 MG/DL (ref 1.6–2.6)
MCH RBC QN AUTO: 26.8 PG (ref 27–31)
MCHC RBC AUTO-ENTMCNC: 31.1 G/DL (ref 32–36)
MCV RBC AUTO: 86 FL (ref 82–98)
MONOCYTES # BLD AUTO: 0.2 K/UL (ref 0.3–1)
MONOCYTES NFR BLD: 1.9 % (ref 4–15)
NEUTROPHILS # BLD AUTO: 8.8 K/UL (ref 1.8–7.7)
NEUTROPHILS NFR BLD: 84 % (ref 38–73)
NRBC BLD-RTO: 2 /100 WBC
PLATELET # BLD AUTO: 53 K/UL (ref 150–350)
PMV BLD AUTO: ABNORMAL FL (ref 9.2–12.9)
POTASSIUM SERPL-SCNC: 4.1 MMOL/L (ref 3.5–5.1)
RBC # BLD AUTO: 3.32 M/UL (ref 4.6–6.2)
SODIUM SERPL-SCNC: 138 MMOL/L (ref 136–145)
TROPONIN I SERPL DL<=0.01 NG/ML-MCNC: <0.03 NG/ML
WBC # BLD AUTO: 10.43 K/UL (ref 3.9–12.7)

## 2020-11-10 PROCEDURE — 80048 BASIC METABOLIC PNL TOTAL CA: CPT

## 2020-11-10 PROCEDURE — 85025 COMPLETE CBC W/AUTO DIFF WBC: CPT

## 2020-11-10 PROCEDURE — 63600175 PHARM REV CODE 636 W HCPCS: Performed by: INTERNAL MEDICINE

## 2020-11-10 PROCEDURE — 83605 ASSAY OF LACTIC ACID: CPT

## 2020-11-10 PROCEDURE — 12000002 HC ACUTE/MED SURGE SEMI-PRIVATE ROOM

## 2020-11-10 PROCEDURE — 84484 ASSAY OF TROPONIN QUANT: CPT

## 2020-11-10 PROCEDURE — 99232 PR SUBSEQUENT HOSPITAL CARE,LEVL II: ICD-10-PCS | Mod: ,,, | Performed by: PHYSICIAN ASSISTANT

## 2020-11-10 PROCEDURE — 25000003 PHARM REV CODE 250: Performed by: NURSE PRACTITIONER

## 2020-11-10 PROCEDURE — 83735 ASSAY OF MAGNESIUM: CPT

## 2020-11-10 PROCEDURE — 99232 SBSQ HOSP IP/OBS MODERATE 35: CPT | Mod: ,,, | Performed by: PHYSICIAN ASSISTANT

## 2020-11-10 PROCEDURE — 36415 COLL VENOUS BLD VENIPUNCTURE: CPT

## 2020-11-10 RX ADMIN — SENNOSIDES AND DOCUSATE SODIUM 1 TABLET: 8.6; 5 TABLET ORAL at 10:11

## 2020-11-10 RX ADMIN — MAGNESIUM OXIDE 800 MG: 400 TABLET ORAL at 10:11

## 2020-11-10 RX ADMIN — PIPERACILLIN SODIUM AND TAZOBACTAM SODIUM 3.38 G: 3; .375 INJECTION, POWDER, LYOPHILIZED, FOR SOLUTION INTRAVENOUS at 05:11

## 2020-11-10 RX ADMIN — CETIRIZINE HYDROCHLORIDE 10 MG: 10 TABLET, FILM COATED ORAL at 10:11

## 2020-11-10 RX ADMIN — SENNOSIDES AND DOCUSATE SODIUM 1 TABLET: 8.6; 5 TABLET ORAL at 08:11

## 2020-11-10 RX ADMIN — PIPERACILLIN SODIUM AND TAZOBACTAM SODIUM 3.38 G: 3; .375 INJECTION, POWDER, LYOPHILIZED, FOR SOLUTION INTRAVENOUS at 10:11

## 2020-11-11 LAB
ANION GAP SERPL CALC-SCNC: 10 MMOL/L (ref 8–16)
BUN SERPL-MCNC: 20 MG/DL (ref 8–23)
CALCIUM SERPL-MCNC: 8.3 MG/DL (ref 8.7–10.5)
CHLORIDE SERPL-SCNC: 107 MMOL/L (ref 95–110)
CO2 SERPL-SCNC: 23 MMOL/L (ref 23–29)
CREAT SERPL-MCNC: 0.7 MG/DL (ref 0.5–1.4)
EST. GFR  (AFRICAN AMERICAN): >60 ML/MIN/1.73 M^2
EST. GFR  (NON AFRICAN AMERICAN): >60 ML/MIN/1.73 M^2
GLUCOSE SERPL-MCNC: 105 MG/DL (ref 70–110)
HAPTOGLOB SERPL-MCNC: 466 MG/DL (ref 32–363)
MAGNESIUM SERPL-MCNC: 1.9 MG/DL (ref 1.6–2.6)
POTASSIUM SERPL-SCNC: 4.2 MMOL/L (ref 3.5–5.1)
SODIUM SERPL-SCNC: 140 MMOL/L (ref 136–145)

## 2020-11-11 PROCEDURE — 83735 ASSAY OF MAGNESIUM: CPT

## 2020-11-11 PROCEDURE — 25000003 PHARM REV CODE 250: Performed by: NURSE PRACTITIONER

## 2020-11-11 PROCEDURE — 12000002 HC ACUTE/MED SURGE SEMI-PRIVATE ROOM

## 2020-11-11 PROCEDURE — 63600175 PHARM REV CODE 636 W HCPCS: Performed by: INTERNAL MEDICINE

## 2020-11-11 PROCEDURE — 36415 COLL VENOUS BLD VENIPUNCTURE: CPT

## 2020-11-11 PROCEDURE — 80048 BASIC METABOLIC PNL TOTAL CA: CPT

## 2020-11-11 RX ADMIN — PIPERACILLIN SODIUM AND TAZOBACTAM SODIUM 3.38 G: 3; .375 INJECTION, POWDER, LYOPHILIZED, FOR SOLUTION INTRAVENOUS at 01:11

## 2020-11-11 RX ADMIN — SENNOSIDES AND DOCUSATE SODIUM 1 TABLET: 8.6; 5 TABLET ORAL at 09:11

## 2020-11-11 RX ADMIN — SENNOSIDES AND DOCUSATE SODIUM 1 TABLET: 8.6; 5 TABLET ORAL at 10:11

## 2020-11-11 RX ADMIN — PIPERACILLIN SODIUM AND TAZOBACTAM SODIUM 3.38 G: 3; .375 INJECTION, POWDER, LYOPHILIZED, FOR SOLUTION INTRAVENOUS at 10:11

## 2020-11-11 RX ADMIN — CETIRIZINE HYDROCHLORIDE 10 MG: 10 TABLET, FILM COATED ORAL at 10:11

## 2020-11-11 RX ADMIN — PIPERACILLIN SODIUM AND TAZOBACTAM SODIUM 3.38 G: 3; .375 INJECTION, POWDER, LYOPHILIZED, FOR SOLUTION INTRAVENOUS at 05:11

## 2020-11-12 LAB
ANION GAP SERPL CALC-SCNC: 11 MMOL/L (ref 8–16)
BUN SERPL-MCNC: 17 MG/DL (ref 8–23)
CALCIUM SERPL-MCNC: 8.7 MG/DL (ref 8.7–10.5)
CHLORIDE SERPL-SCNC: 106 MMOL/L (ref 95–110)
CO2 SERPL-SCNC: 24 MMOL/L (ref 23–29)
CREAT SERPL-MCNC: 0.7 MG/DL (ref 0.5–1.4)
EST. GFR  (AFRICAN AMERICAN): >60 ML/MIN/1.73 M^2
EST. GFR  (NON AFRICAN AMERICAN): >60 ML/MIN/1.73 M^2
GLUCOSE SERPL-MCNC: 148 MG/DL (ref 70–110)
MAGNESIUM SERPL-MCNC: 1.9 MG/DL (ref 1.6–2.6)
POTASSIUM SERPL-SCNC: 4.2 MMOL/L (ref 3.5–5.1)
SODIUM SERPL-SCNC: 141 MMOL/L (ref 136–145)

## 2020-11-12 PROCEDURE — 25000003 PHARM REV CODE 250: Performed by: NURSE PRACTITIONER

## 2020-11-12 PROCEDURE — 36415 COLL VENOUS BLD VENIPUNCTURE: CPT

## 2020-11-12 PROCEDURE — 80048 BASIC METABOLIC PNL TOTAL CA: CPT

## 2020-11-12 PROCEDURE — 12000002 HC ACUTE/MED SURGE SEMI-PRIVATE ROOM

## 2020-11-12 PROCEDURE — 63600175 PHARM REV CODE 636 W HCPCS: Performed by: INTERNAL MEDICINE

## 2020-11-12 PROCEDURE — 83735 ASSAY OF MAGNESIUM: CPT

## 2020-11-12 RX ADMIN — PIPERACILLIN SODIUM AND TAZOBACTAM SODIUM 3.38 G: 3; .375 INJECTION, POWDER, LYOPHILIZED, FOR SOLUTION INTRAVENOUS at 08:11

## 2020-11-12 RX ADMIN — SENNOSIDES AND DOCUSATE SODIUM 1 TABLET: 8.6; 5 TABLET ORAL at 09:11

## 2020-11-12 RX ADMIN — SENNOSIDES AND DOCUSATE SODIUM 1 TABLET: 8.6; 5 TABLET ORAL at 08:11

## 2020-11-12 RX ADMIN — PIPERACILLIN SODIUM AND TAZOBACTAM SODIUM 3.38 G: 3; .375 INJECTION, POWDER, LYOPHILIZED, FOR SOLUTION INTRAVENOUS at 01:11

## 2020-11-12 RX ADMIN — CETIRIZINE HYDROCHLORIDE 10 MG: 10 TABLET, FILM COATED ORAL at 09:11

## 2020-11-13 ENCOUNTER — TELEPHONE (OUTPATIENT)
Dept: HEMATOLOGY/ONCOLOGY | Facility: CLINIC | Age: 60
End: 2020-11-13

## 2020-11-13 VITALS
BODY MASS INDEX: 23.41 KG/M2 | TEMPERATURE: 98 F | RESPIRATION RATE: 18 BRPM | SYSTOLIC BLOOD PRESSURE: 112 MMHG | HEIGHT: 69 IN | WEIGHT: 158.06 LBS | HEART RATE: 86 BPM | DIASTOLIC BLOOD PRESSURE: 70 MMHG | OXYGEN SATURATION: 96 %

## 2020-11-13 PROBLEM — E87.20 LACTIC ACIDOSIS: Status: RESOLVED | Noted: 2020-11-09 | Resolved: 2020-11-13

## 2020-11-13 PROBLEM — D64.9 SYMPTOMATIC ANEMIA: Status: RESOLVED | Noted: 2020-11-09 | Resolved: 2020-11-13

## 2020-11-13 PROBLEM — D72.829 LEUKOCYTOSIS: Status: RESOLVED | Noted: 2020-11-09 | Resolved: 2020-11-13

## 2020-11-13 LAB
ANION GAP SERPL CALC-SCNC: 10 MMOL/L (ref 8–16)
BASOPHILS # BLD AUTO: 0.03 K/UL (ref 0–0.2)
BASOPHILS NFR BLD: 0.2 % (ref 0–1.9)
BUN SERPL-MCNC: 17 MG/DL (ref 8–23)
CALCIUM SERPL-MCNC: 9.2 MG/DL (ref 8.7–10.5)
CHLORIDE SERPL-SCNC: 103 MMOL/L (ref 95–110)
CO2 SERPL-SCNC: 26 MMOL/L (ref 23–29)
CREAT SERPL-MCNC: 0.8 MG/DL (ref 0.5–1.4)
DIFFERENTIAL METHOD: ABNORMAL
EOSINOPHIL # BLD AUTO: 0.3 K/UL (ref 0–0.5)
EOSINOPHIL NFR BLD: 2.4 % (ref 0–8)
ERYTHROCYTE [DISTWIDTH] IN BLOOD BY AUTOMATED COUNT: 20.4 % (ref 11.5–14.5)
EST. GFR  (AFRICAN AMERICAN): >60 ML/MIN/1.73 M^2
EST. GFR  (NON AFRICAN AMERICAN): >60 ML/MIN/1.73 M^2
GLUCOSE SERPL-MCNC: 112 MG/DL (ref 70–110)
HCT VFR BLD AUTO: 28 % (ref 40–54)
HGB BLD-MCNC: 8.4 G/DL (ref 14–18)
IMM GRANULOCYTES # BLD AUTO: 0.21 K/UL (ref 0–0.04)
IMM GRANULOCYTES NFR BLD AUTO: 1.7 % (ref 0–0.5)
LYMPHOCYTES # BLD AUTO: 2.9 K/UL (ref 1–4.8)
LYMPHOCYTES NFR BLD: 22.9 % (ref 18–48)
MAGNESIUM SERPL-MCNC: 1.7 MG/DL (ref 1.6–2.6)
MCH RBC QN AUTO: 26.1 PG (ref 27–31)
MCHC RBC AUTO-ENTMCNC: 30 G/DL (ref 32–36)
MCV RBC AUTO: 87 FL (ref 82–98)
MONOCYTES # BLD AUTO: 0.4 K/UL (ref 0.3–1)
MONOCYTES NFR BLD: 2.8 % (ref 4–15)
NEUTROPHILS # BLD AUTO: 8.9 K/UL (ref 1.8–7.7)
NEUTROPHILS NFR BLD: 70 % (ref 38–73)
NRBC BLD-RTO: 1 /100 WBC
PLATELET # BLD AUTO: 41 K/UL (ref 150–350)
PMV BLD AUTO: ABNORMAL FL (ref 9.2–12.9)
POTASSIUM SERPL-SCNC: 3.5 MMOL/L (ref 3.5–5.1)
RBC # BLD AUTO: 3.22 M/UL (ref 4.6–6.2)
SODIUM SERPL-SCNC: 139 MMOL/L (ref 136–145)
WBC # BLD AUTO: 12.67 K/UL (ref 3.9–12.7)

## 2020-11-13 PROCEDURE — 97116 GAIT TRAINING THERAPY: CPT

## 2020-11-13 PROCEDURE — 25000003 PHARM REV CODE 250: Performed by: RADIOLOGY

## 2020-11-13 PROCEDURE — 25000003 PHARM REV CODE 250: Performed by: NURSE PRACTITIONER

## 2020-11-13 PROCEDURE — 63600175 PHARM REV CODE 636 W HCPCS: Performed by: INTERNAL MEDICINE

## 2020-11-13 PROCEDURE — 80048 BASIC METABOLIC PNL TOTAL CA: CPT

## 2020-11-13 PROCEDURE — 36415 COLL VENOUS BLD VENIPUNCTURE: CPT

## 2020-11-13 PROCEDURE — 85025 COMPLETE CBC W/AUTO DIFF WBC: CPT

## 2020-11-13 PROCEDURE — 63600175 PHARM REV CODE 636 W HCPCS: Performed by: RADIOLOGY

## 2020-11-13 PROCEDURE — 83735 ASSAY OF MAGNESIUM: CPT

## 2020-11-13 PROCEDURE — 97162 PT EVAL MOD COMPLEX 30 MIN: CPT

## 2020-11-13 RX ORDER — SODIUM CHLORIDE 9 MG/ML
INJECTION, SOLUTION INTRAVENOUS
Status: COMPLETED | OUTPATIENT
Start: 2020-11-13 | End: 2020-11-13

## 2020-11-13 RX ORDER — AMOXICILLIN AND CLAVULANATE POTASSIUM 875; 125 MG/1; MG/1
1 TABLET, FILM COATED ORAL EVERY 12 HOURS
Qty: 8 TABLET | Refills: 0 | Status: SHIPPED | OUTPATIENT
Start: 2020-11-13 | End: 2020-11-17

## 2020-11-13 RX ORDER — LIDOCAINE HYDROCHLORIDE 10 MG/ML
INJECTION INFILTRATION; PERINEURAL CODE/TRAUMA/SEDATION MEDICATION
Status: COMPLETED | OUTPATIENT
Start: 2020-11-13 | End: 2020-11-13

## 2020-11-13 RX ORDER — MIDAZOLAM HYDROCHLORIDE 1 MG/ML
INJECTION INTRAMUSCULAR; INTRAVENOUS CODE/TRAUMA/SEDATION MEDICATION
Status: COMPLETED | OUTPATIENT
Start: 2020-11-13 | End: 2020-11-13

## 2020-11-13 RX ORDER — FENTANYL CITRATE 50 UG/ML
INJECTION, SOLUTION INTRAMUSCULAR; INTRAVENOUS CODE/TRAUMA/SEDATION MEDICATION
Status: COMPLETED | OUTPATIENT
Start: 2020-11-13 | End: 2020-11-13

## 2020-11-13 RX ADMIN — MIDAZOLAM HYDROCHLORIDE 1 MG: 1 INJECTION, SOLUTION INTRAMUSCULAR; INTRAVENOUS at 10:11

## 2020-11-13 RX ADMIN — SODIUM CHLORIDE 250 ML/HR: 9 INJECTION, SOLUTION INTRAVENOUS at 10:11

## 2020-11-13 RX ADMIN — PIPERACILLIN SODIUM AND TAZOBACTAM SODIUM 3.38 G: 3; .375 INJECTION, POWDER, LYOPHILIZED, FOR SOLUTION INTRAVENOUS at 05:11

## 2020-11-13 RX ADMIN — SENNOSIDES AND DOCUSATE SODIUM 1 TABLET: 8.6; 5 TABLET ORAL at 10:11

## 2020-11-13 RX ADMIN — LIDOCAINE HYDROCHLORIDE 9 ML: 10 INJECTION, SOLUTION INFILTRATION; PERINEURAL at 10:11

## 2020-11-13 RX ADMIN — CETIRIZINE HYDROCHLORIDE 10 MG: 10 TABLET, FILM COATED ORAL at 10:11

## 2020-11-13 RX ADMIN — FENTANYL CITRATE 25 MCG: 50 INJECTION INTRAMUSCULAR; INTRAVENOUS at 10:11

## 2020-11-14 LAB
BACTERIA BLD CULT: NORMAL
BACTERIA BLD CULT: NORMAL

## 2020-11-16 ENCOUNTER — TELEPHONE (OUTPATIENT)
Dept: HEMATOLOGY/ONCOLOGY | Facility: CLINIC | Age: 60
End: 2020-11-16

## 2020-11-16 ENCOUNTER — OFFICE VISIT (OUTPATIENT)
Dept: HEMATOLOGY/ONCOLOGY | Facility: CLINIC | Age: 60
End: 2020-11-16
Payer: MEDICAID

## 2020-11-16 VITALS
DIASTOLIC BLOOD PRESSURE: 52 MMHG | WEIGHT: 158.94 LBS | RESPIRATION RATE: 18 BRPM | TEMPERATURE: 97 F | BODY MASS INDEX: 23.47 KG/M2 | SYSTOLIC BLOOD PRESSURE: 76 MMHG | OXYGEN SATURATION: 96 % | HEART RATE: 101 BPM

## 2020-11-16 DIAGNOSIS — D64.9 ANEMIA, UNSPECIFIED TYPE: ICD-10-CM

## 2020-11-16 DIAGNOSIS — D69.6 THROMBOCYTOPENIA: ICD-10-CM

## 2020-11-16 DIAGNOSIS — R53.1 WEAKNESS: ICD-10-CM

## 2020-11-16 DIAGNOSIS — C81.18 NODULAR SCLEROSIS HODGKIN LYMPHOMA OF LYMPH NODES OF MULTIPLE REGIONS: Primary | ICD-10-CM

## 2020-11-16 DIAGNOSIS — R41.0 CONFUSION: ICD-10-CM

## 2020-11-16 DIAGNOSIS — Z09 HOSPITAL DISCHARGE FOLLOW-UP: ICD-10-CM

## 2020-11-16 DIAGNOSIS — I95.9 HYPOTENSIVE EPISODE: ICD-10-CM

## 2020-11-16 PROCEDURE — 99999 PR PBB SHADOW E&M-EST. PATIENT-LVL III: ICD-10-PCS | Mod: PBBFAC,,, | Performed by: INTERNAL MEDICINE

## 2020-11-16 PROCEDURE — 99215 OFFICE O/P EST HI 40 MIN: CPT | Mod: S$PBB,,, | Performed by: INTERNAL MEDICINE

## 2020-11-16 PROCEDURE — 94760 N-INVAS EAR/PLS OXIMETRY 1: CPT | Mod: PBBFAC,PO | Performed by: INTERNAL MEDICINE

## 2020-11-16 PROCEDURE — 99215 PR OFFICE/OUTPT VISIT, EST, LEVL V, 40-54 MIN: ICD-10-PCS | Mod: S$PBB,,, | Performed by: INTERNAL MEDICINE

## 2020-11-16 PROCEDURE — 99213 OFFICE O/P EST LOW 20 MIN: CPT | Mod: PBBFAC,PO,25 | Performed by: INTERNAL MEDICINE

## 2020-11-16 PROCEDURE — 99999 PR PBB SHADOW E&M-EST. PATIENT-LVL III: CPT | Mod: PBBFAC,,, | Performed by: INTERNAL MEDICINE

## 2020-11-16 RX ORDER — FOLIC ACID 1 MG/1
1 TABLET ORAL DAILY
Qty: 30 TABLET | Refills: 11 | Status: SHIPPED | OUTPATIENT
Start: 2020-11-16 | End: 2021-11-16

## 2020-11-16 RX ORDER — PYRIDOXINE HCL (VITAMIN B6) 100 MG
100 TABLET ORAL DAILY
Qty: 90 TABLET | Refills: 3 | Status: SHIPPED | OUTPATIENT
Start: 2020-11-16 | End: 2021-11-16

## 2020-11-16 RX ORDER — LANOLIN ALCOHOL/MO/W.PET/CERES
100 CREAM (GRAM) TOPICAL DAILY
Qty: 60 TABLET | Refills: 1 | Status: SHIPPED | OUTPATIENT
Start: 2020-11-16

## 2020-11-16 RX ORDER — CYANOCOBALAMIN (VITAMIN B-12) 1000MCG/15
1000 LIQUID (ML) ORAL DAILY
Qty: 240 ML | Refills: 3 | Status: SHIPPED | OUTPATIENT
Start: 2020-11-16

## 2020-11-16 NOTE — TELEPHONE ENCOUNTER
Pt and friend Jessica confirmed pt's MRI appt tomorrow, labs, and that Home Health and this office would be in contact shortly to setup fu ov with Dr. Rich. Reviewed AVS and gave pt hard copy. Pt and caregiver has no further questions or concerns. This RN reached out to Angela Singh via TEAMS to authorize MRI at request of Charge Nurse JESSICA Tubbs.

## 2020-11-16 NOTE — PROGRESS NOTES
Hospital F/U         Heriberto Keys is a 60 y.o.    This is a 60 yr old gentleman here for Hospital F/U for neutropenic fever     F/U of Hodgkins disease He received ABVD cycle 1 while hospitalized at [facility] in past. He had neurological presentation with dizziness and confusion yet LP negative for CSF involvement. He received levaquin for sinusitis and his mentation improved. CHemo did cause severe marrow suppression requiring GF. Pt had chemo in the hospital cycle 1 ABVD Bone marrow involvement was noted December 6 2019.  Post IV iron and procrit in the past as well. Entered remission   He had been on carvidolol with lasix for HTN.      March 6 2020 here for pet ct results which show remission   Had chemo 2- here to check labs : in ho     March 31, 2020 follow-up on counts Hodgkin's is in remission he is extremely weak trying to make himself eat weight loss is stabilizing he is tolerating medication to help such as Megace and remains on midodrine to help with hypotension which I suspect is was due to adrenal insufficiency from long-term use of steroids that were given with his chemotherapy    Pt presented to [facility] ER November 9 2020  with complaints of weakness today he is here   All chart records reviewed      Hospital Course:   60-year-old male with known past medical history of Hodgkin's lymphoma in remission, history of chemo induced neutropenia, thrombocytopenia, admitted 11/09/2020 with a diagnosis of symptomatic anemia, pancytopenia, elevated INR, leukocytosis, mild lactic acidosis and confusion. Patient was admitted to med surge unit.  Duong saw patient and case discussed He was transfused 2 unit of packed red blood cells on this admission.  Lactic acid 2.1.  Pro callus slightly elevated.  Zosyn was added to his regimen of treatment.  Blood cultures so far negative,Ultrasound of the liver was done due to thrombocytopenia and elevated INR which showed cholelithiasis without any evidence of acute  cholecystitis.  CT of the chest abdomen pelvis were done which showed no new osseous abnormality or increase in lymphadenopathy.  Bone marrow biopsy was recommended.  Bone marrow biopsy performed  2020 with IR.  On 2020, patient mentation cleared.  He was placed on Augmentin prior to such  He is not being compliant with midodrine . He fell in the hospital and has pain on his right buttocks     Past Medical History:   Diagnosis Date    Anemia     Depression     Encounter for blood transfusion     [place] (hard of hearing)     Lymphoma     Lymphoma     Lymphoma      Past Surgical History:   Procedure Laterality Date    BONE MARROW ASPIRATION Left 2019    Procedure: ASPIRATION, BONE MARROW;  Surgeon: Nancy Diagnostic Provider;  Location: [facility];  Service: General;  Laterality: Left;    LYMPHADENECTOMY Bilateral        Current Outpatient Medications:     amoxicillin-clavulanate 875-125mg (AUGMENTIN) 875-125 mg per tablet, Take 1 tablet by mouth every 12 (twelve) hours. for 4 days, Disp: 8 tablet, Rfl: 0    cetirizine (ZYRTEC) 10 MG tablet, Take 10 mg by mouth daily as needed for Allergies., Disp: , Rfl:     midodrine (PROAMATINE) 5 MG Tab, TAKE 1 TABLET (5 MG TOTAL) BY MOUTH 2 (TWO) TIMES DAILY WITH MEALS., Disp: 60 tablet, Rfl: 1    tobramycin sulfate 0.3% (TOBREX) 0.3 % ophthalmic solution, 3 drops every 4 (four) hours. , Disp: , Rfl:   Review of patient's allergies indicates:  No Known Allergies  Social History     Tobacco Use    Smoking status: Former Smoker     Packs/day: 1.00     Types: Cigarettes     Quit date: 2015     Years since quittin.7    Smokeless tobacco: Current User   Substance Use Topics    Alcohol use: No     Frequency: Never    Drug use: No     No family history on file.  Symptoms of HYPOTENSION continue   CONSTITUTIONAL ++ fevers, no further chills, night sweats, ++ confusion   SKIN: no rashes or itching  ENT: No headaches, head trauma, vision changes,  stable  change in taste   LYMPH NODES: None noticedNeck pain stable : doing physical therapy   CV: No chest pain, palpitations.  No orthopnea or PND  RESP: No dyspnea on exertion, cough,  or hemoptysis  GI: no vomiting and diarrhea   : No dysuria, hematuria, urgency, or frequency   HEME: No easy bruising, bleeding problems  PSYCHIATRIC: No depression, anxiety, no psychosis   NEURO: No seizures,   difficulty sleeping    MSK:   Positive weakness          BP (!) 76/52 (BP Location: Right arm, Patient Position: Sitting, BP Method: Large (Automatic))   Pulse 101   Temp 96.7 °F (35.9 °C) (Temporal)   Resp 18   Wt 72.1 kg (158 lb 15.2 oz)   SpO2 96%   BMI 23.47 kg/m²   Height / weight / VS reviewed  GENERAL.: Well-developed, well-nourished, in no acute distress  PSYCH: pleasant affect, no anxiety, no depression  HEENT: Normocephalic, lids intact, conjunctiva pink,cnon-boggy turbinates,Normal auricula, nasal septum, dentition, gums and mucosa ,OP clear,no palatal pallor  NECK: Supple,trachea midline, no palpable abnormalities  LYMPHATICS: No cervical or axillary adenopathy  RESPIRATORY: CTAB, no wheezes, rubs or rhonchi  Good respiratory effort without any retractions or diaphragmatic movement  CARDIOVASCULAR: no JVD, S1 / S2 with RRR, no murmurc  ABDOMEN: NTND, normal bowel sounds, no palpable HSM or mass  EXTREMITIES: No cyanosis, no clubbing, no edema, no joint effusion  NEUROLOGICAL: alert and oriented x 3c  SKIN: Warm, dry, no rash or lesions noted, no tentingc      Lab Results   Component Value Date    WBC 3.68 (L) 03/30/2020    HGB 9.8 (L) 03/30/2020    HCT 30.5 (L) 03/30/2020     (H) 03/30/2020    PLT 90 (L) 03/30/2020     Lab Results   Component Value Date    WBC 12.67 11/13/2020    HGB 8.4 (L) 11/13/2020    HCT 28.0 (L) 11/13/2020    MCV 87 11/13/2020    PLT 41 (L) 11/13/2020           CMP  Sodium   Date Value Ref Range Status   11/13/2020 139 136 - 145 mmol/L Final   03/07/2019 138 134 - 144 mmol/L       Potassium   Date Value Ref Range Status   11/13/2020 3.5 3.5 - 5.1 mmol/L Final     Chloride   Date Value Ref Range Status   11/13/2020 103 95 - 110 mmol/L Final   03/07/2019 105 98 - 110 mmol/L      CO2   Date Value Ref Range Status   11/13/2020 26 23 - 29 mmol/L Final     Glucose   Date Value Ref Range Status   11/13/2020 112 (H) 70 - 110 mg/dL Final   03/07/2019 106 (H) 70 - 99 mg/dL      BUN   Date Value Ref Range Status   11/13/2020 17 8 - 23 mg/dL Final     Creatinine   Date Value Ref Range Status   11/13/2020 0.8 0.5 - 1.4 mg/dL Final   03/07/2019 0.54 (L) 0.60 - 1.40 mg/dL      Calcium   Date Value Ref Range Status   11/13/2020 9.2 8.7 - 10.5 mg/dL Final     Total Protein   Date Value Ref Range Status   11/09/2020 5.2 (L) 6.0 - 8.4 g/dL Final     Albumin   Date Value Ref Range Status   11/09/2020 2.1 (L) 3.5 - 5.2 g/dL Final   03/07/2019 3.0 (L) 3.1 - 4.7 g/dL      Total Bilirubin   Date Value Ref Range Status   11/09/2020 2.1 (H) 0.1 - 1.0 mg/dL Final     Comment:     For infants and newborns, interpretation of results should be based  on gestational age, weight and in agreement with clinical  observations.  Premature Infant recommended reference ranges:  Up to 24 hours.............<8.0 mg/dL  Up to 48 hours............<12.0 mg/dL  3-5 days..................<15.0 mg/dL  6-29 days.................<15.0 mg/dL       Alkaline Phosphatase   Date Value Ref Range Status   11/09/2020 112 55 - 135 U/L Final     AST   Date Value Ref Range Status   11/09/2020 16 10 - 40 U/L Final     ALT   Date Value Ref Range Status   11/09/2020 18 10 - 44 U/L Final     Anion Gap   Date Value Ref Range Status   11/13/2020 10 8 - 16 mmol/L Final     eGFR if    Date Value Ref Range Status   11/13/2020 >60.0 >60 mL/min/1.73 m^2 Final     eGFR if non    Date Value Ref Range Status   11/13/2020 >60.0 >60 mL/min/1.73 m^2 Final     Comment:     Calculation used to obtain the estimated glomerular  filtration  rate (eGFR) is the CKD-EPI equation.        PET CT reviewed Negative except slight uptake near rectum     Assessment and plan   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  -     MRI BRAIN W WO CONTRAST; Future; Expected date: 11/16/2020  -     thiamine 100 MG tablet; Take 1 tablet (100 mg total) by mouth once daily.  Dispense: 60 tablet; Refill: 1  -     pyridoxine, vitamin B6, (VITAMIN B-6) 100 MG Tab; Take 1 tablet (100 mg total) by mouth once daily.  Dispense: 90 tablet; Refill: 3  -     cyanocobalamin, vitamin B-12, 1,000 mcg/15 mL Liqd; Place 1,000 mcg under the tongue once daily.  Dispense: 240 mL; Refill: 3  -     folic acid (FOLVITE) 1 MG tablet; Take 1 tablet (1 mg total) by mouth once daily.  Dispense: 30 tablet; Refill: 11  -     Ambulatory referral/consult to Home Health; Future; Expected date: 11/23/2020    Hospital discharge follow-up    Anemia, unspecified type  -     thiamine 100 MG tablet; Take 1 tablet (100 mg total) by mouth once daily.  Dispense: 60 tablet; Refill: 1  -     pyridoxine, vitamin B6, (VITAMIN B-6) 100 MG Tab; Take 1 tablet (100 mg total) by mouth once daily.  Dispense: 90 tablet; Refill: 3  -     cyanocobalamin, vitamin B-12, 1,000 mcg/15 mL Liqd; Place 1,000 mcg under the tongue once daily.  Dispense: 240 mL; Refill: 3  -     folic acid (FOLVITE) 1 MG tablet; Take 1 tablet (1 mg total) by mouth once daily.  Dispense: 30 tablet; Refill: 11    Thrombocytopenia  -     thiamine 100 MG tablet; Take 1 tablet (100 mg total) by mouth once daily.  Dispense: 60 tablet; Refill: 1  -     pyridoxine, vitamin B6, (VITAMIN B-6) 100 MG Tab; Take 1 tablet (100 mg total) by mouth once daily.  Dispense: 90 tablet; Refill: 3  -     cyanocobalamin, vitamin B-12, 1,000 mcg/15 mL Liqd; Place 1,000 mcg under the tongue once daily.  Dispense: 240 mL; Refill: 3  -     folic acid (FOLVITE) 1 MG tablet; Take 1 tablet (1 mg total) by mouth once daily.  Dispense: 30  tablet; Refill: 11    Hypotensive episode    Weakness  -     Ambulatory referral/consult to Home Health; Future; Expected date: 11/23/2020    Confusion  -     Ambulatory referral/consult to Home Health; Future; Expected date: 11/23/2020      No further chemo at this time in remission   Hypotensive add midodrine take this twice a day  5mg    needs stat MRI of brain to assess for leptomeningeal spread : may need omaya and treatment   Will call pathology today and inquire about bone marrow results   Visit in a month with labs  Fever could be due to hodgkins : await test results and make further recs accordingly  He must take the midodrine !! , hold tylenol except for fever   Advance Care Planning     Power of   I initiated the process of advance care planning today and explained the importance of this process to the patient.  I introduced the concept of advance directives to the patient, as well. Then the patient received detailed information about the importance of designating a Health Care Power of  (HCPOA). He was also instructed to communicate with this person about their wishes for future healthcare, should he become sick and lose decision-making capacity. The patient has not previously appointed a HCPOA.         Living Will  During this visit, I engaged the patient  in the advance care planning process.  The patient and I reviewed the role for advance directives and their purpose in directing future healthcare if the patient's unable to speak for him/herself.  At this point in time, the patient does have full decision-making capacity.  We discussed different extreme health states that he could experience, and reviewed what kind of medical care he would want in those situations.  The patient communicated that if he were comatose and had little chance of a meaningful recovery, he would want machines/life-sustaining treatments used.  I spent a total of  30 minutes consecutive OV engaging the patient  in this advance care planning discussion.

## 2020-11-17 ENCOUNTER — TELEPHONE (OUTPATIENT)
Dept: HEMATOLOGY/ONCOLOGY | Facility: CLINIC | Age: 60
End: 2020-11-17

## 2020-11-17 PROCEDURE — G0180 PR HOME HEALTH MD CERTIFICATION: ICD-10-PCS | Mod: ,,, | Performed by: INTERNAL MEDICINE

## 2020-11-17 PROCEDURE — G0180 MD CERTIFICATION HHA PATIENT: HCPCS | Mod: ,,, | Performed by: INTERNAL MEDICINE

## 2020-11-17 NOTE — TELEPHONE ENCOUNTER
----- Message from Bridget Holley sent at 11/17/2020  3:03 PM CST -----  [phone]    She is requesting an order PT, OT, Aid,  and wheel chair.  Please notify her when they have been entered.  Thank you!

## 2020-11-18 ENCOUNTER — TELEPHONE (OUTPATIENT)
Dept: HEMATOLOGY/ONCOLOGY | Facility: CLINIC | Age: 60
End: 2020-11-18

## 2020-11-18 DIAGNOSIS — C81.18 NODULAR SCLEROSIS HODGKIN LYMPHOMA OF LYMPH NODES OF MULTIPLE REGIONS: Primary | ICD-10-CM

## 2020-11-18 DIAGNOSIS — R53.1 WEAKNESS: ICD-10-CM

## 2020-11-18 NOTE — TELEPHONE ENCOUNTER
Melissa w/ Home Health ack ords for PT/OT, aid, wheelchair,  placed and to please contact me at [phone] if she has any other needs. Melissa ack no further questions or concerns.

## 2020-11-19 ENCOUNTER — TELEPHONE (OUTPATIENT)
Dept: HEMATOLOGY/ONCOLOGY | Facility: CLINIC | Age: 60
End: 2020-11-19

## 2020-11-19 ENCOUNTER — HOSPITAL ENCOUNTER (OUTPATIENT)
Dept: RADIOLOGY | Facility: HOSPITAL | Age: 60
Discharge: HOME | End: 2020-11-19
Attending: INTERNAL MEDICINE
Payer: MEDICAID

## 2020-11-19 DIAGNOSIS — C81.18 NODULAR SCLEROSIS HODGKIN LYMPHOMA OF LYMPH NODES OF MULTIPLE REGIONS: ICD-10-CM

## 2020-11-19 PROCEDURE — 70553 MRI BRAIN STEM W/O & W/DYE: CPT | Mod: TC,PO

## 2020-11-19 PROCEDURE — A9585 GADOBUTROL INJECTION: HCPCS | Mod: PO | Performed by: INTERNAL MEDICINE

## 2020-11-19 PROCEDURE — 25500020 PHARM REV CODE 255: Mod: PO | Performed by: INTERNAL MEDICINE

## 2020-11-19 RX ORDER — GADOBUTROL 604.72 MG/ML
7 INJECTION INTRAVENOUS
Status: COMPLETED | OUTPATIENT
Start: 2020-11-19 | End: 2020-11-19

## 2020-11-19 RX ADMIN — GADOBUTROL 7 ML: 604.72 INJECTION INTRAVENOUS at 03:11

## 2020-11-19 NOTE — TELEPHONE ENCOUNTER
"Spoke with caregiver Patrizia after receiving her msg today. Issue already resolved by this Rn and caregiver and pt have no questions or concerns at this time. Caregiver Patrizia stated "hospice coming tomorrow" for pt.  "

## 2020-11-19 NOTE — TELEPHONE ENCOUNTER
Spoke with Sharon regarding message (below) but Sahron let me know that her question had already been taken care of and she had no further concerns at this time.

## 2020-11-20 ENCOUNTER — TELEPHONE (OUTPATIENT)
Dept: HEMATOLOGY/ONCOLOGY | Facility: CLINIC | Age: 60
End: 2020-11-20

## 2020-11-20 DIAGNOSIS — R53.1 WEAKNESS: Primary | ICD-10-CM

## 2020-11-20 NOTE — TELEPHONE ENCOUNTER
Contacted Nessa who sent Staff Msg to this RN requesting lab ords as lab home draw had been ord w/ Home Health. Confirmed with Dr. Rich no labs needed at this time. Telma Vance to seven in Staff Msg.

## 2020-11-20 NOTE — TELEPHONE ENCOUNTER
confirmed pt's appt on 11/24 and that Home Health would be helping with patient. No further concerns.

## 2020-11-20 NOTE — TELEPHONE ENCOUNTER
Left VM with caregiver Jessica letting her know MRI/hospice would be discussed with Dr. Rich at  on 11/24. Told same to Home Health Nurse Prema.

## 2020-11-23 ENCOUNTER — TELEPHONE (OUTPATIENT)
Dept: HEMATOLOGY/ONCOLOGY | Facility: CLINIC | Age: 60
End: 2020-11-23

## 2020-11-24 ENCOUNTER — TELEPHONE (OUTPATIENT)
Dept: HEMATOLOGY/ONCOLOGY | Facility: CLINIC | Age: 60
End: 2020-11-24

## 2020-11-24 ENCOUNTER — LAB VISIT (OUTPATIENT)
Dept: LAB | Facility: HOSPITAL | Age: 60
End: 2020-11-24
Attending: INTERNAL MEDICINE
Payer: MEDICAID

## 2020-11-24 ENCOUNTER — OFFICE VISIT (OUTPATIENT)
Dept: HEMATOLOGY/ONCOLOGY | Facility: CLINIC | Age: 60
End: 2020-11-24
Payer: MEDICAID

## 2020-11-24 VITALS
SYSTOLIC BLOOD PRESSURE: 119 MMHG | RESPIRATION RATE: 16 BRPM | HEIGHT: 69 IN | BODY MASS INDEX: 23.09 KG/M2 | HEART RATE: 104 BPM | OXYGEN SATURATION: 93 % | WEIGHT: 155.88 LBS | DIASTOLIC BLOOD PRESSURE: 55 MMHG | TEMPERATURE: 99 F

## 2020-11-24 DIAGNOSIS — N40.0 ENLARGED PROSTATE: ICD-10-CM

## 2020-11-24 DIAGNOSIS — R89.8 ABNORMAL BONE MARROW EXAMINATION: ICD-10-CM

## 2020-11-24 DIAGNOSIS — R76.8 CMV (CYTOMEGALOVIRUS) ANTIBODY POSITIVE: ICD-10-CM

## 2020-11-24 DIAGNOSIS — D61.9 BONE MARROW HYPOCELLULARITY: ICD-10-CM

## 2020-11-24 DIAGNOSIS — Z92.21 STATUS POST CHEMOTHERAPY: ICD-10-CM

## 2020-11-24 DIAGNOSIS — Z85.71 HISTORY OF HODGKIN'S LYMPHOMA: ICD-10-CM

## 2020-11-24 DIAGNOSIS — D64.9 ANEMIA, UNSPECIFIED TYPE: ICD-10-CM

## 2020-11-24 DIAGNOSIS — R76.8 CMV (CYTOMEGALOVIRUS) ANTIBODY POSITIVE: Primary | ICD-10-CM

## 2020-11-24 LAB
BASOPHILS # BLD AUTO: 0.04 K/UL (ref 0–0.2)
BASOPHILS NFR BLD: 0.3 % (ref 0–1.9)
DIFFERENTIAL METHOD: ABNORMAL
EOSINOPHIL # BLD AUTO: 0.1 K/UL (ref 0–0.5)
EOSINOPHIL NFR BLD: 0.7 % (ref 0–8)
ERYTHROCYTE [DISTWIDTH] IN BLOOD BY AUTOMATED COUNT: 21.6 % (ref 11.5–14.5)
HCT VFR BLD AUTO: 23.9 % (ref 40–54)
HGB BLD-MCNC: 7 G/DL (ref 14–18)
IMM GRANULOCYTES # BLD AUTO: 0.28 K/UL (ref 0–0.04)
IMM GRANULOCYTES NFR BLD AUTO: 2.1 % (ref 0–0.5)
LYMPHOCYTES # BLD AUTO: 4 K/UL (ref 1–4.8)
LYMPHOCYTES NFR BLD: 29.2 % (ref 18–48)
MCH RBC QN AUTO: 27.1 PG (ref 27–31)
MCHC RBC AUTO-ENTMCNC: 29.3 G/DL (ref 32–36)
MCV RBC AUTO: 93 FL (ref 82–98)
MONOCYTES # BLD AUTO: 0.6 K/UL (ref 0.3–1)
MONOCYTES NFR BLD: 4.7 % (ref 4–15)
NEUTROPHILS # BLD AUTO: 8.5 K/UL (ref 1.8–7.7)
NEUTROPHILS NFR BLD: 63 % (ref 38–73)
NRBC BLD-RTO: 1 /100 WBC
PLATELET # BLD AUTO: 78 K/UL (ref 150–350)
PMV BLD AUTO: 11.9 FL (ref 9.2–12.9)
RBC # BLD AUTO: 2.58 M/UL (ref 4.6–6.2)
WBC # BLD AUTO: 13.55 K/UL (ref 3.9–12.7)

## 2020-11-24 PROCEDURE — 85025 COMPLETE CBC W/AUTO DIFF WBC: CPT

## 2020-11-24 PROCEDURE — 36415 COLL VENOUS BLD VENIPUNCTURE: CPT

## 2020-11-24 PROCEDURE — 86687 HTLV-I ANTIBODY: CPT

## 2020-11-24 PROCEDURE — 99215 PR OFFICE/OUTPT VISIT, EST, LEVL V, 40-54 MIN: ICD-10-PCS | Mod: S$PBB,,, | Performed by: INTERNAL MEDICINE

## 2020-11-24 PROCEDURE — 99999 PR PBB SHADOW E&M-EST. PATIENT-LVL IV: CPT | Mod: PBBFAC,,, | Performed by: INTERNAL MEDICINE

## 2020-11-24 PROCEDURE — 99999 PR PBB SHADOW E&M-EST. PATIENT-LVL IV: ICD-10-PCS | Mod: PBBFAC,,, | Performed by: INTERNAL MEDICINE

## 2020-11-24 PROCEDURE — 99214 OFFICE O/P EST MOD 30 MIN: CPT | Mod: PBBFAC,PO | Performed by: INTERNAL MEDICINE

## 2020-11-24 PROCEDURE — 94760 N-INVAS EAR/PLS OXIMETRY 1: CPT | Mod: PBBFAC,PO | Performed by: INTERNAL MEDICINE

## 2020-11-24 PROCEDURE — 99215 OFFICE O/P EST HI 40 MIN: CPT | Mod: S$PBB,,, | Performed by: INTERNAL MEDICINE

## 2020-11-24 PROCEDURE — 86703 HIV-1/HIV-2 1 RESULT ANTBDY: CPT

## 2020-11-24 PROCEDURE — 86696 HERPES SIMPLEX TYPE 2 TEST: CPT

## 2020-11-24 PROCEDURE — 86694 HERPES SIMPLEX NES ANTBDY: CPT

## 2020-11-24 NOTE — Clinical Note
Have me call pathology today, call  and get him appt please Dr Jean-Baptiste about marrow abnormality Cd 4 ratio He is known to her

## 2020-11-24 NOTE — TELEPHONE ENCOUNTER
Patient's caregivers ack Dr. Rich would like patient to have a lab draw today. They stated they would go and have pt get bloodwork drawn. Reviewed AVS and handed caregivers hard copy. No further questions or concerns.

## 2020-11-24 NOTE — PROGRESS NOTES
CC      Heriberto Keys is a 60 y.o.    This is a 60 yr old gentleman here for history of hodgkins lymphoma    F/U of Hodgkins disease He received ABVD cycle 1 while hospitalized at [facility] in past. He had neurological presentation with dizziness and confusion yet LP negative for CSF involvement. He received levaquin for sinusitis and his mentation improved. CHemo did cause severe marrow suppression requiring GF. Pt had chemo in the hospital cycle 1 ABVD Bone marrow involvement was noted December 6 2019.  Post IV iron and procrit in the past as well. Entered remission   He had been on carvidolol with lasix for HTN.      March 6 2020 here for pet ct results which show remission   Had chemo 2- here to check labs : in ho     March 31, 2020 follow-up on counts Hodgkin's is in remission he is extremely weak trying to make himself eat weight loss is stabilizing he is tolerating medication to help such as Megace and remains on midodrine to help with hypotension which I suspect is was due to adrenal insufficiency from long-term use of steroids that were given with his chemotherapy    Pt presented to [facility] ER November 9 2020  with complaints of weakness today he is here         All chart records reviewed      Hospital Course:   60-year-old male with known past medical history of Hodgkin's lymphoma in remission, history of chemo induced neutropenia, thrombocytopenia, admitted 11/09/2020 with a diagnosis of symptomatic anemia, pancytopenia, elevated INR, leukocytosis, mild lactic acidosis and confusion. Patient was admitted to [facility] unit.  Duong saw patient and case discussed He was transfused 2 unit of packed red blood cells on this admission.  Lactic acid 2.1.  Pro callus slightly elevated.  Zosyn was added to his regimen of treatment.  Blood cultures so far negative,Ultrasound of the liver was done due to thrombocytopenia and elevated INR which showed cholelithiasis without any evidence of acute cholecystitis.  CT of  the chest abdomen pelvis were done which showed no new osseous abnormality or increase in lymphadenopathy.    Bone marrow biopsy performed  11/13/2020 with IR.   On 11/12/2020, patient mentation cleared.  He was placed on Augmentin prior to such  He is not being compliant with midodrine . He fell in the hospital and has pain on his right buttocks   November 24 2020   Pt reports feeling better  Here for marrow results and to discuss labs  He is taking the midodrine and B12   NOT B1 or B6       Past Medical History:   Diagnosis Date    Anemia     Depression     Encounter for blood transfusion     [place] (hard of hearing)     Lymphoma     Lymphoma     Lymphoma 2019     Past Surgical History:   Procedure Laterality Date    BONE MARROW ASPIRATION Left 11/11/2019    Procedure: ASPIRATION, BONE MARROW;  Surgeon: Nancy Diagnostic Provider;  Location: [facility] OR;  Service: General;  Laterality: Left;    LYMPHADENECTOMY Bilateral        Current Outpatient Medications:     cetirizine (ZYRTEC) 10 MG tablet, Take 10 mg by mouth daily as needed for Allergies., Disp: , Rfl:     cyanocobalamin, vitamin B-12, 1,000 mcg/15 mL Liqd, Place 1,000 mcg under the tongue once daily., Disp: 240 mL, Rfl: 3    folic acid (FOLVITE) 1 MG tablet, Take 1 tablet (1 mg total) by mouth once daily., Disp: 30 tablet, Rfl: 11    midodrine (PROAMATINE) 5 MG Tab, TAKE 1 TABLET (5 MG TOTAL) BY MOUTH 2 (TWO) TIMES DAILY WITH MEALS., Disp: 60 tablet, Rfl: 1    tobramycin sulfate 0.3% (TOBREX) 0.3 % ophthalmic solution, 3 drops every 4 (four) hours. , Disp: , Rfl:     pyridoxine, vitamin B6, (VITAMIN B-6) 100 MG Tab, Take 1 tablet (100 mg total) by mouth once daily. (Patient not taking: Reported on 11/24/2020), Disp: 90 tablet, Rfl: 3    thiamine 100 MG tablet, Take 1 tablet (100 mg total) by mouth once daily. (Patient not taking: Reported on 11/24/2020), Disp: 60 tablet, Rfl: 1  Review of patient's allergies indicates:  No Known Allergies  Social  "History     Tobacco Use    Smoking status: Former Smoker     Packs/day: 1.00     Types: Cigarettes     Quit date: 2015     Years since quittin.7    Smokeless tobacco: Current User   Substance Use Topics    Alcohol use: No     Frequency: Never    Drug use: No     History reviewed. No pertinent family history.    CONSTITUTIONAL + yet better fevers, no further chills, night sweats, improving  confusion   SKIN: no rashes or itching  ENT: No headaches, head trauma, vision changes, stable  change in taste   LYMPH NODES: None noticed   CV: No chest pain, palpitations.  No orthopnea or PND  RESP: No dyspnea on exertion, cough,  or hemoptysis  GI: no vomiting and diarrhea   : No dysuria, hematuria, urgency, or frequency   HEME: No easy bruising, bleeding problems  PSYCHIATRIC: No depression, anxiety, no psychosis   NEURO: No seizures,   difficulty sleeping    MSK:   Positive weakness          BP (!) 119/55 (BP Location: Right arm, Patient Position: Sitting, BP Method: Large (Automatic))   Pulse 104   Temp 98.7 °F (37.1 °C) (Temporal)   Resp 16   Ht 5' 9" (1.753 m)   Wt 70.7 kg (155 lb 13.8 oz)   SpO2 (!) 93%   BMI 23.02 kg/m²   Height / weight / VS reviewed  GENERAL.: Well-developed, well-nourished, in no acute distress  PSYCH: pleasant affect, no anxiety, no depression  HEENT: Normocephalic, lids intact, conjunctiva pink,cnon-boggy turbinates,Normal auricula, nasal septum, dentition   NECK: Supple,trachea midline, no palpable abnormalities  LYMPHATICS: No cervical or axillary adenopathy  RESPIRATORY: CTAB, no wheezes, rubs or rhonchi  Good respiratory effort without any retractions or diaphragmatic movement  CARDIOVASCULAR: no JVD, S1 / S2 with RRR, no murmurc  ABDOMEN: NTND, normal bowel sounds, no palpable HSM or mass  EXTREMITIES: No cyanosis, no clubbing, no edema, no joint effusion  NEUROLOGICAL: alert and oriented x 3c  SKIN: Warm, dry, no rash or lesions noted, no tentingc      Lab Results "   Component Value Date    WBC 3.68 (L) 03/30/2020    HGB 9.8 (L) 03/30/2020    HCT 30.5 (L) 03/30/2020     (H) 03/30/2020    PLT 90 (L) 03/30/2020     Lab Results   Component Value Date    WBC 12.67 11/13/2020    HGB 8.4 (L) 11/13/2020    HCT 28.0 (L) 11/13/2020    MCV 87 11/13/2020    PLT 41 (L) 11/13/2020       CMP  Sodium   Date Value Ref Range Status   11/19/2020 141 136 - 145 mmol/L Final   03/07/2019 138 134 - 144 mmol/L      Potassium   Date Value Ref Range Status   11/19/2020 3.6 3.5 - 5.1 mmol/L Final     Chloride   Date Value Ref Range Status   11/19/2020 104 95 - 110 mmol/L Final   03/07/2019 105 98 - 110 mmol/L      CO2   Date Value Ref Range Status   11/19/2020 24 23 - 29 mmol/L Final     Glucose   Date Value Ref Range Status   11/19/2020 136 (H) 70 - 110 mg/dL Final   03/07/2019 106 (H) 70 - 99 mg/dL      BUN   Date Value Ref Range Status   11/19/2020 14 8 - 23 mg/dL Final     Creatinine   Date Value Ref Range Status   11/19/2020 0.8 0.5 - 1.4 mg/dL Final   03/07/2019 0.54 (L) 0.60 - 1.40 mg/dL      Calcium   Date Value Ref Range Status   11/19/2020 9.6 8.7 - 10.5 mg/dL Final     Total Protein   Date Value Ref Range Status   11/19/2020 5.4 (L) 6.0 - 8.4 g/dL Final     Albumin   Date Value Ref Range Status   11/19/2020 2.2 (L) 3.5 - 5.2 g/dL Final   03/07/2019 3.0 (L) 3.1 - 4.7 g/dL      Total Bilirubin   Date Value Ref Range Status   11/19/2020 1.3 (H) 0.1 - 1.0 mg/dL Final     Comment:     For infants and newborns, interpretation of results should be based  on gestational age, weight and in agreement with clinical  observations.  Premature Infant recommended reference ranges:  Up to 24 hours.............<8.0 mg/dL  Up to 48 hours............<12.0 mg/dL  3-5 days..................<15.0 mg/dL  6-29 days.................<15.0 mg/dL       Alkaline Phosphatase   Date Value Ref Range Status   11/19/2020 186 (H) 55 - 135 U/L Final     AST   Date Value Ref Range Status   11/19/2020 15 10 - 40 U/L Final      ALT   Date Value Ref Range Status   11/19/2020 15 10 - 44 U/L Final     Anion Gap   Date Value Ref Range Status   11/19/2020 13 8 - 16 mmol/L Final     eGFR if    Date Value Ref Range Status   11/19/2020 >60.0 >60 mL/min/1.73 m^2 Final     eGFR if non    Date Value Ref Range Status   11/19/2020 >60.0 >60 mL/min/1.73 m^2 Final     Comment:     Calculation used to obtain the estimated glomerular filtration  rate (eGFR) is the CKD-EPI equation.        PET CT reviewed Negative except slight uptake near rectum     Assessment and plan   CMV (cytomegalovirus) antibody positive  -     CBC Auto Differential; Standing  -     Ambulatory referral/consult to Infectious Disease; Future; Expected date: 12/01/2020  -     HIV 1/2 Ag/Ab (4th Gen); Future; Expected date: 11/24/2020  -     HTLV I/II ANTIBODY; Future; Expected date: 11/24/2020  -     HERPES SIMPLEX 1 & 2 IGM; Future; Expected date: 11/24/2020  -     HERPES SIMPLEX 1&2 IGG; Future; Expected date: 11/24/2020  -     Human Herpesvirus-8 Ab, IgG, Serum; Future; Expected date: 11/24/2020    Anemia, unspecified type  -     HIV 1/2 Ag/Ab (4th Gen); Future; Expected date: 11/24/2020  -     HTLV I/II ANTIBODY; Future; Expected date: 11/24/2020  -     HERPES SIMPLEX 1 & 2 IGM; Future; Expected date: 11/24/2020  -     HERPES SIMPLEX 1&2 IGG; Future; Expected date: 11/24/2020  -     Human Herpesvirus-8 Ab, IgG, Serum; Future; Expected date: 11/24/2020    Enlarged prostate    Bone marrow hypocellularity  -     Ambulatory referral/consult to Infectious Disease; Future; Expected date: 12/01/2020  -     HIV 1/2 Ag/Ab (4th Gen); Future; Expected date: 11/24/2020  -     HTLV I/II ANTIBODY; Future; Expected date: 11/24/2020  -     HERPES SIMPLEX 1 & 2 IGM; Future; Expected date: 11/24/2020  -     HERPES SIMPLEX 1&2 IGG; Future; Expected date: 11/24/2020  -     Human Herpesvirus-8 Ab, IgG, Serum; Future; Expected date: 11/24/2020    Status post  chemotherapy    History of Hodgkin's lymphoma  -     CBC Auto Differential; Standing  -     Ambulatory referral/consult to Infectious Disease; Future; Expected date: 12/01/2020    Abnormal bone marrow examination  -     HIV 1/2 Ag/Ab (4th Gen); Future; Expected date: 11/24/2020  -     HTLV I/II ANTIBODY; Future; Expected date: 11/24/2020  -     HERPES SIMPLEX 1 & 2 IGM; Future; Expected date: 11/24/2020  -     HERPES SIMPLEX 1&2 IGG; Future; Expected date: 11/24/2020  -     Human Herpesvirus-8 Ab, IgG, Serum; Future; Expected date: 11/24/2020        Repeat [facility] to draw weekly   Hypotensive continue  midodrine take this twice a day  5mg   Bone marrow does not show lymphoma : but it does show hypocellular marrow and Cd 4 CD 8 ratio   Must rule out aplastic anemia and / or infection   He must take the midodrine , B1 and B6 with B12   Must continue to see pcp  Checking HIV status etc   May need a blood transfusion  They will keep a journal of temperature for now  Referral to ID as well to help with abnormal CD4 ration in marrow and will discuss with pathology to see if this can be diagnosed as aplastic anemia   Advance Care Planning     Power of   I initiated the process of advance care planning today and explained the importance of this process to the patient.  I introduced the concept of advance directives to the patient, as well. Then the patient received detailed information about the importance of designating a Health Care Power of  (HCPOA). He was also instructed to communicate with this person about their wishes for future healthcare, should he become sick and lose decision-making capacity. The patient has not previously appointed a HCPOA.         Living Will  During this visit, I engaged the patient  in the advance care planning process.  The patient and I reviewed the role for advance directives and their purpose in directing future healthcare if the patient's unable to speak for  him/herself.  At this point in time, the patient does have full decision-making capacity.  We discussed different extreme health states that he could experience, and reviewed what kind of medical care he would want in those situations.  The patient communicated that if he were comatose and had little chance of a meaningful recovery, he would want machines/life-sustaining treatments used.  I spent a total of  30 minutes consecutive OV engaging the patient in this advance care planning discussion.

## 2020-11-25 ENCOUNTER — EXTERNAL HOME HEALTH (OUTPATIENT)
Dept: HOME HEALTH SERVICES | Facility: HOSPITAL | Age: 60
End: 2020-11-25
Payer: MEDICAID

## 2020-11-25 LAB
HIV 1+2 AB+HIV1 P24 AG SERPL QL IA: NEGATIVE
HSV1 IGG SERPL QL IA: POSITIVE
HSV2 IGG SERPL QL IA: NEGATIVE

## 2020-11-26 LAB — HSV AB, IGM BY EIA: 1.28 INDEX

## 2020-11-27 LAB — HTLV I/II ANTIBODY, DONOR EVAL (BLOOD EVAL): NORMAL

## 2020-11-30 ENCOUNTER — TELEPHONE (OUTPATIENT)
Dept: HEMATOLOGY/ONCOLOGY | Facility: CLINIC | Age: 60
End: 2020-11-30

## 2020-12-01 ENCOUNTER — DOCUMENT SCAN (OUTPATIENT)
Dept: HOME HEALTH SERVICES | Facility: HOSPITAL | Age: 60
End: 2020-12-01
Payer: MEDICAID

## 2020-12-07 ENCOUNTER — HOSPITAL ENCOUNTER (INPATIENT)
Facility: HOSPITAL | Age: 60
LOS: 16 days | Discharge: NURSING, REHAB OR LONG-TERM CARE | DRG: 871 | End: 2020-12-23
Attending: EMERGENCY MEDICINE | Admitting: INTERNAL MEDICINE
Payer: MEDICAID

## 2020-12-07 DIAGNOSIS — R06.02 SHORTNESS OF BREATH: ICD-10-CM

## 2020-12-07 DIAGNOSIS — D69.6 THROMBOCYTOPENIA: ICD-10-CM

## 2020-12-07 DIAGNOSIS — R07.9 CHEST PAIN: ICD-10-CM

## 2020-12-07 DIAGNOSIS — J18.9 PNEUMONIA OF BOTH LOWER LOBES DUE TO INFECTIOUS ORGANISM: ICD-10-CM

## 2020-12-07 DIAGNOSIS — A41.9 SEPSIS, DUE TO UNSPECIFIED ORGANISM, UNSPECIFIED WHETHER ACUTE ORGAN DYSFUNCTION PRESENT: ICD-10-CM

## 2020-12-07 DIAGNOSIS — R53.1 GENERALIZED WEAKNESS: ICD-10-CM

## 2020-12-07 DIAGNOSIS — D64.9 ANEMIA, UNSPECIFIED TYPE: ICD-10-CM

## 2020-12-07 DIAGNOSIS — A41.9 SEPSIS: ICD-10-CM

## 2020-12-07 DIAGNOSIS — R53.1 WEAKNESS: ICD-10-CM

## 2020-12-07 DIAGNOSIS — C81.18 NODULAR SCLEROSIS HODGKIN LYMPHOMA OF LYMPH NODES OF MULTIPLE REGIONS: ICD-10-CM

## 2020-12-07 DIAGNOSIS — R41.82 ALTERED MENTAL STATUS, UNSPECIFIED ALTERED MENTAL STATUS TYPE: ICD-10-CM

## 2020-12-07 DIAGNOSIS — D64.9 SYMPTOMATIC ANEMIA: Primary | ICD-10-CM

## 2020-12-07 PROBLEM — E87.6 HYPOKALEMIA: Status: ACTIVE | Noted: 2020-12-07

## 2020-12-07 LAB
ABO + RH BLD: NORMAL
ALBUMIN SERPL BCP-MCNC: 2 G/DL (ref 3.5–5.2)
ALP SERPL-CCNC: 139 U/L (ref 55–135)
ALT SERPL W/O P-5'-P-CCNC: 14 U/L (ref 10–44)
ANION GAP SERPL CALC-SCNC: 10 MMOL/L (ref 8–16)
ANISOCYTOSIS BLD QL SMEAR: ABNORMAL
AST SERPL-CCNC: 11 U/L (ref 10–40)
BASOPHILS # BLD AUTO: 0.02 K/UL (ref 0–0.2)
BASOPHILS NFR BLD: 0.1 % (ref 0–1.9)
BILIRUB SERPL-MCNC: 1.6 MG/DL (ref 0.1–1)
BILIRUB UR QL STRIP: NEGATIVE
BLD GP AB SCN CELLS X3 SERPL QL: NORMAL
BLD PROD TYP BPU: NORMAL
BLOOD UNIT EXPIRATION DATE: NORMAL
BLOOD UNIT TYPE CODE: 5100
BLOOD UNIT TYPE: NORMAL
BNP SERPL-MCNC: 274 PG/ML (ref 0–99)
BUN SERPL-MCNC: 13 MG/DL (ref 8–23)
CALCIUM SERPL-MCNC: 9.6 MG/DL (ref 8.7–10.5)
CHLORIDE SERPL-SCNC: 102 MMOL/L (ref 95–110)
CLARITY UR: CLEAR
CO2 SERPL-SCNC: 28 MMOL/L (ref 23–29)
CODING SYSTEM: NORMAL
COLOR UR: YELLOW
CREAT SERPL-MCNC: 0.9 MG/DL (ref 0.5–1.4)
DIFFERENTIAL METHOD: ABNORMAL
DISPENSE STATUS: NORMAL
EOSINOPHIL # BLD AUTO: 0.3 K/UL (ref 0–0.5)
EOSINOPHIL NFR BLD: 1.7 % (ref 0–8)
ERYTHROCYTE [DISTWIDTH] IN BLOOD BY AUTOMATED COUNT: 23 % (ref 11.5–14.5)
EST. GFR  (AFRICAN AMERICAN): >60 ML/MIN/1.73 M^2
EST. GFR  (NON AFRICAN AMERICAN): >60 ML/MIN/1.73 M^2
GLUCOSE SERPL-MCNC: 123 MG/DL (ref 70–110)
GLUCOSE UR QL STRIP: NEGATIVE
HCT VFR BLD AUTO: 22.9 % (ref 40–54)
HGB BLD-MCNC: 6.4 G/DL (ref 14–18)
HGB UR QL STRIP: NEGATIVE
IMM GRANULOCYTES # BLD AUTO: 0.29 K/UL (ref 0–0.04)
IMM GRANULOCYTES NFR BLD AUTO: 1.7 % (ref 0–0.5)
KETONES UR QL STRIP: NEGATIVE
LACTATE SERPL-SCNC: 1.6 MMOL/L (ref 0.5–1.9)
LACTATE SERPL-SCNC: 2 MMOL/L (ref 0.5–1.9)
LEUKOCYTE ESTERASE UR QL STRIP: NEGATIVE
LYMPHOCYTES # BLD AUTO: 5.1 K/UL (ref 1–4.8)
LYMPHOCYTES NFR BLD: 30.2 % (ref 18–48)
MAGNESIUM SERPL-MCNC: 2 MG/DL (ref 1.6–2.6)
MCH RBC QN AUTO: 27 PG (ref 27–31)
MCHC RBC AUTO-ENTMCNC: 27.9 G/DL (ref 32–36)
MCV RBC AUTO: 97 FL (ref 82–98)
MONOCYTES # BLD AUTO: 0.4 K/UL (ref 0.3–1)
MONOCYTES NFR BLD: 2.3 % (ref 4–15)
NEUTROPHILS # BLD AUTO: 10.8 K/UL (ref 1.8–7.7)
NEUTROPHILS NFR BLD: 64 % (ref 38–73)
NITRITE UR QL STRIP: NEGATIVE
NRBC BLD-RTO: 3 /100 WBC
NUM UNITS TRANS PACKED RBC: NORMAL
PH UR STRIP: 6 [PH] (ref 5–8)
PHOSPHATE SERPL-MCNC: 3.7 MG/DL (ref 2.7–4.5)
PLATELET # BLD AUTO: 83 K/UL (ref 150–350)
PMV BLD AUTO: 11.7 FL (ref 9.2–12.9)
POTASSIUM SERPL-SCNC: 3.1 MMOL/L (ref 3.5–5.1)
PROT SERPL-MCNC: 5 G/DL (ref 6–8.4)
PROT UR QL STRIP: ABNORMAL
RBC # BLD AUTO: 2.37 M/UL (ref 4.6–6.2)
SARS-COV-2 RDRP RESP QL NAA+PROBE: NEGATIVE
SODIUM SERPL-SCNC: 140 MMOL/L (ref 136–145)
SP GR UR STRIP: 1.02 (ref 1–1.03)
URN SPEC COLLECT METH UR: ABNORMAL
UROBILINOGEN UR STRIP-ACNC: >=8 EU/DL
WBC # BLD AUTO: 16.8 K/UL (ref 3.9–12.7)

## 2020-12-07 PROCEDURE — 84100 ASSAY OF PHOSPHORUS: CPT

## 2020-12-07 PROCEDURE — 93010 ELECTROCARDIOGRAM REPORT: CPT | Mod: ,,, | Performed by: INTERNAL MEDICINE

## 2020-12-07 PROCEDURE — 85025 COMPLETE CBC W/AUTO DIFF WBC: CPT

## 2020-12-07 PROCEDURE — 81003 URINALYSIS AUTO W/O SCOPE: CPT

## 2020-12-07 PROCEDURE — 83605 ASSAY OF LACTIC ACID: CPT | Mod: 91

## 2020-12-07 PROCEDURE — 25000003 PHARM REV CODE 250: Performed by: STUDENT IN AN ORGANIZED HEALTH CARE EDUCATION/TRAINING PROGRAM

## 2020-12-07 PROCEDURE — 36415 COLL VENOUS BLD VENIPUNCTURE: CPT

## 2020-12-07 PROCEDURE — 36430 TRANSFUSION BLD/BLD COMPNT: CPT

## 2020-12-07 PROCEDURE — 12000002 HC ACUTE/MED SURGE SEMI-PRIVATE ROOM

## 2020-12-07 PROCEDURE — 83735 ASSAY OF MAGNESIUM: CPT

## 2020-12-07 PROCEDURE — 80053 COMPREHEN METABOLIC PANEL: CPT

## 2020-12-07 PROCEDURE — 86901 BLOOD TYPING SEROLOGIC RH(D): CPT

## 2020-12-07 PROCEDURE — U0002 COVID-19 LAB TEST NON-CDC: HCPCS

## 2020-12-07 PROCEDURE — 99285 EMERGENCY DEPT VISIT HI MDM: CPT | Mod: 25

## 2020-12-07 PROCEDURE — 96365 THER/PROPH/DIAG IV INF INIT: CPT

## 2020-12-07 PROCEDURE — 25500020 PHARM REV CODE 255: Performed by: STUDENT IN AN ORGANIZED HEALTH CARE EDUCATION/TRAINING PROGRAM

## 2020-12-07 PROCEDURE — 87040 BLOOD CULTURE FOR BACTERIA: CPT | Mod: 59

## 2020-12-07 PROCEDURE — 83880 ASSAY OF NATRIURETIC PEPTIDE: CPT

## 2020-12-07 PROCEDURE — 63600175 PHARM REV CODE 636 W HCPCS: Performed by: STUDENT IN AN ORGANIZED HEALTH CARE EDUCATION/TRAINING PROGRAM

## 2020-12-07 PROCEDURE — 93010 EKG 12-LEAD: ICD-10-PCS | Mod: ,,, | Performed by: INTERNAL MEDICINE

## 2020-12-07 PROCEDURE — 93005 ELECTROCARDIOGRAM TRACING: CPT | Performed by: INTERNAL MEDICINE

## 2020-12-07 PROCEDURE — 86920 COMPATIBILITY TEST SPIN: CPT

## 2020-12-07 PROCEDURE — P9040 RBC LEUKOREDUCED IRRADIATED: HCPCS

## 2020-12-07 PROCEDURE — 96366 THER/PROPH/DIAG IV INF ADDON: CPT

## 2020-12-07 RX ORDER — IBUPROFEN 400 MG/1
400 TABLET ORAL EVERY 6 HOURS PRN
Status: DISCONTINUED | OUTPATIENT
Start: 2020-12-08 | End: 2020-12-23 | Stop reason: HOSPADM

## 2020-12-07 RX ORDER — MAGNESIUM SULFATE HEPTAHYDRATE 40 MG/ML
2 INJECTION, SOLUTION INTRAVENOUS ONCE
Status: COMPLETED | OUTPATIENT
Start: 2020-12-07 | End: 2020-12-07

## 2020-12-07 RX ORDER — THIAMINE HCL 100 MG
100 TABLET ORAL DAILY
Status: DISCONTINUED | OUTPATIENT
Start: 2020-12-08 | End: 2020-12-23 | Stop reason: HOSPADM

## 2020-12-07 RX ORDER — HYDROCODONE BITARTRATE AND ACETAMINOPHEN 500; 5 MG/1; MG/1
TABLET ORAL
Status: DISCONTINUED | OUTPATIENT
Start: 2020-12-07 | End: 2020-12-23 | Stop reason: HOSPADM

## 2020-12-07 RX ORDER — IBUPROFEN 400 MG/1
800 TABLET ORAL
Status: COMPLETED | OUTPATIENT
Start: 2020-12-07 | End: 2020-12-07

## 2020-12-07 RX ORDER — ACETAMINOPHEN 325 MG/1
650 TABLET ORAL EVERY 8 HOURS PRN
Status: DISCONTINUED | OUTPATIENT
Start: 2020-12-07 | End: 2020-12-07

## 2020-12-07 RX ORDER — FOLIC ACID 1 MG/1
1 TABLET ORAL DAILY
Status: DISCONTINUED | OUTPATIENT
Start: 2020-12-08 | End: 2020-12-23 | Stop reason: HOSPADM

## 2020-12-07 RX ORDER — LANOLIN ALCOHOL/MO/W.PET/CERES
1000 CREAM (GRAM) TOPICAL DAILY
Status: DISCONTINUED | OUTPATIENT
Start: 2020-12-08 | End: 2020-12-23 | Stop reason: HOSPADM

## 2020-12-07 RX ORDER — ONDANSETRON 2 MG/ML
4 INJECTION INTRAMUSCULAR; INTRAVENOUS EVERY 8 HOURS PRN
Status: DISCONTINUED | OUTPATIENT
Start: 2020-12-07 | End: 2020-12-23 | Stop reason: HOSPADM

## 2020-12-07 RX ORDER — LIDOCAINE HYDROCHLORIDE 10 MG/ML
5 INJECTION, SOLUTION EPIDURAL; INFILTRATION; INTRACAUDAL; PERINEURAL
Status: DISCONTINUED | OUTPATIENT
Start: 2020-12-07 | End: 2020-12-07

## 2020-12-07 RX ORDER — LANOLIN ALCOHOL/MO/W.PET/CERES
100 CREAM (GRAM) TOPICAL DAILY
Status: DISCONTINUED | OUTPATIENT
Start: 2020-12-08 | End: 2020-12-23 | Stop reason: HOSPADM

## 2020-12-07 RX ORDER — ACETAMINOPHEN 325 MG/1
650 TABLET ORAL EVERY 4 HOURS PRN
Status: DISCONTINUED | OUTPATIENT
Start: 2020-12-07 | End: 2020-12-07

## 2020-12-07 RX ORDER — MIDODRINE HYDROCHLORIDE 2.5 MG/1
5 TABLET ORAL 2 TIMES DAILY WITH MEALS
Status: DISCONTINUED | OUTPATIENT
Start: 2020-12-08 | End: 2020-12-08

## 2020-12-07 RX ADMIN — IBUPROFEN 800 MG: 400 TABLET ORAL at 06:12

## 2020-12-07 RX ADMIN — POTASSIUM BICARBONATE 50 MEQ: 977.5 TABLET, EFFERVESCENT ORAL at 05:12

## 2020-12-07 RX ADMIN — IOHEXOL 50 ML: 350 INJECTION, SOLUTION INTRAVENOUS at 05:12

## 2020-12-07 RX ADMIN — MAGNESIUM SULFATE 2 G: 2 INJECTION INTRAVENOUS at 05:12

## 2020-12-07 RX ADMIN — SODIUM CHLORIDE 1000 ML: 0.9 INJECTION, SOLUTION INTRAVENOUS at 05:12

## 2020-12-07 NOTE — ED NOTES
Bed: [place] 03  Expected date:   Expected time:   Means of arrival:   Comments:  EMS- Gen patricia x 1wk
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
## 2020-12-07 NOTE — ED TRIAGE NOTES
"Present with family friend, Mrs. Lopez states " he's very confused, fall danger, he's falling twice in the last twenty four hours, extremely weak, my son has been helping me take care of him" reports witnessing pt fall, " when he falls he collapses" Mrs. Lopez reports symptoms since 1 wk, family friend reports confusion x 3 days, " every now and then he says something that makes sense but most of the time he's confused"   "

## 2020-12-08 LAB
ANION GAP SERPL CALC-SCNC: 6 MMOL/L (ref 8–16)
ANISOCYTOSIS BLD QL SMEAR: ABNORMAL
BASOPHILS # BLD AUTO: 0.01 K/UL (ref 0–0.2)
BASOPHILS NFR BLD: 0.1 % (ref 0–1.9)
BLD PROD TYP BPU: NORMAL
BLOOD UNIT EXPIRATION DATE: NORMAL
BLOOD UNIT TYPE CODE: 6200
BLOOD UNIT TYPE: NORMAL
BUN SERPL-MCNC: 18 MG/DL (ref 8–23)
CALCIUM SERPL-MCNC: 8.1 MG/DL (ref 8.7–10.5)
CHLORIDE SERPL-SCNC: 103 MMOL/L (ref 95–110)
CO2 SERPL-SCNC: 28 MMOL/L (ref 23–29)
CODING SYSTEM: NORMAL
CREAT SERPL-MCNC: 0.8 MG/DL (ref 0.5–1.4)
DIFFERENTIAL METHOD: ABNORMAL
DISPENSE STATUS: NORMAL
EOSINOPHIL # BLD AUTO: 0.4 K/UL (ref 0–0.5)
EOSINOPHIL NFR BLD: 3.4 % (ref 0–8)
ERYTHROCYTE [DISTWIDTH] IN BLOOD BY AUTOMATED COUNT: 21.5 % (ref 11.5–14.5)
EST. GFR  (AFRICAN AMERICAN): >60 ML/MIN/1.73 M^2
EST. GFR  (NON AFRICAN AMERICAN): >60 ML/MIN/1.73 M^2
GLUCOSE SERPL-MCNC: 200 MG/DL (ref 70–110)
HCT VFR BLD AUTO: 21.3 % (ref 40–54)
HCT VFR BLD AUTO: 26.1 % (ref 40–54)
HCT VFR BLD AUTO: 26.1 % (ref 40–54)
HGB BLD-MCNC: 6.1 G/DL (ref 14–18)
HGB BLD-MCNC: 7.9 G/DL (ref 14–18)
HGB BLD-MCNC: 7.9 G/DL (ref 14–18)
IMM GRANULOCYTES # BLD AUTO: 0.17 K/UL (ref 0–0.04)
IMM GRANULOCYTES NFR BLD AUTO: 1.6 % (ref 0–0.5)
LYMPHOCYTES # BLD AUTO: 2.2 K/UL (ref 1–4.8)
LYMPHOCYTES NFR BLD: 20.5 % (ref 18–48)
MAGNESIUM SERPL-MCNC: 2.2 MG/DL (ref 1.6–2.6)
MCH RBC QN AUTO: 27.2 PG (ref 27–31)
MCHC RBC AUTO-ENTMCNC: 28.6 G/DL (ref 32–36)
MCV RBC AUTO: 95 FL (ref 82–98)
MONOCYTES # BLD AUTO: 0.3 K/UL (ref 0.3–1)
MONOCYTES NFR BLD: 2.5 % (ref 4–15)
NEUTROPHILS # BLD AUTO: 7.7 K/UL (ref 1.8–7.7)
NEUTROPHILS NFR BLD: 71.9 % (ref 38–73)
NRBC BLD-RTO: 2 /100 WBC
NUM UNITS TRANS PACKED RBC: NORMAL
PLATELET # BLD AUTO: 54 K/UL (ref 150–350)
PMV BLD AUTO: 12.9 FL (ref 9.2–12.9)
POLYCHROMASIA BLD QL SMEAR: ABNORMAL
POTASSIUM SERPL-SCNC: 2.9 MMOL/L (ref 3.5–5.1)
RBC # BLD AUTO: 2.24 M/UL (ref 4.6–6.2)
SODIUM SERPL-SCNC: 137 MMOL/L (ref 136–145)
WBC # BLD AUTO: 10.74 K/UL (ref 3.9–12.7)

## 2020-12-08 PROCEDURE — 36415 COLL VENOUS BLD VENIPUNCTURE: CPT

## 2020-12-08 PROCEDURE — 84145 PROCALCITONIN (PCT): CPT

## 2020-12-08 PROCEDURE — 99233 SBSQ HOSP IP/OBS HIGH 50: CPT | Mod: ,,, | Performed by: PHYSICIAN ASSISTANT

## 2020-12-08 PROCEDURE — 85014 HEMATOCRIT: CPT

## 2020-12-08 PROCEDURE — 83735 ASSAY OF MAGNESIUM: CPT

## 2020-12-08 PROCEDURE — 36430 TRANSFUSION BLD/BLD COMPNT: CPT

## 2020-12-08 PROCEDURE — 85018 HEMOGLOBIN: CPT

## 2020-12-08 PROCEDURE — P9016 RBC LEUKOCYTES REDUCED: HCPCS

## 2020-12-08 PROCEDURE — 63600175 PHARM REV CODE 636 W HCPCS: Performed by: INTERNAL MEDICINE

## 2020-12-08 PROCEDURE — 12000002 HC ACUTE/MED SURGE SEMI-PRIVATE ROOM

## 2020-12-08 PROCEDURE — 25000003 PHARM REV CODE 250: Performed by: INTERNAL MEDICINE

## 2020-12-08 PROCEDURE — 85025 COMPLETE CBC W/AUTO DIFF WBC: CPT

## 2020-12-08 PROCEDURE — 63600175 PHARM REV CODE 636 W HCPCS: Performed by: FAMILY MEDICINE

## 2020-12-08 PROCEDURE — 99233 PR SUBSEQUENT HOSPITAL CARE,LEVL III: ICD-10-PCS | Mod: ,,, | Performed by: PHYSICIAN ASSISTANT

## 2020-12-08 PROCEDURE — 25000003 PHARM REV CODE 250: Performed by: FAMILY MEDICINE

## 2020-12-08 PROCEDURE — 84153 ASSAY OF PSA TOTAL: CPT

## 2020-12-08 PROCEDURE — 80048 BASIC METABOLIC PNL TOTAL CA: CPT

## 2020-12-08 RX ORDER — MAGNESIUM SULFATE HEPTAHYDRATE 40 MG/ML
2 INJECTION, SOLUTION INTRAVENOUS
Status: DISCONTINUED | OUTPATIENT
Start: 2020-12-08 | End: 2020-12-23 | Stop reason: HOSPADM

## 2020-12-08 RX ORDER — MIDODRINE HYDROCHLORIDE 2.5 MG/1
5 TABLET ORAL
Status: DISCONTINUED | OUTPATIENT
Start: 2020-12-08 | End: 2020-12-23 | Stop reason: HOSPADM

## 2020-12-08 RX ORDER — MAGNESIUM SULFATE HEPTAHYDRATE 40 MG/ML
4 INJECTION, SOLUTION INTRAVENOUS
Status: DISCONTINUED | OUTPATIENT
Start: 2020-12-08 | End: 2020-12-23 | Stop reason: HOSPADM

## 2020-12-08 RX ORDER — FUROSEMIDE 10 MG/ML
20 INJECTION INTRAMUSCULAR; INTRAVENOUS ONCE
Status: DISCONTINUED | OUTPATIENT
Start: 2020-12-08 | End: 2020-12-12

## 2020-12-08 RX ORDER — CALCIUM CHLORIDE IN 0.9 % NACL 1 G/100 ML
1 INTRAVENOUS SOLUTION, PIGGYBACK (ML) INTRAVENOUS
Status: DISCONTINUED | OUTPATIENT
Start: 2020-12-08 | End: 2020-12-23 | Stop reason: HOSPADM

## 2020-12-08 RX ORDER — LANOLIN ALCOHOL/MO/W.PET/CERES
800 CREAM (GRAM) TOPICAL
Status: DISCONTINUED | OUTPATIENT
Start: 2020-12-08 | End: 2020-12-23 | Stop reason: HOSPADM

## 2020-12-08 RX ORDER — POTASSIUM CHLORIDE 7.45 MG/ML
20 INJECTION INTRAVENOUS
Status: DISCONTINUED | OUTPATIENT
Start: 2020-12-08 | End: 2020-12-23 | Stop reason: HOSPADM

## 2020-12-08 RX ORDER — POTASSIUM CHLORIDE 20 MEQ/1
20 TABLET, EXTENDED RELEASE ORAL
Status: DISCONTINUED | OUTPATIENT
Start: 2020-12-08 | End: 2020-12-23 | Stop reason: HOSPADM

## 2020-12-08 RX ORDER — POTASSIUM CHLORIDE 7.45 MG/ML
40 INJECTION INTRAVENOUS
Status: DISCONTINUED | OUTPATIENT
Start: 2020-12-08 | End: 2020-12-23 | Stop reason: HOSPADM

## 2020-12-08 RX ORDER — MAGNESIUM SULFATE 1 G/100ML
1 INJECTION INTRAVENOUS
Status: DISCONTINUED | OUTPATIENT
Start: 2020-12-08 | End: 2020-12-23 | Stop reason: HOSPADM

## 2020-12-08 RX ORDER — HYDROCODONE BITARTRATE AND ACETAMINOPHEN 500; 5 MG/1; MG/1
TABLET ORAL
Status: DISCONTINUED | OUTPATIENT
Start: 2020-12-08 | End: 2020-12-23 | Stop reason: HOSPADM

## 2020-12-08 RX ORDER — POTASSIUM CHLORIDE 20 MEQ/1
40 TABLET, EXTENDED RELEASE ORAL
Status: DISCONTINUED | OUTPATIENT
Start: 2020-12-08 | End: 2020-12-23 | Stop reason: HOSPADM

## 2020-12-08 RX ADMIN — VANCOMYCIN HYDROCHLORIDE 1250 MG: 1 INJECTION, POWDER, LYOPHILIZED, FOR SOLUTION INTRAVENOUS at 02:12

## 2020-12-08 RX ADMIN — PIPERACILLIN SODIUM AND TAZOBACTAM SODIUM 3.38 G: 3; .375 INJECTION, POWDER, LYOPHILIZED, FOR SOLUTION INTRAVENOUS at 06:12

## 2020-12-08 RX ADMIN — MIDODRINE HYDROCHLORIDE 5 MG: 2.5 TABLET ORAL at 06:12

## 2020-12-08 RX ADMIN — CYANOCOBALAMIN TAB 1000 MCG 1000 MCG: 1000 TAB at 09:12

## 2020-12-08 RX ADMIN — PIPERACILLIN SODIUM AND TAZOBACTAM SODIUM 3.38 G: 3; .375 INJECTION, POWDER, LYOPHILIZED, FOR SOLUTION INTRAVENOUS at 02:12

## 2020-12-08 RX ADMIN — PYRIDOXINE HCL TAB 50 MG 100 MG: 50 TAB at 09:12

## 2020-12-08 RX ADMIN — MIDODRINE HYDROCHLORIDE 5 MG: 2.5 TABLET ORAL at 11:12

## 2020-12-08 RX ADMIN — POTASSIUM CHLORIDE 40 MEQ: 20 TABLET, EXTENDED RELEASE ORAL at 09:12

## 2020-12-08 RX ADMIN — FOLIC ACID 1 MG: 1 TABLET ORAL at 09:12

## 2020-12-08 RX ADMIN — PIPERACILLIN SODIUM AND TAZOBACTAM SODIUM 3.38 G: 3; .375 INJECTION, POWDER, LYOPHILIZED, FOR SOLUTION INTRAVENOUS at 09:12

## 2020-12-08 RX ADMIN — VANCOMYCIN HYDROCHLORIDE 1500 MG: 1 INJECTION, POWDER, LYOPHILIZED, FOR SOLUTION INTRAVENOUS at 03:12

## 2020-12-08 RX ADMIN — MIDODRINE HYDROCHLORIDE 5 MG: 2.5 TABLET ORAL at 09:12

## 2020-12-08 RX ADMIN — THIAMINE HCL TAB 100 MG 100 MG: 100 TAB at 09:12

## 2020-12-08 NOTE — PROGRESS NOTES
[facility] Medicine  Progress Note    Patient name: Heriberto Keys  MRN: [id number]  Admit Date: 12/7/2020   LOS: 1 day     SUBJECTIVE:     Principal problem: Sepsis    Interval History:    Feels well. Friend at bedside reports significant improvement of mentation. Patient has no children, per friend.    Scheduled Meds:   cyanocobalamin  1,000 mcg Oral Daily    folic acid  1 mg Oral Daily    furosemide (LASIX) IV  20 mg Intravenous Once    midodrine  5 mg Oral BID WM    piperacillin-tazobactam (ZOSYN) IVPB  3.375 g Intravenous Q8H    pyridoxine (vitamin B6)  100 mg Oral Daily    thiamine  100 mg Oral Daily    vancomycin (VANCOCIN) IVPB  1,250 mg Intravenous Q12H     Continuous Infusions:  PRN Meds:sodium chloride, sodium chloride, calcium chloride IVPB, calcium chloride IVPB, calcium chloride IVPB, ibuprofen, magnesium oxide, magnesium sulfate IVPB, magnesium sulfate IVPB, magnesium sulfate IVPB, magnesium sulfate IVPB, ondansetron, potassium chloride in water, potassium chloride in water, potassium chloride in water, potassium chloride in water, potassium chloride, potassium chloride, potassium chloride, potassium chloride, sodium phosphate IVPB, sodium phosphate IVPB, sodium phosphate IVPB, sodium phosphate IVPB, sodium phosphate IVPB, Pharmacy to dose Vancomycin consult **AND** vancomycin - pharmacy to dose    Review of patient's allergies indicates:  No Known Allergies    Review of Systems  As per subjective    OBJECTIVE:     Vital Signs (Most Recent)  Temp: 96.7 °F (35.9 °C) (12/08/20 0732)  Pulse: 67 (12/08/20 0732)  Resp: 16 (12/08/20 0732)  BP: (!) 91/53 (12/08/20 0732)  SpO2: 99 % (12/08/20 0732)    Vital Signs Range (Last 24H):  Temp:  [96 °F (35.6 °C)-101.1 °F (38.4 °C)]   Pulse:  []   Resp:  [16-26]   BP: ()/(45-74)   SpO2:  [92 %-99 %]     I & O (Last 24H):    Intake/Output Summary (Last 24 hours) at 12/8/2020 0907  Last data filed at 12/8/2020 0700  Gross per  24 hour   Intake 1385.41 ml   Output 200 ml   Net 1185.41 ml       Physical Exam:  General: Patient resting comfortably in no acute distress. Appears as stated age. Calm  Eyes: EOM intact. No conjunctivae injection. No scleral icterus.  ENT: Hearing grossly intact. No discharge from ears. No nasal discharge.   CVS: RRR. +2 pitting edema BL LE  Lungs: CTA BL, no wheezing or crackles. Good breath sounds. No accessory muscle use. No acute respiratory distress  Neuro: AOx3. GCS 15. Cranial nerves grossly intact. Moves all extremities equally. Follows commands. Responds appropriately     Laboratory:  CBC:   Recent Labs   Lab 12/07/20  1604 12/08/20  0604   WBC 16.80* 10.74   HGB 6.4* 6.1*   HCT 22.9* 21.3*   PLT 83* 54*     CMP:   Recent Labs   Lab 12/07/20  1604 12/08/20  0604    137   K 3.1* 2.9*    103   CO2 28 28   * 200*   BUN 13 18   CREATININE 0.9 0.8   CALCIUM 9.6 8.1*   PROT 5.0*  --    ALBUMIN 2.0*  --    BILITOT 1.6*  --    ALKPHOS 139*  --    AST 11  --    ALT 14  --    ANIONGAP 10 6*   EGFRNONAA >60.0 >60.0       Microbiology Results (last 7 days)     Procedure Component Value Units Date/Time    Blood culture x two cultures. Draw prior to antibiotics [[id number]] Collected: 12/07/20 1605    Order Status: Completed Specimen: Blood from Peripheral, Hand, Right Updated: 12/07/20 2358     Blood Culture, Routine No Growth to date    Narrative:      Aerobic and anaerobic    Blood culture x two cultures. 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     Sepsis - resolved      Plan:   S/p 1 PRBC  Plan transfuse 1 PRBC today. Lasix IV after transfusion  H/H q8h  Oncology working up aplastic anemia as an outpatient. Consider GI consult   Vancomycin, Zosyn  BCx pending  Sputum Cx pending  HbA1C pending  Avoid heparin and related products due to thrombocytopenia  Electrolyte SS    Oncology consulted      VTE Risk Mitigation (From admission, onward)         Ordered     IP VTE HIGH RISK PATIENT  Once      12/07/20 2235     Place sequential compression device  Until discontinued      12/07/20 2235                    Medical Decision Making during this encounter was  [] Low Complexity  [] Moderate Complexity  [x] High Complexity        Department Hospital Medicine  [facility]  Flo Barry MD  Date of service: 12/08/2020
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
## 2020-12-08 NOTE — PLAN OF CARE
Patient lives with friend's. He has home health. He uses [facility] on [address]. He uses walker, shower chair and BSC. May need home health at discharge. CM will continue to follow patient until discharged. Talked to Nessa at [facility] and he is current with them.        12/08/20 1125   Discharge Assessment   Assessment Type Discharge Planning Assessment   Confirmed/corrected address and phone number on facesheet? Yes   Assessment information obtained from? Caregiver  (makayla Nagy [phone])   Prior to hospitilization cognitive status: Alert/Oriented   Prior to hospitalization functional status: Needs Assistance   Current cognitive status: Alert/Oriented   Current Functional Status: Partially Dependent   Lives With friend(s)   Able to Return to Prior Arrangements yes   Is patient able to care for self after discharge? Unable to determine at this time (comments)   Patient's perception of discharge disposition home or selfcare   Readmission Within the Last 30 Days no previous admission in last 30 days   Patient currently being followed by outpatient case management? No   Patient currently receives any other outside agency services? Yes   Name and contact number of agency or person providing outside services Not sure   Is it the patient/care giver preference to resume care with the current outside agency? Yes   Equipment Currently Used at Home walker, rolling;wheelchair;bedside commode;shower chair   Do you have any problems affording any of your prescribed medications? No   Is the patient taking medications as prescribed? yes   Does the patient have transportation home? Yes   Transportation Anticipated family or friend will provide   Dialysis Name and Scheduled days N/a   Does the patient receive services at the [facility]? No   Discharge Plan A Home;Home Health   Discharge Plan B Home Health   DME Needed Upon Discharge  none   Patient/Family in Agreement with Plan yes

## 2020-12-08 NOTE — HPI
60-year-old male with known Hodgkin's lymphoma currently in remission, chronic anemia, thrombocytopenia and chronic hypotension on midodrine brought to the ED secondary to fever and generalized weakness.    History is limited; patient is a poor historian and has hearing loss. No family at bedside.  As per report he has been having intermittent fever (T-max of 103° F) with altered mental status and falls.  He is alert and oriented during my evaluation.  He admits to fever and generalized weakness.  He denies headache, neck pain, chest pain, cough, abdominal pain, diarrhea, melena or hematochezia..  Report of significant bilateral lower extremity edema noted. He was seen by his oncologist 2 weeks ago and he is being worked up for aplastic anemia.     In the ED:  T-max of 101.1° F with tachycardia and tachypnea.  Labs with worsening leukocytosis and acute on chronic anemia with thrombocytopenia.  Chest x-ray reveals worsening bilateral perihilar and lower lung interstitial and ground-glass opacities.    Rest of the 10 point review of systems is negative except as mentioned above.

## 2020-12-08 NOTE — ASSESSMENT & PLAN NOTE
4/4 SIRS criteria on admission with fever, leukocytosis, tachycardia and tachypnea  Hypotension is chronic thought to be secondary to chronic adrenal insufficiency from long-term steroid use; continue home midodrine  Likely etiologies of sepsis include lower respiratory infection given findings on chest x-ray  Will start empiric vancomycin and piperacillin/tazobactam  Obtain sputum culture.  Follow blood cultures in ED.  Consider ID consult and imaging with CT chest if no improvement
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
## 2020-12-08 NOTE — CONSULTS
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 costophrenic sulcus  which raises suspicion of a small component of right-sided pleural  fluid.     IMPRESSION:     Detrimental development of bilateral parahilar and lower lung zone  interstitial and groundglass opacities.     Probable minimal right-sided pleural fluid.     Additional observations as above.     Electronically Signed by Rasheed Parra M.D. on 12/7/2020 3:47 PM                                  No results found for this or any previous visit (from the past 2160 hour(s)).  No results found for this or any previous visit (from the past 2160 hour(s)).  All lab results and imaging results have been reviewed.    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 Pt was to be referred to Dr. Jean-Baptiste ID for  workup of abnormal CD4 ratio with infection to r/o infective cause of anemia vs aplastic anemia.   -Pt had BMBx performed inpatient 11/09/2020 with pathology not showing lymphoma : but it did show hypocellular marrow and Cd 4 CD 8 ratio. Pt is being followed by hem/onc Dr. Amanda Rich.  -Referral placed to Dr. Jean-Baptiste for ID consult.  -Transfuse pt with PRBC PRN if H/H <7/21 or if pt is hemodynamically unstable/symptomatically anemic.   -Transfuse platelets if platelet count <20,000 or if pt actively bleeding.  -Stabilize patient and have pt follow up with Dr. Amanda Rich within 1 week of discharge at [facility] [address] [place] [id number]. Office contact number: [phone].    Sincerely,  Adolfo Sorenson PA-C    Note is available for collaborating MD; Dr. Suzie Dukes for review.    Electronically signed by: Adolfo Sorenson PA-C

## 2020-12-08 NOTE — ASSESSMENT & PLAN NOTE
Acute on chronic anemia  His oncologist in exploring other possibilities of anemia including aplastic anemia  Patient denies melena or hematochezia  Transfuse 1 unit of packed red blood cells to maintain hemoglobin greater than 7 grams/deciliter  Hematology-oncology consulted

## 2020-12-08 NOTE — PLAN OF CARE
Problem: Fall Injury Risk  Goal: Absence of Fall and Fall-Related Injury  Outcome: Ongoing, Progressing     Problem: Adult Inpatient Plan of Care  Goal: Plan of Care Review  Outcome: Ongoing, Progressing  Goal: Patient-Specific Goal (Individualization)  Outcome: Ongoing, Progressing  Goal: Absence of Hospital-Acquired Illness or Injury  Outcome: Ongoing, Progressing  Goal: Optimal Comfort and Wellbeing  Outcome: Ongoing, Progressing  Goal: Readiness for Transition of Care  Outcome: Ongoing, Progressing  Goal: Rounds/Family Conference  Outcome: Ongoing, Progressing     Problem: Adjustment to Illness (Sepsis/Septic Shock)  Goal: Optimal Coping  Outcome: Ongoing, Progressing     Problem: Bleeding (Sepsis/Septic Shock)  Goal: Absence of Bleeding  Outcome: Ongoing, Progressing     Problem: Glycemic Control Impaired (Sepsis/Septic Shock)  Goal: Blood Glucose Level Within Desired Range  Outcome: Ongoing, Progressing     Problem: Hemodynamic Instability (Sepsis/Septic Shock)  Goal: Effective Tissue Perfusion  Outcome: Ongoing, Progressing     Problem: Infection (Sepsis/Septic Shock)  Goal: Absence of Infection Signs/Symptoms  Outcome: Ongoing, Progressing     Problem: Nutrition Impaired (Sepsis/Septic Shock)  Goal: Optimal Nutrition Intake  Outcome: Ongoing, Progressing     Problem: Respiratory Compromise (Sepsis/Septic Shock)  Goal: Effective Oxygenation and Ventilation  Outcome: Ongoing, Progressing     Problem: Infection  Goal: Infection Symptom Resolution  Outcome: Ongoing, Progressing

## 2020-12-09 LAB
ANION GAP SERPL CALC-SCNC: 10 MMOL/L (ref 8–16)
BASOPHILS # BLD AUTO: 0.03 K/UL (ref 0–0.2)
BASOPHILS NFR BLD: 0.2 % (ref 0–1.9)
BUN SERPL-MCNC: 16 MG/DL (ref 8–23)
CALCIUM SERPL-MCNC: 8.7 MG/DL (ref 8.7–10.5)
CHLORIDE SERPL-SCNC: 102 MMOL/L (ref 95–110)
CO2 SERPL-SCNC: 26 MMOL/L (ref 23–29)
COMPLEXED PSA SERPL-MCNC: 1.7 NG/ML (ref 0–4)
CREAT SERPL-MCNC: 0.8 MG/DL (ref 0.5–1.4)
DIFFERENTIAL METHOD: ABNORMAL
EOSINOPHIL # BLD AUTO: 0.3 K/UL (ref 0–0.5)
EOSINOPHIL NFR BLD: 2.5 % (ref 0–8)
ERYTHROCYTE [DISTWIDTH] IN BLOOD BY AUTOMATED COUNT: 20.4 % (ref 11.5–14.5)
EST. GFR  (AFRICAN AMERICAN): >60 ML/MIN/1.73 M^2
EST. GFR  (NON AFRICAN AMERICAN): >60 ML/MIN/1.73 M^2
GLUCOSE SERPL-MCNC: 93 MG/DL (ref 70–110)
HCT VFR BLD AUTO: 26.6 % (ref 40–54)
HCT VFR BLD AUTO: 26.9 % (ref 40–54)
HCT VFR BLD AUTO: 28.3 % (ref 40–54)
HGB BLD-MCNC: 8 G/DL (ref 14–18)
HGB BLD-MCNC: 8.4 G/DL (ref 14–18)
HGB BLD-MCNC: 8.7 G/DL (ref 14–18)
IMM GRANULOCYTES # BLD AUTO: 0.22 K/UL (ref 0–0.04)
IMM GRANULOCYTES NFR BLD AUTO: 1.7 % (ref 0–0.5)
LYMPHOCYTES # BLD AUTO: 3.2 K/UL (ref 1–4.8)
LYMPHOCYTES NFR BLD: 25.1 % (ref 18–48)
MAGNESIUM SERPL-MCNC: 1.9 MG/DL (ref 1.6–2.6)
MCH RBC QN AUTO: 28.9 PG (ref 27–31)
MCHC RBC AUTO-ENTMCNC: 30.7 G/DL (ref 32–36)
MCV RBC AUTO: 94 FL (ref 82–98)
MONOCYTES # BLD AUTO: 0.3 K/UL (ref 0.3–1)
MONOCYTES NFR BLD: 2 % (ref 4–15)
NEUTROPHILS # BLD AUTO: 8.7 K/UL (ref 1.8–7.7)
NEUTROPHILS NFR BLD: 68.5 % (ref 38–73)
NRBC BLD-RTO: 2 /100 WBC
PLATELET # BLD AUTO: 62 K/UL (ref 150–350)
PMV BLD AUTO: 12.1 FL (ref 9.2–12.9)
POTASSIUM SERPL-SCNC: 3.8 MMOL/L (ref 3.5–5.1)
PROCALCITONIN SERPL IA-MCNC: 2.58 NG/ML (ref 0–0.5)
RBC # BLD AUTO: 3.01 M/UL (ref 4.6–6.2)
SODIUM SERPL-SCNC: 138 MMOL/L (ref 136–145)
WBC # BLD AUTO: 12.65 K/UL (ref 3.9–12.7)

## 2020-12-09 PROCEDURE — 80048 BASIC METABOLIC PNL TOTAL CA: CPT

## 2020-12-09 PROCEDURE — 12000002 HC ACUTE/MED SURGE SEMI-PRIVATE ROOM

## 2020-12-09 PROCEDURE — 85018 HEMOGLOBIN: CPT

## 2020-12-09 PROCEDURE — 83735 ASSAY OF MAGNESIUM: CPT

## 2020-12-09 PROCEDURE — 63600175 PHARM REV CODE 636 W HCPCS: Performed by: FAMILY MEDICINE

## 2020-12-09 PROCEDURE — 99233 SBSQ HOSP IP/OBS HIGH 50: CPT | Mod: ,,, | Performed by: INTERNAL MEDICINE

## 2020-12-09 PROCEDURE — 63600175 PHARM REV CODE 636 W HCPCS: Performed by: INTERNAL MEDICINE

## 2020-12-09 PROCEDURE — 25000003 PHARM REV CODE 250: Performed by: FAMILY MEDICINE

## 2020-12-09 PROCEDURE — 85018 HEMOGLOBIN: CPT | Mod: 91

## 2020-12-09 PROCEDURE — 25000003 PHARM REV CODE 250: Performed by: INTERNAL MEDICINE

## 2020-12-09 PROCEDURE — 85014 HEMATOCRIT: CPT

## 2020-12-09 PROCEDURE — 99233 PR SUBSEQUENT HOSPITAL CARE,LEVL III: ICD-10-PCS | Mod: ,,, | Performed by: INTERNAL MEDICINE

## 2020-12-09 PROCEDURE — 94761 N-INVAS EAR/PLS OXIMETRY MLT: CPT

## 2020-12-09 PROCEDURE — 85014 HEMATOCRIT: CPT | Mod: 91

## 2020-12-09 PROCEDURE — 85025 COMPLETE CBC W/AUTO DIFF WBC: CPT

## 2020-12-09 PROCEDURE — 36415 COLL VENOUS BLD VENIPUNCTURE: CPT

## 2020-12-09 RX ADMIN — PIPERACILLIN SODIUM AND TAZOBACTAM SODIUM 3.38 G: 3; .375 INJECTION, POWDER, LYOPHILIZED, FOR SOLUTION INTRAVENOUS at 05:12

## 2020-12-09 RX ADMIN — VANCOMYCIN HYDROCHLORIDE 1250 MG: 1 INJECTION, POWDER, LYOPHILIZED, FOR SOLUTION INTRAVENOUS at 03:12

## 2020-12-09 RX ADMIN — MIDODRINE HYDROCHLORIDE 5 MG: 2.5 TABLET ORAL at 11:12

## 2020-12-09 RX ADMIN — THIAMINE HCL TAB 100 MG 100 MG: 100 TAB at 08:12

## 2020-12-09 RX ADMIN — PIPERACILLIN SODIUM AND TAZOBACTAM SODIUM 3.38 G: 3; .375 INJECTION, POWDER, LYOPHILIZED, FOR SOLUTION INTRAVENOUS at 01:12

## 2020-12-09 RX ADMIN — MIDODRINE HYDROCHLORIDE 5 MG: 2.5 TABLET ORAL at 05:12

## 2020-12-09 RX ADMIN — MIDODRINE HYDROCHLORIDE 5 MG: 2.5 TABLET ORAL at 08:12

## 2020-12-09 RX ADMIN — PYRIDOXINE HCL TAB 50 MG 100 MG: 50 TAB at 08:12

## 2020-12-09 RX ADMIN — FOLIC ACID 1 MG: 1 TABLET ORAL at 08:12

## 2020-12-09 RX ADMIN — CYANOCOBALAMIN TAB 1000 MCG 1000 MCG: 1000 TAB at 08:12

## 2020-12-09 RX ADMIN — PIPERACILLIN SODIUM AND TAZOBACTAM SODIUM 3.38 G: 3; .375 INJECTION, POWDER, LYOPHILIZED, FOR SOLUTION INTRAVENOUS at 09:12

## 2020-12-09 NOTE — CLINICAL REVIEW
ENT NOTE    Received consult for hearing loss with tympanic membrane perforations and some evidence of chronic sinusitis. Reviewed CT head, which did reveal some mild thickening in the sinuses with a sclerotic mastoid. Could not fully visualize however since it was a CT Head and not sinuses. Please obtain CT Sinus without contrast for further evaluation. I will also evaluate the patient tomorrow morning, 12/10/20 with nasal endoscopy. Will schedule hearing test as an outpatient.

## 2020-12-09 NOTE — CARE UPDATE
12/09/20 1100   PRE-TX-O2   O2 Device (Oxygen Therapy) room air   SpO2 98 %   Pulse Oximetry Type Intermittent   $ Pulse Oximetry - Multiple Charge Pulse Oximetry - Multiple   Pulse 80   Resp 18
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
## 2020-12-09 NOTE — PROGRESS NOTES
[facility] Hospital Medicine  Progress Note    Patient name: Heriberto Keys  MRN: [id number]  Admit Date: 12/7/2020   LOS: 2 days     SUBJECTIVE:     Principal problem: Sepsis    Interval History:    Doing well.  Denies chest pain, shortness of breath.  ENT recommends CT sinus without contrast. H/H stable    12/8: Feels well. Friend at bedside reports significant improvement of mentation. Patient has no children, per friend.    Scheduled Meds:   cyanocobalamin  1,000 mcg Oral Daily    folic acid  1 mg Oral Daily    furosemide (LASIX) IV  20 mg Intravenous Once    midodrine  5 mg Oral TID WM    piperacillin-tazobactam (ZOSYN) IVPB  3.375 g Intravenous Q8H    pyridoxine (vitamin B6)  100 mg Oral Daily    thiamine  100 mg Oral Daily     Continuous Infusions:  PRN Meds:sodium chloride, sodium chloride, calcium chloride IVPB, calcium chloride IVPB, calcium chloride IVPB, ibuprofen, magnesium oxide, magnesium sulfate IVPB, magnesium sulfate IVPB, magnesium sulfate IVPB, magnesium sulfate IVPB, ondansetron, potassium chloride in water, potassium chloride in water, potassium chloride in water, potassium chloride in water, potassium chloride, potassium chloride, potassium chloride, potassium chloride, sodium phosphate IVPB, sodium phosphate IVPB, sodium phosphate IVPB, sodium phosphate IVPB, sodium phosphate IVPB    Review of patient's allergies indicates:  No Known Allergies    Review of Systems  As per subjective    OBJECTIVE:     Vital Signs (Most Recent)  Temp: 99.1 °F (37.3 °C) (12/09/20 1122)  Pulse: 103 (12/09/20 1122)  Resp: 18 (12/09/20 1122)  BP: 106/68 (12/09/20 1122)  SpO2: 95 % (12/09/20 1122)    Vital Signs Range (Last 24H):  Temp:  [97.3 °F (36.3 °C)-99.1 °F (37.3 °C)]   Pulse:  []   Resp:  [16-18]   BP: ()/(54-71)   SpO2:  [94 %-99 %]     I & O (Last 24H):    Intake/Output Summary (Last 24 hours) at 12/9/2020 1214  Last data filed at 12/8/2020 1420  Gross per 24 hour   Intake  257.83 ml   Output --   Net 257.83 ml       Physical Exam:  General: Patient resting comfortably in no acute distress. Appears as stated age. Calm  Eyes: EOM intact. No conjunctivae injection. No scleral icterus.  ENT: Hearing grossly intact. No discharge from ears. No nasal discharge.   CVS: RRR. +2 pitting edema BL LE  Lungs: CTA BL, no wheezing or crackles. Good breath sounds. No accessory muscle use. No acute respiratory distress  Neuro: AOx3. GCS 15. Cranial nerves grossly intact. Moves all extremities equally. Follows commands. Responds appropriately     Laboratory:  CBC:   Recent Labs   Lab 12/07/20  1604 12/08/20  0604 12/08/20  1713 12/09/20  0012 12/09/20  0913   WBC 16.80* 10.74  --   --  12.65   HGB 6.4* 6.1* 7.9*  7.9* 8.4* 8.7*   HCT 22.9* 21.3* 26.1*  26.1* 26.9* 28.3*   PLT 83* 54*  --   --  62*     CMP:   Recent Labs   Lab 12/07/20  1604 12/08/20  0604 12/09/20  0913    137 138   K 3.1* 2.9* 3.8    103 102   CO2 28 28 26   * 200* 93   BUN 13 18 16   CREATININE 0.9 0.8 0.8   CALCIUM 9.6 8.1* 8.7   PROT 5.0*  --   --    ALBUMIN 2.0*  --   --    BILITOT 1.6*  --   --    ALKPHOS 139*  --   --    AST 11  --   --    ALT 14  --   --    ANIONGAP 10 6* 10   EGFRNONAA >60.0 >60.0 >60.0       Microbiology Results (last 7 days)     Procedure Component Value Units Date/Time    Blood culture x two cultures. Draw prior to antibiotics [[id number]] Collected: 12/07/20 1604    Order Status: Completed Specimen: Blood from Peripheral, Antecubital, Right Updated: 12/08/20 1832     Blood Culture, Routine No Growth to date      No Growth to date    Narrative:      Aerobic and anaerobic    Blood culture x two cultures. Draw prior to antibiotics [[id number]] Collected: 12/07/20 1605    Order Status: Completed Specimen: Blood from Peripheral, Hand, Right Updated: 12/08/20 1832     Blood Culture, Routine No Growth to date      No Growth to date    Narrative:      Aerobic and anaerobic    Culture,  Respiratory with Gram Stain [[id number]]     Order Status: No result Specimen: Respiratory            Diagnostic Results:      ASSESSMENT/PLAN:     Active Hospital Problems    Diagnosis  POA    *Sepsis [A41.9]  Yes    Altered mental status [R41.82]  Unknown    Hypokalemia [E87.6]  Yes    Thrombocytopenia [D69.6]  Yes    Anemia [D64.9]  Yes    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     Sepsis - resolved      Plan:   S/p 2 PRBC  H/H q8h  Oncology working up aplastic anemia as an outpatient. Consider GI consult if persistently dropping H/H  Zosyn IV. Vancomycin IV d/c per ID  BCx NGTD  Sputum Cx pending  HbA1C pending  Avoid heparin and related products due to thrombocytopenia  Electrolyte SS  CT sinus wo pending    Oncology consulted  ENT consulted  ID consulted    VTE Risk Mitigation (From admission, onward)         Ordered     IP VTE HIGH RISK PATIENT  Once      12/07/20 2235     Place sequential compression device  Until discontinued      12/07/20 2235                    Medical Decision Making during this encounter was  [] Low Complexity  [] Moderate Complexity  [x] High Complexity        Department Hospital Medicine  [facility]  Flo Barry MD  Date of service: 12/09/2020

## 2020-12-10 PROCEDURE — 25000003 PHARM REV CODE 250: Performed by: FAMILY MEDICINE

## 2020-12-10 PROCEDURE — 12000002 HC ACUTE/MED SURGE SEMI-PRIVATE ROOM

## 2020-12-10 PROCEDURE — 99232 SBSQ HOSP IP/OBS MODERATE 35: CPT | Mod: ,,, | Performed by: INTERNAL MEDICINE

## 2020-12-10 PROCEDURE — 99232 PR SUBSEQUENT HOSPITAL CARE,LEVL II: ICD-10-PCS | Mod: ,,, | Performed by: INTERNAL MEDICINE

## 2020-12-10 PROCEDURE — 25000003 PHARM REV CODE 250: Performed by: INTERNAL MEDICINE

## 2020-12-10 PROCEDURE — 63600175 PHARM REV CODE 636 W HCPCS: Performed by: INTERNAL MEDICINE

## 2020-12-10 RX ORDER — LEVOFLOXACIN 500 MG/1
500 TABLET, FILM COATED ORAL DAILY
Status: DISCONTINUED | OUTPATIENT
Start: 2020-12-10 | End: 2020-12-12

## 2020-12-10 RX ADMIN — PYRIDOXINE HCL TAB 50 MG 100 MG: 50 TAB at 08:12

## 2020-12-10 RX ADMIN — MIDODRINE HYDROCHLORIDE 5 MG: 2.5 TABLET ORAL at 08:12

## 2020-12-10 RX ADMIN — THIAMINE HCL TAB 100 MG 100 MG: 100 TAB at 08:12

## 2020-12-10 RX ADMIN — MIDODRINE HYDROCHLORIDE 5 MG: 2.5 TABLET ORAL at 12:12

## 2020-12-10 RX ADMIN — PIPERACILLIN SODIUM AND TAZOBACTAM SODIUM 3.38 G: 3; .375 INJECTION, POWDER, LYOPHILIZED, FOR SOLUTION INTRAVENOUS at 01:12

## 2020-12-10 RX ADMIN — LEVOFLOXACIN 500 MG: 500 TABLET, FILM COATED ORAL at 05:12

## 2020-12-10 RX ADMIN — MIDODRINE HYDROCHLORIDE 5 MG: 2.5 TABLET ORAL at 05:12

## 2020-12-10 RX ADMIN — CYANOCOBALAMIN TAB 1000 MCG 1000 MCG: 1000 TAB at 08:12
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
## 2020-12-10 NOTE — NURSING
Patient refusing lab draws at this time. Educated patient on the reason and importance of the lab draw, but he still refused. Patient resting in bed, no distress noted, call light within reach. Will continue to monitor.

## 2020-12-10 NOTE — CONSULTS
[facility]  Otorhinolaryngology-Head & Neck Surgery  Consult Note    Patient Name: Heriberto Keys  MRN: [id number]  Code Status: Full Code  Admission Date: 2020  Hospital Length of Stay: 3 days  Attending Physician: Flo Barry MD  Primary Care Provider: Jagruti Davis NP    Inpatient consult to ENT  Consult performed by: Ren Raza MD  Consult ordered by: Jodi Jean-Baptiste MD          Subjective:     Chief Complaint: Weakness, fever    History of Present Illness: Heriberto Keys is a 60 y.o. male here for evaluation of Sinus and ear issues. The pt has a hx of Hodgin's which is in remission as well as pancytopenia. He presented to the ER recently c/o weakness and fever. He was admitted and put on Vanc and Zosyn. CT head revealed some evidence of chronic sinusitis and mastoid issues, which was not new. ENT was consulted for evaluation.  Pt is hard to communicate with due to significant hearing loss. He reports this has been present for years, and denies any prior surgeries. He also states he has had some chronic sinus issues, but nothing he reported was very signifcant besides some congestion. He denies any changes in vision, facial numbness or weakness    Review of patient's allergies indicates:  No Known Allergies    Past Medical History:   Diagnosis Date    Anemia     Depression     Encounter for blood transfusion     [place] (hard of hearing)     Lymphoma     Lymphoma     Lymphoma        Past Surgical History:   Procedure Laterality Date    BONE MARROW ASPIRATION Left 2019    Procedure: ASPIRATION, BONE MARROW;  [facility];  Location: [facility];  Service: General;  Laterality: Left;    LYMPHADENECTOMY Bilateral        Family History     None          Tobacco Use    Smoking status: Former Smoker     Packs/day: 1.00     Types: Cigarettes     Quit date: 2015     Years since quittin.8    Smokeless tobacco: Current User    Substance and Sexual Activity    Alcohol use: No     Frequency: Never    Drug use: No    Sexual activity: Not on file       Review of Systems    Objective:     Vital Signs (Most Recent):  Temp: 98.2 °F (36.8 °C) (12/10/20 0735)  Pulse: 99 (12/10/20 0735)  Resp: 19 (12/10/20 0735)  BP: (!) 128/54 (12/10/20 0735)  SpO2: 96 % (12/10/20 0735) Vital Signs (24h Range):  Temp:  [98.2 °F (36.8 °C)-99.6 °F (37.6 °C)] 98.2 °F (36.8 °C)  Pulse:  [] 99  Resp:  [16-19] 19  SpO2:  [95 %-98 %] 96 %  BP: (106-170)/(54-79) 128/54     Weight: 69.4 kg (153 lb)  Body mass index is 22.59 kg/m².    Physical Exam:  General: Awake, alert, no apparent distress.  Breathing comfortably without stridor. Good voice.   Head and Face: no craniofacial deformities. No facial numbness or weakness  External Ears: normal pinnae shape and position  Ext. Aud. Canal:  Right: EAC clear        Left:   EAC clear  Tympanic Mem:  Right:  Significant TM retraction pocket vs Perforation                     Left:    Significant TM retraction pocket vs Perforation   Hearing: Grossly reduced bilaterally  Nose:  Septum significantly deviated to the left, see procedure below  Oral Cavity/Oropharynx:  Mucous membranes moist, no masses or lesions noted.    Neck:  supple without significant adenopathy  Cardiac: No peripheral swelling by examination. 2+ pulses  Respiratory: Breathing comfortably without stridor.  No increased work of breathing  Neuro: Alert, Oriented. Normal mood.    Cranial Nerves: Grossly Intact  Vestibular: No nystagmus on examination.  Larynx: Unable to visualize due to gag reflex      Procedures:     The risks, benefits, and alternatives to the procedure were discussed and appropriate verbal consent was obtained.     Procedure Note    Procedure:  Nasal Endoscopy (CPT 09257)    Pre-operative Diagnosis: chronic sinusitis    Post-operative Diagnosis: same    Anesthesia: Topical afrin and lidocaine    Procedure Details:    After topical  anesthesia and decongestion, the patient was placed in the sitting position.  The zero degree telescope was passed along the left nasal floor to the nasopharynx.  It was then passed into the region of the middle meatus, middle turbinate, and the sphenoethmoid region. An identical procedure was performed on the right side.  The following findings were noted as stated below:    Findings: Boggy mucosa, clear rhinorrhea. No purulence, masses, necrotic tissue or signs of invasive fungal disease noted    Condition:  Stable.  Patient tolerated procedure well.    Complications:  None      Data Reviewed:     Significant Labs:  Recent Labs   Lab 12/09/20  0913 12/09/20  1651   WBC 12.65  --    RBC 3.01*  --    HGB 8.7* 8.0*   HCT 28.3* 26.6*   PLT 62*  --    MCV 94  --    MCH 28.9  --    MCHC 30.7*  --          Significant Diagnostics:  CT Sinuses without Contrast  Order: [id number]  Status:  Final result   Visible to patient:  No (not released) Next appt:  12/16/2020 at 10:40 AM in Infectious Diseases (Concepción Chambers MD)  Details    Reading Physician Reading Date Result Priority   Crow Dukes MD  [phone] 12/9/2020       Narrative & Impression     CMS MANDATED QUALITY DATA - CT RADIATION  436     All CT scans at this facility utilize dose modulation, iterative  reconstruction, and/or weight based dosing when appropriate to reduce  radiation dose to as low as reasonably achievable.     CT SINUSES WITHOUT CONTRAST     CLINICAL HISTORY:  60 years Male Sinusitis, chronic or recurrent     COMPARISON: CT sinuses November 11, 2020     FINDINGS: Limited images through the brain demonstrate no acute  intracranial pathology.     Left frontal sinus mucosal thickening measuring 7 mm in maximum  thickness, slightly worsened from prior. Small area of focal mucosal  opacification of left frontal ethmoidal recess. Right frontal sinus  mucosal thickening measures 3 mm in maximum thickness. Small area of  focal mucosal opacification of  the right frontal ethmoidal recess.     Bilateral ethmoid air cell mucosal thickening and opacification,  slightly worsened from prior.     1 to 2 mm mucosal thickening along the anterior aspect of both  sphenoid sinuses. Sphenoid sinuses are otherwise clear.  Sphenoethmoidal recesses are patent.     Mucosal thickening along the inferior aspect of the right maxillary  sinus measures 3 mm in maximum thickness. There is also right  maxillary sinus mucosal thickening superior medially. The right  ostiomeatal complex is patent. There is a patent accessory maxillary  sinus ostium on the right. Complete mucosal opacification of  infraorbital air cell (Mio cell) on the left, stable from prior.  Mucosal thickening along the floor of the left maxillary sinus  measures 3 mm in maximum thickness (coronal image 51). Left  ostiomeatal complex is patent.     Rightward nasal septal deviation of 4 mm. Fluid opacification of  visualized mastoid air cells bilaterally. Degenerative changes of the  cervical spine noted. Patient is edentulous. Carotid artery  atherosclerotic calcification noted.     IMPRESSION:     Multifocal paranasal sinus mucosal thickening as outlined above,  stable/slightly worsened compared to prior.     Rightward nasal septal deviation.     Bilateral mastoid effusions.                 Assessment:     1. Altered mental status, unspecified altered mental status type    2. Weakness    3. Symptomatic anemia    4. Sepsis    5. Chest pain    6. Sepsis, due to unspecified organism, unspecified whether acute organ dysfunction present    7. Anemia, unspecified type    8. Thrombocytopenia    9. 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   Chronic sinusitis  11.   Chronic mastoiditis  12.   Mixed hearing loss, bilaterally    Plan:     1. Nasal endoscopy performed, no significant signs of invasive or fulminant sinus disease. He does have findings of chronic sinus and mastoid changes on  CT, which are likely not contributing to his current leukocytosis and fevers. He would benefit from outpatient workup with audiogram. Will setup for him.     Thank you for your consult. Please call with any questions or concerns    Ren Raza MD  Otorhinolaryngology-Head & Neck Surgery  [facility] ENT ([facility])  [phone]

## 2020-12-10 NOTE — PROGRESS NOTES
[facility] Medicine  Progress Note    Patient name: Heriberto Keys  MRN: [id number]  Admit Date: 12/7/2020   LOS: 3 days     SUBJECTIVE:     Principal problem: Sepsis    Interval History:  No acute overnight events reported.  Afebrile overnight. Refused lab draws this morning. Patient feels like we are drawing too much blood (as reported by bedside RN).     Scheduled Meds:   cyanocobalamin  1,000 mcg Oral Daily    folic acid  1 mg Oral Daily    furosemide (LASIX) IV  20 mg Intravenous Once    levoFLOXacin  500 mg Oral Daily    midodrine  5 mg Oral TID WM    piperacillin-tazobactam (ZOSYN) IVPB  3.375 g Intravenous Q8H    pyridoxine (vitamin B6)  100 mg Oral Daily    thiamine  100 mg Oral Daily     Continuous Infusions:  PRN Meds:sodium chloride, sodium chloride, calcium chloride IVPB, calcium chloride IVPB, calcium chloride IVPB, ibuprofen, magnesium oxide, magnesium sulfate IVPB, magnesium sulfate IVPB, magnesium sulfate IVPB, magnesium sulfate IVPB, ondansetron, potassium chloride in water, potassium chloride in water, potassium chloride in water, potassium chloride in water, potassium chloride, potassium chloride, potassium chloride, potassium chloride, sodium phosphate IVPB, sodium phosphate IVPB, sodium phosphate IVPB, sodium phosphate IVPB, sodium phosphate IVPB    Review of patient's allergies indicates:  No Known Allergies    Review of Systems  Unable to obtain - allowed exam but refusing to answer questions     OBJECTIVE:     Vital Signs (Most Recent)  Temp: 98.7 °F (37.1 °C) (12/10/20 1129)  Pulse: 66 (12/10/20 1129)  Resp: 19 (12/10/20 1129)  BP: 131/68 (12/10/20 1129)  SpO2: 98 % (12/10/20 1129)    Vital Signs Range (Last 24H):  Temp:  [98.2 °F (36.8 °C)-99.6 °F (37.6 °C)]   Pulse:  [66-99]   Resp:  [16-19]   BP: (110-170)/(54-79)   SpO2:  [95 %-98 %]     I & O (Last 24H):    Intake/Output Summary (Last 24 hours) at 12/10/2020 7634  Last data filed at 12/10/2020  0857  Gross per 24 hour   Intake 240 ml   Output 100 ml   Net 140 ml       Physical Exam:  General: Patient resting comfortably in no acute distress. Appears as stated age. Calm  Eyes: No conjunctivae injection. No scleral icterus.  ENT: Hard of hearing. No discharge from ears. No nasal discharge.   CVS: RRR. +2 pitting edema BL LE  Lungs: CTA BL, no wheezing or crackles. Good breath sounds. No accessory muscle use. No acute respiratory distress  Neuro: Alert. Moves all extremities equally. No focal neurologic deficit     Laboratory:  CBC:   Recent Labs   Lab 12/09/20  0012 12/09/20  0913 12/09/20  1651   WBC  --  12.65  --    HGB 8.4* 8.7* 8.0*   HCT 26.9* 28.3* 26.6*   PLT  --  62*  --      CMP:   Recent Labs   Lab 12/09/20  0913      K 3.8      CO2 26   GLU 93   BUN 16   CREATININE 0.8   CALCIUM 8.7   ANIONGAP 10   EGFRNONAA >60.0       Microbiology Results (last 7 days)     Procedure Component Value Units Date/Time    Blood culture x two cultures. Draw prior to antibiotics [[id number]] Collected: 12/07/20 1605    Order Status: Completed Specimen: Blood from Peripheral, Hand, Right Updated: 12/09/20 1832     Blood Culture, Routine No Growth to date      No Growth to date      No Growth to date    Narrative:      Aerobic and anaerobic    Blood culture x two cultures. Draw prior to antibiotics [[id number]] Collected: 12/07/20 1604    Order Status: Completed Specimen: Blood from Peripheral, Antecubital, Right Updated: 12/09/20 1832     Blood Culture, Routine No Growth to date      No Growth to date      No Growth to date    Narrative:      Aerobic and anaerobic    Culture, Respiratory with Gram Stain [[id number]]     Order Status: No result Specimen: Respiratory            Diagnostic Results:  CT Sinuses without Contrast [[id number]] Collected: 12/09/20 1414   Order Status: Completed Updated: 12/09/20 1454   Narrative:     CMS MANDATED QUALITY DATA - CT RADIATION  436     All CT scans at this facility  utilize dose modulation, iterative   reconstruction, and/or weight based dosing when appropriate to reduce   radiation dose to as low as reasonably achievable.     CT SINUSES WITHOUT CONTRAST     CLINICAL HISTORY:   60 years Male Sinusitis, chronic or recurrent     COMPARISON: CT sinuses November 11, 2020     FINDINGS: Limited images through the brain demonstrate no acute   intracranial pathology.     Left frontal sinus mucosal thickening measuring 7 mm in maximum   thickness, slightly worsened from prior. Small area of focal mucosal   opacification of left frontal ethmoidal recess. Right frontal sinus   mucosal thickening measures 3 mm in maximum thickness. Small area of   focal mucosal opacification of the right frontal ethmoidal recess.     Bilateral ethmoid air cell mucosal thickening and opacification,   slightly worsened from prior.     1 to 2 mm mucosal thickening along the anterior aspect of both   sphenoid sinuses. Sphenoid sinuses are otherwise clear.   Sphenoethmoidal recesses are patent.     Mucosal thickening along the inferior aspect of the right maxillary   sinus measures 3 mm in maximum thickness. There is also right   maxillary sinus mucosal thickening superior medially. The right   ostiomeatal complex is patent. There is a patent accessory maxillary   sinus ostium on the right. Complete mucosal opacification of   infraorbital air cell (Mio cell) on the left, stable from prior.   Mucosal thickening along the floor of the left maxillary sinus   measures 3 mm in maximum thickness (coronal image 51). Left   ostiomeatal complex is patent.     Rightward nasal septal deviation of 4 mm. Fluid opacification of   visualized mastoid air cells bilaterally. Degenerative changes of the   cervical spine noted. Patient is edentulous. Carotid artery   atherosclerotic calcification noted.     IMPRESSION:     Multifocal paranasal sinus mucosal thickening as outlined above,   stable/slightly worsened compared to  prior.     Rightward nasal septal deviation.     Bilateral mastoid effusions.     Electronically Signed by Crow MCMANUS on 12/9/2020 2:51 PM         ASSESSMENT/PLAN:     60-year-old male with Hodgkin's lymphoma in remission and chronic anemia admitted for sepsis of unclear etiology along with acute on chronic anemia.  Being followed by infectious disease as well as Oncology.  Seen by ENT for chronic sinusitis and hearing loss with no acute identifiable etiology.  Recommend outpatient audiogram.    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     Sepsis - resolved      Plan:   Acute on chronic anemia; normocytic and hypochromic - transfuse as needed to maintain hemoglobin greater than 7 grams/deciliter.  Hematology following  Being worked up outpatient for aplastic anemia as an outpatient - Parvovirus testing sent   ID following for sepsis workup.  Continue antibiotics at the direction  BCx NGTD  S/p nasal endoscopy (12/10) with no evidence of invasive or fulminant sinus disease.  Noted to have changes of chronic sinusitis and mastoiditis.  Outpatient audiogram recommended.  Avoid heparin and related products due to thrombocytopenia    Appreciate input from consultants including ID, oncology and ENT  Discussed plan of care with bedside nursing    VTE Risk Mitigation (From admission, onward)         Ordered     IP VTE HIGH RISK PATIENT  Once      12/07/20 2235     Place sequential compression device  Until discontinued      12/07/20 2235                    Medical Decision Making during this encounter was  [] Low Complexity  [] Moderate Complexity  [x] High Complexity        Department  Hospital Medicine  [facility]  Sanjeev Chairez MD  Date of service: 12/10/2020

## 2020-12-11 PROBLEM — G93.41 ENCEPHALOPATHY, METABOLIC: Status: ACTIVE | Noted: 2020-12-11

## 2020-12-11 PROBLEM — A41.9 SEPSIS: Status: RESOLVED | Noted: 2020-12-07 | Resolved: 2020-12-11

## 2020-12-11 PROBLEM — E87.6 HYPOKALEMIA: Status: RESOLVED | Noted: 2020-12-07 | Resolved: 2020-12-11

## 2020-12-11 LAB
ANION GAP SERPL CALC-SCNC: 9 MMOL/L (ref 8–16)
BASOPHILS # BLD AUTO: 0.04 K/UL (ref 0–0.2)
BASOPHILS NFR BLD: 0.2 % (ref 0–1.9)
BUN SERPL-MCNC: 14 MG/DL (ref 8–23)
CALCIUM SERPL-MCNC: 9.5 MG/DL (ref 8.7–10.5)
CHLORIDE SERPL-SCNC: 102 MMOL/L (ref 95–110)
CO2 SERPL-SCNC: 28 MMOL/L (ref 23–29)
CREAT SERPL-MCNC: 0.7 MG/DL (ref 0.5–1.4)
DIFFERENTIAL METHOD: ABNORMAL
EOSINOPHIL # BLD AUTO: 0.3 K/UL (ref 0–0.5)
EOSINOPHIL NFR BLD: 1.3 % (ref 0–8)
ERYTHROCYTE [DISTWIDTH] IN BLOOD BY AUTOMATED COUNT: 20.5 % (ref 11.5–14.5)
EST. GFR  (AFRICAN AMERICAN): >60 ML/MIN/1.73 M^2
EST. GFR  (NON AFRICAN AMERICAN): >60 ML/MIN/1.73 M^2
GLUCOSE SERPL-MCNC: 142 MG/DL (ref 70–110)
HCT VFR BLD AUTO: 28.9 % (ref 40–54)
HGB BLD-MCNC: 8.5 G/DL (ref 14–18)
IMM GRANULOCYTES # BLD AUTO: 0.38 K/UL (ref 0–0.04)
IMM GRANULOCYTES NFR BLD AUTO: 2 % (ref 0–0.5)
LYMPHOCYTES # BLD AUTO: 4.2 K/UL (ref 1–4.8)
LYMPHOCYTES NFR BLD: 21.6 % (ref 18–48)
MAGNESIUM SERPL-MCNC: 1.9 MG/DL (ref 1.6–2.6)
MCH RBC QN AUTO: 27.7 PG (ref 27–31)
MCHC RBC AUTO-ENTMCNC: 29.4 G/DL (ref 32–36)
MCV RBC AUTO: 94 FL (ref 82–98)
MONOCYTES # BLD AUTO: 0.5 K/UL (ref 0.3–1)
MONOCYTES NFR BLD: 2.6 % (ref 4–15)
NEUTROPHILS # BLD AUTO: 14.1 K/UL (ref 1.8–7.7)
NEUTROPHILS NFR BLD: 72.3 % (ref 38–73)
NRBC BLD-RTO: 2 /100 WBC
PLATELET # BLD AUTO: 79 K/UL (ref 150–350)
PMV BLD AUTO: 11.3 FL (ref 9.2–12.9)
POTASSIUM SERPL-SCNC: 3.4 MMOL/L (ref 3.5–5.1)
RBC # BLD AUTO: 3.07 M/UL (ref 4.6–6.2)
SODIUM SERPL-SCNC: 139 MMOL/L (ref 136–145)
WBC # BLD AUTO: 19.48 K/UL (ref 3.9–12.7)

## 2020-12-11 PROCEDURE — 80048 BASIC METABOLIC PNL TOTAL CA: CPT

## 2020-12-11 PROCEDURE — 99232 SBSQ HOSP IP/OBS MODERATE 35: CPT | Mod: ,,, | Performed by: INTERNAL MEDICINE

## 2020-12-11 PROCEDURE — 12000002 HC ACUTE/MED SURGE SEMI-PRIVATE ROOM

## 2020-12-11 PROCEDURE — 25000003 PHARM REV CODE 250: Performed by: INTERNAL MEDICINE

## 2020-12-11 PROCEDURE — 83735 ASSAY OF MAGNESIUM: CPT

## 2020-12-11 PROCEDURE — 25000003 PHARM REV CODE 250: Performed by: FAMILY MEDICINE

## 2020-12-11 PROCEDURE — 99232 PR SUBSEQUENT HOSPITAL CARE,LEVL II: ICD-10-PCS | Mod: ,,, | Performed by: PHYSICIAN ASSISTANT

## 2020-12-11 PROCEDURE — 85025 COMPLETE CBC W/AUTO DIFF WBC: CPT

## 2020-12-11 PROCEDURE — 99232 SBSQ HOSP IP/OBS MODERATE 35: CPT | Mod: ,,, | Performed by: PHYSICIAN ASSISTANT

## 2020-12-11 PROCEDURE — 36415 COLL VENOUS BLD VENIPUNCTURE: CPT

## 2020-12-11 PROCEDURE — 99232 PR SUBSEQUENT HOSPITAL CARE,LEVL II: ICD-10-PCS | Mod: ,,, | Performed by: INTERNAL MEDICINE

## 2020-12-11 RX ADMIN — MIDODRINE HYDROCHLORIDE 5 MG: 2.5 TABLET ORAL at 01:12

## 2020-12-11 RX ADMIN — MIDODRINE HYDROCHLORIDE 5 MG: 2.5 TABLET ORAL at 10:12

## 2020-12-11 RX ADMIN — LEVOFLOXACIN 500 MG: 500 TABLET, FILM COATED ORAL at 10:12

## 2020-12-11 RX ADMIN — MIDODRINE HYDROCHLORIDE 5 MG: 2.5 TABLET ORAL at 04:12

## 2020-12-11 RX ADMIN — CYANOCOBALAMIN TAB 1000 MCG 1000 MCG: 1000 TAB at 10:12

## 2020-12-11 RX ADMIN — PYRIDOXINE HCL TAB 50 MG 100 MG: 50 TAB at 10:12

## 2020-12-11 RX ADMIN — THIAMINE HCL TAB 100 MG 100 MG: 100 TAB at 10:12

## 2020-12-11 RX ADMIN — FOLIC ACID 1 MG: 1 TABLET ORAL at 09:12
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
## 2020-12-11 NOTE — PROGRESS NOTES
"[facility]  Adult Nutrition   Progress Note (Initial Assessment)     SUMMARY     Recommendations  Recommendation/Intervention: 1. Continue present diet as tolerated by patient--encourage intake 2. RD to add Ensure milkshake TID to aid in meeting estimated needs (1464 kcals, 66 g protein)  Goals: 1. Patient to meet at least 75% of estimated needs through diet and supplement intake  Nutrition Goal Status: new    Dietitian Rounds Brief    Pt assessed 2' LOS. Pt known to me from previous admissions to [facility]. Unable to interview--pt with cover over his head during rounds. No one present at bedside. Intake poor per chart, 0-25% per I/Os. Pt s/p PRBC x 2. Noted some paranoia per chart--refusing labs/meds, etc. Will add ONS to aid in meeting estimated needs. Per EMR weight history, 16# or 9.4% wt loss over past 3.5 months (severe). Unable to make dx of malnutrition at this time based on current data.     Reason for Assessment  Reason For Assessment: length of stay  Diagnosis: infection/sepsis  Relevant Medical History: Hodgkins lymphoma (in remission), [place], depression  Interdisciplinary Rounds: attended    Nutrition Risk Screen  Nutrition Risk Screen: no indicators present     MST Score: 0  Have you recently lost weight without trying?: No  Weight loss score: 0  Have you been eating poorly because of a decreased appetite?: No  Appetite score: 0       Nutrition/Diet History  Patient Reported Diet/Restrictions/Preferences: general    Anthropometrics  Temp: 99.1 °F (37.3 °C)  Height Method: Stated  Height: 5' 9" (175.3 cm)  Height (inches): 69 in  Weight Method: Bed Scale  Weight: 69.4 kg (153 lb)  Weight (lb): 153 lb  Ideal Body Weight (IBW), Male: 160 lb  % Ideal Body Weight, Male (lb): 95.63 %  BMI (Calculated): 22.6  BMI Grade: 18.5-24.9 - normal       Weight History:  Wt Readings from Last 10 Encounters:   12/11/20 69.4 kg (153 lb)   11/24/20 70.7 kg (155 lb 13.8 oz)   11/16/20 72.1 kg (158 lb 15.2 oz) "   11/09/20 71.7 kg (158 lb 1.1 oz)   10/12/20 72.6 kg (160 lb)   08/28/20 77 kg (169 lb 12.1 oz)   03/17/20 67.2 kg (148 lb 3.2 oz)   03/16/20 67.6 kg (149 lb 1.6 oz)   03/13/20 67.8 kg (149 lb 6.4 oz)   03/13/20 67.7 kg (149 lb 4 oz)       Lab/Procedures/Meds: Pertinent Labs Reviewed    Clinical Chemistry:  Recent Labs   Lab 12/07/20  1604  12/09/20  0913      < > 138   K 3.1*   < > 3.8      < > 102   CO2 28   < > 26   *   < > 93   BUN 13   < > 16   CREATININE 0.9   < > 0.8   CALCIUM 9.6   < > 8.7   PROT 5.0*  --   --    ALBUMIN 2.0*  --   --    BILITOT 1.6*  --   --    ALKPHOS 139*  --   --    AST 11  --   --    ALT 14  --   --    ANIONGAP 10   < > 10   ESTGFRAFRICA >60.0   < > >60.0   EGFRNONAA >60.0   < > >60.0   MG 2.0   < > 1.9   PHOS 3.7  --   --     < > = values in this interval not displayed.       CBC:   Recent Labs   Lab 12/09/20  0913 12/09/20  1651   WBC 12.65  --    RBC 3.01*  --    HGB 8.7* 8.0*   HCT 28.3* 26.6*   PLT 62*  --    MCV 94  --    MCH 28.9  --    MCHC 30.7*  --        Cardiac Profile:  Recent Labs   Lab 12/07/20  1604   *       Medications: Pertinent Medications reviewed    Scheduled Meds:   cyanocobalamin  1,000 mcg Oral Daily    folic acid  1 mg Oral Daily    furosemide (LASIX) IV  20 mg Intravenous Once    levoFLOXacin  500 mg Oral Daily    midodrine  5 mg Oral TID WM    piperacillin-tazobactam (ZOSYN) IVPB  3.375 g Intravenous Q8H    pyridoxine (vitamin B6)  100 mg Oral Daily    thiamine  100 mg Oral Daily       Continuous Infusions:    PRN Meds:.sodium chloride, sodium chloride, calcium chloride IVPB, calcium chloride IVPB, calcium chloride IVPB, ibuprofen, magnesium oxide, magnesium sulfate IVPB, magnesium sulfate IVPB, magnesium sulfate IVPB, magnesium sulfate IVPB, ondansetron, potassium chloride in water, potassium chloride in water, potassium chloride in water, potassium chloride in water, potassium chloride, potassium chloride, potassium  chloride, potassium chloride, sodium phosphate IVPB, sodium phosphate IVPB, sodium phosphate IVPB, sodium phosphate IVPB, sodium phosphate IVPB    Estimated/Assessed Needs  Weight Used For Calorie Calculations: 69.4 kg (153 lb)  Energy Calorie Requirements (kcal): 3400-8240 (25-30 kcal/kg)  Energy Need Method: Kcal/kg  Protein Requirements: 69-83 g (1-1.2 g/kg)  Weight Used For Protein Calculations: 69.4 kg (153 lb)     Estimated Fluid Requirement Method: RDA Method  RDA Method (mL): 1735       Nutrition Prescription Ordered  Current Diet Order: Regular    Evaluation of Received Nutrient/Fluid Intake  Energy Calories Required: not meeting needs  Protein Required: not meeting needs  Tolerance: other (see comments)(unable to assess)     Intake/Output Summary (Last 24 hours) at 12/11/2020 0950  Last data filed at 12/11/2020 0746  Gross per 24 hour   Intake --   Output 350 ml   Net -350 ml        % Meal Intake: 0 - 25 %    Nutrition Risk  Level of Risk/Frequency of Follow-up: high     Monitor and Evaluation  Food and Nutrient Intake: energy intake, food and beverage intake  Food and Nutrient Adminstration: diet order  Physical Activity and Function: nutrition-related ADLs and IADLs  Anthropometric Measurements: weight, weight change  Biochemical Data, Medical Tests and Procedures: gastrointestinal profile, electrolyte and renal panel, glucose/endocrine profile, inflammatory profile  Nutrition-Focused Physical Findings: overall appearance     Nutrition Follow-Up  RD Follow-up?: Yes      Trena Pichardo RD 12/11/2020 9:50 AM

## 2020-12-11 NOTE — PLAN OF CARE
Problem: Oral Intake Inadequate  Goal: Improved Oral Intake  Outcome: Ongoing, Progressing  Intervention: Promote and Optimize Oral Intake  Flowsheets (Taken 12/11/2020 0905)  Oral Nutrition Promotion: calorie dense liquids provided

## 2020-12-11 NOTE — PROGRESS NOTES
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 available for collaborating MD; Dr. Amanda Rich for review.    Electronically signed by: Adolfo Sorenson PA-C
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
## 2020-12-12 LAB
ANION GAP SERPL CALC-SCNC: 8 MMOL/L (ref 8–16)
ANISOCYTOSIS BLD QL SMEAR: ABNORMAL
BACTERIA BLD CULT: NORMAL
BACTERIA BLD CULT: NORMAL
BASOPHILS # BLD AUTO: 0.03 K/UL (ref 0–0.2)
BASOPHILS NFR BLD: 0.2 % (ref 0–1.9)
BNP SERPL-MCNC: 403 PG/ML (ref 0–99)
BUN SERPL-MCNC: 13 MG/DL (ref 8–23)
CALCIUM SERPL-MCNC: 9.5 MG/DL (ref 8.7–10.5)
CHLORIDE SERPL-SCNC: 104 MMOL/L (ref 95–110)
CO2 SERPL-SCNC: 28 MMOL/L (ref 23–29)
CREAT SERPL-MCNC: 0.7 MG/DL (ref 0.5–1.4)
DIFFERENTIAL METHOD: ABNORMAL
EOSINOPHIL # BLD AUTO: 0.2 K/UL (ref 0–0.5)
EOSINOPHIL NFR BLD: 1.5 % (ref 0–8)
ERYTHROCYTE [DISTWIDTH] IN BLOOD BY AUTOMATED COUNT: 20.2 % (ref 11.5–14.5)
EST. GFR  (AFRICAN AMERICAN): >60 ML/MIN/1.73 M^2
EST. GFR  (NON AFRICAN AMERICAN): >60 ML/MIN/1.73 M^2
GLUCOSE SERPL-MCNC: 110 MG/DL (ref 70–110)
HCT VFR BLD AUTO: 24 % (ref 40–54)
HGB BLD-MCNC: 7.1 G/DL (ref 14–18)
HYPOCHROMIA BLD QL SMEAR: ABNORMAL
IMM GRANULOCYTES # BLD AUTO: 0.32 K/UL (ref 0–0.04)
IMM GRANULOCYTES NFR BLD AUTO: 2 % (ref 0–0.5)
LYMPHOCYTES # BLD AUTO: 4.1 K/UL (ref 1–4.8)
LYMPHOCYTES NFR BLD: 25.4 % (ref 18–48)
MAGNESIUM SERPL-MCNC: 1.8 MG/DL (ref 1.6–2.6)
MCH RBC QN AUTO: 27.7 PG (ref 27–31)
MCHC RBC AUTO-ENTMCNC: 29.6 G/DL (ref 32–36)
MCV RBC AUTO: 94 FL (ref 82–98)
MONOCYTES # BLD AUTO: 0.5 K/UL (ref 0.3–1)
MONOCYTES NFR BLD: 2.9 % (ref 4–15)
NEUTROPHILS # BLD AUTO: 10.8 K/UL (ref 1.8–7.7)
NEUTROPHILS NFR BLD: 68 % (ref 38–73)
NRBC BLD-RTO: 1 /100 WBC
PLATELET # BLD AUTO: 69 K/UL (ref 150–350)
PMV BLD AUTO: 11.5 FL (ref 9.2–12.9)
POLYCHROMASIA BLD QL SMEAR: ABNORMAL
POTASSIUM SERPL-SCNC: 3.3 MMOL/L (ref 3.5–5.1)
RBC # BLD AUTO: 2.56 M/UL (ref 4.6–6.2)
SODIUM SERPL-SCNC: 140 MMOL/L (ref 136–145)
WBC # BLD AUTO: 16.01 K/UL (ref 3.9–12.7)

## 2020-12-12 PROCEDURE — 85025 COMPLETE CBC W/AUTO DIFF WBC: CPT

## 2020-12-12 PROCEDURE — 83880 ASSAY OF NATRIURETIC PEPTIDE: CPT

## 2020-12-12 PROCEDURE — 36415 COLL VENOUS BLD VENIPUNCTURE: CPT

## 2020-12-12 PROCEDURE — 25000003 PHARM REV CODE 250: Performed by: INTERNAL MEDICINE

## 2020-12-12 PROCEDURE — 25000003 PHARM REV CODE 250: Performed by: FAMILY MEDICINE

## 2020-12-12 PROCEDURE — 83735 ASSAY OF MAGNESIUM: CPT

## 2020-12-12 PROCEDURE — 63600175 PHARM REV CODE 636 W HCPCS: Performed by: INTERNAL MEDICINE

## 2020-12-12 PROCEDURE — 80048 BASIC METABOLIC PNL TOTAL CA: CPT

## 2020-12-12 PROCEDURE — 12000002 HC ACUTE/MED SURGE SEMI-PRIVATE ROOM

## 2020-12-12 RX ORDER — FUROSEMIDE 10 MG/ML
20 INJECTION INTRAMUSCULAR; INTRAVENOUS ONCE
Status: COMPLETED | OUTPATIENT
Start: 2020-12-12 | End: 2020-12-12

## 2020-12-12 RX ADMIN — THIAMINE HCL TAB 100 MG 100 MG: 100 TAB at 09:12

## 2020-12-12 RX ADMIN — MIDODRINE HYDROCHLORIDE 5 MG: 2.5 TABLET ORAL at 05:12

## 2020-12-12 RX ADMIN — CYANOCOBALAMIN TAB 1000 MCG 1000 MCG: 1000 TAB at 09:12

## 2020-12-12 RX ADMIN — IBUPROFEN 400 MG: 400 TABLET, FILM COATED ORAL at 05:12

## 2020-12-12 RX ADMIN — PIPERACILLIN SODIUM AND TAZOBACTAM SODIUM 3.38 G: 3; .375 INJECTION, POWDER, LYOPHILIZED, FOR SOLUTION INTRAVENOUS at 04:12

## 2020-12-12 RX ADMIN — MIDODRINE HYDROCHLORIDE 5 MG: 2.5 TABLET ORAL at 11:12

## 2020-12-12 RX ADMIN — FUROSEMIDE 20 MG: 10 INJECTION, SOLUTION INTRAMUSCULAR; INTRAVENOUS at 04:12

## 2020-12-12 RX ADMIN — PYRIDOXINE HCL TAB 50 MG 100 MG: 50 TAB at 09:12

## 2020-12-12 RX ADMIN — FOLIC ACID 1 MG: 1 TABLET ORAL at 09:12

## 2020-12-12 RX ADMIN — LEVOFLOXACIN 500 MG: 500 TABLET, FILM COATED ORAL at 09:12
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
## 2020-12-13 ENCOUNTER — CLINICAL SUPPORT (OUTPATIENT)
Dept: CARDIOLOGY | Facility: HOSPITAL | Age: 60
DRG: 871 | End: 2020-12-13
Attending: INTERNAL MEDICINE
Payer: MEDICAID

## 2020-12-13 VITALS — WEIGHT: 153 LBS | BODY MASS INDEX: 22.66 KG/M2 | HEIGHT: 69 IN

## 2020-12-13 LAB
AMMONIA PLAS-SCNC: 19 UMOL/L (ref 10–50)
AORTIC ROOT ANNULUS: 3.67 CM
AORTIC VALVE CUSP SEPERATION: 2.37 CM
AV INDEX (PROSTH): 0.9
AV MEAN GRADIENT: 2 MMHG
AV PEAK GRADIENT: 4 MMHG
AV VALVE AREA: 3.35 CM2
AV VELOCITY RATIO: 78.94
BSA FOR ECHO PROCEDURE: 1.84 M2
CV ECHO LV RWT: 0.67 CM
DOP CALC AO PEAK VEL: 0.96 M/S
DOP CALC AO VTI: 14.52 CM
DOP CALC LVOT AREA: 3.7 CM2
DOP CALC LVOT DIAMETER: 2.17 CM
DOP CALC LVOT PEAK VEL: 75.78 M/S
DOP CALC LVOT STROKE VOLUME: 48.57 CM3
DOP CALCLVOT PEAK VEL VTI: 13.14 CM
E WAVE DECELERATION TIME: 259.8 MSEC
E/A RATIO: 1.06
E/E' RATIO: 6 M/S
ECHO LV POSTERIOR WALL: 1.38 CM (ref 0.6–1.1)
FRACTIONAL SHORTENING: 27 % (ref 28–44)
INTERVENTRICULAR SEPTUM: 1.39 CM (ref 0.6–1.1)
IVRT: 116.03 MSEC
LEFT ATRIUM SIZE: 3.27 CM
LEFT INTERNAL DIMENSION IN SYSTOLE: 2.98 CM (ref 2.1–4)
LEFT VENTRICLE MASS INDEX: 116 G/M2
LEFT VENTRICULAR INTERNAL DIMENSION IN DIASTOLE: 4.1 CM (ref 3.5–6)
LEFT VENTRICULAR MASS: 213.03 G
LV LATERAL E/E' RATIO: 5.14 M/S
LV SEPTAL E/E' RATIO: 7.2 M/S
MV PEAK A VEL: 0.34 M/S
MV PEAK E VEL: 0.36 M/S
PISA TR MAX VEL: 2.88 M/S
PV PEAK VELOCITY: 68.44 CM/S
RA PRESSURE: 3 MMHG
RIGHT VENTRICULAR END-DIASTOLIC DIMENSION: 188 CM
TDI LATERAL: 0.07 M/S
TDI SEPTAL: 0.05 M/S
TDI: 0.06 M/S
TR MAX PG: 33 MMHG
TV REST PULMONARY ARTERY PRESSURE: 36 MMHG

## 2020-12-13 PROCEDURE — 93306 ECHO (CUPID ONLY): ICD-10-PCS | Mod: 26,,, | Performed by: INTERNAL MEDICINE

## 2020-12-13 PROCEDURE — 25000003 PHARM REV CODE 250: Performed by: FAMILY MEDICINE

## 2020-12-13 PROCEDURE — 12000002 HC ACUTE/MED SURGE SEMI-PRIVATE ROOM

## 2020-12-13 PROCEDURE — 97530 THERAPEUTIC ACTIVITIES: CPT

## 2020-12-13 PROCEDURE — 93306 TTE W/DOPPLER COMPLETE: CPT | Mod: 26,,, | Performed by: INTERNAL MEDICINE

## 2020-12-13 PROCEDURE — 36415 COLL VENOUS BLD VENIPUNCTURE: CPT

## 2020-12-13 PROCEDURE — 93306 TTE W/DOPPLER COMPLETE: CPT

## 2020-12-13 PROCEDURE — 25000003 PHARM REV CODE 250: Performed by: INTERNAL MEDICINE

## 2020-12-13 PROCEDURE — 82140 ASSAY OF AMMONIA: CPT

## 2020-12-13 PROCEDURE — 97165 OT EVAL LOW COMPLEX 30 MIN: CPT

## 2020-12-13 PROCEDURE — 63600175 PHARM REV CODE 636 W HCPCS: Performed by: INTERNAL MEDICINE

## 2020-12-13 RX ADMIN — MIDODRINE HYDROCHLORIDE 5 MG: 2.5 TABLET ORAL at 04:12

## 2020-12-13 RX ADMIN — CYANOCOBALAMIN TAB 1000 MCG 1000 MCG: 1000 TAB at 09:12

## 2020-12-13 RX ADMIN — FOLIC ACID 1 MG: 1 TABLET ORAL at 09:12

## 2020-12-13 RX ADMIN — MIDODRINE HYDROCHLORIDE 5 MG: 2.5 TABLET ORAL at 01:12

## 2020-12-13 RX ADMIN — PIPERACILLIN SODIUM AND TAZOBACTAM SODIUM 3.38 G: 3; .375 INJECTION, POWDER, LYOPHILIZED, FOR SOLUTION INTRAVENOUS at 01:12

## 2020-12-13 RX ADMIN — PIPERACILLIN SODIUM AND TAZOBACTAM SODIUM 3.38 G: 3; .375 INJECTION, POWDER, LYOPHILIZED, FOR SOLUTION INTRAVENOUS at 05:12

## 2020-12-13 RX ADMIN — MIDODRINE HYDROCHLORIDE 5 MG: 2.5 TABLET ORAL at 09:12

## 2020-12-13 RX ADMIN — PIPERACILLIN SODIUM AND TAZOBACTAM SODIUM 3.38 G: 3; .375 INJECTION, POWDER, LYOPHILIZED, FOR SOLUTION INTRAVENOUS at 10:12

## 2020-12-13 RX ADMIN — THIAMINE HCL TAB 100 MG 100 MG: 100 TAB at 09:12

## 2020-12-13 RX ADMIN — PYRIDOXINE HCL TAB 50 MG 100 MG: 50 TAB at 09:12

## 2020-12-13 NOTE — PT/OT/SLP EVAL
Occupational Therapy   Evaluation/Treatment    Name: Heriberto Keys  MRN: [id number]  Admitting Diagnosis:  Pneumonia of both lower lobes due to infectious organism    The primary encounter diagnosis was Altered mental status, unspecified altered mental status type. 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    Recommendations:     Discharge Recommendations: home health OT, nursing facility, skilled  Discharge Equipment Recommendations:  bedside commode, shower chair  Barriers to discharge:  None(says he has a friend who can help him at home)    Assessment:     Heriberto Keys is a 60 y.o. male with a medical diagnosis of Pneumonia of both lower lobes due to infectious organism.  He presents with Performance deficits affecting function: weakness, impaired endurance, impaired self care skills, impaired functional mobilty, impaired balance, decreased lower extremity function, decreased upper extremity function, decreased safety awareness, impaired cardiopulmonary response to activity.      Pt. presents with poor tolerance for OOB activity/ADLS and transfers 2/2 dizziness and hypotensive. Pt could stand 2 times with RW with min A and do marching in place but had to quickly due to dizziness.  He performed supine<>sit SBA-Min A, donned socks total assist seated EOB.  Nurse made aware.  Continue with OT POC.    Rehab Prognosis: Good; patient would benefit from acute skilled OT services to address these deficits and reach maximum level of function.       Plan:     Patient to be seen 5 x/week to address the above listed problems via self-care/home management, therapeutic activities,  "therapeutic exercises  · Plan of Care Expires: 01/13/21  · Plan of Care Reviewed with: patient    Subjective     Chief Complaint: dizziness when standing.    Patient/Family Comments/goals: 'I have been waiting to do therapy"    Occupational Profile:  Living Environment: lives alone in apartment with no steps; tub/shower combo.No DME.  Previous level of function: Indep ADLs, ambulated without a device, drives, cooks , cleans. Pt is [place] with hearing aids but still had to speak in his ear to hear.  Roles and Routines: fairly active  Equipment Used at Home:  (has RW but says he does not use)  Assistance upon Discharge: pt says he has a friend to help him.    Pain/Comfort:  Pain Rating 1: 0/10  Pain Rating Post-Intervention 1: 0/10    Patients cultural, spiritual, Scientologist conflicts given the current situation: no    Objective:     Communicated with: nurse prior to session.  Patient found HOB elevated with peripheral IV, bed alarm upon OT entry to room.    General Precautions: Standard, fall, hearing impaired(hypotensive/dizziness)   Orthopedic Precautions:N/A   Braces: N/A     Occupational Performance:    Bed Mobility:    · Patient completed Scooting/Bridging with contact guard assistance  · Patient completed Supine to Sit with stand by assistance  · Patient completed Sit to Supine with minimum assistance    Functional Mobility/Transfers:  · Patient completed Sit <> Stand Transfer with minimum assistance  with  rolling walker   Functional Mobility: unable 2/2 dizziness    Activities of Daily Living:  · Feeding:  independence .  · Grooming: setup HOB elevated 2/2 dizziness in standing .  · Lower Body Dressing: maximal assistance socks, limited AROM legs when lifting and crossing,stiffness  · Toileting: maximal assistance simulated standing with RW, max for clothes management next to bed     Cognitive/Visual Perceptual:  Cognitive/Psychosocial Skills:     -       Oriented to: Person, Place and Time   -       Follows " Commands/attention:Follows one-step commands  -       Communication: clear/fluent  -       Memory: ongoing assessment  -       Safety awareness/insight to disability: impaired   -       Mood/Affect/Coping skills/emotional control: Cooperative  Visual/Perceptual:      -Intact grossly    Physical Exam:  Balance:    -       sitting:  good-/fair -   standing  :  fair  Postural examination/scapula alignment:    -       Rounded shoulders  Dominant hand:    -       right  Upper Extremity Range of Motion:     -       Right Upper Extremity: WFL  -       Left Upper Extremity: WFL except shld ~ 75 flexino, says this is new  Upper Extremity Strength:    -       Right Upper Extremity: Deficits: 3-5/ shld distally 4-/5  -       Left Upper Extremity: Deficits: shld 3-/5-2+/5; distally 4-/5   Strength:    -       Right Upper Extremity: WFL  -       Left Upper Extremity: WFL    AMPAC 6 Click ADL:  AMPAC Total Score: 16    Treatment & Education:  --role of OT and POC  --standing with RW marching ex to increased BP, dizziness, hypotensive unable to resolve with marching ex and had to return to bed.  Education:    Patient left HOB elevated with all lines intact, call button in reach, bed alarm on and nurse notified    GOALS:   Multidisciplinary Problems     Occupational Therapy Goals        Problem: Occupational Therapy Goal    Goal Priority Disciplines Outcome Interventions   Occupational Therapy Goal     OT, PT/OT Ongoing, Not Progressing    Description: Goals to be met by: discharge    Patient will increase functional independence with ADLs by performing:    UE Dressing with Modified [place].  LE Dressing with Modified [place].  Grooming while standing at sink with Modified [place].  Toileting from bedside commode with Modified [place] for hygiene and clothing management.   Toilet transfer to bedside commode with Modified [place].                     History:     Past Medical History:   Diagnosis Date     Anemia     Depression     Encounter for blood transfusion     [place] (hard of hearing)     Lymphoma     Lymphoma     Lymphoma 2019         Past Surgical History:   Procedure Laterality Date    BONE MARROW ASPIRATION Left 11/11/2019    Procedure: ASPIRATION, BONE MARROW;  Surgeon: Nancy Diagnostic Provider;  Location: [facility];  Service: General;  Laterality: Left;    LYMPHADENECTOMY Bilateral        Time Tracking:     OT Date of Treatment: 12/13/20  OT Start Time: 0900  OT Stop Time: 0927  OT Total Time (min): 27 min    Billable Minutes:Evaluation 15  Therapeutic Activity 12  Total Time 27    Betsy Cedeno OT  12/13/2020     no
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
## 2020-12-13 NOTE — PLAN OF CARE
Problem: Occupational Therapy Goal  Goal: Occupational Therapy Goal  Description: Goals to be met by: discharge    Patient will increase functional independence with ADLs by performing:    UE Dressing with Modified [place].  LE Dressing with Modified [place].  Grooming while standing at sink with Modified [place].  Toileting from bedside commode with Modified [place] for hygiene and clothing management.   Toilet transfer to bedside commode with Modified [place].    Outcome: Ongoing, Not Progressing   Pt dizzy and hypotensive.

## 2020-12-13 NOTE — PT/OT/SLP PROGRESS
Physical Therapy      Patient Name:  Heriberto Keys   MRN:  [id number]    Patient not seen today secondary to low blood pressure. Will follow-up on 12/14/2020.    Amador Dimas, PT
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
## 2020-12-14 LAB
ANION GAP SERPL CALC-SCNC: 9 MMOL/L (ref 8–16)
BUN SERPL-MCNC: 21 MG/DL (ref 8–23)
CALCIUM SERPL-MCNC: 9.3 MG/DL (ref 8.7–10.5)
CHLORIDE SERPL-SCNC: 101 MMOL/L (ref 95–110)
CO2 SERPL-SCNC: 28 MMOL/L (ref 23–29)
CREAT SERPL-MCNC: 0.7 MG/DL (ref 0.5–1.4)
ERYTHROCYTE [DISTWIDTH] IN BLOOD BY AUTOMATED COUNT: 19.9 % (ref 11.5–14.5)
EST. GFR  (AFRICAN AMERICAN): >60 ML/MIN/1.73 M^2
EST. GFR  (NON AFRICAN AMERICAN): >60 ML/MIN/1.73 M^2
GLUCOSE SERPL-MCNC: 96 MG/DL (ref 70–110)
HCT VFR BLD AUTO: 26.4 % (ref 40–54)
HGB BLD-MCNC: 7.9 G/DL (ref 14–18)
MAGNESIUM SERPL-MCNC: 2 MG/DL (ref 1.6–2.6)
MCH RBC QN AUTO: 28.2 PG (ref 27–31)
MCHC RBC AUTO-ENTMCNC: 29.9 G/DL (ref 32–36)
MCV RBC AUTO: 94 FL (ref 82–98)
PLATELET # BLD AUTO: 62 K/UL (ref 150–350)
PMV BLD AUTO: 12.9 FL (ref 9.2–12.9)
POTASSIUM SERPL-SCNC: 3.3 MMOL/L (ref 3.5–5.1)
RBC # BLD AUTO: 2.8 M/UL (ref 4.6–6.2)
SODIUM SERPL-SCNC: 138 MMOL/L (ref 136–145)
WBC # BLD AUTO: 10.64 K/UL (ref 3.9–12.7)

## 2020-12-14 PROCEDURE — 99232 PR SUBSEQUENT HOSPITAL CARE,LEVL II: ICD-10-PCS | Mod: ,,, | Performed by: INTERNAL MEDICINE

## 2020-12-14 PROCEDURE — 12000002 HC ACUTE/MED SURGE SEMI-PRIVATE ROOM

## 2020-12-14 PROCEDURE — 63600175 PHARM REV CODE 636 W HCPCS: Performed by: INTERNAL MEDICINE

## 2020-12-14 PROCEDURE — 83735 ASSAY OF MAGNESIUM: CPT

## 2020-12-14 PROCEDURE — 99232 SBSQ HOSP IP/OBS MODERATE 35: CPT | Mod: ,,, | Performed by: INTERNAL MEDICINE

## 2020-12-14 PROCEDURE — 36415 COLL VENOUS BLD VENIPUNCTURE: CPT

## 2020-12-14 PROCEDURE — 85027 COMPLETE CBC AUTOMATED: CPT

## 2020-12-14 PROCEDURE — 25000003 PHARM REV CODE 250: Performed by: FAMILY MEDICINE

## 2020-12-14 PROCEDURE — 86580 TB INTRADERMAL TEST: CPT | Performed by: INTERNAL MEDICINE

## 2020-12-14 PROCEDURE — 80048 BASIC METABOLIC PNL TOTAL CA: CPT

## 2020-12-14 PROCEDURE — 30200315 PPD INTRADERMAL TEST REV CODE 302: Performed by: INTERNAL MEDICINE

## 2020-12-14 PROCEDURE — 25000003 PHARM REV CODE 250: Performed by: INTERNAL MEDICINE

## 2020-12-14 PROCEDURE — 97161 PT EVAL LOW COMPLEX 20 MIN: CPT

## 2020-12-14 RX ADMIN — CYANOCOBALAMIN TAB 1000 MCG 1000 MCG: 1000 TAB at 10:12

## 2020-12-14 RX ADMIN — PYRIDOXINE HCL TAB 50 MG 100 MG: 50 TAB at 10:12

## 2020-12-14 RX ADMIN — THIAMINE HCL TAB 100 MG 100 MG: 100 TAB at 10:12

## 2020-12-14 RX ADMIN — MIDODRINE HYDROCHLORIDE 5 MG: 2.5 TABLET ORAL at 05:12

## 2020-12-14 RX ADMIN — MIDODRINE HYDROCHLORIDE 5 MG: 2.5 TABLET ORAL at 10:12

## 2020-12-14 RX ADMIN — FOLIC ACID 1 MG: 1 TABLET ORAL at 10:12

## 2020-12-14 RX ADMIN — PIPERACILLIN SODIUM AND TAZOBACTAM SODIUM 3.38 G: 3; .375 INJECTION, POWDER, LYOPHILIZED, FOR SOLUTION INTRAVENOUS at 05:12

## 2020-12-14 RX ADMIN — MIDODRINE HYDROCHLORIDE 5 MG: 2.5 TABLET ORAL at 02:12

## 2020-12-14 RX ADMIN — TUBERCULIN PURIFIED PROTEIN DERIVATIVE 5 UNITS: 5 INJECTION, SOLUTION INTRADERMAL at 06:12

## 2020-12-14 NOTE — PLAN OF CARE
Goals to be met by: Discharge     Patient will increase functional independence with mobility by performin. Supine to sit with Modified [place]  2. Sit to supine with Modified [place]  3. Rolling to Left and Right with Modified [place].  4. Sit to stand transfer with Supervision  5. Bed to chair transfer with Supervision using Rolling Walker  6. Gait  x 50 feet with Minimal Assistance using Rolling Walker.

## 2020-12-14 NOTE — PT/OT/SLP EVAL
Physical Therapy Evaluation    Patient Name:  Heriberto Keys   MRN:  [id number]    Recommendations:     Discharge Recommendations:  nursing facility, skilled   Discharge Equipment Recommendations: bedside commode, shower chair   Barriers to discharge: extensive assist with mobility    Assessment:     Heriberto Keys is a 60 y.o. male admitted with a medical diagnosis of Pneumonia of both lower lobes due to infectious organism.  He presents with the following impairments/functional limitations:  weakness, impaired endurance, impaired self care skills, impaired functional mobilty, impaired balance, decreased lower extremity function, decreased safety awareness, impaired cardiopulmonary response to activity.    Pt found supine in bed. Pt agreeable to assessment. Pt reports weakness and fear of mobility. Supine to sit supervision with increase time. Sat EOB x 4 mins. Declined to perform transfer due to report of weakness and fatigue. Educated on benefit of therapy to improve strength. Request to return to bed. Supervision for sit to supine. Min A for proper midline positioning.     Rehab Prognosis: Fair; patient would benefit from acute skilled PT services to address these deficits and reach maximum level of function.    Recent Surgery: * No surgery found *      Plan:     During this hospitalization, patient to be seen 6 x/week to address the identified rehab impairments via gait training, therapeutic activities, therapeutic exercises, neuromuscular re-education and progress toward the following goals:    · Plan of Care Expires:  01/14/21    Subjective     Chief Complaint: weakness and fatigue  Patient/Family Comments/goals: return home  Pain/Comfort:  · Pain Rating 1: 0/10    Patients cultural, spiritual, Hoahaoism conflicts given the current situation: no    Living Environment:  Pt lives in an 8th floor apartment with elevator access. Pt independent with all functional mobility with no AD but does have a RW he uses  intermittently. Friend assists as needed.   Prior to admission, patients level of function was Independent.  Equipment used at home: walker, rolling.  DME owned (not currently used): none.  Upon discharge, patient will have assistance from friend.    Objective:     Communicated with RN prior to session.  Patient found supine with peripheral IV, bed alarm  upon PT entry to room.    General Precautions: Standard, fall, hearing impaired   Orthopedic Precautions:N/A   Braces: N/A     Exams:  · Cognitive Exam:  Patient is oriented to Person, Place and Time  · RLE ROM: WFL  · RLE Strength: 3/5 throughout  · LLE ROM: WFL  · LLE Strength: 3/5 throughout    Functional Mobility:  · Bed Mobility:     · Supine to Sit: supervision  · Sit to Supine: supervision    Therapeutic Activities and Exercises:  Pt educated on benefit of skilled PT services, importance of time OOB, fall prevention, need for assistance with mobility and use of call bell to seek assistance     AM-PAC 6 CLICK MOBILITY  Total Score:11     Patient left supine with all lines intact, call button in reach and RN notified.    GOALS:   Multidisciplinary Problems     Physical Therapy Goals        Problem: Physical Therapy Goal    Goal Priority Disciplines Outcome Goal Variances Interventions   Physical Therapy Goal     PT, PT/OT                      History:     Past Medical History:   Diagnosis Date    Anemia     Depression     Encounter for blood transfusion     [place]hard of hearing)     Lymphoma     Lymphoma     Lymphoma 2019       Past Surgical History:   Procedure Laterality Date    BONE MARROW ASPIRATION Left 11/11/2019    Procedure: ASPIRATION, BONE MARROW;  Surgeon: Nancy Diagnostic Provider;  Location: [facility];  Service: General;  Laterality: Left;    LYMPHADENECTOMY Bilateral        Time Tracking:     PT Received On: 12/14/20  PT Start Time: 0842     PT Stop Time: 0852  PT Total Time (min): 10 min     Billable Minutes: Evaluation Bety Cardoso  Ellen, PT  12/14/2020
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
## 2020-12-14 NOTE — PLAN OF CARE
Problem: Fall Injury Risk  Goal: Absence of Fall and Fall-Related Injury  Outcome: Ongoing, Progressing     Problem: Adult Inpatient Plan of Care  Goal: Plan of Care Review  Outcome: Ongoing, Progressing  Flowsheets (Taken 12/14/2020 0643)  Plan of Care Reviewed With: patient  Goal: Optimal Comfort and Wellbeing  Outcome: Ongoing, Progressing

## 2020-12-14 NOTE — NURSING
Pt had wet diaper on floor by bed. Pt states he gets wet sometimes and put it there.  Pt instructed to call for assist. Pt voiced understanding.

## 2020-12-14 NOTE — PT/OT/SLP PROGRESS
"Occupational Therapy      Patient Name:  Heriberto Keys   MRN:  [id number]    Patient not seen today secondary to Patient unwilling to participate(pt has blanket over his head, pulls it down to tell the OT, "No; I feel sick"). OT reported pt's status the pt's RN.    Amanda Cornejo OT  12/14/2020  "

## 2020-12-15 PROCEDURE — 12000002 HC ACUTE/MED SURGE SEMI-PRIVATE ROOM

## 2020-12-15 PROCEDURE — 25000003 PHARM REV CODE 250: Performed by: INTERNAL MEDICINE

## 2020-12-15 PROCEDURE — 97530 THERAPEUTIC ACTIVITIES: CPT

## 2020-12-15 PROCEDURE — 25000003 PHARM REV CODE 250: Performed by: FAMILY MEDICINE

## 2020-12-15 RX ORDER — LEVOFLOXACIN 750 MG/1
750 TABLET ORAL DAILY
Status: DISCONTINUED | OUTPATIENT
Start: 2020-12-15 | End: 2020-12-15

## 2020-12-15 RX ORDER — LEVOFLOXACIN 750 MG/1
750 TABLET ORAL
Status: DISPENSED | OUTPATIENT
Start: 2020-12-15 | End: 2020-12-19

## 2020-12-15 RX ADMIN — MIDODRINE HYDROCHLORIDE 5 MG: 2.5 TABLET ORAL at 07:12

## 2020-12-15 RX ADMIN — MIDODRINE HYDROCHLORIDE 5 MG: 2.5 TABLET ORAL at 11:12

## 2020-12-15 RX ADMIN — THIAMINE HCL TAB 100 MG 100 MG: 100 TAB at 08:12

## 2020-12-15 RX ADMIN — PYRIDOXINE HCL TAB 50 MG 100 MG: 50 TAB at 08:12

## 2020-12-15 RX ADMIN — MIDODRINE HYDROCHLORIDE 5 MG: 2.5 TABLET ORAL at 05:12

## 2020-12-15 RX ADMIN — FOLIC ACID 1 MG: 1 TABLET ORAL at 08:12

## 2020-12-15 RX ADMIN — LEVOFLOXACIN 750 MG: 750 TABLET, FILM COATED ORAL at 08:12

## 2020-12-15 RX ADMIN — CYANOCOBALAMIN TAB 1000 MCG 1000 MCG: 1000 TAB at 08:12

## 2020-12-15 NOTE — PLAN OF CARE
12/15/20 1423   Post-Acute Status   Post-Acute Placement Status Referrals Sent       CM spoke to Atiya ([facility] [phone]) who informed cm to send referral to in net work facilities, referrals were sent to [facility] and [facility] patient aware.  Cm to follow

## 2020-12-15 NOTE — PLAN OF CARE
Problem: Fall Injury Risk  Goal: Absence of Fall and Fall-Related Injury  Outcome: Ongoing, Progressing     Problem: Adult Inpatient Plan of Care  Goal: Plan of Care Review  Outcome: Ongoing, Progressing     Problem: Adult Inpatient Plan of Care  Goal: Readiness for Transition of Care  Outcome: Ongoing, Progressing     Problem: Adult Inpatient Plan of Care  Goal: Optimal Comfort and Wellbeing  Outcome: Ongoing, Progressing

## 2020-12-15 NOTE — PROGRESS NOTES
[facility] Medicine  Progress Note    Patient name: Heriberto Keys  MRN: [id number]  Admit Date: 12/7/2020   LOS: 8 days     SUBJECTIVE:     Principal problem: Pneumonia of both lower lobes due to infectious organism    Interval History:  No acute overnight events reported.  He is much more alert and interactive today.  He has been refusing intravenous medications including potassium and antibiotics.  Transition to oral antibiotics.    Scheduled Meds:   cyanocobalamin  1,000 mcg Oral Daily    folic acid  1 mg Oral Daily    levoFLOXacin  750 mg Oral Before breakfast    midodrine  5 mg Oral TID WM    pyridoxine (vitamin B6)  100 mg Oral Daily    thiamine  100 mg Oral Daily     Continuous Infusions:  PRN Meds:sodium chloride, sodium chloride, calcium chloride IVPB, calcium chloride IVPB, calcium chloride IVPB, ibuprofen, magnesium oxide, magnesium sulfate IVPB, magnesium sulfate IVPB, magnesium sulfate IVPB, magnesium sulfate IVPB, ondansetron, potassium chloride in water, potassium chloride in water, potassium chloride in water, potassium chloride in water, potassium chloride, potassium chloride, potassium chloride, potassium chloride, sodium phosphate IVPB, sodium phosphate IVPB, sodium phosphate IVPB, sodium phosphate IVPB, sodium phosphate IVPB    Review of patient's allergies indicates:  No Known Allergies    Review of Systems  Limited secondary to hearing loss    OBJECTIVE:     Vital Signs (Most Recent)  Temp: 99 °F (37.2 °C) (12/15/20 1200)  Pulse: 109 (12/15/20 1200)  Resp: 19 (12/15/20 1200)  BP: 102/67 (12/15/20 1200)  SpO2: 98 % (12/15/20 1200)    Vital Signs Range (Last 24H):  Temp:  [97.5 °F (36.4 °C)-99 °F (37.2 °C)]   Pulse:  []   Resp:  [16-19]   BP: (101-118)/(66-75)   SpO2:  [95 %-98 %]     I & O (Last 24H):    Intake/Output Summary (Last 24 hours) at 12/15/2020 1605  Last data filed at 12/15/2020 1200  Gross per 24 hour   Intake 720 ml   Output --   Net 720 ml        Physical Exam:  General: Patient resting comfortably in no acute distress. Appears as stated age. Calm  Eyes: No conjunctivae injection. No scleral icterus.  ENT: Hard of hearing. No discharge from ears. No nasal discharge.   CVS: RRR. +2 pitting edema BL LE  Lungs: CTA BL, no wheezing. Bibasilar crackles audible. No accessory muscle use. No acute respiratory distress  Abd: Nontender. Soft   Neuro: Alert. Moves all extremities equally. No focal neurologic deficit     Laboratory:  CBC:   Recent Labs   Lab 12/14/20  0613   WBC 10.64   HGB 7.9*   HCT 26.4*   PLT 62*       Microbiology Results (last 7 days)     Procedure Component Value Units Date/Time    Blood culture x two cultures. Draw prior to antibiotics [[id number]] Collected: 12/07/20 1604    Order Status: Completed Specimen: Blood from Peripheral, Antecubital, Right Updated: 12/12/20 1832     Blood Culture, Routine No growth after 5 days.    Narrative:      Aerobic and anaerobic    Blood culture x two cultures. Draw prior to antibiotics [[id number]] Collected: 12/07/20 1605    Order Status: Completed Specimen: Blood from Peripheral, Hand, Right Updated: 12/12/20 1832     Blood Culture, Routine No growth after 5 days.    Narrative:      Aerobic and anaerobic           ASSESSMENT/PLAN:     60-year-old male with Hodgkin's lymphoma in remission and chronic anemia admitted for sepsis of unclear etiology along with acute on chronic anemia.  Being followed by infectious disease as well as Oncology.  Seen by ENT for chronic sinusitis and hearing loss with no acute identifiable etiology.  Recommend outpatient audiogram.  Transition from intravenous antibiotics to oral levofloxacin for pneumonia as per ID recommendations.  Seen by PT/OT recommended skilled nursing facility.  Case management consult for the same.    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  Hematology following  Being worked up outpatient for aplastic anemia as an outpatient - Parvovirus testing sent   Echo with normal LVEF and indeterminate diastolic function  S/p nasal endoscopy (12/10) with no evidence of invasive or fulminant sinus disease.  Noted to have changes of chronic sinusitis and mastoiditis.  Outpatient audiogram recommended.  Avoid heparin and related products due to thrombocytopenia  Encourage ambulation. PT/OT consult - recommend skilled nursing facility placement.  Case management consulted for the same    Appreciate input from consultants including ID, oncology and ENT  Discussed plan of care with bedside nursing     VTE Risk Mitigation (From admission, onward)         Ordered     IP VTE HIGH RISK PATIENT  Once      12/07/20 2235     Place sequential compression device  Until discontinued      12/07/20 2235                    Medical Decision Making during this encounter was  [] Low Complexity  [ x ] Moderate Complexity  [ ] High Complexity        Department Hospital Medicine  [facility]  Sanjeev Chairez MD  Date of service: 12/15/2020

## 2020-12-15 NOTE — PT/OT/SLP PROGRESS
Physical Therapy Treatment    Patient Name:  Heriberto Keys   MRN:  [id number]    Recommendations:     Discharge Recommendations:  nursing facility, skilled   Discharge Equipment Recommendations: bedside commode, shower chair   Barriers to discharge: extensive assist with mobility    Assessment:     Heriberto Keys is a 60 y.o. male admitted with a medical diagnosis of Pneumonia of both lower lobes due to infectious organism.  He presents with the following impairments/functional limitations:  decreased lower extremity function, impaired functional mobilty, weakness, impaired endurance, impaired self care skills, impaired balance, decreased safety awareness, impaired cardiopulmonary response to activity.    Pt found supine in bed. Pt agreeable to OOB to wheelchair with assist from nurse. Bed mobility supervision with increase time. Sit to stand min A to RW. SPT min A x 2 with poor HP requiring education on proper HP. Attempted to have pt self propel wheelchair initially unable in room but once place in hallway able to self propel 5 ft with bilateral UE assist and mod A for equal B UE exertion to maintain a straight pathway. BP up in chair 87/50. Pt initially reported he wanted to remain up in chair but then attempted to transfer to bed. Min A x 2 SPT wheelchair to bed. Sit to supine supervision.     Rehab Prognosis: Fair; patient would benefit from acute skilled PT services to address these deficits and reach maximum level of function.    Recent Surgery: * No surgery found *      Plan:     During this hospitalization, patient to be seen 6 x/week to address the identified rehab impairments via gait training, therapeutic activities, therapeutic exercises, neuromuscular re-education and progress toward the following goals:    · Plan of Care Expires:  01/15/21    Subjective     Chief Complaint: weakness  Patient/Family Comments/goals: get stronger to return home with friend  Pain/Comfort:  · Pain Rating 1:  0/10      Objective:     Communicated with RN prior to session.  Patient found supine with peripheral IV, bed alarm upon PT entry to room.     General Precautions: Standard, fall, hearing impaired   Orthopedic Precautions:N/A   Braces: N/A     Functional Mobility:  · Bed Mobility:     · Supine to Sit: supervision  · Sit to Supine: supervision  · Transfers:     · Sit to Stand:  minimum assistance with rolling walker  · Bed to Chair: minimum assistance and of 2 persons with  rolling walker  using  Stand Pivot      AM-PAC 6 CLICK MOBILITY          Therapeutic Activities and Exercises:  Pt educated on importance of time OOB, proper HP with bed mobility and transfers, effect of prolong time in bed on BP, fall prevention, need for assist with mobility and use of call bell to seek assist as needed    Patient left supine with all lines intact, call button in reach, bed alarm on and RN notified..    GOALS:   Multidisciplinary Problems     Physical Therapy Goals        Problem: Physical Therapy Goal    Goal Priority Disciplines Outcome Goal Variances Interventions   Physical Therapy Goal     PT, PT/OT Ongoing, Progressing                     Time Tracking:     PT Received On: 12/15/20  PT Start Time: 0923     PT Stop Time: 0951  PT Total Time (min): 28 min     Billable Minutes: Therapeutic Activity 28    Treatment Type: Treatment  PT/PTA: PT     PTA Visit Number: 0     Janae Hughes, PT  12/15/2020

## 2020-12-15 NOTE — NURSING
Pt still refusing abx and potassium. Flushed IV, leaking at site. Removed, pt refusing new IV insertion. Reported to charge nurse.

## 2020-12-15 NOTE — PROGRESS NOTES
"[facility]  Adult Nutrition   Progress Note (Follow-Up)    SUMMARY     Recommendations  Recommendation/Intervention: 1. Continue diet and supplements as tolerated by patient 2. Encourage PO intake and provide meal assist as required  Goals: 1. Patient to meet at least 75% of estimated needs through diet and supplement intake  Nutrition Goal Status: progressing towards goal  Communication of RD Recs: reviewed with RN    Dietitian Rounds Brief    Pt seen for f/u. Unable to interview--pt resting despite multiple attempts. Friend not at bedside. Consumed a little of bkfst ~25% per RD visual. Drinking some of the milkshakes. Continues to refuse some medications at times. SNF placement being arranged by case management.    Reason for Assessment  Reason For Assessment: RD follow-up  Diagnosis: infection/sepsis  Relevant Medical History: Hodgkins lymphoma (in remission), [place], depression  Interdisciplinary Rounds: attended    Nutrition Risk Screen  Nutrition Risk Screen: no indicators present     MST Score: 0  Have you recently lost weight without trying?: No  Weight loss score: 0  Have you been eating poorly because of a decreased appetite?: No  Appetite score: 0       Nutrition/Diet History  Patient Reported Diet/Restrictions/Preferences: general  Spiritual, Cultural Beliefs, Quaker Practices, Values that Affect Care: no  Factors Affecting Nutritional Intake: decreased appetite, impaired cognitive status/motor control    Anthropometrics  Temp: 97.5 °F (36.4 °C)  Height Method: Stated  Height: 5' 9" (175.3 cm)  Height (inches): 69 in  Weight Method: Bed Scale  Weight: 69.4 kg (153 lb)  Weight (lb): 153 lb  Ideal Body Weight (IBW), Male: 160 lb  % Ideal Body Weight, Male (lb): 95.63 %  BMI (Calculated): 22.6  BMI Grade: 18.5-24.9 - normal       Weight History:  Wt Readings from Last 10 Encounters:   12/11/20 69.4 kg (153 lb)   12/13/20 69.4 kg (153 lb)   11/24/20 70.7 kg (155 lb 13.8 oz)   11/16/20 72.1 kg " (158 lb 15.2 oz)   11/09/20 71.7 kg (158 lb 1.1 oz)   10/12/20 72.6 kg (160 lb)   08/28/20 77 kg (169 lb 12.1 oz)   03/17/20 67.2 kg (148 lb 3.2 oz)   03/16/20 67.6 kg (149 lb 1.6 oz)   03/13/20 67.8 kg (149 lb 6.4 oz)       Lab/Procedures/Meds: Pertinent Labs Reviewed    Clinical Chemistry:  Recent Labs   Lab 12/14/20  0613      K 3.3*      CO2 28   GLU 96   BUN 21   CREATININE 0.7   CALCIUM 9.3   ANIONGAP 9   ESTGFRAFRICA >60.0   EGFRNONAA >60.0   MG 2.0       CBC:   Recent Labs   Lab 12/14/20  0613   WBC 10.64   RBC 2.80*   HGB 7.9*   HCT 26.4*   PLT 62*   MCV 94   MCH 28.2   MCHC 29.9*       Cardiac Profile:  Recent Labs   Lab 12/12/20  0731   *         Medications: Pertinent Medications reviewed    Scheduled Meds:   cyanocobalamin  1,000 mcg Oral Daily    folic acid  1 mg Oral Daily    levoFLOXacin  750 mg Oral Before breakfast    midodrine  5 mg Oral TID WM    pyridoxine (vitamin B6)  100 mg Oral Daily    thiamine  100 mg Oral Daily       Continuous Infusions:    PRN Meds:.sodium chloride, sodium chloride, calcium chloride IVPB, calcium chloride IVPB, calcium chloride IVPB, ibuprofen, magnesium oxide, magnesium sulfate IVPB, magnesium sulfate IVPB, magnesium sulfate IVPB, magnesium sulfate IVPB, ondansetron, potassium chloride in water, potassium chloride in water, potassium chloride in water, potassium chloride in water, potassium chloride, potassium chloride, potassium chloride, potassium chloride, sodium phosphate IVPB, sodium phosphate IVPB, sodium phosphate IVPB, sodium phosphate IVPB, sodium phosphate IVPB    Estimated/Assessed Needs  Weight Used For Calorie Calculations: 69.4 kg (153 lb)  Energy Calorie Requirements (kcal): 8796-4346 (25-30 kcal/kg)  Energy Need Method: Kcal/kg  Protein Requirements: 69-83 g (1-1.2 g/kg)  Weight Used For Protein Calculations: 69.4 kg (153 lb)     Estimated Fluid Requirement Method: RDA Method  RDA Method (mL): 1736       Nutrition Prescription  Ordered  Current Diet Order: Regular  Oral Nutrition Supplement: Ensure Enlive milkshake TID    Evaluation of Received Nutrient/Fluid Intake  Energy Calories Required: not meeting needs  Protein Required: not meeting needs  Tolerance: tolerating     Intake/Output Summary (Last 24 hours) at 12/15/2020 0914  Last data filed at 12/15/2020 0819  Gross per 24 hour   Intake 480 ml   Output --   Net 480 ml     % Meal Intake: 0 - 25 %    Nutrition Risk  Level of Risk/Frequency of Follow-up: moderate - high     Monitor and Evaluation  Food and Nutrient Intake: energy intake, food and beverage intake  Food and Nutrient Adminstration: diet order  Physical Activity and Function: nutrition-related ADLs and IADLs  Anthropometric Measurements: weight, weight change  Biochemical Data, Medical Tests and Procedures: electrolyte and renal panel, gastrointestinal profile, glucose/endocrine profile  Nutrition-Focused Physical Findings: overall appearance     Nutrition Follow-Up  RD Follow-up?: Yes      Trena Pichardo RD 12/15/2020 9:14 AM

## 2020-12-15 NOTE — PLAN OF CARE
met with pt and provided cms list, Patient wishes to stay in [place].  Pt choice signed and referrals sent to [facility] and [facility].  Will follow until discharged     12/15/20 0894   Post-Acute Status   Post-Acute Authorization Placement   Post-Acute Placement Status Referrals Sent   Patient choice form signed by patient/caregiver List with quality metrics by geographic area provided   Discharge Plan   Discharge Plan A Skilled Nursing Facility   Discharge Plan B Skilled Nursing Facility

## 2020-12-15 NOTE — NURSING
"Pt  refusing abx and potassium. States "No, I don't want anymore IV stuff" Also refusing to take PO K+  "

## 2020-12-15 NOTE — PLAN OF CARE
Goals to be met by: Discharge      Patient will increase functional independence with mobility by performin. Supine to sit with Modified [place]  2. Sit to supine with Modified [place]  3. Rolling to Left and Right with Modified [place].  4. Sit to stand transfer with Supervision  5. Bed to chair transfer with Supervision using Rolling Walker  6. Gait  x 50 feet with Minimal Assistance using Rolling Walker.

## 2020-12-15 NOTE — PT/OT/SLP PROGRESS
Occupational Therapy   Treatment    Name: Heriberto Keys  MRN: [id number]  Admitting Diagnosis:  Pneumonia of both lower lobes due to infectious organism     The primary encounter diagnosis was Altered mental status, unspecified altered mental status type. 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    Recommendations:     Discharge Recommendations: nursing facility, skilled  Discharge Equipment Recommendations:  none  Barriers to discharge:  Decreased caregiver support(increased caregiver burden of care)    Assessment:     Heriberto Keys is a 60 y.o. male with a medical diagnosis of Pneumonia of both lower lobes due to infectious organism.  He presents with Performance deficits affecting function are weakness, impaired endurance, impaired self care skills, impaired functional mobilty, impaired balance, gait instability, impaired cognition, decreased upper extremity function, decreased lower extremity function, decreased safety awareness, decreased ROM, impaired cardiopulmonary response to activity.     Pt. confused, not willing to participate for duration of session.  BP sitting /73, no distress noted, pt just quit and laid self back down in bed.  Continue with OT POC.        Rehab Prognosis:  Fair; patient would benefit from acute skilled OT services to address these deficits and reach maximum level of function.       Plan:     Patient to be seen 5 x/week to address the above listed problems via self-care/home management, therapeutic activities, therapeutic exercises  · Plan of Care Expires: 01/13/21  · Plan of Care Reviewed with: patient, caregiver    Subjective  "    Pain/Comfort:  Pain Rating 1: 0/10  Pain Rating Post-Intervention 1: 0/10    Objective:     Communicated with: nurse prior to session.  Patient found HOB elevated with telemetry, bed alarm upon OT entry to room.    General Precautions: Standard, fall, hearing impaired(wears  hearing aids)   Orthopedic Precautions:N/A   Braces: N/A     Occupational Performance:     Bed Mobility:    · Patient completed Rolling/Turning to Left with  minimum assistance and with side rail  · Patient completed Scooting/Bridging with stand by assistance  · Patient completed Supine to Sit with minimum assistance  · Patient completed Sit to Supine with stand by assistance     Functional Mobility/Transfers:  · Declined tranasfers  · Functional Mobility: same, stated " I will do that when I get to my car.'  Pt is confused.     Activities of Daily Living:  · refused toileting, refused to eat lunch.      [facility] 6 Click ADL:      Treatment & Education:  --Caregiver at bedside, she is [age]said pt lives with her and her son and that she is caretaker for both and she is 80 y/o. She said the pt. was indep months ago and since recent hospitalizations he's been more confused and needed assistance with ADLs and transfers. She said pt 's confusion 'comes and goes.'   --Pt agreeable to sit EOB with OT needing min A to transition to EOB. BP checked supine:  107/73, sittin/73, .  Pt sat ~ 5 with supervision and  performed BUE AAROM x 5 reps and stopped due to fatigued. Pt exhibited facial grimacing (pain?) with L shld flexion.  Pt. spontaneously repositioned hips in sitting with SBA, pushing up with BUE. No compalints of pain or dizzness. He became increasing anxious vs confused and just stopped participating ex and wanted to lie down in bed and refused refused BSC t/f and toileiting training.     Patient left HOB elevated with all lines intact, call button in reach, bed alarm on, nurse notified and caregiver presentEducation:      GOALS: "   Multidisciplinary Problems     Occupational Therapy Goals        Problem: Occupational Therapy Goal    Goal Priority Disciplines Outcome Interventions   Occupational Therapy Goal     OT, PT/OT Ongoing, Not Progressing    Description: Goals to be met by: discharge    Patient will increase functional independence with ADLs by performing:    UE Dressing with Modified [place].  LE Dressing with Modified [place].  Grooming while standing at sink with Modified [place].  Toileting from bedside commode with Modified [place] for hygiene and clothing management.   Toilet transfer to bedside commode with Modified [place].                     Time Tracking:     OT Date of Treatment: 12/15/20  OT Start Time: 1300  OT Stop Time: 1310  OT Total Time (min): 10 min    Billable Minutes:Therapeutic Activity 10  Total Time 10    Betsy Cedeno OT  12/15/2020

## 2020-12-15 NOTE — PLAN OF CARE
Problem: Occupational Therapy Goal  Goal: Occupational Therapy Goal  Description: Goals to be met by: discharge    Patient will increase functional independence with ADLs by performing:    UE Dressing with Modified [place].  LE Dressing with Modified [place].  Grooming while standing at sink with Modified [place].  Toileting from bedside commode with Modified [place] for hygiene and clothing management.   Toilet transfer to bedside commode with Modified [place].    Outcome: Ongoing, Not Progressing   Pt. confused, not willing to participate for duration of session.  BP sitting /73, no distress noted, pt just quit and laid self back down in bed.  Continue with OT POC.

## 2020-12-16 LAB
ABO + RH BLD: NORMAL
ANION GAP SERPL CALC-SCNC: 9 MMOL/L (ref 8–16)
BLD GP AB SCN CELLS X3 SERPL QL: NORMAL
BUN SERPL-MCNC: 12 MG/DL (ref 8–23)
CALCIUM SERPL-MCNC: 9 MG/DL (ref 8.7–10.5)
CHLORIDE SERPL-SCNC: 101 MMOL/L (ref 95–110)
CO2 SERPL-SCNC: 26 MMOL/L (ref 23–29)
CREAT SERPL-MCNC: 0.7 MG/DL (ref 0.5–1.4)
ERYTHROCYTE [DISTWIDTH] IN BLOOD BY AUTOMATED COUNT: 20.6 % (ref 11.5–14.5)
EST. GFR  (AFRICAN AMERICAN): >60 ML/MIN/1.73 M^2
EST. GFR  (NON AFRICAN AMERICAN): >60 ML/MIN/1.73 M^2
GLUCOSE SERPL-MCNC: 106 MG/DL (ref 70–110)
HCT VFR BLD AUTO: 23.3 % (ref 40–54)
HGB BLD-MCNC: 6.8 G/DL (ref 14–18)
MAGNESIUM SERPL-MCNC: 1.7 MG/DL (ref 1.6–2.6)
MCH RBC QN AUTO: 28 PG (ref 27–31)
MCHC RBC AUTO-ENTMCNC: 29.2 G/DL (ref 32–36)
MCV RBC AUTO: 96 FL (ref 82–98)
PLATELET # BLD AUTO: 72 K/UL (ref 150–350)
PMV BLD AUTO: 12 FL (ref 9.2–12.9)
POTASSIUM SERPL-SCNC: 2.9 MMOL/L (ref 3.5–5.1)
RBC # BLD AUTO: 2.43 M/UL (ref 4.6–6.2)
SODIUM SERPL-SCNC: 136 MMOL/L (ref 136–145)
WBC # BLD AUTO: 15.27 K/UL (ref 3.9–12.7)

## 2020-12-16 PROCEDURE — P9016 RBC LEUKOCYTES REDUCED: HCPCS

## 2020-12-16 PROCEDURE — 36415 COLL VENOUS BLD VENIPUNCTURE: CPT

## 2020-12-16 PROCEDURE — 85027 COMPLETE CBC AUTOMATED: CPT

## 2020-12-16 PROCEDURE — 36430 TRANSFUSION BLD/BLD COMPNT: CPT

## 2020-12-16 PROCEDURE — 25000003 PHARM REV CODE 250: Performed by: HOSPITALIST

## 2020-12-16 PROCEDURE — 86850 RBC ANTIBODY SCREEN: CPT

## 2020-12-16 PROCEDURE — 25000003 PHARM REV CODE 250: Performed by: FAMILY MEDICINE

## 2020-12-16 PROCEDURE — 83735 ASSAY OF MAGNESIUM: CPT

## 2020-12-16 PROCEDURE — 97530 THERAPEUTIC ACTIVITIES: CPT | Mod: CQ

## 2020-12-16 PROCEDURE — 80048 BASIC METABOLIC PNL TOTAL CA: CPT

## 2020-12-16 PROCEDURE — 25000003 PHARM REV CODE 250: Performed by: INTERNAL MEDICINE

## 2020-12-16 PROCEDURE — 97535 SELF CARE MNGMENT TRAINING: CPT

## 2020-12-16 PROCEDURE — 86922 COMPATIBILITY TEST ANTIGLOB: CPT

## 2020-12-16 PROCEDURE — 12000002 HC ACUTE/MED SURGE SEMI-PRIVATE ROOM

## 2020-12-16 RX ORDER — POTASSIUM CHLORIDE 20 MEQ/1
60 TABLET, EXTENDED RELEASE ORAL ONCE
Status: COMPLETED | OUTPATIENT
Start: 2020-12-16 | End: 2020-12-16

## 2020-12-16 RX ORDER — LANOLIN ALCOHOL/MO/W.PET/CERES
400 CREAM (GRAM) TOPICAL 2 TIMES DAILY
Status: DISPENSED | OUTPATIENT
Start: 2020-12-16 | End: 2020-12-18

## 2020-12-16 RX ORDER — HYDROCODONE BITARTRATE AND ACETAMINOPHEN 500; 5 MG/1; MG/1
TABLET ORAL
Status: DISCONTINUED | OUTPATIENT
Start: 2020-12-16 | End: 2020-12-23 | Stop reason: HOSPADM

## 2020-12-16 RX ADMIN — FOLIC ACID 1 MG: 1 TABLET ORAL at 10:12

## 2020-12-16 RX ADMIN — CYANOCOBALAMIN TAB 1000 MCG 1000 MCG: 1000 TAB at 10:12

## 2020-12-16 RX ADMIN — PYRIDOXINE HCL TAB 50 MG 100 MG: 50 TAB at 10:12

## 2020-12-16 RX ADMIN — MIDODRINE HYDROCHLORIDE 5 MG: 2.5 TABLET ORAL at 10:12

## 2020-12-16 RX ADMIN — LEVOFLOXACIN 750 MG: 750 TABLET, FILM COATED ORAL at 06:12

## 2020-12-16 RX ADMIN — THIAMINE HCL TAB 100 MG 100 MG: 100 TAB at 10:12

## 2020-12-16 RX ADMIN — MIDODRINE HYDROCHLORIDE 5 MG: 2.5 TABLET ORAL at 12:12

## 2020-12-16 RX ADMIN — MIDODRINE HYDROCHLORIDE 5 MG: 2.5 TABLET ORAL at 06:12

## 2020-12-16 RX ADMIN — MAGNESIUM OXIDE 400 MG: 400 TABLET ORAL at 12:12

## 2020-12-16 RX ADMIN — POTASSIUM CHLORIDE 60 MEQ: 20 TABLET, EXTENDED RELEASE ORAL at 12:12

## 2020-12-16 NOTE — PLAN OF CARE
Problem: Physical Therapy Goal  Goal: Physical Therapy Goal  Outcome: Ongoing, Progressing   Pt continues to progress towards goals.

## 2020-12-16 NOTE — PROGRESS NOTES
Critical access hospital Medicine  Progress Note    Patient name: Heriberto Keys  MRN: [id number]  Admit Date: 12/7/2020   LOS: 9 days     SUBJECTIVE:     Principal problem: Pneumonia of both lower lobes due to infectious organism    Interval History:  No acute overnight events reported.  He is much more alert and interactive today.  He has been refusing labs and other interventions. On oral antibiotics.    Scheduled Meds:   cyanocobalamin  1,000 mcg Oral Daily    folic acid  1 mg Oral Daily    levoFLOXacin  750 mg Oral Before breakfast    midodrine  5 mg Oral TID WM    pyridoxine (vitamin B6)  100 mg Oral Daily    thiamine  100 mg Oral Daily     Continuous Infusions:  PRN Meds:sodium chloride, sodium chloride, calcium chloride IVPB, calcium chloride IVPB, calcium chloride IVPB, ibuprofen, magnesium oxide, magnesium sulfate IVPB, magnesium sulfate IVPB, magnesium sulfate IVPB, magnesium sulfate IVPB, ondansetron, potassium chloride in water, potassium chloride in water, potassium chloride in water, potassium chloride in water, potassium chloride, potassium chloride, potassium chloride, potassium chloride, sodium phosphate IVPB, sodium phosphate IVPB, sodium phosphate IVPB, sodium phosphate IVPB, sodium phosphate IVPB    Review of patient's allergies indicates:  No Known Allergies    Review of Systems  Limited secondary to hearing loss    OBJECTIVE:     Vital Signs (Most Recent)  Temp: 98.7 °F (37.1 °C) (12/16/20 0743)  Pulse: 108 (12/16/20 0743)  Resp: 20 (12/16/20 0743)  BP: 112/67 (12/16/20 0743)  SpO2: 97 % (12/16/20 0743)    Vital Signs Range (Last 24H):  Temp:  [98.2 °F (36.8 °C)-99 °F (37.2 °C)]   Pulse:  []   Resp:  [18-20]   BP: (102-129)/(67-78)   SpO2:  [95 %-98 %]     I & O (Last 24H):    Intake/Output Summary (Last 24 hours) at 12/16/2020 0848  Last data filed at 12/15/2020 1700  Gross per 24 hour   Intake 480 ml   Output --   Net 480 ml       Physical Exam:  General: Patient  resting comfortably in no acute distress. Appears as stated age. Calm  Eyes: No conjunctivae injection. No scleral icterus.  ENT: Hard of hearing. No discharge from ears. No nasal discharge.   CVS: RRR. +2 pitting edema BL LE  Lungs: CTA BL, no wheezing. Bibasilar crackles audible. No accessory muscle use. No acute respiratory distress  Abd: Nontender. Soft   Neuro: Alert. Moves all extremities equally. No focal neurologic deficit     Laboratory:  CBC:   No results for input(s): WBC, HGB, HCT, PLT in the last 48 hours.    Microbiology Results (last 7 days)     Procedure Component Value Units Date/Time    Blood culture x two cultures. Draw prior to antibiotics [[id number]] Collected: 12/07/20 1604    Order Status: Completed Specimen: Blood from Peripheral, Antecubital, Right Updated: 12/12/20 1832     Blood Culture, Routine No growth after 5 days.    Narrative:      Aerobic and anaerobic    Blood culture x two cultures. Draw prior to antibiotics [[id number]] Collected: 12/07/20 1605    Order Status: Completed Specimen: Blood from Peripheral, Hand, Right Updated: 12/12/20 1832     Blood Culture, Routine No growth after 5 days.    Narrative:      Aerobic and anaerobic           ASSESSMENT/PLAN:     60-year-old male with Hodgkin's lymphoma in remission and chronic anemia admitted for sepsis of unclear etiology along with acute on chronic anemia.  Being followed by infectious disease as well as Oncology.  Seen by ENT for chronic sinusitis and hearing loss with no acute identifiable etiology.  Recommend outpatient audiogram.  Transition from intravenous antibiotics to oral levofloxacin for pneumonia as per ID recommendations.  Seen by PT/OT recommended skilled nursing facility.  Case management consult for the same.    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  Hematology following  Being worked up outpatient for aplastic anemia as an outpatient - Parvovirus testing sent   Echo with normal LVEF and indeterminate diastolic function  S/p nasal endoscopy (12/10) with no evidence of invasive or fulminant sinus disease.  Noted to have changes of chronic sinusitis and mastoiditis.  Outpatient audiogram recommended.  Avoid heparin and related products due to thrombocytopenia  Encourage ambulation. PT/OT consult - recommend skilled nursing facility placement.  Case management consulted for the same    Appreciate input from consultants including ID, oncology and ENT  Discussed plan of care with bedside nursing     VTE Risk Mitigation (From admission, onward)         Ordered     IP VTE HIGH RISK PATIENT  Once      12/07/20 2235     Place sequential compression device  Until discontinued      12/07/20 2235                    Medical Decision Making during this encounter was  [] Low Complexity  [ x ] Moderate Complexity  [ ] High Complexity        Department Hospital Medicine  [facility]  Silviano Berman MD  Date of service: 12/16/2020

## 2020-12-16 NOTE — PT/OT/SLP PROGRESS
Occupational Therapy   Treatment    Name: Heriberto Keys  MRN: [id number]  Admitting Diagnosis:  Pneumonia of both lower lobes due to infectious organism       Recommendations:     Discharge Recommendations: nursing facility, skilled  Discharge Equipment Recommendations:  none  Barriers to discharge:       Assessment:     Heriberto Keys is a 60 y.o. male with a medical diagnosis of Pneumonia of both lower lobes due to infectious organism.  Pt agreeable to OT therapy session this AM. Performance deficits affecting function are weakness, impaired endurance, impaired self care skills, impaired functional mobilty, gait instability, impaired balance, decreased upper extremity function, decreased safety awareness.     Rehab Prognosis:  Fair; patient would benefit from acute skilled OT services to address these deficits and reach maximum level of function.       Plan:     Patient to be seen 5 x/week to address the above listed problems via self-care/home management, therapeutic activities, therapeutic exercises  · Plan of Care Expires: 01/13/21  · Plan of Care Reviewed with: patient    Subjective     Pain/Comfort:  · Pain Rating 1: 0/10    Objective:     Communicated with: nursing prior to session.  Patient found HOB elevated with telemetry, bed alarm upon OT entry to room.    General Precautions: Standard, fall   Orthopedic Precautions:N/A   Braces:       Occupational Performance:     Bed Mobility:    · Patient completed Supine to Sit with contact guard assistance  · Patient completed Sit to Supine with contact guard assistance     Functional Mobility/Transfers:  · Pt declined    Activities of Daily Living:  · Grooming: setup assistance seated EOB for oral care and to wash face  ; After completion of tasks, pt requested to lay back down, stated he couldn't sit up any longer    Treatment & Education:  Pt educated on importance of OOB/EOB activity, use of call bell, and safety during ADLs and bed mobility.     Patient left  HOB elevated with all lines intact, call button in reach and bed alarm onEducation:      GOALS:   Multidisciplinary Problems     Occupational Therapy Goals        Problem: Occupational Therapy Goal    Goal Priority Disciplines Outcome Interventions   Occupational Therapy Goal     OT, PT/OT Ongoing, Progressing    Description: Goals to be met by: discharge    Patient will increase functional independence with ADLs by performing:    UE Dressing with Modified [place].  LE Dressing with Modified [place].  Grooming while standing at sink with Modified [place].  Toileting from bedside commode with Modified [place] for hygiene and clothing management.   Toilet transfer to bedside commode with Modified [place].                     Time Tracking:     OT Date of Treatment: 12/16/20  OT Start Time: 0935  OT Stop Time: 0948  OT Total Time (min): 13 min    Billable Minutes:Self Care/Home Management 13    Nunu Hollis OT  12/16/2020

## 2020-12-16 NOTE — PT/OT/SLP PROGRESS
Physical Therapy Treatment    Patient Name:  Heriberto Keys   MRN:  [id number]    Recommendations:     Discharge Recommendations:  nursing facility, skilled   Discharge Equipment Recommendations: (TBD at next level of care)   Barriers to discharge: None    Assessment:     Heriberto Keys is a 60 y.o. male admitted with a medical diagnosis of Pneumonia of both lower lobes due to infectious organism.  He presents with the following impairments/functional limitations:  weakness, impaired endurance, impaired self care skills, impaired functional mobilty, impaired balance, gait instability, impaired cognition, decreased upper extremity function, decreased lower extremity function, decreased safety awareness, decreased ROM, impaired cardiopulmonary response to activity.    Patient presents resting in bed with HOB elevated; agreeable to PT treatment. Patient performed bed mobility with Min Assist and performed one sit > stand trial with Min Assist and RW. In standing, patient reported increase RLE shaking and sat without warning. Patient then refused second stand trial. Continue with PT and POC.    Rehab Prognosis: Good; patient would benefit from acute skilled PT services to address these deficits and reach maximum level of function.    Recent Surgery: * No surgery found *      Plan:     During this hospitalization, patient to be seen 6 x/week to address the identified rehab impairments via gait training, therapeutic activities, therapeutic exercises, neuromuscular re-education and progress toward the following goals:    · Plan of Care Expires:  01/15/21    Subjective     Chief Complaint: RLE shaking in standing  Patient/Family Comments/goals:   Pain/Comfort:  · Pain Rating 1: 0/10      Objective:     Communicated with RN prior to session.  Patient found HOB elevated with telemetry, bed alarm upon PT entry to room.     General Precautions: Standard, fall   Orthopedic Precautions:N/A   Braces:       Functional  Mobility:  · Bed Mobility:     · Scooting: moderate assistance  · Supine to Sit: minimum assistance  · Sit to Supine: minimum assistance  · Transfers:     · Sit to Stand:  minimum assistance and of 2 persons with rolling walker; unable to side step 2/2 RLE fatigue/shakiness      AM-PAC 6 CLICK MOBILITY          Therapeutic Activities and Exercises:   bed mobility; sitting EOB for trunk control and midline orientation; sit <> stands    Patient left HOB elevated with all lines intact, call button in reach and bed alarm on..    GOALS:   Multidisciplinary Problems     Physical Therapy Goals        Problem: Physical Therapy Goal    Goal Priority Disciplines Outcome Goal Variances Interventions   Physical Therapy Goal     PT, PT/OT Ongoing, Progressing                     Time Tracking:     PT Received On: 12/16/20  PT Start Time: 0922     PT Stop Time: 0931  PT Total Time (min): 9 min     Billable Minutes: Therapeutic Activity 9    Treatment Type: Treatment  PT/PTA: PTA     PTA Visit Number: Josiane Arredondo PTA  12/16/2020

## 2020-12-16 NOTE — PLAN OF CARE
Problem: Occupational Therapy Goal  Goal: Occupational Therapy Goal  Description: Goals to be met by: discharge    Patient will increase functional independence with ADLs by performing:    UE Dressing with Modified [place].  LE Dressing with Modified [place].  Grooming while standing at sink with Modified [place].  Toileting from bedside commode with Modified [place] for hygiene and clothing management.   Toilet transfer to bedside commode with Modified [place].    Outcome: Ongoing, Progressing

## 2020-12-17 LAB — TB INDURATION 48 - 72 HR READ: 0 MM

## 2020-12-17 PROCEDURE — 12000002 HC ACUTE/MED SURGE SEMI-PRIVATE ROOM

## 2020-12-17 RX ORDER — POTASSIUM CHLORIDE 7.45 MG/ML
40 INJECTION INTRAVENOUS
Status: DISCONTINUED | OUTPATIENT
Start: 2020-12-17 | End: 2020-12-23 | Stop reason: HOSPADM

## 2020-12-17 RX ORDER — POTASSIUM CHLORIDE 20 MEQ/1
40 TABLET, EXTENDED RELEASE ORAL
Status: DISCONTINUED | OUTPATIENT
Start: 2020-12-17 | End: 2020-12-23 | Stop reason: HOSPADM

## 2020-12-17 RX ORDER — LANOLIN ALCOHOL/MO/W.PET/CERES
800 CREAM (GRAM) TOPICAL
Status: DISCONTINUED | OUTPATIENT
Start: 2020-12-17 | End: 2020-12-23 | Stop reason: HOSPADM

## 2020-12-17 RX ORDER — MAGNESIUM SULFATE 1 G/100ML
1 INJECTION INTRAVENOUS
Status: DISCONTINUED | OUTPATIENT
Start: 2020-12-17 | End: 2020-12-23 | Stop reason: HOSPADM

## 2020-12-17 RX ORDER — MAGNESIUM SULFATE HEPTAHYDRATE 40 MG/ML
2 INJECTION, SOLUTION INTRAVENOUS
Status: DISCONTINUED | OUTPATIENT
Start: 2020-12-17 | End: 2020-12-23 | Stop reason: HOSPADM

## 2020-12-17 RX ORDER — POTASSIUM CHLORIDE 7.45 MG/ML
20 INJECTION INTRAVENOUS
Status: DISCONTINUED | OUTPATIENT
Start: 2020-12-17 | End: 2020-12-23 | Stop reason: HOSPADM

## 2020-12-17 RX ORDER — POTASSIUM CHLORIDE 20 MEQ/1
20 TABLET, EXTENDED RELEASE ORAL
Status: DISCONTINUED | OUTPATIENT
Start: 2020-12-17 | End: 2020-12-23 | Stop reason: HOSPADM

## 2020-12-17 RX ORDER — MAGNESIUM SULFATE HEPTAHYDRATE 40 MG/ML
4 INJECTION, SOLUTION INTRAVENOUS
Status: DISCONTINUED | OUTPATIENT
Start: 2020-12-17 | End: 2020-12-23 | Stop reason: HOSPADM

## 2020-12-17 NOTE — PT/OT/SLP PROGRESS
"Occupational Therapy      Patient Name:  Heriberto Keys   MRN:  [id number]    Patient not seen today secondary to Patient unwilling to participate("No; I'm tired right now").    Amanda Cornejo OT  12/17/2020  "

## 2020-12-17 NOTE — PT/OT/SLP PROGRESS
Physical Therapy      Patient Name:  Heriberto Keys   MRN:  [id number]    Patient not seen today secondary to Patient fatigue(patient unable to stay awake long enough to participate in PT session. Patient opens eyes for a few seconds and then goes back to sleep.). Will follow-up 12/18/20.    Kori Arredondo PTA

## 2020-12-17 NOTE — NURSING
Port a cath needle and dressing found on bedside table while making rounds. No bleeding noted. Pt has been confused and remains that way, has been refusing most care for several days. Pt received 1 unit PRBCs today for H/H 6.8/23.3 and has been hypokalemic at 2.9. His Mg is 1.7, he refused his magnesium tablet tonight as well. He is refusing to allow me to reaccess his port at this time. Dr Stephens notified

## 2020-12-17 NOTE — PROGRESS NOTES
[facility] Medicine  Progress Note    Patient name: Heriberto Keys  MRN: [id number]  Admit Date: 12/7/2020   LOS: 10 days     SUBJECTIVE:     Principal problem: Pneumonia of both lower lobes due to infectious organism    Interval History:  No acute overnight events reported.  Patient is very defiant and did not allow labs drawn, is not taking any p.o. medications.  He appears alert and oriented once he puts his hearing aid.  No family is available at bedside nor they are available on the phone.      Scheduled Meds:   cyanocobalamin  1,000 mcg Oral Daily    folic acid  1 mg Oral Daily    levoFLOXacin  750 mg Oral Before breakfast    magnesium oxide  400 mg Oral BID    midodrine  5 mg Oral TID WM    pyridoxine (vitamin B6)  100 mg Oral Daily    thiamine  100 mg Oral Daily     Continuous Infusions:  PRN Meds:sodium chloride, sodium chloride, sodium chloride, calcium chloride IVPB, calcium chloride IVPB, calcium chloride IVPB, calcium chloride IVPB, calcium chloride IVPB, calcium chloride IVPB, ibuprofen, magnesium oxide, magnesium oxide, magnesium sulfate IVPB, magnesium sulfate IVPB, magnesium sulfate IVPB, magnesium sulfate IVPB, magnesium sulfate IVPB, magnesium sulfate IVPB, magnesium sulfate IVPB, magnesium sulfate IVPB, ondansetron, potassium chloride in water, potassium chloride in water, potassium chloride in water, potassium chloride in water, potassium chloride in water, potassium chloride in water, potassium chloride in water, potassium chloride in water, potassium chloride, potassium chloride, potassium chloride, potassium chloride, potassium chloride, potassium chloride, potassium chloride, potassium chloride, sodium phosphate IVPB, sodium phosphate IVPB, sodium phosphate IVPB, sodium phosphate IVPB, sodium phosphate IVPB, sodium phosphate IVPB, sodium phosphate IVPB, sodium phosphate IVPB, sodium phosphate IVPB, sodium phosphate IVPB    Review of patient's allergies  indicates:  No Known Allergies    Review of Systems  Limited secondary to hearing loss    OBJECTIVE:     Vital Signs (Most Recent)  Temp: 98.3 °F (36.8 °C) (12/17/20 0733)  Pulse: 91 (12/17/20 0733)  Resp: 16 (12/17/20 0733)  BP: 121/76 (12/17/20 0733)  SpO2: 100 % (12/17/20 0733)    Vital Signs Range (Last 24H):  Temp:  [97.7 °F (36.5 °C)-98.8 °F (37.1 °C)]   Pulse:  [85-98]   Resp:  [14-20]   BP: (108-130)/(60-76)   SpO2:  [94 %-100 %]     I & O (Last 24H):    Intake/Output Summary (Last 24 hours) at 12/17/2020 0842  Last data filed at 12/16/2020 2013  Gross per 24 hour   Intake 256.67 ml   Output 200 ml   Net 56.67 ml       Physical Exam:  General: Patient resting comfortably in no acute distress. Appears as stated age. Calm  Eyes: No conjunctivae injection. No scleral icterus.  ENT: Hard of hearing. No discharge from ears. No nasal discharge.   CVS: RRR. +2 pitting edema BL LE  Lungs: CTA BL, no wheezing. Bibasilar crackles audible. No accessory muscle use. No acute respiratory distress  Abd: Nontender. Soft   Neuro: Alert. Moves all extremities equally. No focal neurologic deficit     Laboratory:  CBC:   Recent Labs   Lab 12/16/20  1035   WBC 15.27*   HGB 6.8*   HCT 23.3*   PLT 72*       Microbiology Results (last 7 days)     Procedure Component Value Units Date/Time    Blood culture x two cultures. Draw prior to antibiotics [[id number]] Collected: 12/07/20 1604    Order Status: Completed Specimen: Blood from Peripheral, Antecubital, Right Updated: 12/12/20 1832     Blood Culture, Routine No growth after 5 days.    Narrative:      Aerobic and anaerobic    Blood culture x two cultures. Draw prior to antibiotics [[id number]] Collected: 12/07/20 1605    Order Status: Completed Specimen: Blood from Peripheral, Hand, Right Updated: 12/12/20 1832     Blood Culture, Routine No growth after 5 days.    Narrative:      Aerobic and anaerobic           ASSESSMENT/PLAN:     60-year-old male with Hodgkin's lymphoma in  remission and chronic anemia admitted for sepsis of unclear etiology along with acute on chronic anemia.  Being followed by infectious disease as well as Oncology.  Seen by ENT for chronic sinusitis and hearing loss with no acute identifiable etiology.  Recommend outpatient audiogram.  Transition from intravenous antibiotics to oral levofloxacin for pneumonia as per ID recommendations.  Seen by PT/OT recommended skilled nursing facility.  Case management consult for the same.    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  Refused morning labs today  Extremely difficult to treat this patient due to his frequent refusal, poor social situation and advanced lymphoma with aplastic anemia  Continue antibiotics for pneumonia; change to levofloxacin (stop date - 12/18) however patient is frequently refusing even p.o. antibiotics  Acute on chronic anemia; normocytic and hypochromic - transfuse as needed to maintain hemoglobin greater than 7 grams/deciliter.  Hematology following  Being worked up outpatient for aplastic anemia as an outpatient - Parvovirus testing sent   Echo with normal LVEF and indeterminate diastolic function  S/p nasal endoscopy (12/10) with no evidence of invasive or fulminant sinus disease.  Noted to have changes of chronic sinusitis and mastoiditis.  Outpatient audiogram recommended.  Avoid heparin and related products due to thrombocytopenia  Encourage ambulation. PT/OT consult - recommend skilled nursing facility  placement.  Case management consulted for the same    Appreciate input from consultants including ID, oncology and ENT  Discussed plan of care with bedside nursing     I tried to call the contact number listed in Facesheet without any success.  Overall it appears that we are not making any progress as patient is refusing frequent labs, medications and would like to go home however at the same time he is severely deconditioned and gravely ill    VTE Risk Mitigation (From admission, onward)         Ordered     IP VTE HIGH RISK PATIENT  Once      12/07/20 2235     Place sequential compression device  Until discontinued      12/07/20 2235                    Medical Decision Making during this encounter was  [] Low Complexity  [ x ] Moderate Complexity  [ ] High Complexity        Department Hospital Medicine  [facility]  Silviano Berman MD  Date of service: 12/17/2020

## 2020-12-17 NOTE — NURSING
"Patient refusing magnesium tablet, states "Baby, you gotta stay away from that stuff, you gotta stay away from the drugs. I don't want it" Explained need for medication, encouraged to take it, patient continues to refuse, became frustrated and started to attempt to get out of bed to leave. Calmed pt and convinced him to stay in bed. Pt pulled blanket back over head.   "

## 2020-12-17 NOTE — NURSING
Mr Louis is refusing his lab draw this morning again, also refusing to access his port. He is refusing his PO abx. Several of us tried to explain to him why we were needing it and he said he doesn't trust us. He said he would like me to send the dr in. Dr Stephens notified. No new orders

## 2020-12-18 PROCEDURE — 25000003 PHARM REV CODE 250: Performed by: INTERNAL MEDICINE

## 2020-12-18 PROCEDURE — 12000002 HC ACUTE/MED SURGE SEMI-PRIVATE ROOM

## 2020-12-18 PROCEDURE — 97530 THERAPEUTIC ACTIVITIES: CPT

## 2020-12-18 PROCEDURE — 25000003 PHARM REV CODE 250: Performed by: FAMILY MEDICINE

## 2020-12-18 PROCEDURE — 97535 SELF CARE MNGMENT TRAINING: CPT

## 2020-12-18 RX ADMIN — FOLIC ACID 1 MG: 1 TABLET ORAL at 09:12

## 2020-12-18 RX ADMIN — LEVOFLOXACIN 750 MG: 750 TABLET, FILM COATED ORAL at 05:12

## 2020-12-18 RX ADMIN — MIDODRINE HYDROCHLORIDE 5 MG: 2.5 TABLET ORAL at 01:12

## 2020-12-18 RX ADMIN — MIDODRINE HYDROCHLORIDE 5 MG: 2.5 TABLET ORAL at 05:12

## 2020-12-18 RX ADMIN — PYRIDOXINE HCL TAB 50 MG 100 MG: 50 TAB at 09:12

## 2020-12-18 RX ADMIN — MIDODRINE HYDROCHLORIDE 5 MG: 2.5 TABLET ORAL at 09:12

## 2020-12-18 RX ADMIN — CYANOCOBALAMIN TAB 1000 MCG 1000 MCG: 1000 TAB at 09:12

## 2020-12-18 RX ADMIN — THIAMINE HCL TAB 100 MG 100 MG: 100 TAB at 09:12

## 2020-12-18 NOTE — PROGRESS NOTES
"[facility]  Adult Nutrition   Progress Note (Follow-Up)    SUMMARY     Recommendations  Recommendation/Intervention: 1. Continue diet and supplements as tolerated by patient 2. Encourage PO intake of meals and provide meal assist as needed  Goals: 1. Patient to meet at least 75% of estimated needs through diet and supplement intake  Nutrition Goal Status: progressing towards goal  Communication of RD Recs: reviewed with RN    Dietitian Rounds Brief    Pt seen for f/u. Tolerating diet; intake minimal. States that he ate bkfst, but RD not able to confirm.  PO intake is sporadic. RD called and ordered pt a hamburger for lunch. Not consuming ONS. Drinking Coca [employer]a per staff. LBM 12/15.     Reason for Assessment  Reason For Assessment: RD follow-up  Diagnosis: infection/sepsis  Relevant Medical History: Hodgkins lymphoma (in remission), [place], depression  Interdisciplinary Rounds: attended    Nutrition Risk Screen  Nutrition Risk Screen: no indicators present     MST Score: 0  Have you recently lost weight without trying?: No  Weight loss score: 0  Have you been eating poorly because of a decreased appetite?: No  Appetite score: 0       Nutrition/Diet History  Patient Reported Diet/Restrictions/Preferences: general  Spiritual, Cultural Beliefs, Adventist Practices, Values that Affect Care: no  Factors Affecting Nutritional Intake: decreased appetite, impaired cognitive status/motor control    Anthropometrics  Temp: 98.3 °F (36.8 °C)  Height Method: Stated  Height: 5' 9" (175.3 cm)  Height (inches): 69 in  Weight Method: Bed Scale  Weight: 69.4 kg (153 lb)  Weight (lb): 153 lb  Ideal Body Weight (IBW), Male: 160 lb  % Ideal Body Weight, Male (lb): 95.63 %  BMI (Calculated): 22.6  BMI Grade: 18.5-24.9 - normal       Weight History:  Wt Readings from Last 10 Encounters:   12/11/20 69.4 kg (153 lb)   12/13/20 69.4 kg (153 lb)   11/24/20 70.7 kg (155 lb 13.8 oz)   11/16/20 72.1 kg (158 lb 15.2 oz)   11/09/20 " 71.7 kg (158 lb 1.1 oz)   10/12/20 72.6 kg (160 lb)   08/28/20 77 kg (169 lb 12.1 oz)   03/17/20 67.2 kg (148 lb 3.2 oz)   03/16/20 67.6 kg (149 lb 1.6 oz)   03/13/20 67.8 kg (149 lb 6.4 oz)       Lab/Procedures/Meds: Pertinent Labs Reviewed    Clinical Chemistry:  Recent Labs   Lab 12/16/20  1035      K 2.9*      CO2 26      BUN 12   CREATININE 0.7   CALCIUM 9.0   ANIONGAP 9   ESTGFRAFRICA >60.0   EGFRNONAA >60.0   MG 1.7       CBC:   Recent Labs   Lab 12/16/20  1035   WBC 15.27*   RBC 2.43*   HGB 6.8*   HCT 23.3*   PLT 72*   MCV 96   MCH 28.0   MCHC 29.2*       Cardiac Profile:  Recent Labs   Lab 12/12/20  0731   *         Medications: Pertinent Medications reviewed    Scheduled Meds:   cyanocobalamin  1,000 mcg Oral Daily    folic acid  1 mg Oral Daily    levoFLOXacin  750 mg Oral Before breakfast    midodrine  5 mg Oral TID WM    pyridoxine (vitamin B6)  100 mg Oral Daily    thiamine  100 mg Oral Daily       Continuous Infusions:    PRN Meds:.sodium chloride, sodium chloride, sodium chloride, calcium chloride IVPB, calcium chloride IVPB, calcium chloride IVPB, calcium chloride IVPB, calcium chloride IVPB, calcium chloride IVPB, ibuprofen, magnesium oxide, magnesium oxide, magnesium sulfate IVPB, magnesium sulfate IVPB, magnesium sulfate IVPB, magnesium sulfate IVPB, magnesium sulfate IVPB, magnesium sulfate IVPB, magnesium sulfate IVPB, magnesium sulfate IVPB, ondansetron, potassium chloride in water, potassium chloride in water, potassium chloride in water, potassium chloride in water, potassium chloride in water, potassium chloride in water, potassium chloride in water, potassium chloride in water, potassium chloride, potassium chloride, potassium chloride, potassium chloride, potassium chloride, potassium chloride, potassium chloride, potassium chloride, sodium phosphate IVPB, sodium phosphate IVPB, sodium phosphate IVPB, sodium phosphate IVPB, sodium phosphate IVPB, sodium  phosphate IVPB, sodium phosphate IVPB, sodium phosphate IVPB, sodium phosphate IVPB, sodium phosphate IVPB    Estimated/Assessed Needs  Weight Used For Calorie Calculations: 69.4 kg (153 lb)  Energy Calorie Requirements (kcal): 4492-3798 (25-30 kcal/kg)  Energy Need Method: Kcal/kg  Protein Requirements: 69-83 g (1-1.2 g/kg)  Weight Used For Protein Calculations: 69.4 kg (153 lb)     Estimated Fluid Requirement Method: RDA Method  RDA Method (mL): 1735       Nutrition Prescription Ordered  Current Diet Order: Regular  Oral Nutrition Supplement: Ensure Enlive milkshake TID    Evaluation of Received Nutrient/Fluid Intake  Energy Calories Required: not meeting needs  Protein Required: not meeting needs  Tolerance: tolerating     Intake/Output Summary (Last 24 hours) at 12/18/2020 1304  Last data filed at 12/18/2020 0409  Gross per 24 hour   Intake --   Output 250 ml   Net -250 ml        % Meal Intake: 0 - 25 %    Nutrition Risk  Level of Risk/Frequency of Follow-up: high     Monitor and Evaluation  Food and Nutrient Intake: food and beverage intake, energy intake  Food and Nutrient Adminstration: diet order  Physical Activity and Function: nutrition-related ADLs and IADLs  Anthropometric Measurements: weight, weight change  Biochemical Data, Medical Tests and Procedures: electrolyte and renal panel, gastrointestinal profile, glucose/endocrine profile  Nutrition-Focused Physical Findings: overall appearance     Nutrition Follow-Up  RD Follow-up?: Yes      Trena Pichardo RD 12/18/2020 1:04 PM

## 2020-12-18 NOTE — PT/OT/SLP PROGRESS
"Physical Therapy Treatment    Patient Name:  Heriberto Keys   MRN:  [id number]    Recommendations:     Discharge Recommendations:  nursing facility, skilled   Discharge Equipment Recommendations: (TBD at next level of care)   Barriers to discharge: None    Assessment:     Heriberto Keys is a 60 y.o. male admitted with a medical diagnosis of Pneumonia of both lower lobes due to infectious organism.  He presents with the following impairments/functional limitations:  weakness, impaired endurance, impaired self care skills, impaired functional mobilty, gait instability, impaired balance, decreased lower extremity function, decreased safety awareness, impaired cardiopulmonary response to activity. Pt supine in NAD; agreeable to PT treatment with Mod encouragement. Pt reports weakness and dizziness at sitting EOB /70. Pt agreeable to OOB to chair with Max encouragement. Once up in chair very comfortable and happy. "thank you so much." RN aware.     Rehab Prognosis: Good; patient would benefit from acute skilled PT services to address these deficits and reach maximum level of function.    Recent Surgery: * No surgery found *      Plan:     During this hospitalization, patient to be seen 6 x/week to address the identified rehab impairments via gait training, therapeutic activities, therapeutic exercises and progress toward the following goals:    · Plan of Care Expires:  01/15/21    Subjective     Chief Complaint: tired and weak  Patient/Family Comments/goals: home  Pain/Comfort:  · Pain Rating 1: 0/10  · Pain Rating Post-Intervention 1: 0/10      Objective:     Communicated with RN prior to session.  Patient found supine with bed alarm, telemetry upon PT entry to room.     General Precautions: Standard, fall   Orthopedic Precautions:N/A   Braces:       Functional Mobility:  · Bed Mobility:     · Rolling Left:  minimum assistance  · Supine to Sit: minimum assistance  · Transfers:     · Sit to Stand:  moderate " assistance with hand-held assist    · Bed to Chair: moderate assistance with  hand-held assist  using  Squat Pivot  · Gait: NT pt not agreeable  · Balance: good in sitting; fair to poor in standing      AM-PAC 6 CLICK MOBILITY          Therapeutic Activities and Exercises:   bed mobility; sitting EOB for trunk control and midline orientation; transfer training; PT educated pt/ family on importance of out of bed to chair and functional mobility to negate negative effects of prolonged bed rest. Will progress activity as tolerated by patient; At this time patient only able to tolerate positional changes and transfers due to overall deconditioning.     Patient left up in chair with all lines intact, call button in reach and RN notified..    GOALS:   Multidisciplinary Problems     Physical Therapy Goals        Problem: Physical Therapy Goal    Goal Priority Disciplines Outcome Goal Variances Interventions   Physical Therapy Goal     PT, PT/OT Ongoing, Progressing     Description: Goals to be met by: Discharge      Patient will increase functional independence with mobility by performin. Supine to sit with Modified [place]  2. Sit to supine with Modified [place]  3. Rolling to Left and Right with Modified [place].  4. Sit to stand transfer with Supervision  5. Bed to chair transfer with Supervision using Rolling Walker  6. Gait  x 50 feet with Minimal Assistance using Rolling Walker.                    Time Tracking:     PT Received On: 20  PT Start Time: 937     PT Stop Time: 949  PT Total Time (min): 12 min     Billable Minutes: Therapeutic Activity 12    Treatment Type: Treatment  PT/PTA: PT     PTA Visit Number: 0     Mimi Benavides, PT  2020

## 2020-12-18 NOTE — PLAN OF CARE
Problem: Occupational Therapy Goal  Goal: Occupational Therapy Goal  Description: Goals to be met by: discharge    Patient will increase functional independence with ADLs by performing:    UE Dressing with Modified [place].  LE Dressing with Modified [place].  Grooming while standing at sink with Modified [place].  Toileting from bedside commode with Modified [place] for hygiene and clothing management.   Toilet transfer to bedside commode with Modified [place].    Outcome: Ongoing, Progressing

## 2020-12-18 NOTE — PLAN OF CARE
Problem: Physical Therapy Goal  Goal: Physical Therapy Goal  Description: Goals to be met by: Discharge      Patient will increase functional independence with mobility by performin. Supine to sit with Modified [place]  2. Sit to supine with Modified [place]  3. Rolling to Left and Right with Modified [place].  4. Sit to stand transfer with Supervision  5. Bed to chair transfer with Supervision using Rolling Walker  6. Gait  x 50 feet with Minimal Assistance using Rolling Walker.   2020 1205 by Mimi Benavides PT  Outcome: Ongoing, Progressing

## 2020-12-18 NOTE — PROGRESS NOTES
[facility] Medicine  Progress Note    Patient name: Heriberto Keys  MRN: [id number]  Admit Date: 12/7/2020   LOS: 11 days     SUBJECTIVE:     Principal problem: Pneumonia of both lower lobes due to infectious organism    Interval History:  No acute overnight events reported.  Patient is very defiant and did not allow labs drawn, is not taking any p.o. medications.  He appears alert and oriented once he puts his hearing aid.      Scheduled Meds:   cyanocobalamin  1,000 mcg Oral Daily    folic acid  1 mg Oral Daily    levoFLOXacin  750 mg Oral Before breakfast    magnesium oxide  400 mg Oral BID    midodrine  5 mg Oral TID WM    pyridoxine (vitamin B6)  100 mg Oral Daily    thiamine  100 mg Oral Daily     Continuous Infusions:  PRN Meds:sodium chloride, sodium chloride, sodium chloride, calcium chloride IVPB, calcium chloride IVPB, calcium chloride IVPB, calcium chloride IVPB, calcium chloride IVPB, calcium chloride IVPB, ibuprofen, magnesium oxide, magnesium oxide, magnesium sulfate IVPB, magnesium sulfate IVPB, magnesium sulfate IVPB, magnesium sulfate IVPB, magnesium sulfate IVPB, magnesium sulfate IVPB, magnesium sulfate IVPB, magnesium sulfate IVPB, ondansetron, potassium chloride in water, potassium chloride in water, potassium chloride in water, potassium chloride in water, potassium chloride in water, potassium chloride in water, potassium chloride in water, potassium chloride in water, potassium chloride, potassium chloride, potassium chloride, potassium chloride, potassium chloride, potassium chloride, potassium chloride, potassium chloride, sodium phosphate IVPB, sodium phosphate IVPB, sodium phosphate IVPB, sodium phosphate IVPB, sodium phosphate IVPB, sodium phosphate IVPB, sodium phosphate IVPB, sodium phosphate IVPB, sodium phosphate IVPB, sodium phosphate IVPB    Review of patient's allergies indicates:  No Known Allergies    Review of Systems  Limited secondary to  hearing loss    OBJECTIVE:     Vital Signs (Most Recent)  Temp: 97.7 °F (36.5 °C) (12/18/20 0749)  Pulse: 106 (12/18/20 0749)  Resp: 18 (12/18/20 0749)  BP: 121/72 (12/18/20 0749)  SpO2: 99 % (12/18/20 0749)    Vital Signs Range (Last 24H):  Temp:  [97.7 °F (36.5 °C)-99.8 °F (37.7 °C)]   Pulse:  []   Resp:  [18]   BP: (117-135)/(71-77)   SpO2:  [95 %-99 %]     I & O (Last 24H):    Intake/Output Summary (Last 24 hours) at 12/18/2020 0802  Last data filed at 12/18/2020 0409  Gross per 24 hour   Intake --   Output 450 ml   Net -450 ml       Physical Exam:  General: Patient resting comfortably in no acute distress. Appears as stated age. Calm  Eyes: No conjunctivae injection. No scleral icterus.  ENT: Hard of hearing. No discharge from ears. No nasal discharge.   CVS: RRR. +2 pitting edema BL LE  Lungs: CTA BL, no wheezing. Bibasilar crackles audible. No accessory muscle use. No acute respiratory distress  Abd: Nontender. Soft   Neuro: Alert. Moves all extremities equally. No focal neurologic deficit     Laboratory:  CBC:   Recent Labs   Lab 12/16/20  1035   WBC 15.27*   HGB 6.8*   HCT 23.3*   PLT 72*       Microbiology Results (last 7 days)     Procedure Component Value Units Date/Time    Blood culture x two cultures. Draw prior to antibiotics [[id number]] Collected: 12/07/20 1604    Order Status: Completed Specimen: Blood from Peripheral, Antecubital, Right Updated: 12/12/20 1832     Blood Culture, Routine No growth after 5 days.    Narrative:      Aerobic and anaerobic    Blood culture x two cultures. Draw prior to antibiotics [[id number]] Collected: 12/07/20 1605    Order Status: Completed Specimen: Blood from Peripheral, Hand, Right Updated: 12/12/20 1832     Blood Culture, Routine No growth after 5 days.    Narrative:      Aerobic and anaerobic           ASSESSMENT/PLAN:     60-year-old male with Hodgkin's lymphoma in remission and chronic anemia admitted for sepsis of unclear etiology along with acute on  chronic anemia.  Being followed by infectious disease as well as Oncology.  Seen by ENT for chronic sinusitis and hearing loss with no acute identifiable etiology.  Recommend outpatient audiogram.  Transition from intravenous antibiotics to oral levofloxacin for pneumonia as per ID recommendations.  Seen by PT/OT recommended skilled nursing facility.  Case management consult for the same.    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  Refused morning labs 2 days in a row  Extremely difficult to treat this patient due to his frequent refusal, poor social situation and advanced lymphoma with aplastic anemia  Continue antibiotics for pneumonia; change to levofloxacin (stop date - 12/18) however patient is frequently refusing even p.o. antibiotics  Acute on chronic anemia; normocytic and hypochromic - transfuse as needed to maintain hemoglobin greater than 7 grams/deciliter.  Hematology signed off recommending outpatient follow-up  Being worked up outpatient for aplastic anemia as an outpatient - Parvovirus testing not sent due to his refusal  Echo with normal LVEF and indeterminate diastolic function  S/p nasal endoscopy (12/10) with no evidence of invasive or fulminant sinus disease.  Noted to have changes of chronic sinusitis and mastoiditis.  Outpatient audiogram recommended.  Avoid heparin and related products due to thrombocytopenia  Encourage ambulation. PT/OT consult - recommend skilled nursing facility placement.  Case management  consulted for the same    Appreciate input from consultants including ID, oncology and ENT  Discussed plan of care with bedside nursing       VTE Risk Mitigation (From admission, onward)         Ordered     IP VTE HIGH RISK PATIENT  Once      12/07/20 2235     Place sequential compression device  Until discontinued      12/07/20 2235                    Medical Decision Making during this encounter was  [] Low Complexity  [ x ] Moderate Complexity  [ ] High Complexity        Department Hospital Medicine  [facility]  Silviano Berman MD  Date of service: 12/18/2020

## 2020-12-18 NOTE — PT/OT/SLP PROGRESS
Occupational Therapy   Treatment    Name: Heriberto Keys  MRN: [id number]  Admitting Diagnosis:  Pneumonia of both lower lobes due to infectious organism       Recommendations:     Discharge Recommendations: nursing facility, skilled  Discharge Equipment Recommendations:  none  Barriers to discharge:       Assessment:     Heriberot Keys is a 60 y.o. male with a medical diagnosis of Pneumonia of both lower lobes due to infectious organism.  Pt agreeable to OT therapy session this AM. Performance deficits affecting function are weakness, impaired endurance, impaired self care skills, impaired functional mobilty, gait instability, impaired balance, decreased upper extremity function, decreased lower extremity function. Pt agreeable to sit EOB with encouragement, but returned self to supine after g/h task, stating he needed to lay back down. Pt very [place].     Rehab Prognosis:  Fair; patient would benefit from acute skilled OT services to address these deficits and reach maximum level of function.       Plan:     Patient to be seen 5 x/week to address the above listed problems via self-care/home management, therapeutic activities, therapeutic exercises  · Plan of Care Expires: 01/13/21  · Plan of Care Reviewed with: patient    Subjective     Pain/Comfort:  · Pain Rating 1: 0/10    Objective:     Communicated with: nursing prior to session.  Patient found HOB elevated with telemetry, bed alarm upon OT entry to room.    General Precautions: Standard, fall   Orthopedic Precautions:N/A   Braces: N/A     Occupational Performance:     Bed Mobility:    · Patient completed Scooting/Bridging with maximal assistance and 2 persons to scoot towards HOB while supine  · Patient completed Supine to Sit with minimum assistance  · Patient completed Sit to Supine with contact guard assistance    Activities of Daily Living:  · Grooming: stand by assistance seated EOB to wash face and to brush teeth; pt required cues to initiate  task  · Upper Body Dressing: minimum assistance to don gown, cues to thread UE's through    Treatment & Education:  Pt educated on role of OT, importance of OOB/EOB activity, and safety during ADLs    Patient left HOB elevated with all lines intact, call button in reach, bed alarm on and RN notifiedEducation:      GOALS:   Multidisciplinary Problems     Occupational Therapy Goals        Problem: Occupational Therapy Goal    Goal Priority Disciplines Outcome Interventions   Occupational Therapy Goal     OT, PT/OT Ongoing, Progressing    Description: Goals to be met by: discharge    Patient will increase functional independence with ADLs by performing:    UE Dressing with Modified [place].  LE Dressing with Modified [place].  Grooming while standing at sink with Modified [place].  Toileting from bedside commode with Modified [place] for hygiene and clothing management.   Toilet transfer to bedside commode with Modified [place].                     Time Tracking:     OT Date of Treatment: 12/18/20  OT Start Time: 0915  OT Stop Time: 0930  OT Total Time (min): 15 min    Billable Minutes:Self Care/Home Management 15    Nunu Hollis OT  12/18/2020

## 2020-12-19 PROCEDURE — 25000003 PHARM REV CODE 250: Performed by: INTERNAL MEDICINE

## 2020-12-19 PROCEDURE — 12000002 HC ACUTE/MED SURGE SEMI-PRIVATE ROOM

## 2020-12-19 PROCEDURE — 25000003 PHARM REV CODE 250: Performed by: FAMILY MEDICINE

## 2020-12-19 RX ORDER — MIRTAZAPINE 15 MG/1
15 TABLET, FILM COATED ORAL NIGHTLY
Status: DISCONTINUED | OUTPATIENT
Start: 2020-12-19 | End: 2020-12-23 | Stop reason: HOSPADM

## 2020-12-19 RX ADMIN — CYANOCOBALAMIN TAB 1000 MCG 1000 MCG: 1000 TAB at 09:12

## 2020-12-19 RX ADMIN — MIDODRINE HYDROCHLORIDE 5 MG: 2.5 TABLET ORAL at 09:12

## 2020-12-19 RX ADMIN — FOLIC ACID 1 MG: 1 TABLET ORAL at 09:12

## 2020-12-19 RX ADMIN — MIDODRINE HYDROCHLORIDE 5 MG: 2.5 TABLET ORAL at 05:12

## 2020-12-19 RX ADMIN — PYRIDOXINE HCL TAB 50 MG 100 MG: 50 TAB at 09:12

## 2020-12-19 RX ADMIN — MIDODRINE HYDROCHLORIDE 5 MG: 2.5 TABLET ORAL at 12:12

## 2020-12-19 RX ADMIN — THIAMINE HCL TAB 100 MG 100 MG: 100 TAB at 09:12

## 2020-12-19 NOTE — PLAN OF CARE
Mostly withdrawn, very little interaction. Pain is well control, slept well and safety maintained. No current issues or events.

## 2020-12-19 NOTE — PT/OT/SLP PROGRESS
Physical Therapy      Patient Name:  Heriberto Keys   MRN:  [id number]    Patient not seen today secondary to Other (Comment)(Pt declined tx despite max encouragement.). Will follow-up 12/21/20.    Hillary Hammant, PTA

## 2020-12-19 NOTE — PROGRESS NOTES
[facility] Medicine  Progress Note    Patient name: Heriberto Keys  MRN: [id number]  Admit Date: 12/7/2020   LOS: 12 days     SUBJECTIVE:     Principal problem: Pneumonia of both lower lobes due to infectious organism    Interval History:  No acute overnight events reported.  Still refusing labs and therapy sessions intermittently.  Alert, responding appropriately.    Scheduled Meds:   cyanocobalamin  1,000 mcg Oral Daily    folic acid  1 mg Oral Daily    midodrine  5 mg Oral TID WM    mirtazapine  15 mg Oral QHS    pyridoxine (vitamin B6)  100 mg Oral Daily    thiamine  100 mg Oral Daily     Continuous Infusions:  PRN Meds:sodium chloride, sodium chloride, sodium chloride, calcium chloride IVPB, calcium chloride IVPB, calcium chloride IVPB, calcium chloride IVPB, calcium chloride IVPB, calcium chloride IVPB, ibuprofen, magnesium oxide, magnesium oxide, magnesium sulfate IVPB, magnesium sulfate IVPB, magnesium sulfate IVPB, magnesium sulfate IVPB, magnesium sulfate IVPB, magnesium sulfate IVPB, magnesium sulfate IVPB, magnesium sulfate IVPB, ondansetron, potassium chloride in water, potassium chloride in water, potassium chloride in water, potassium chloride in water, potassium chloride in water, potassium chloride in water, potassium chloride in water, potassium chloride in water, potassium chloride, potassium chloride, potassium chloride, potassium chloride, potassium chloride, potassium chloride, potassium chloride, potassium chloride, sodium phosphate IVPB, sodium phosphate IVPB, sodium phosphate IVPB, sodium phosphate IVPB, sodium phosphate IVPB, sodium phosphate IVPB, sodium phosphate IVPB, sodium phosphate IVPB, sodium phosphate IVPB, sodium phosphate IVPB    Review of patient's allergies indicates:  No Known Allergies    Review of Systems  Limited secondary to hearing loss    OBJECTIVE:     Vital Signs (Most Recent)  Temp: 96.7 °F (35.9 °C) (12/19/20 1100)  Pulse: 100 (12/19/20  1100)  Resp: 18 (12/19/20 1100)  BP: 120/60 (12/19/20 1100)  SpO2: 98 % (12/19/20 1100)    Vital Signs Range (Last 24H):  Temp:  [96.7 °F (35.9 °C)-100.1 °F (37.8 °C)]   Pulse:  []   Resp:  [18-19]   BP: (116-134)/(60-76)   SpO2:  [95 %-100 %]     I & O (Last 24H):  No intake or output data in the 24 hours ending 12/19/20 1603    Physical Exam:  General: Patient resting comfortably in no acute distress. Appears as stated age. Calm  Eyes: No conjunctivae injection. No scleral icterus.  ENT: Hard of hearing. No discharge from ears. No nasal discharge.   CVS: RRR. +2 pitting edema BL LE  Lungs: CTA BL, no wheezing. Bibasilar crackles audible. No accessory muscle use. No acute respiratory distress  Abd: Nontender. Soft   Neuro: Alert. Moves all extremities equally. No focal neurologic deficit     Laboratory:  CBC:   No results for input(s): WBC, HGB, HCT, PLT in the last 48 hours.    Microbiology Results (last 7 days)     Procedure Component Value Units Date/Time    Blood culture x two cultures. Draw prior to antibiotics [[id number]] Collected: 12/07/20 1604    Order Status: Completed Specimen: Blood from Peripheral, Antecubital, Right Updated: 12/12/20 1832     Blood Culture, Routine No growth after 5 days.    Narrative:      Aerobic and anaerobic    Blood culture x two cultures. Draw prior to antibiotics [[id number]] Collected: 12/07/20 1605    Order Status: Completed Specimen: Blood from Peripheral, Hand, Right Updated: 12/12/20 1832     Blood Culture, Routine No growth after 5 days.    Narrative:      Aerobic and anaerobic           ASSESSMENT/PLAN:     60-year-old male with Hodgkin's lymphoma in remission and chronic anemia admitted for sepsis of unclear etiology along with acute on chronic anemia.  Being followed by infectious disease as well as Oncology.  Seen by ENT for chronic sinusitis and hearing loss with no acute identifiable etiology.  Recommend outpatient audiogram.  Status post antibiotic treatment  for pneumonia.  Seen by PT/OT recommended skilled nursing facility.  Case management consult for the same.    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  No evidence of blood loss noted  Received several blood transfusions to maintain hemoglobin greater than 7 grams/deciliter.  However patient has been refusing further lab draws which makes it challenging to treat his anemia appropriately   Hematology signed off recommending outpatient follow-up  Being worked up outpatient for aplastic anemia as an outpatient - Parvovirus testing not sent due to his refusal  Echo with normal LVEF and indeterminate diastolic function  S/p nasal endoscopy (12/10) with no evidence of invasive or fulminant sinus disease.  Noted to have changes of chronic sinusitis and mastoiditis.  Outpatient audiogram recommended.  Avoid heparin and related products due to thrombocytopenia  Encourage ambulation. PT/OT consult - recommend skilled nursing facility placement.  Case management consulted for the same    Appreciate input from consultants including ID, oncology and ENT  Had a long conversation with his daughter Dottie.  She will try to be in town next week or try to get in touch with her father over phone and encourage him for skilled nursing facility.  Other disposition options include home with home health    VTE Risk Mitigation (From admission, onward)         Ordered     IP VTE HIGH RISK PATIENT  Once      12/07/20  2235     Place sequential compression device  Until discontinued      12/07/20 2235                    Medical Decision Making during this encounter was  [] Low Complexity  [ x ] Moderate Complexity  [ ] High Complexity        [facility] Medicine  [facility]  Sanjeev Chairez MD  Date of service: 12/19/2020

## 2020-12-20 PROCEDURE — 25000003 PHARM REV CODE 250: Performed by: FAMILY MEDICINE

## 2020-12-20 PROCEDURE — 12000002 HC ACUTE/MED SURGE SEMI-PRIVATE ROOM

## 2020-12-20 PROCEDURE — 25000003 PHARM REV CODE 250: Performed by: INTERNAL MEDICINE

## 2020-12-20 RX ADMIN — MIRTAZAPINE 15 MG: 15 TABLET, FILM COATED ORAL at 09:12

## 2020-12-20 RX ADMIN — MIDODRINE HYDROCHLORIDE 5 MG: 2.5 TABLET ORAL at 10:12

## 2020-12-20 RX ADMIN — THIAMINE HCL TAB 100 MG 100 MG: 100 TAB at 10:12

## 2020-12-20 RX ADMIN — PYRIDOXINE HCL TAB 50 MG 100 MG: 50 TAB at 10:12

## 2020-12-20 RX ADMIN — FOLIC ACID 1 MG: 1 TABLET ORAL at 10:12

## 2020-12-20 RX ADMIN — MIDODRINE HYDROCHLORIDE 5 MG: 2.5 TABLET ORAL at 05:12

## 2020-12-20 RX ADMIN — CYANOCOBALAMIN TAB 1000 MCG 1000 MCG: 1000 TAB at 10:12

## 2020-12-20 RX ADMIN — MIDODRINE HYDROCHLORIDE 5 MG: 2.5 TABLET ORAL at 12:12

## 2020-12-20 NOTE — PLAN OF CARE
Pain is well controlled, slept well and safety maintained. No current issues or events. Still withdrawn and low energy.

## 2020-12-20 NOTE — PROGRESS NOTES
[facility] Medicine  Progress Note    Patient name: Heriberto Keys  MRN: [id number]  Admit Date: 12/7/2020   LOS: 13 days     SUBJECTIVE:     Principal problem: Pneumonia of both lower lobes due to infectious organism    Interval History:  No acute overnight events reported.  Awaiting placement. Poor appetite; needs lot of encouragement to eat.    Scheduled Meds:   cyanocobalamin  1,000 mcg Oral Daily    folic acid  1 mg Oral Daily    midodrine  5 mg Oral TID WM    mirtazapine  15 mg Oral QHS    pyridoxine (vitamin B6)  100 mg Oral Daily    thiamine  100 mg Oral Daily     Continuous Infusions:  PRN Meds:sodium chloride, sodium chloride, sodium chloride, calcium chloride IVPB, calcium chloride IVPB, calcium chloride IVPB, calcium chloride IVPB, calcium chloride IVPB, calcium chloride IVPB, ibuprofen, magnesium oxide, magnesium oxide, magnesium sulfate IVPB, magnesium sulfate IVPB, magnesium sulfate IVPB, magnesium sulfate IVPB, magnesium sulfate IVPB, magnesium sulfate IVPB, magnesium sulfate IVPB, magnesium sulfate IVPB, ondansetron, potassium chloride in water, potassium chloride in water, potassium chloride in water, potassium chloride in water, potassium chloride in water, potassium chloride in water, potassium chloride in water, potassium chloride in water, potassium chloride, potassium chloride, potassium chloride, potassium chloride, potassium chloride, potassium chloride, potassium chloride, potassium chloride, sodium phosphate IVPB, sodium phosphate IVPB, sodium phosphate IVPB, sodium phosphate IVPB, sodium phosphate IVPB, sodium phosphate IVPB, sodium phosphate IVPB, sodium phosphate IVPB, sodium phosphate IVPB, sodium phosphate IVPB    Review of patient's allergies indicates:  No Known Allergies    Review of Systems  Limited secondary to hearing loss    OBJECTIVE:     Vital Signs (Most Recent)  Temp: 99.6 °F (37.6 °C) (12/20/20 1114)  Pulse: 95 (12/20/20 1114)  Resp: 17  (12/20/20 1114)  BP: 118/71 (12/20/20 1114)  SpO2: 95 % (12/20/20 1114)    Vital Signs Range (Last 24H):  Temp:  [97.8 °F (36.6 °C)-100.1 °F (37.8 °C)]   Pulse:  []   Resp:  [17-18]   BP: (115-136)/(63-78)   SpO2:  [93 %-99 %]     I & O (Last 24H):  No intake or output data in the 24 hours ending 12/20/20 1624    Physical Exam:  General: Patient resting comfortably in no acute distress. Appears as stated age. Calm  Eyes: No conjunctivae injection. No scleral icterus.  ENT: Hard of hearing. No discharge from ears. No nasal discharge.   CVS: RRR. +2 pitting edema BL LE  Lungs: CTA BL, no wheezing. Bibasilar crackles audible. No accessory muscle use. No acute respiratory distress  Abd: Nontender. Soft   Neuro: Alert. Moves all extremities equally. No focal neurologic deficit     Laboratory:  CBC:   No results for input(s): WBC, HGB, HCT, PLT in the last 48 hours.    Microbiology Results (last 7 days)     ** No results found for the last 168 hours. **           ASSESSMENT/PLAN:     60-year-old male with Hodgkin's lymphoma in remission and chronic anemia admitted for sepsis of unclear etiology along with acute on chronic anemia.  Being followed by infectious disease as well as Oncology.  Seen by ENT for chronic sinusitis and hearing loss with no acute identifiable etiology.  Recommend outpatient audiogram.  Status post antibiotic treatment for pneumonia.  Seen by PT/OT recommended skilled nursing facility.  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  No evidence of blood loss noted  Received several blood transfusions to maintain hemoglobin greater than 7 grams/deciliter.  However patient has been refusing further lab draws which makes it challenging to treat his anemia appropriately   Hematology signed off recommending outpatient follow-up  Being worked up outpatient for aplastic anemia as an outpatient - Parvovirus testing not sent due to his refusal  Echo with normal LVEF and indeterminate diastolic function  S/p nasal endoscopy (12/10) with no evidence of invasive or fulminant sinus disease.  Noted to have changes of chronic sinusitis and mastoiditis.  Outpatient audiogram recommended.  Avoid heparin and related products due to thrombocytopenia  Encourage ambulation. PT/OT consult - recommend skilled nursing facility placement.  Case management consulted for the same    Appreciate input from consultants including ID, oncology and ENT  Had a long conversation with his daughter Dottie on 12/19.  She will try to be in town next week or try to get in touch with her father over phone and encourage him for skilled nursing facility.  Other disposition options include home with home health    VTE Risk Mitigation (From admission, onward)         Ordered     IP VTE HIGH RISK PATIENT  Once      12/07/20 2235     Place sequential compression device  Until discontinued      12/07/20 2235                    Medical Decision Making during this encounter was  [] Low Complexity  [ x ] Moderate Complexity  [ ] High Complexity        Department Hospital Medicine  [facility]  Sanjeev Chairez MD  Date of service: 12/20/2020

## 2020-12-21 PROCEDURE — 25000003 PHARM REV CODE 250: Performed by: FAMILY MEDICINE

## 2020-12-21 PROCEDURE — 97530 THERAPEUTIC ACTIVITIES: CPT

## 2020-12-21 PROCEDURE — 25000003 PHARM REV CODE 250: Performed by: INTERNAL MEDICINE

## 2020-12-21 PROCEDURE — 12000002 HC ACUTE/MED SURGE SEMI-PRIVATE ROOM

## 2020-12-21 RX ORDER — AMOXICILLIN 250 MG
1 CAPSULE ORAL 2 TIMES DAILY
Status: DISCONTINUED | OUTPATIENT
Start: 2020-12-21 | End: 2020-12-23 | Stop reason: HOSPADM

## 2020-12-21 RX ORDER — POLYETHYLENE GLYCOL 3350 17 G/17G
17 POWDER, FOR SOLUTION ORAL DAILY
Status: DISCONTINUED | OUTPATIENT
Start: 2020-12-21 | End: 2020-12-23 | Stop reason: HOSPADM

## 2020-12-21 RX ADMIN — POLYETHYLENE GLYCOL 3350 17 G: 17 POWDER, FOR SOLUTION ORAL at 11:12

## 2020-12-21 RX ADMIN — THIAMINE HCL TAB 100 MG 100 MG: 100 TAB at 10:12

## 2020-12-21 RX ADMIN — MIDODRINE HYDROCHLORIDE 5 MG: 2.5 TABLET ORAL at 05:12

## 2020-12-21 RX ADMIN — FOLIC ACID 1 MG: 1 TABLET ORAL at 10:12

## 2020-12-21 RX ADMIN — SENNOSIDES AND DOCUSATE SODIUM 1 TABLET: 8.6; 5 TABLET ORAL at 08:12

## 2020-12-21 RX ADMIN — SENNOSIDES AND DOCUSATE SODIUM 1 TABLET: 8.6; 5 TABLET ORAL at 11:12

## 2020-12-21 RX ADMIN — MIDODRINE HYDROCHLORIDE 5 MG: 2.5 TABLET ORAL at 11:12

## 2020-12-21 RX ADMIN — CYANOCOBALAMIN TAB 1000 MCG 1000 MCG: 1000 TAB at 10:12

## 2020-12-21 RX ADMIN — PYRIDOXINE HCL TAB 50 MG 100 MG: 50 TAB at 10:12

## 2020-12-21 RX ADMIN — MIDODRINE HYDROCHLORIDE 5 MG: 2.5 TABLET ORAL at 10:12

## 2020-12-21 RX ADMIN — MIRTAZAPINE 15 MG: 15 TABLET, FILM COATED ORAL at 08:12

## 2020-12-21 NOTE — PT/OT/SLP PROGRESS
Occupational Therapy   Treatment    Name: Heriberto Keys  MRN: [id number]  Admitting Diagnosis:  Pneumonia of both lower lobes due to infectious organism       Recommendations:     Discharge Recommendations: nursing facility, skilled  Discharge Equipment Recommendations:  none  Barriers to discharge:       Assessment:     Heriberto Keys is a 60 y.o. male with a medical diagnosis of Pneumonia of both lower lobes due to infectious organism.  Pt agreeable to OT therapy session this AM. Performance deficits affecting function are weakness, impaired endurance, impaired self care skills, impaired functional mobilty, gait instability, impaired balance, decreased lower extremity function, decreased upper extremity function, decreased safety awareness, impaired cardiopulmonary response to activity. Pt required encouragement from OT/RN for OOB to chair task this session.     Rehab Prognosis:  Fair; patient would benefit from acute skilled OT services to address these deficits and reach maximum level of function.       Plan:     Patient to be seen 5 x/week to address the above listed problems via self-care/home management, therapeutic activities, therapeutic exercises  · Plan of Care Expires: 01/13/21  · Plan of Care Reviewed with: patient    Subjective     Pain/Comfort:  · Pain Rating 1: 0/10    Objective:     Communicated with: nursing prior to session.  Patient found HOB elevated with telemetry, bed alarm upon OT entry to room.    General Precautions: Standard, fall   Orthopedic Precautions:N/A   Braces: N/A     Occupational Performance:     Bed Mobility:    · Patient completed Scooting/Bridging with minimum assistance  · Patient completed Supine to Sit with moderate assistance     Functional Mobility/Transfers:  · Patient completed Sit <> Stand Transfer with moderate assistance  with  rolling walker   · Patient completed Bed <> Chair Transfer using Stand Pivot technique with minimum assistance and moderate assistance with  rolling walker   · Pt required cues for hand placement and RW management during transfer    Activities of Daily Living:  · Feeding:  setup assistance to open can of coke    Pt declined further ADLs 2/2 fatigue.     Treatment & Education:  Pt educated on role of OT, importance of OOB activity, use of call bell, and safety during ADLs, transfers, and functional mobility.   RN and RN  notified of ring found in bedside chair, patient stating that it's not his, ring given to RN .     Patient left up in chair with all lines intact, call button in reach, RN notified and RN presentEducation:      GOALS:   Multidisciplinary Problems     Occupational Therapy Goals        Problem: Occupational Therapy Goal    Goal Priority Disciplines Outcome Interventions   Occupational Therapy Goal     OT, PT/OT Ongoing, Progressing    Description: Goals to be met by: discharge    Patient will increase functional independence with ADLs by performing:    UE Dressing with Modified [place].  LE Dressing with Modified [place].  Grooming while standing at sink with Modified [place].  Toileting from bedside commode with Modified [place] for hygiene and clothing management.   Toilet transfer to bedside commode with Modified [place].                     Time Tracking:     OT Date of Treatment: 12/21/20  OT Start Time: 0957  OT Stop Time: 1010  OT Total Time (min): 13 min    Billable Minutes:Therapeutic Activity 13    Nunu Hollis OT  12/21/2020

## 2020-12-21 NOTE — PROGRESS NOTES
"Replaced by [facility]  Adult Nutrition   Progress Note (Follow-Up)    SUMMARY     Recommendations/Interventions:  Recommendation/Intervention:   1. Continue current diet as tolerated by patient. Encourage intake.  2. Continue ONS, Ensure Enlive milkshakes to aid in meeting estimated needs when meal intake is insufficient.     Goals: 1. Patient to meet at least 75% of estimated energy and protein needs via PO intake of meals and nutrition supplements.  Nutrition Goal Status: progressing towards goal    Dietitian Rounds Brief:  Seen for follow-up. Patient remains with minimal PO intake. Per RN, it is a fight to get him to eat. Patient is drinking ONS, Ensure Enlive. Noted LBM: 12/15. Will continue to monitor.    Reason for Assessment  Reason For Assessment: RD follow-up  Diagnosis: infection/sepsis  Interdisciplinary Rounds: did not attend    Nutrition Risk Screen  Nutrition Risk Screen: no indicators present  MST Score: 0  Have you recently lost weight without trying?: No  Weight loss score: 0  Have you been eating poorly because of a decreased appetite?: No  Appetite score: 0       Nutrition/Diet History  Patient Reported Diet/Restrictions/Preferences: general  Spiritual, Cultural Beliefs, Episcopal Practices, Values that Affect Care: no  Food Allergies: NKFA  Factors Affecting Nutritional Intake: decreased appetite, impaired cognitive status/motor control    Anthropometrics  Temp: 99.6 °F (37.6 °C)  Height Method: Stated  Height: 5' 9" (175.3 cm)  Height (inches): 69 in  Weight Method: Bed Scale  Weight: 69.4 kg (153 lb)  Weight (lb): 153 lb  Ideal Body Weight (IBW), Male: 160 lb  % Ideal Body Weight, Male (lb): 95.63 %  BMI (Calculated): 22.6  BMI Grade: 18.5-24.9 - normal       Weight History:  Wt Readings from Last 10 Encounters:   12/11/20 69.4 kg (153 lb)   12/13/20 69.4 kg (153 lb)   11/24/20 70.7 kg (155 lb 13.8 oz)   11/16/20 72.1 kg (158 lb 15.2 oz)   11/09/20 71.7 kg (158 lb 1.1 oz)   10/12/20 72.6 kg " (160 lb)   08/28/20 77 kg (169 lb 12.1 oz)   03/17/20 67.2 kg (148 lb 3.2 oz)   03/16/20 67.6 kg (149 lb 1.6 oz)   03/13/20 67.8 kg (149 lb 6.4 oz)     Lab/Procedures/Meds: Pertinent Labs Reviewed  Clinical Chemistry:  Recent Labs   Lab 12/16/20  1035      K 2.9*      CO2 26      BUN 12   CREATININE 0.7   CALCIUM 9.0   ANIONGAP 9   ESTGFRAFRICA >60.0   EGFRNONAA >60.0   MG 1.7     CBC:   Recent Labs   Lab 12/16/20  1035   WBC 15.27*   RBC 2.43*   HGB 6.8*   HCT 23.3*   PLT 72*   MCV 96   MCH 28.0   MCHC 29.2*     Medications: Pertinent Medications reviewed  Scheduled Meds:   cyanocobalamin  1,000 mcg Oral Daily    folic acid  1 mg Oral Daily    midodrine  5 mg Oral TID WM    mirtazapine  15 mg Oral QHS    polyethylene glycol  17 g Oral Daily    pyridoxine (vitamin B6)  100 mg Oral Daily    senna-docusate 8.6-50 mg  1 tablet Oral BID    thiamine  100 mg Oral Daily     Continuous Infusions:  PRN Meds:.sodium chloride, sodium chloride, sodium chloride, calcium chloride IVPB, calcium chloride IVPB, calcium chloride IVPB, calcium chloride IVPB, calcium chloride IVPB, calcium chloride IVPB, ibuprofen, magnesium oxide, magnesium oxide, magnesium sulfate IVPB, magnesium sulfate IVPB, magnesium sulfate IVPB, magnesium sulfate IVPB, magnesium sulfate IVPB, magnesium sulfate IVPB, magnesium sulfate IVPB, magnesium sulfate IVPB, ondansetron, potassium chloride in water, potassium chloride in water, potassium chloride in water, potassium chloride in water, potassium chloride in water, potassium chloride in water, potassium chloride in water, potassium chloride in water, potassium chloride, potassium chloride, potassium chloride, potassium chloride, potassium chloride, potassium chloride, potassium chloride, potassium chloride, sodium phosphate IVPB, sodium phosphate IVPB, sodium phosphate IVPB, sodium phosphate IVPB, sodium phosphate IVPB, sodium phosphate IVPB, sodium phosphate IVPB, sodium phosphate  "IVPB, sodium phosphate IVPB, sodium phosphate IVPB    Antibiotics (From admission, onward)    Start     Stop Route Frequency Ordered    12/15/20 0900  levoFLOXacin tablet 750 mg     Note to Pharmacy: Ht: 5' 9" (1.753 m)  Wt: 69.4 kg (153 lb)  Estimated Creatinine Clearance: 108.8 mL/min (based on SCr of 0.7 mg/dL).      12/19 0559 Oral Before breakfast 12/15/20 0819           Estimated/Assessed Needs  Weight Used For Calorie Calculations: 69.4 kg (153 lb)  Energy Calorie Requirements (kcal): 4463-2357 (25-30 kcal/kg)  Energy Need Method: Kcal/kg  Protein Requirements: 69-83 g (1-1.2 g/kg)  Weight Used For Protein Calculations: 69.4 kg (153 lb)  Estimated Fluid Requirement Method: RDA Method  RDA Method (mL): 1735       Nutrition Prescription Ordered  Current Diet Order: Regular  Oral Nutrition Supplement: Ensure Enlive milkshake TID    Evaluation of Received Nutrient/Fluid Intake  Energy Calories Required: not meeting needs  Protein Required: not meeting needs  Fluid Required: meeting needs  Tolerance: tolerating  % Meal Intake: 0 - 25 %  No intake or output data in the 24 hours ending 12/21/20 1131     Nutrition Risk  Level of Risk/Frequency of Follow-up: moderate - high     Monitor and Evaluation  Food and Nutrient Intake: energy intake, food and beverage intake  Food and Nutrient Adminstration: diet order  Physical Activity and Function: nutrition-related ADLs and IADLs, factors affecting access to physical activity  Anthropometric Measurements: weight, weight change, body mass index  Biochemical Data, Medical Tests and Procedures: electrolyte and renal panel, gastrointestinal profile, glucose/endocrine profile, inflammatory profile, lipid profile  Nutrition-Focused Physical Findings: overall appearance     Nutrition Follow-Up  RD Follow-up?: Yes     Maeve Akins, 12/21/2020, 11:38 AM      "

## 2020-12-21 NOTE — PLAN OF CARE
Problem: Physical Therapy Goal  Goal: Physical Therapy Goal  Description: Goals to be met by: Discharge      Patient will increase functional independence with mobility by performin. Supine to sit with Modified [place]  2. Sit to supine with Modified [place]  3. Rolling to Left and Right with Modified [place].  4. Sit to stand transfer with Supervision  5. Bed to chair transfer with Supervision using Rolling Walker  6. Gait  x 50 feet with Minimal Assistance using Rolling Walker.   Outcome: Ongoing, Progressing

## 2020-12-21 NOTE — PLAN OF CARE
Problem: Occupational Therapy Goal  Goal: Occupational Therapy Goal  Description: Goals to be met by: discharge    Patient will increase functional independence with ADLs by performing:    UE Dressing with Modified [place].  LE Dressing with Modified [place].  Grooming while standing at sink with Modified [place].  Toileting from bedside commode with Modified [place] for hygiene and clothing management.   Toilet transfer to bedside commode with Modified [place].    Outcome: Ongoing, Progressing

## 2020-12-21 NOTE — PT/OT/SLP PROGRESS
"Physical Therapy Treatment    Patient Name:  Hreiberto Keys   MRN:  [id number]    Recommendations:     Discharge Recommendations:  nursing facility, skilled   Discharge Equipment Recommendations: (TBD at next level of care)   Barriers to discharge: None    Assessment:     Heriberto Keys is a 60 y.o. male admitted with a medical diagnosis of Pneumonia of both lower lobes due to infectious organism.  He presents with the following impairments/functional limitations:  weakness, impaired endurance, impaired self care skills, impaired functional mobilty, gait instability, impaired balance, decreased lower extremity function, decreased safety awareness, impaired cardiopulmonary response to activity. Pt sitting up in chair after up with OT (~ 1 hour ago); states very tired and requesting back to bed; edu pt on importance of OOB and insists on back to bed. Mid transfer pt sits without warning and at EOB with Max A x 2 to slide back onto bed; Pt states "I'm tired" after edu on not sitting without warning.     Rehab Prognosis: Good; patient would benefit from acute skilled PT services to address these deficits and reach maximum level of function.    Recent Surgery: * No surgery found *      Plan:     During this hospitalization, patient to be seen 6 x/week to address the identified rehab impairments via gait training, therapeutic activities, therapeutic exercises and progress toward the following goals:    · Plan of Care Expires:  01/15/21    Subjective     Chief Complaint: tired  Patient/Family Comments/goals: home  Pain/Comfort:  · Pain Rating 1: 0/10  · Pain Rating Post-Intervention 1: 0/10      Objective:     Communicated with RN and OT prior to session.  Patient found up in chair with telemetry, bed alarm upon PT entry to room.     General Precautions: Standard, fall   Orthopedic Precautions:N/A   Braces:       Functional Mobility:  · Bed Mobility:     · Sit to Supine: moderate assistance  · Transfers:     · Sit to " Stand:  maximal assistance with rolling walker  · Bed to Chair: maximal assistance with  rolling walker  using  Stand Pivot  · Gait: NT  · Balance: good in sitting; poor in standing      AM-PAC 6 CLICK MOBILITY          Therapeutic Activities and Exercises:   bed mobility; transfer training; sitting EOB for trunk control and midline orientation; PT educated pt/ family on importance of out of bed to chair and functional mobility to negate negative effects of prolonged bed rest. Will progress activity as tolerated by patient; At this time patient only able to tolerate positional changes and transfers due to overall deconditioning.      Patient left supine with all lines intact, call button in reach, bed alarm on and RN notified..    GOALS:   Multidisciplinary Problems     Physical Therapy Goals        Problem: Physical Therapy Goal    Goal Priority Disciplines Outcome Goal Variances Interventions   Physical Therapy Goal     PT, PT/OT Ongoing, Progressing     Description: Goals to be met by: Discharge      Patient will increase functional independence with mobility by performin. Supine to sit with Modified [place]  2. Sit to supine with Modified [place]  3. Rolling to Left and Right with Modified [place].  4. Sit to stand transfer with Supervision  5. Bed to chair transfer with Supervision using Rolling Walker  6. Gait  x 50 feet with Minimal Assistance using Rolling Walker.                    Time Tracking:     PT Received On: 20  PT Start Time: 1034     PT Stop Time: 1045  PT Total Time (min): 11 min     Billable Minutes: Therapeutic Activity 11       Treatment Type: Treatment  PT/PTA: PT     PTA Visit Number: 0     Mmii Benavides, PT  2020

## 2020-12-22 PROCEDURE — 93010 EKG 12-LEAD: ICD-10-PCS | Mod: ,,, | Performed by: INTERNAL MEDICINE

## 2020-12-22 PROCEDURE — 12000002 HC ACUTE/MED SURGE SEMI-PRIVATE ROOM

## 2020-12-22 PROCEDURE — 93010 ELECTROCARDIOGRAM REPORT: CPT | Mod: ,,, | Performed by: INTERNAL MEDICINE

## 2020-12-22 PROCEDURE — 93005 ELECTROCARDIOGRAM TRACING: CPT | Performed by: INTERNAL MEDICINE

## 2020-12-22 PROCEDURE — 25000003 PHARM REV CODE 250: Performed by: FAMILY MEDICINE

## 2020-12-22 PROCEDURE — 25000003 PHARM REV CODE 250: Performed by: INTERNAL MEDICINE

## 2020-12-22 RX ADMIN — SENNOSIDES AND DOCUSATE SODIUM 1 TABLET: 8.6; 5 TABLET ORAL at 09:12

## 2020-12-22 RX ADMIN — CYANOCOBALAMIN TAB 1000 MCG 1000 MCG: 1000 TAB at 09:12

## 2020-12-22 RX ADMIN — MIRTAZAPINE 15 MG: 15 TABLET, FILM COATED ORAL at 09:12

## 2020-12-22 RX ADMIN — POLYETHYLENE GLYCOL 3350 17 G: 17 POWDER, FOR SOLUTION ORAL at 09:12

## 2020-12-22 RX ADMIN — MIDODRINE HYDROCHLORIDE 5 MG: 2.5 TABLET ORAL at 05:12

## 2020-12-22 RX ADMIN — FOLIC ACID 1 MG: 1 TABLET ORAL at 09:12

## 2020-12-22 RX ADMIN — PYRIDOXINE HCL TAB 50 MG 100 MG: 50 TAB at 09:12

## 2020-12-22 RX ADMIN — MIDODRINE HYDROCHLORIDE 5 MG: 2.5 TABLET ORAL at 01:12

## 2020-12-22 RX ADMIN — THIAMINE HCL TAB 100 MG 100 MG: 100 TAB at 09:12

## 2020-12-22 RX ADMIN — MIDODRINE HYDROCHLORIDE 5 MG: 2.5 TABLET ORAL at 09:12

## 2020-12-22 NOTE — PLAN OF CARE
12/22/20 1531   Post-Acute Status   Post-Acute Authorization Placement   Post-Acute Placement Status Family Barriers   Talked to patient multiple times about going to the SNF in [place]. Talked to Alysia at [phone] and is willing to take him but he is not talking to anyone. He is refusing blood work. Talked to Daughter at [phone] and she has not decided where to send him. The daughter wants him to stay in [place] but he has placement in [place]. The nursing homes he can go to in [place] are [facility], [facility], [facility], or [facility].

## 2020-12-22 NOTE — CARE UPDATE
Called Daughter Dottie Coker, daughter, [phone] earlier today and she talked to Me and Dr. Angel. Tried calling back numerous times to figure out where he needs to go and no answer. CM left message for call back.

## 2020-12-22 NOTE — PT/OT/SLP PROGRESS
Occupational Therapy      Patient Name:  Heriberto Keys   MRN:  [id number]    Patient not seen today secondary to Patient unwilling to participate. Will follow-up next service date.    Nunu Hollis OT  12/22/2020

## 2020-12-22 NOTE — PT/OT/SLP PROGRESS
Physical Therapy      Patient Name:  Heriberto Keys   MRN:  [id number]    Patient not seen today secondary to Patient unwilling to participate. Will follow-up 12/23/20.    Kori Arredondo PTA

## 2020-12-22 NOTE — PLAN OF CARE
Problem: Fall Injury Risk  Goal: Absence of Fall and Fall-Related Injury  Outcome: Ongoing, Progressing     Problem: Adult Inpatient Plan of Care  Goal: Plan of Care Review  Outcome: Ongoing, Progressing  Flowsheets (Taken 12/22/2020 0711)  Plan of Care Reviewed With: patient     Problem: Bleeding (Sepsis/Septic Shock)  Goal: Absence of Bleeding  Outcome: Ongoing, Progressing     Problem: Infection  Goal: Infection Symptom Resolution  Outcome: Ongoing, Progressing     Problem: Oral Intake Inadequate  Goal: Improved Oral Intake  Outcome: Ongoing, Progressing     Problem: Infection (Pneumonia)  Goal: Resolution of Infection Signs/Symptoms  Outcome: Ongoing, Progressing

## 2020-12-22 NOTE — PROGRESS NOTES
[facility] Medicine    Progress Note    Patient Name: Heriberto Keys  MRN: [id number]  Patient Class: IP- Inpatient   Admission Date: 12/7/2020  2:40 PM  Length of Stay: 15  Attending Physician: DANIELLE PEARSON MD  Primary Care Provider: Jagruti Davis NP  Face-to-Face encounter date: 12/22/2020  Code status: Full  Chief Complaint: Weakness (Falls; increased weakness x 1wk)         Patient ID: Heriberto Keys is a 60 y.o. 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     History is limited; patient is a poor historian and has hearing loss. No family at bedside.  As per report he has been having intermittent fever (T-max of 103° F) with altered mental status and falls.  He is alert and oriented during my evaluation.  He admits to fever and generalized weakness.  He denies headache, neck pain, chest pain, cough, abdominal pain, diarrhea, melena or hematochezia..  Report of significant bilateral lower extremity edema noted. He was seen by his oncologist 2 weeks ago and he is being worked up for aplastic anemia.      In the ED:  T-max of 101.1° F with tachycardia and tachypnea.  Labs with worsening leukocytosis and acute on chronic anemia with thrombocytopenia.  Chest x-ray reveals worsening bilateral  perihilar and lower lung interstitial and ground-glass opacities.       Subjective:    Interval History:  Patient laying in bed, very hard of hearing.  Does not want to talk.  Told me that he wants to rest and come back again some other time as he is not in a mood to talk  I tried to convince him for the last sent told him that his hemoglobin has been doing we need to know if he is in need of more transfusion, patient turned his face away and did not respond  Spoke to patient's daughter who does not want to take him to her hometown as he has refused in the past.  Informed she will get the power of  and then situated him and nursing home in [place]  Noted T-max of 100.5°  No family present at bedside.  No other concerns/issues overnight reported by the patient or the nursing staff except for patient has been refusing vitals off and on as well as blood work for the last 6 days    Review of Systems All other Review of Systems were found to be negative expect for that mentioned already in HPI.     Objective:     Vitals:    12/21/20 2001 12/21/20 2059 12/21/20 2351 12/22/20 0354   BP:  116/70 106/70 121/73   BP Location:  Left arm     Patient Position:  Lying     Pulse:  107 (!) 113 108   Resp:  20 20 19   Temp: Comment: Patient refused vitals (!) 100.5 °F (38.1 °C) 99.7 °F (37.6 °C) 98.7 °F (37.1 °C)   TempSrc:  Oral Oral Oral   SpO2:  (!) 92% 95% 95%   Weight:       Height:            Vitals reviewed.  Constitutional: No distress.  Laying in bed, malnourished  HENT: NC.  Very hard of hearing  Head: Atraumatic.   Cardiovascular:  tachycardic rate, regular rhythm.  1+ pitting edema bilateral lower extremity  Pulmonary/Chest: Effort normal. No wheezes.   Abdominal: Soft. Bowel sounds are normal. No distension and no mass. No tenderness  Neurological: Alert.   Skin: Skin is warm and dry.   Psych:  Uncooperative    Following labs were Reviewed   CBC:  No results for input(s): WBC, HGB, HCT, PLT in the last 24  hours.  CMP:  No results for input(s): GLUCOSE, CALCIUM, ALBUMIN, PROT, NA, K, CO2, CL, BUN, CREATININE, ALKPHOS, ALT, AST, BILITOT in the last 24 hours.  Last 72 hour POCT GLUCOSE  No results found for: POCTGLUCOSE     Microbiology Results (last 7 days)     ** No results found for the last 168 hours. **            X-Ray Chest Lateral Decubitus Bilateral   Final Result      X-Ray Chest AP Portable   Final Result      CT Sinuses without Contrast   Final Result      CT Head W Wo Contrast   Final Result      X-Ray Chest AP Portable   Final Result            Scheduled Meds:   cyanocobalamin  1,000 mcg Oral Daily    folic acid  1 mg Oral Daily    midodrine  5 mg Oral TID WM    mirtazapine  15 mg Oral QHS    polyethylene glycol  17 g Oral Daily    pyridoxine (vitamin B6)  100 mg Oral Daily    senna-docusate 8.6-50 mg  1 tablet Oral BID    thiamine  100 mg Oral Daily     Continuous Infusions:  PRN Meds:.sodium chloride, sodium chloride, sodium chloride, calcium chloride IVPB, calcium chloride IVPB, calcium chloride IVPB, calcium chloride IVPB, calcium chloride IVPB, calcium chloride IVPB, ibuprofen, magnesium oxide, magnesium oxide, magnesium sulfate IVPB, magnesium sulfate IVPB, magnesium sulfate IVPB, magnesium sulfate IVPB, magnesium sulfate IVPB, magnesium sulfate IVPB, magnesium sulfate IVPB, magnesium sulfate IVPB, ondansetron, potassium chloride in water, potassium chloride in water, potassium chloride in water, potassium chloride in water, potassium chloride in water, potassium chloride in water, potassium chloride in water, potassium chloride in water, potassium chloride, potassium chloride, potassium chloride, potassium chloride, potassium chloride, potassium chloride, potassium chloride, potassium chloride, sodium phosphate IVPB, sodium phosphate IVPB, sodium phosphate IVPB, sodium phosphate IVPB, sodium phosphate IVPB, sodium phosphate IVPB, sodium phosphate IVPB, sodium phosphate IVPB, sodium phosphate  IVPB, sodium phosphate IVPB      Assessment & Plan:     Active Hospital Problems    Diagnosis    *Pneumonia of both lower lobes due to infectious organism    Encephalopathy, metabolic    Altered mental status    Thrombocytopenia    Anemia    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  No evidence of blood loss noted  Received several blood transfusions to maintain hemoglobin greater than 7 grams/deciliter.    Patient has been refusing further lab draws for the last 6 days, which makes it challenging to treat his anemia appropriately.   Hematology signed off recommending outpatient follow-up  Being worked up outpatient for aplastic anemia as an outpatient - Parvovirus testing not sent due to his refusal  I tried to talk to pt today but he refused stating he wants to rest and want to be left alone   Pt very uncooperative     Pneumonia of both lower lobes due to infectious organism  Status post completion of antibiotic course    Echo with normal LVEF and indeterminate diastolic function    Hx of Chronic sinusitis   S/p nasal endoscopy (12/10) with no evidence of invasive or fulminant sinus disease.    Noted to have changes of chronic sinusitis and mastoiditis.    Outpatient audiogram recommended.    Avoid heparin and related products due to thrombocytopenia    Encourage ambulation. PT/OT consulted- recommend skilled nursing facility placement.  Case management following  Pt not very cooperative   PPD negative 12/17/2020     Appreciate input from consultants including ID, oncology and ENT    Had a long conversation with his daughter Dottie on 12/22/202.  she informed that she is not coming to [place] and she wants her Dad to be placed in nursing home.  I inquired if she has the medical power of , she informed now.  I did inform her that we will need his permission to go to nursing home.  Did informed  that his caregiver Ms Lopez is unable to care for him as he does not do anything for himself .  Daughter Dottie said she will work on getting the power of  and place him a nursing home being the only option.  Other disposition options include home with home health, if caregiver will take him home as daughter said she will pay for home health care    Discharge Planning:   SNF placement vs NH   Case management involved     Above encounter included review of the medical records, interviewing and examining the patient face-to-face, discussion with family and other health care providers, ordering and interpreting lab/test results and formulating a plan of care.     Medical Decision Making:      [_] Low Complexity  [_] Moderate Complexity  [x] High Complexity      Clovis Angel MD  Department of Hospital Medicine   [facility]

## 2020-12-22 NOTE — NURSING
"Pt had head under cover and states he wants to be left alone.  Pt instructed on importance of monitoring vital signs and he agreed.  Pt states "Why do you keep asking me things when you can look in the computer?"  Pt instructed on importance of monitoring his mental status but needs reinforcement.  PO fluids encouraged with increased temperature but pt states he had enough. Pt knows he is in the hospital and knows it's 2020, but stated it's November.  "

## 2020-12-22 NOTE — HOSPITAL COURSE
60-year-old male with Hodgkin's lymphoma in remission, hearing loss and chronic anemia admitted for pneumonia as well as acute on chronic anemia.  Seen by infectious diseases, ENT as well as Oncology.      Seen by ENT for chronic sinusitis and hearing loss with no acute identifiable etiology.  Recommend outpatient audiogram.    Pt completed his treatment for pneumonia in house.    His anemia is thought to be secondary to aplastic anemia however complete workup is pending and has been deferred to outpatient setting as per Hematology.    Patient has been refusing several interventions including daily labs and intravenous antibiotics or supplements.    He received several units of packed red blood cells during the course of his hospitalization to maintain hemoglobin greater than 7 grams/deciliter.    Hospital stay complicated by confusion which is thought to be secondary to hearing loss. Also very uncooperative.  Seen by PT/OT who recommended skilled nursing facility.    Patient is very withdrawn and requires a lot of encouragement to feed himself or participate with therapy.   I discussed with his daughter Dottie who is a dentist out of the state.  She understands that he would be best served at a skilled nursing facility to improve his strength or if he is uncooperative, then will benefit from Nursing home placement.  Daughter also informed me that she does not have any plans to come down to [place] to talk to him and he has not been answering her calls.  Daughter informed that she does not have a power of  but she will get the power of  to make decisions for him as he is unwilling to.  Patient caregiver who is also a friend expressed last time to me that she is unable to take care of him as he is very dependent and unable to do anything for himself.  Today is day 7 that patient has been refusing blood work or any active interactions or communication.  Patient has been accepted at [facility] in  [place].  We tried to reach out to patient's daughter Dottie multiple times yesterday and today but in able to get hold of her.   Kelsi called his son and informed him of his transfer to skilled facility.  Verbalized understanding    Patient is being discharged today  Patient was seen and examined on the day of discharge    Vitals reviewed.  Constitutional: No distress.  Laying in bed, malnourished  HENT: NC.  Very hard of hearing  Head: Atraumatic.   Cardiovascular:  tachycardic rate, regular rhythm.  1+ pitting edema bilateral lower extremity  Pulmonary/Chest: Effort normal. No wheezes.   Abdominal: Soft. Bowel sounds are normal. No distension and no mass. No tenderness  Neurological: Alert.   Skin: Skin is warm and dry.   Psych:  Uncooperative

## 2020-12-22 NOTE — PROGRESS NOTES
[facility] Medicine  Progress Note    Patient name: Heriberto Keys  MRN: [id number]  Admit Date: 12/7/2020   LOS: 14 days     SUBJECTIVE:     Principal problem: Pneumonia of both lower lobes due to infectious organism    Interval History:  No acute overnight events reported.  Awaiting placement. Poor appetite; needs lot of encouragement to eat.    Scheduled Meds:   cyanocobalamin  1,000 mcg Oral Daily    folic acid  1 mg Oral Daily    midodrine  5 mg Oral TID WM    mirtazapine  15 mg Oral QHS    polyethylene glycol  17 g Oral Daily    pyridoxine (vitamin B6)  100 mg Oral Daily    senna-docusate 8.6-50 mg  1 tablet Oral BID    thiamine  100 mg Oral Daily     Continuous Infusions:  PRN Meds:sodium chloride, sodium chloride, sodium chloride, calcium chloride IVPB, calcium chloride IVPB, calcium chloride IVPB, calcium chloride IVPB, calcium chloride IVPB, calcium chloride IVPB, ibuprofen, magnesium oxide, magnesium oxide, magnesium sulfate IVPB, magnesium sulfate IVPB, magnesium sulfate IVPB, magnesium sulfate IVPB, magnesium sulfate IVPB, magnesium sulfate IVPB, magnesium sulfate IVPB, magnesium sulfate IVPB, ondansetron, potassium chloride in water, potassium chloride in water, potassium chloride in water, potassium chloride in water, potassium chloride in water, potassium chloride in water, potassium chloride in water, potassium chloride in water, potassium chloride, potassium chloride, potassium chloride, potassium chloride, potassium chloride, potassium chloride, potassium chloride, potassium chloride, sodium phosphate IVPB, sodium phosphate IVPB, sodium phosphate IVPB, sodium phosphate IVPB, sodium phosphate IVPB, sodium phosphate IVPB, sodium phosphate IVPB, sodium phosphate IVPB, sodium phosphate IVPB, sodium phosphate IVPB    Review of patient's allergies indicates:  No Known Allergies    Review of Systems  Limited secondary to hearing loss    OBJECTIVE:     Vital Signs (Most  Recent)  Temp: 99.9 °F (37.7 °C) (12/21/20 1500)  Pulse: 105 (12/21/20 1500)  Resp: 20 (12/21/20 1500)  BP: (!) 143/78 (12/21/20 1500)  SpO2: 95 % (12/21/20 1500)    Vital Signs Range (Last 24H):  Temp:  [99 °F (37.2 °C)-99.9 °F (37.7 °C)]   Pulse:  [104-108]   Resp:  [17-20]   BP: (114-143)/(71-78)   SpO2:  [94 %-95 %]     I & O (Last 24H):  No intake or output data in the 24 hours ending 12/21/20 1818    Physical Exam:  General: Patient resting comfortably in no acute distress. Appears as stated age. Calm  Eyes: No conjunctivae injection. No scleral icterus.  ENT: Hard of hearing. No discharge from ears. No nasal discharge.   CVS: RRR. +2 pitting edema BL LE  Lungs: CTA BL, no wheezing. Bibasilar crackles audible. No accessory muscle use. No acute respiratory distress  Abd: Nontender. Soft   Neuro: Alert. Moves all extremities equally. No focal neurologic deficit     Laboratory:  CBC:   No results for input(s): WBC, HGB, HCT, PLT in the last 48 hours.    Microbiology Results (last 7 days)     ** No results found for the last 168 hours. **           ASSESSMENT/PLAN:     60-year-old male with Hodgkin's lymphoma in remission, hearing loss and chronic anemia admitted for pneumonia as well as acute on chronic anemia.  Seen by infectious diseases, ENT as well as Oncology.      Seen by ENT for chronic sinusitis and hearing loss with no acute identifiable etiology.  Recommend outpatient audiogram.  Status post antibiotic treatment for pneumonia.  His anemia is thought to be secondary to aplastic anemia however complete workup is pending and has been deferred to outpatient setting as per Hematology.  Patient has been refusing several interventions including daily labs and intravenous antibiotics.  He received several units of packed red blood cells during the course of his hospitalization to maintain hemoglobin greater than 7 grams/deciliter.  Hospital stay complicated by confusion which is thought to be secondary to  hearing loss.  Seen by PT/OT who recommended skilled nursing facility.    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  No evidence of blood loss noted  Received several blood transfusions to maintain hemoglobin greater than 7 grams/deciliter.  However patient has been refusing further lab draws which makes it challenging to treat his anemia appropriately. Hematology signed off recommending outpatient follow-up  Being worked up outpatient for aplastic anemia as an outpatient - Parvovirus testing not sent due to his refusal  Echo with normal LVEF and indeterminate diastolic function  S/p nasal endoscopy (12/10) with no evidence of invasive or fulminant sinus disease.  Noted to have changes of chronic sinusitis and mastoiditis.  Outpatient audiogram recommended.  Avoid heparin and related products due to thrombocytopenia  Encourage ambulation. PT/OT consult - recommend skilled nursing facility placement.  Case management following    Appreciate input from consultants including ID, oncology and ENT  Had a long conversation with his daughter Dottie on 12/19.  She will try to be in town next week or try to get in touch with her father over phone and encourage him for skilled nursing facility.  Other disposition options include home with home health    VTE Risk Mitigation (From admission, onward)         Ordered     IP VTE HIGH RISK PATIENT  Once      12/07/20 2235     Place sequential compression  device  Until discontinued      12/07/20 9964                    Medical Decision Making during this encounter was  [] Low Complexity  [ x ] Moderate Complexity  [ ] High Complexity        Department Hospital Medicine  [facility]  Sanjeev Chairez MD  Date of service: 12/21/2020

## 2020-12-23 VITALS
HEIGHT: 69 IN | BODY MASS INDEX: 22.66 KG/M2 | DIASTOLIC BLOOD PRESSURE: 73 MMHG | RESPIRATION RATE: 16 BRPM | OXYGEN SATURATION: 95 % | SYSTOLIC BLOOD PRESSURE: 126 MMHG | HEART RATE: 115 BPM | WEIGHT: 153 LBS | TEMPERATURE: 98 F

## 2020-12-23 PROBLEM — J18.9 PNEUMONIA OF BOTH LOWER LOBES DUE TO INFECTIOUS ORGANISM: Status: RESOLVED | Noted: 2019-08-12 | Resolved: 2020-12-23

## 2020-12-23 PROBLEM — G93.41 ENCEPHALOPATHY, METABOLIC: Status: RESOLVED | Noted: 2020-12-11 | Resolved: 2020-12-23

## 2020-12-23 PROCEDURE — 97530 THERAPEUTIC ACTIVITIES: CPT | Mod: CQ

## 2020-12-23 PROCEDURE — 25000003 PHARM REV CODE 250: Performed by: FAMILY MEDICINE

## 2020-12-23 PROCEDURE — 25000003 PHARM REV CODE 250: Performed by: INTERNAL MEDICINE

## 2020-12-23 RX ADMIN — POLYETHYLENE GLYCOL 3350 17 G: 17 POWDER, FOR SOLUTION ORAL at 09:12

## 2020-12-23 RX ADMIN — MIDODRINE HYDROCHLORIDE 5 MG: 2.5 TABLET ORAL at 09:12

## 2020-12-23 RX ADMIN — PYRIDOXINE HCL TAB 50 MG 100 MG: 50 TAB at 09:12

## 2020-12-23 RX ADMIN — THIAMINE HCL TAB 100 MG 100 MG: 100 TAB at 09:12

## 2020-12-23 RX ADMIN — CYANOCOBALAMIN TAB 1000 MCG 1000 MCG: 1000 TAB at 09:12

## 2020-12-23 RX ADMIN — FOLIC ACID 1 MG: 1 TABLET ORAL at 09:12

## 2020-12-23 RX ADMIN — SENNOSIDES AND DOCUSATE SODIUM 1 TABLET: 8.6; 5 TABLET ORAL at 09:12

## 2020-12-23 NOTE — PLAN OF CARE
D/C orders sent via [employer] to Nayeli Jackson. Confirmed with Christi that they have been received and she is giving the information to their DON. Christi to call this SW back with number to call report once orders reviewed. Await response.

## 2020-12-23 NOTE — PROGRESS NOTES
"[facility]  Adult Nutrition   Progress Note (Follow-Up)    SUMMARY     Recommendations  Recommendation/Intervention: 1. Continue diet & supplements as tolerated by patient. Encourage intake 2. Consider appetite stimulant  Goals: 1. Pt to meet at least 75% of estimated energy and protein needs through oral diet and supplement intake  Nutrition Goal Status: progressing towards goal  Communication of RD Recs: reviewed with RN    Dietitian Rounds Brief    Pt seen for f/u. Intake remains poor per staff. No N/V. LBM 12/22. Case mgt arranging placement. Pt not appropriate for IV nutrition--will not maintain IV access. Refusing most lab work.     Reason for Assessment  Reason For Assessment: RD follow-up  Diagnosis: infection/sepsis  Relevant Medical History: Hodgkins lymphoma (in remission), [employer] [place], depression  Interdisciplinary Rounds: did not attend    Nutrition Risk Screen  Nutrition Risk Screen: no indicators present     MST Score: 0  Have you recently lost weight without trying?: No  Weight loss score: 0  Have you been eating poorly because of a decreased appetite?: No  Appetite score: 0       Nutrition/Diet History  Patient Reported Diet/Restrictions/Preferences: general  Spiritual, Cultural Beliefs, Yazidism Practices, Values that Affect Care: no  Food Allergies: NKFA  Factors Affecting Nutritional Intake: impaired cognitive status/motor control, decreased appetite    Anthropometrics  Temp: (patient refused vitals)  Height Method: Stated  Height: 5' 9" (175.3 cm)  Height (inches): 69 in  Weight Method: Bed Scale  Weight: 69.4 kg (153 lb)  Weight (lb): 153 lb  Ideal Body Weight (IBW), Male: 160 lb  % Ideal Body Weight, Male (lb): 95.63 %  BMI (Calculated): 22.6  BMI Grade: 18.5-24.9 - normal       Weight History:  Wt Readings from Last 10 Encounters:   12/11/20 69.4 kg (153 lb)   12/13/20 69.4 kg (153 lb)   11/24/20 70.7 kg (155 lb 13.8 oz)   11/16/20 72.1 kg (158 lb 15.2 oz)   11/09/20 71.7 kg (158 lb " 1.1 oz)   10/12/20 72.6 kg (160 lb)   08/28/20 77 kg (169 lb 12.1 oz)   03/17/20 67.2 kg (148 lb 3.2 oz)   03/16/20 67.6 kg (149 lb 1.6 oz)   03/13/20 67.8 kg (149 lb 6.4 oz)       Lab/Procedures/Meds: Pertinent Labs Reviewed    Clinical Chemistry:  Recent Labs   Lab 12/16/20  1035      K 2.9*      CO2 26      BUN 12   CREATININE 0.7   CALCIUM 9.0   ANIONGAP 9   ESTGFRAFRICA >60.0   EGFRNONAA >60.0   MG 1.7       CBC:   Recent Labs   Lab 12/16/20  1035   WBC 15.27*   RBC 2.43*   HGB 6.8*   HCT 23.3*   PLT 72*   MCV 96   MCH 28.0   MCHC 29.2*         Medications: Pertinent Medications reviewed    Scheduled Meds:   cyanocobalamin  1,000 mcg Oral Daily    folic acid  1 mg Oral Daily    midodrine  5 mg Oral TID WM    mirtazapine  15 mg Oral QHS    polyethylene glycol  17 g Oral Daily    pyridoxine (vitamin B6)  100 mg Oral Daily    senna-docusate 8.6-50 mg  1 tablet Oral BID    thiamine  100 mg Oral Daily       Continuous Infusions:    PRN Meds:.sodium chloride, sodium chloride, sodium chloride, calcium chloride IVPB, calcium chloride IVPB, calcium chloride IVPB, calcium chloride IVPB, calcium chloride IVPB, calcium chloride IVPB, ibuprofen, magnesium oxide, magnesium oxide, magnesium sulfate IVPB, magnesium sulfate IVPB, magnesium sulfate IVPB, magnesium sulfate IVPB, magnesium sulfate IVPB, magnesium sulfate IVPB, magnesium sulfate IVPB, magnesium sulfate IVPB, ondansetron, potassium chloride in water, potassium chloride in water, potassium chloride in water, potassium chloride in water, potassium chloride in water, potassium chloride in water, potassium chloride in water, potassium chloride in water, potassium chloride, potassium chloride, potassium chloride, potassium chloride, potassium chloride, potassium chloride, potassium chloride, potassium chloride, sodium phosphate IVPB, sodium phosphate IVPB, sodium phosphate IVPB, sodium phosphate IVPB, sodium phosphate IVPB, sodium phosphate  IVPB, sodium phosphate IVPB, sodium phosphate IVPB, sodium phosphate IVPB, sodium phosphate IVPB    Estimated/Assessed Needs  Weight Used For Calorie Calculations: 69.4 kg (153 lb)  Energy Calorie Requirements (kcal): 5747-0385 (25-30 kcal/kg)  Energy Need Method: Kcal/kg  Protein Requirements: 69-83 g (1-1.2 g/kg)  Weight Used For Protein Calculations: 69.4 kg (153 lb)     Estimated Fluid Requirement Method: RDA Method  RDA Method (mL): 1735       Nutrition Prescription Ordered  Current Diet Order: Regular  Oral Nutrition Supplement: Ensure Enlive shake TID    Evaluation of Received Nutrient/Fluid Intake  Energy Calories Required: not meeting needs  Protein Required: not meeting needs  Tolerance: tolerating     Intake/Output Summary (Last 24 hours) at 12/23/2020 0834  Last data filed at 12/22/2020 1134  Gross per 24 hour   Intake --   Output 200 ml   Net -200 ml        % Meal Intake: 0 - 25 %    Nutrition Risk  Level of Risk/Frequency of Follow-up: high     Monitor and Evaluation  Food and Nutrient Intake: energy intake, food and beverage intake  Food and Nutrient Adminstration: diet order  Physical Activity and Function: nutrition-related ADLs and IADLs  Anthropometric Measurements: weight, weight change  Biochemical Data, Medical Tests and Procedures: electrolyte and renal panel, gastrointestinal profile, glucose/endocrine profile  Nutrition-Focused Physical Findings: overall appearance     Nutrition Follow-Up  RD Follow-up?: Yes      Trena Pichardo RD 12/23/2020 8:34 AM

## 2020-12-23 NOTE — PT/OT/SLP PROGRESS
Physical Therapy Treatment    Patient Name:  Heriberto Keys   MRN:  [id number]    Recommendations:     Discharge Recommendations:  nursing facility, skilled   Discharge Equipment Recommendations: (TBD at next level of care)   Barriers to discharge: None    Assessment:     Heriberto Keys is a 60 y.o. male admitted with a medical diagnosis of Pneumonia of both lower lobes due to infectious organism.  He presents with the following impairments/functional limitations:  weakness, impaired endurance, impaired self care skills, impaired functional mobilty, gait instability, impaired balance, decreased lower extremity function, decreased safety awareness.    Pt demonstrated ability to maintain upright while sitting EOB with CGA/ min A. Pt unable to maintain sitting EOB due to reports of constipation. Pt returned self to supine position and RN was notified.  Continue with PT and POC.     Rehab Prognosis: Good; patient would benefit from acute skilled PT services to address these deficits and reach maximum level of function.    Recent Surgery: * No surgery found *      Plan:     During this hospitalization, patient to be seen 6 x/week to address the identified rehab impairments via gait training, therapeutic activities, therapeutic exercises and progress toward the following goals:    · Plan of Care Expires:  01/15/21    Subjective     Chief Complaint: Pt agreeable to tx  Patient/Family Comments/goals: return home   Pain/Comfort:  · Pain Rating 1: 0/10      Objective:     Communicated with RN prior to session.  Patient found HOB elevated with telemetry, bed alarm upon PT entry to room.     General Precautions: Standard, fall   Orthopedic Precautions:N/A   Braces:       Functional Mobility:  · Bed Mobility:     · Scooting: maximal assistance  · Supine to Sit: moderate assistance  · Sit to Supine: minimum assistance      AM-PAC 6 CLICK MOBILITY          Therapeutic Activities and Exercises:   bed mobility; sitting EOB for trunk  control and midline orientation    Patient left HOB elevated with all lines intact, call button in reach and bed alarm on..    GOALS:   Multidisciplinary Problems     Physical Therapy Goals        Problem: Physical Therapy Goal    Goal Priority Disciplines Outcome Goal Variances Interventions   Physical Therapy Goal     PT, PT/OT Ongoing, Progressing     Description: Goals to be met by: Discharge      Patient will increase functional independence with mobility by performin. Supine to sit with Modified [place]  2. Sit to supine with Modified [place]  3. Rolling to Left and Right with Modified [place].  4. Sit to stand transfer with Supervision  5. Bed to chair transfer with Supervision using Rolling Walker  6. Gait  x 50 feet with Minimal Assistance using Rolling Walker.                    Time Tracking:     PT Received On: 20  PT Start Time: 1102     PT Stop Time: 1116  PT Total Time (min): 14 min     Billable Minutes: Therapeutic Activity 14    Treatment Type: Treatment  PT/PTA: PTA     PTA Visit Number: 1     Hillary Hammant, PTA  2020

## 2020-12-23 NOTE — PLAN OF CARE
KELY able to reach pt's [phone] and provided updates RE anticipated d/c to SNF today. Familia stated he understood his sister was looking into a facility for pt. KELY explained that pt made his own decision this morning. (Note that no family member has POA). Familia aware that KELY/SYLWIA has made several attempts to call back his sister and she has not responded. KELY informed Familia pt is desiring to go to closest facility, which is [facility] in [place]. Familia v/u. Discussed transportation options. Familia stated he will speak with pt's caregiver Jessica to see if her son is desiring to give pt a ride.

## 2020-12-23 NOTE — PLAN OF CARE
12/23/20 1517   Final Note   Assessment Type Final Discharge Note   Anticipated Discharge Disposition SNF   Post-Acute Status   Post-Acute Authorization Placement   Post-Acute Placement Status Set-up Complete   Pt is d/c to [facility]. KELY arranged for WC accessible van through pt's insurance/[employer] ([phone]). Pt's friend Jessica is dropping off his wheelchair. Pt's son has been informed of d/c. (No response has been received as of yet by pt's dtr).

## 2020-12-23 NOTE — PLAN OF CARE
Problem: Fall Injury Risk  Goal: Absence of Fall and Fall-Related Injury  Outcome: Ongoing, Not Progressing     Problem: Adult Inpatient Plan of Care  Goal: Plan of Care Review  Outcome: Ongoing, Not Progressing  Goal: Patient-Specific Goal (Individualization)  Outcome: Ongoing, Not Progressing  Goal: Absence of Hospital-Acquired Illness or Injury  Outcome: Ongoing, Not Progressing  Goal: Optimal Comfort and Wellbeing  Outcome: Ongoing, Not Progressing  Goal: Readiness for Transition of Care  Outcome: Ongoing, Not Progressing  Goal: Rounds/Family Conference  Outcome: Ongoing, Not Progressing     Problem: Adjustment to Illness (Sepsis/Septic Shock)  Goal: Optimal Coping  Outcome: Ongoing, Not Progressing     Problem: Infection (Sepsis/Septic Shock)  Goal: Absence of Infection Signs/Symptoms  Outcome: Ongoing, Not Progressing     Problem: Nutrition Impaired (Sepsis/Septic Shock)  Goal: Optimal Nutrition Intake  Outcome: Ongoing, Not Progressing     Problem: Respiratory Compromise (Sepsis/Septic Shock)  Goal: Effective Oxygenation and Ventilation  Outcome: Ongoing, Not Progressing     Problem: Infection  Goal: Infection Symptom Resolution  Outcome: Ongoing, Not Progressing     Problem: Respiratory Compromise (Pneumonia)  Goal: Effective Oxygenation and Ventilation  Outcome: Ongoing, Not Progressing     Problem: Skin Injury Risk Increased  Goal: Skin Health and Integrity  Outcome: Ongoing, Not Progressing

## 2020-12-23 NOTE — PT/OT/SLP DISCHARGE
Occupational Therapy Discharge Summary    Heriberto Keys  MRN: [id number]   Principal Problem: Pneumonia of both lower lobes due to infectious organism      Patient Discharged from acute Occupational Therapy on 12/23/2020 .  Please refer to prior OT note dated 12/23/2020  for functional status.    Assessment:      Patient is no longer making progress.    Objective:     GOALS:   Multidisciplinary Problems     Occupational Therapy Goals        Problem: Occupational Therapy Goal    Goal Priority Disciplines Outcome Interventions   Occupational Therapy Goal     OT, PT/OT Ongoing, Progressing    Description: Goals to be met by: discharge    Patient will increase functional independence with ADLs by performing:    UE Dressing with Modified [place].  LE Dressing with Modified [place].  Grooming while standing at sink with Modified [place].  Toileting from bedside commode with Modified [place] for hygiene and clothing management.   Toilet transfer to bedside commode with Modified [place].                     Reasons for Discontinuation of Therapy Services  pt is refusing blood work, pt with lack of progress towards goals with multple refusals with therapy and last H/H levels were 6.8/23.3 on 12/16/2020; pt with signifiicant pitting edema       Plan:     Patient Discharged to: pt currently still at [facility]     Amanda Cornejo, OT  12/23/2020

## 2020-12-23 NOTE — PLAN OF CARE
Problem: Fall Injury Risk  Goal: Absence of Fall and Fall-Related Injury  Outcome: Ongoing, Not Progressing     Problem: Adult Inpatient Plan of Care  Goal: Plan of Care Review  Outcome: Ongoing, Not Progressing  Goal: Optimal Comfort and Wellbeing  Outcome: Ongoing, Not Progressing  Goal: Readiness for Transition of Care  Outcome: Ongoing, Not Progressing  Goal: Rounds/Family Conference  Outcome: Ongoing, Not Progressing     Problem: Adjustment to Illness (Sepsis/Septic Shock)  Goal: Optimal Coping  Outcome: Ongoing, Not Progressing     Problem: Bleeding (Sepsis/Septic Shock)  Goal: Absence of Bleeding  Outcome: Ongoing, Not Progressing     Problem: Glycemic Control Impaired (Sepsis/Septic Shock)  Goal: Blood Glucose Level Within Desired Range  Outcome: Ongoing, Not Progressing     Problem: Hemodynamic Instability (Sepsis/Septic Shock)  Goal: Effective Tissue Perfusion  Outcome: Ongoing, Not Progressing     Problem: Infection (Sepsis/Septic Shock)  Goal: Absence of Infection Signs/Symptoms  Outcome: Ongoing, Not Progressing     Problem: Nutrition Impaired (Sepsis/Septic Shock)  Goal: Optimal Nutrition Intake  Outcome: Ongoing, Not Progressing     Problem: Respiratory Compromise (Sepsis/Septic Shock)  Goal: Effective Oxygenation and Ventilation  Outcome: Ongoing, Not Progressing     Problem: Infection  Goal: Infection Symptom Resolution  Outcome: Ongoing, Not Progressing     Problem: Oral Intake Inadequate  Goal: Improved Oral Intake  Outcome: Ongoing, Not Progressing     Problem: Fluid Imbalance (Pneumonia)  Goal: Fluid Balance  Outcome: Ongoing, Not Progressing     Problem: Infection (Pneumonia)  Goal: Resolution of Infection Signs/Symptoms  Outcome: Ongoing, Not Progressing     Problem: Respiratory Compromise (Pneumonia)  Goal: Effective Oxygenation and Ventilation  Outcome: Ongoing, Not Progressing     Problem: Skin Injury Risk Increased  Goal: Skin Health and Integrity  Outcome: Ongoing, Not Progressing

## 2020-12-23 NOTE — PLAN OF CARE
"KELY spoke with Christi this morning at [phone]. She states pt has been accepted and either [facility] or [facility] ready to accept him. Discussed with Dr. Angel who states pt is alert and oriented, just [place]. SW discussed anticipated d/c today with Dr. Angel present, using pen and paper as necessary to ensure comprehension d/t being [place]. Pt is agreeable to transferring to nursing facility. Pt states he would like to go to "whichever is closer."  Pt is not concerned about family being updated but states this SW may call his children. Pt confirms he is willing to fill out paperwork. Transportation will need to be arranged.    SW attempting to update family - left a message for pt's dtr Dottie Coker at [phone].  "

## 2020-12-23 NOTE — DISCHARGE SUMMARY
[facility]  Discharge Summary      Patient Name: Heriberto Keys  MRN: [id number]  Patient Class: IP- Inpatient  Admission Date: 12/7/2020  Hospital Length of Stay: 16 days  Discharge Date and Time:  12/23/2020 12:54 PM  Attending Physician: Clovis Angel MD   Discharging Provider: Clovis Angel MD  Primary Care Provider: Jagruti Davis NP      HPI:   60-year-old male with known Hodgkin's lymphoma currently in remission, chronic anemia, thrombocytopenia and chronic hypotension on midodrine brought to the ED secondary to fever and generalized weakness.    History is limited; patient is a poor historian and has hearing loss. No family at bedside.  As per report he has been having intermittent fever (T-max of 103° F) with altered mental status and falls.  He is alert and oriented during my evaluation.  He admits to fever and generalized weakness.  He denies headache, neck pain, chest pain, cough, abdominal pain, diarrhea, melena or hematochezia..  Report of significant bilateral lower extremity edema noted. He was seen by his oncologist 2 weeks ago and he is being worked up for aplastic anemia.     In the ED:  T-max of 101.1° F with tachycardia and tachypnea.  Labs with worsening leukocytosis and acute on chronic anemia with thrombocytopenia.  Chest x-ray reveals worsening bilateral perihilar and lower lung interstitial and ground-glass opacities.    Rest of the 10 point review of systems is negative except as mentioned above.      * No surgery found *      Hospital Course:   60-year-old male with Hodgkin's lymphoma in remission, hearing loss and chronic anemia admitted for pneumonia as well as acute on chronic anemia.  Seen by infectious diseases, ENT as well as Oncology.      Seen by ENT for chronic sinusitis and hearing loss with no acute identifiable etiology.  Recommend outpatient audiogram.    Pt completed his treatment for pneumonia in house.    His anemia is thought to be  secondary to aplastic anemia however complete workup is pending and has been deferred to outpatient setting as per Hematology.    Patient has been refusing several interventions including daily labs and intravenous antibiotics or supplements.    He received several units of packed red blood cells during the course of his hospitalization to maintain hemoglobin greater than 7 grams/deciliter.    Hospital stay complicated by confusion which is thought to be secondary to hearing loss. Also very uncooperative.  Seen by PT/OT who recommended skilled nursing facility.    Patient is very withdrawn and requires a lot of encouragement to feed himself or participate with therapy.   I discussed with his daughter Dottie who is a dentist out of the state.  She understands that he would be best served at a skilled nursing facility to improve his strength or if he is uncooperative, then will benefit from Nursing home placement.  Daughter also informed me that she does not have any plans to come down to [place] to talk to him and he has not been answering her calls.  Daughter informed that she does not have a power of  but she will get the power of  to make decisions for him as he is unwilling to.  Patient caregiver who is also a friend expressed last time to me that she is unable to take care of him as he is very dependent and unable to do anything for himself.  Today is day 7 that patient has been refusing blood work or any active interactions or communication.  Patient has been accepted at [facility] in [place].  We tried to reach out to patient's daughter Dottie multiple times yesterday and today but in able to get hold of her.   Kelsi called his son and informed him of his transfer to skilled facility.  Verbalized understanding    Patient is being discharged today  Patient was seen and examined on the day of discharge    Vitals reviewed.  Constitutional: No distress.  Laying in bed,  malnourished  HENT: NC.  Very hard of hearing  Head: Atraumatic.   Cardiovascular:  tachycardic rate, regular rhythm.  1+ pitting edema bilateral lower extremity  Pulmonary/Chest: Effort normal. No wheezes.   Abdominal: Soft. Bowel sounds are normal. No distension and no mass. No tenderness  Neurological: Alert.   Skin: Skin is warm and dry.   Psych:  Uncooperative     Consults:   Consults (From admission, onward)        Status Ordering Provider     Inpatient consult to ENT  Once     Provider:  Ren Raza MD    Completed MIKO JEAN-BAPTISTE     Inpatient consult to Hematology Oncology  Once     Provider:  Amanda Rich MD    Completed ABDULLAHI CHAIREZ     Inpatient consult to Hospitalist  Once     Provider:  Abdullahi Chairez MD    Acknowledged ABDULLAHI CHAIREZ     Inpatient consult to Infectious Diseases  Once     Provider:  Miko Jean-Baptiste MD    Completed LOLA BLISS     Inpatient consult to   Once     Provider:  (Not yet assigned)    Acknowledged ABDULLAHI CHAIREZ          No new Assessment & Plan notes have been filed under this hospital service since the last note was generated.  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  Schedule an appointment as soon as possible for a visit in 2 weeks.    Specialty: Family Medicine  Why: POST DISCHARGE FROM SNF  Contact information:  [address] [facility] [address]  [phone]             Amanda Rich MD In 2 weeks.    Specialty: Hematology and Oncology  Why: POST D/C FROM SNF- ? HOSPICE AS PT REFUSED BLOOD WORK AND TREATMENT  Contact information:  [address]  [address]  [place] [address]  [phone]                 Patient Instructions:      Diet Adult Regular     Notify your health care provider if you experience any of the following:  increased confusion or weakness     Activity as tolerated       Significant Diagnostic Studies: Labs:   BMP: No results for input(s): GLU, NA, K, CL, CO2, BUN, CREATININE, CALCIUM, MG in the last 48 hours., CBC No results for input(s): WBC, HGB, HCT, PLT in the last 48 hours., INR   Lab Results   Component Value Date    INR 2.0 11/09/2020    INR 1.1 01/15/2020    INR 1.4 (H) 11/07/2019   , Lipid Panel No results found for: CHOL, HDL, LDLCALC, TRIG, CHOLHDL and Troponin No results for input(s): TROPONINI in the last 168 hours.    Pending Diagnostic Studies:     Procedure Component Value Units Date/Time    CBC Auto Differential [[id number]]     Order Status: Sent Lab Status: No result     Specimen: Blood     Comprehensive Metabolic Panel [[id number]]     Order Status: Sent Lab Status: No result     Specimen: Blood     EKG 12-lead [[id number]] Collected: 12/22/20 1144    Order Status: Sent Lab Status: In process Updated: 12/22/20 1156    Narrative:      Test Reason : R07.9,    Vent. Rate : 111 BPM     Atrial Rate : 111 BPM     P-R Int : 116 ms          QRS Dur : 136 ms      QT Int : 374 ms       P-R-T Axes : 081 -69 089 degrees     QTc Int : 508 ms    Sinus tachycardia  Right bundle branch block  Left anterior fascicular block   Bifascicular block   Abnormal ECG  When compared with ECG of 07-DEC-2020 15:23,  No significant change  was found    Referred By: [email or web address]   SELF           Confirmed By:     FTA antibodies, IgG and IgM [[id number]]     Order Status: Sent Lab Status: No result     Specimen: Blood     Hepatitis B surface antigen [[id number]]     Order Status: Sent Lab Status: No result     Specimen: Blood     Hepatitis C antibody [[id number]]     Order Status: Sent Lab Status: No result     Specimen: Blood     Magnesium [[id number]]     Order Status: Sent Lab Status: No result     Specimen: Blood     Phosphorus [[id number]]     Order Status: Sent Lab Status: No result     Specimen: Blood          Medications:  Reconciled Home Medications:      Medication List      CONTINUE taking these medications    cetirizine 10 MG tablet  Commonly known as: ZYRTEC  Take 10 mg by mouth daily as needed for Allergies.     cyanocobalamin (vitamin B-12) 1,000 mcg/15 mL Liqd  Place 1,000 mcg under the tongue once daily.     folic acid 1 MG tablet  Commonly known as: FOLVITE  Take 1 tablet (1 mg total) by mouth once daily.     midodrine 5 MG Tab  Commonly known as: PROAMATINE  TAKE 1 TABLET (5 MG TOTAL) BY MOUTH 2 (TWO) TIMES DAILY WITH MEALS.     pyridoxine (vitamin B6) 100 MG Tab  Commonly known as: VITAMIN B-6  Take 1 tablet (100 mg total) by mouth once daily.     thiamine 100 MG tablet  Take 1 tablet (100 mg total) by mouth once daily.        STOP taking these medications    tobramycin sulfate 0.3% 0.3 % ophthalmic solution  Commonly known as: TOBREX            Indwelling Lines/Drains at time of discharge:   Lines/Drains/Airways     Central Venous Catheter Line                 Port A Cath Single Lumen right subclavian -- days                Time spent on the discharge of patient: 36 minutes  Patient was seen and examined on the date of discharge and determined to be suitable for discharge.         Clovis Angel MD  Department of Hospital Medicine  [facility]

## 2020-12-23 NOTE — PLAN OF CARE
Problem: Physical Therapy Goal  Goal: Physical Therapy Goal  Description: Goals to be met by: Discharge      Patient will increase functional independence with mobility by performin. Supine to sit with Modified [place]  2. Sit to supine with Modified [place]  3. Rolling to Left and Right with Modified [place].  4. Sit to stand transfer with Supervision  5. Bed to chair transfer with Supervision using Rolling Walker  6. Gait  x 50 feet with Minimal Assistance using Rolling Walker.   Outcome: Ongoing, Progressing

## 2025-05-12 NOTE — TELEPHONE ENCOUNTER
LM encouraging call back to clinic to inform that we have received note from Cedrick Triage RN and do agree with recommendation for patient to report to nearest ER.  Additionally requested call back to clinic to discuss any further questions/concerns.    ----- Message from Quynh Gibson sent at 11/9/2020  9:28 AM CST -----  Regarding: Requesting a call back time sensitive  Patrizia Dillon neighbor calling on behalf for pt requesting a call back from the nurse for pt.    Jessica Nagy is pt live in person, pt is low blood pressure 84/42 pulse 122,  ...temperature 100.5      Jessica be reached at   [phone]    Thank you!       What Is The Reason For Today's Visit?: Full Body Skin Examination